# Patient Record
Sex: FEMALE | Race: WHITE | NOT HISPANIC OR LATINO | Employment: FULL TIME | URBAN - METROPOLITAN AREA
[De-identification: names, ages, dates, MRNs, and addresses within clinical notes are randomized per-mention and may not be internally consistent; named-entity substitution may affect disease eponyms.]

---

## 2017-01-02 ENCOUNTER — ALLIED HEALTH/NURSE VISIT (OUTPATIENT)
Dept: FAMILY MEDICINE | Facility: CLINIC | Age: 42
End: 2017-01-02
Payer: COMMERCIAL

## 2017-01-02 DIAGNOSIS — Z48.02 VISIT FOR SUTURE REMOVAL: Primary | ICD-10-CM

## 2017-01-02 PROCEDURE — 99207 ZZC NO CHARGE NURSE ONLY: CPT

## 2017-01-12 ENCOUNTER — OFFICE VISIT (OUTPATIENT)
Dept: DERMATOLOGY | Facility: CLINIC | Age: 42
End: 2017-01-12
Payer: COMMERCIAL

## 2017-01-12 DIAGNOSIS — D48.5 NEOPLASM OF UNCERTAIN BEHAVIOR OF SKIN: Primary | ICD-10-CM

## 2017-01-12 PROCEDURE — 88305 TISSUE EXAM BY PATHOLOGIST: CPT | Mod: 90 | Performed by: DERMATOLOGY

## 2017-01-12 PROCEDURE — 11100 HC BIOPSY SKIN/SUBQ/MUC MEM, SINGLE LESION: CPT | Performed by: DERMATOLOGY

## 2017-01-12 NOTE — MR AVS SNAPSHOT
After Visit Summary   1/12/2017    Elizabeth Goodson    MRN: [id number]           Patient Information     Date Of Birth          1975        Visit Information        Provider Department      1/12/2017 3:15 PM Berta Turner MD [facility]        Today's Diagnoses     Neoplasm of uncertain behavior of skin    -  1       Care Instructions    Wound Care After a Biopsy    What is a skin biopsy?  A skin biopsy allows the doctor to examine a very small piece of tissue under the microscope to determine the diagnosis and the best treatment for the skin condition. A local anesthetic (numbing medicine)  is injected with a very small needle into the skin area to be tested. A small piece of skin is taken from the area. Sometimes a suture (stitch) is used.     What are the risks of a skin biopsy?  I will experience scar, bleeding, swelling, pain, crusting and redness. I may experience incomplete removal or recurrence. Risks of this procedure are excessive bleeding, bruising, infection, nerve damage, numbness, thick (hypertrophic or keloidal) scar and non-diagnostic biopsy.    How should I care for my wound for the first 24 hours?    Keep the wound dry and covered for 24 hours    If it bleeds, hold direct pressure on the area for 15 minutes. If bleeding does not stop then go to the emergency room    Avoid strenuous exercise the first 1-2 days or as your doctor instructs you    How should I care for the wound after 24 hours?    After 24 hours, remove the bandage    You may bathe or shower as normal    If you had a scalp biopsy, you can shampoo as usual and can use shower water to clean the biopsy site daily    Clean the wound twice a day with gentle soap and water    Do not scrub, be gentle    Apply white petroleum/Vaseline after cleaning the wound with a cotton swab or a clean finger, and keep the site covered with a Bandaid /bandage. Bandages are not necessary with a scalp biopsy    If you  are unable to cover the site with a Bandaid /bandage, re-apply ointment 2-3 times a day to keep the site moist. Moisture will help with healing    Avoid strenuous activity for first 1-2 days    Avoid lakes, rivers, pools, and oceans until the stitches are removed or the site is healed    How do I clean my wound?    Wash hands for 15 minutes with soap or use hand  before all wound care    Clean the wound with gentle soap and water    Apply white petroleum/Vaseline  to wound after it is clean    Replace the Bandaid /bandage to keep the wound covered for the first few days or as instructed by your doctor    If you had a scalp biopsy, warm shower water to the area on a daily basis should suffice    What should I use to clean my wound?     Cotton-tipped applicators (Qtips )    White petroleum jelly (Vaseline ). Use a clean new container and use Q-tips to apply.    Bandaids   as needed    Gentle soap     How should I care for my wound long term?    Do not get your wound dirty    Keep up with wound care for one week or until the area is healed.    A small scab will form and fall off by itself when the area is completely healed. The area will be red and will become pink in color as it heals. Sun protection is very important for how your scar will turn out. Sunscreen with an SPF 30 or greater is recommended once the area is healed.    You should have some soreness but it should be mild and slowly go away over several days. Talk to your doctor about using tylenol for pain,    When should I call my doctor?  If you have increased:     Pain or swelling    Pus or drainage (clear or slightly yellow drainage is ok)    Temperature over 100F    Spreading redness or warmth around wound    When will I hear about my results?  The biopsy results can take 2-3 weeks to come back. The clinic will call you with the results, send you a [employer] message, or have you schedule a follow-up clinic or phone time to discuss the results.  Contact our clinics if you do not hear from us in 3 weeks.     Who should I call with questions?    [facility]: [phone]     [facility]: [phone]    For urgent needs outside of business hours call the [facility] at [phone] and ask for the dermatology resident on call            Follow-ups after your visit        Your next 10 appointments already scheduled     Feb 02, 2017  8:30 AM   PROCEDURE with Barbara [facility]agoza MD   [facility]    [address] [address] [place] [address] [phone]            Apr 21, 2017  2:45 PM   Return Visit with Berta Turner MD   [facility])    [address] [place] [address] [phone]              Who to contact     If you have questions or need follow up information about today's clinic visit or your schedule please contact [facility] directly at [phone].  Normal or non-critical lab and imaging results will be communicated to you by [email or web address], letter or phone within 4 business days after the clinic has received the results. If you do not hear from us within 7 days, please contact the clinic through [email or web address] or phone. If you have a critical or abnormal lab result, we will notify you by phone as soon as possible.  Submit refill requests through [employer] or call your pharmacy and they will forward the refill request to us. Please allow 3 business days for your refill to be completed.          Additional Information About Your Visit        [email or web address] Information     [employer] gives you secure access to your electronic health record. If you see a primary care provider, you can also send messages to your care team and make appointments. If you have questions, please call your primary care clinic.  If you do not have a primary care provider, please call [phone] and  they will assist you.      [employer] is an electronic gateway that provides easy, online access to your medical records. With [employer], you can request a clinic appointment, read your test results, renew a prescription or communicate with your care team.     To access your existing account, please contact your [facility] or call [phone] for assistance.        Care EveryWhere ID     This is your Care EveryWhere ID. This could be used by other organizations to access your [facility] medical records  [id number]         Blood Pressure from Last 3 Encounters:   12/06/16 132/90   10/31/16 110/88   10/20/16 134/90    Weight from Last 3 Encounters:   12/06/16 85.049 kg (187 lb 8 oz)   10/31/16 83.008 kg (183 lb)   10/20/16 81.647 kg (180 lb)              We Performed the Following     BIOPSY SKIN/SUBQ/MUC MEM, SINGLE LESION     Surgical pathology exam        Primary Care Provider Office Phone # Fax #    RAÚL Pat [facility] [phone]       [facility] [address] [facility] [address]        Thank you!     Thank you for choosing [facility]  for your care. Our goal is always to provide you with excellent care. Hearing back from our patients is one way we can continue to improve our services. Please take a few minutes to complete the written survey that you may receive in the mail after your visit with us. Thank you!             Your Updated Medication List - Protect others around you: Learn how to safely use, store and throw away your medicines at www.disposemymeds.org.          This list is accurate as of: 1/12/17  3:51 PM.  Always use your most recent med list.                   Brand Name Dispense Instructions for use    albuterol 108 (90 BASE) MCG/ACT Inhaler    PROAIR HFA/PROVENTIL HFA/VENTOLIN HFA    1 Inhaler    Inhale 2 puffs into the lungs every 6 hours as needed for shortness of breath / dyspnea or wheezing       amitriptyline 25 MG  tablet    ELAVIL    135 tablet    Take 2-3 tablets (50-75 mg) by mouth At Bedtime       cyclobenzaprine 10 MG tablet    FLEXERIL    90 tablet    TAKE ONE TABLET BY MOUTH AT BEDTIME       diltiazem 240 MG 24 hr capsule     90 capsule    TAKE ONE CAPSULE BY MOUTH ONCE DAILY       * EPINEPHrine 0.3 MG/0.3ML injection     2 each    Inject 0.3 mLs (0.3 mg) into the muscle once as needed for anaphylaxis       * EPINEPHrine 0.3 MG/0.3ML injection    EPIPEN 2-KIMBERLEE    0.6 mL    Inject 0.3 mLs (0.3 mg) into the muscle once as needed for anaphylaxis       leflunomide 20 MG tablet    ARAVA    30 tablet    Take 1 tablet (20 mg) by mouth daily       levothyroxine 50 MCG tablet    SYNTHROID/LEVOTHROID    90 tablet    Take 1 tablet (50 mcg) by mouth daily       losartan 50 MG tablet    COZAAR    90 tablet    Take 1 tablet (50 mg) by mouth daily       * Notice:  This list has 2 medication(s) that are the same as other medications prescribed for you. Read the directions carefully, and ask your doctor or other care provider to review them with you.

## 2017-01-12 NOTE — PROGRESS NOTES
[facility] Dermatology Note      Dermatology Problem List:  1. BCC, left lower leg  -s/p biopsy 12/20/2016  2. NUB, left knee  -s/p biopsy 1/12/2017    Encounter Date: Jan 12, 2017    CC:  Chief Complaint   Patient presents with     Derm Problem     Lt knee sin spot to be removed         History of Present Illness:  Ms. Elizabeth Goodson is a 41 year old female who presents as a follow up for lesion on the left lower leg. The patient was last seen 12/20/2016 when a lesion on the right knee was identified for recheck and a biopsy performed on the left lower leg. At that time a similar lesion was identified on the left knee. Patient declined biopsy pending results from the left lower leg. Today, the patient reports no other lesions of concern.      Past Medical History:   Patient Active Problem List   Diagnosis     Syncope     Raynaud phenomenon     CARDIOVASCULAR SCREENING; LDL GOAL LESS THAN 160     Strain of neck muscle     Migraine headache     Adnexal mass     Cough with hemoptysis     Incidental lung nodule, less than or equal to 3mm     Need for prophylactic vaccination and inoculation against influenza     Fibromyalgia     HTN, goal below 140/90     H/O: hysterectomy     GERD (gastroesophageal reflux disease)     Edema     High risk medication use     Hypothyroidism     Ventral hernia without obstruction or gangrene     Hemoptysis     Rheumatoid arthritis, involving unspecified site, unspecified rheumatoid factor presence (H)     Penicillin allergy     Peanut allergy     Tree nut allergy     Anaphylaxis, subsequent encounter     Itching     Past Medical History   Diagnosis Date     Tobacco abuse, in remission      quit 2012     Massive hemoptysis 11/12     due to pneumonia     Incidental lung nodule, less than or equal to 3mm 11/12     NICHOLAS     Migraine headache      HTN (hypertension)      Adnexal mass      Hemoptysis 11/12, 11/2015-mild     last episodes, mild, has had massive in HX      Past Surgical History   Procedure Laterality Date     Hysterectomy, vaginal  2002     Cholecystectomy, laporoscopic  2007     Cholecystectomy, Laparoscopic     Excise mass foot  11/22/2013     Procedure: EXCISE MASS FOOT;  Right Foot Soft Tissue Mass Excision;  Surgeon: Jose Cruz Garcia DPM;  [facility]: [facility]     Excise mass foot Right 7/17/2015     Procedure: EXCISE MASS FOOT;  Surgeon: Pierre Adan DPM;  Location: [facility] OR     Laparoscopic herniorrhaphy ventral N/A 2/24/2016     Procedure: LAPAROSCOPIC HERNIORRHAPHY VENTRAL;  Surgeon: Lars Dahl MD;  Location: [facility] OR       Social History:  The patient works as a teacher. The patient denies use of tanning beds. The patient does not drink alcohol, smoke or use tobacco.    Family History:  There is no family history of skin cancer.    Medications:  Current Outpatient Prescriptions   Medication Sig Dispense Refill     albuterol (PROAIR HFA, PROVENTIL HFA, VENTOLIN HFA) 108 (90 BASE) MCG/ACT inhaler Inhale 2 puffs into the lungs every 6 hours as needed for shortness of breath / dyspnea or wheezing 1 Inhaler 1     diltiazem 240 MG 24 hr CD capsule TAKE ONE CAPSULE BY MOUTH ONCE DAILY 90 capsule 1     EPINEPHrine (EPIPEN 2-KIMBERLEE) 0.3 MG/0.3ML injection 2-pack Inject 0.3 mLs (0.3 mg) into the muscle once as needed for anaphylaxis 0.6 mL 1     losartan (COZAAR) 50 MG tablet Take 1 tablet (50 mg) by mouth daily 90 tablet 0     cyclobenzaprine (FLEXERIL) 10 MG tablet TAKE ONE TABLET BY MOUTH AT BEDTIME 90 tablet 1     EPINEPHrine (EPIPEN) 0.3 MG/0.3ML injection Inject 0.3 mLs (0.3 mg) into the muscle once as needed for anaphylaxis 2 each 1     levothyroxine (SYNTHROID, LEVOTHROID) 50 MCG tablet Take 1 tablet (50 mcg) by mouth daily 90 tablet 1     leflunomide (ARAVA) 20 MG tablet Take 1 tablet (20 mg) by mouth daily 30 tablet 2     amitriptyline (ELAVIL) 25 MG tablet Take 2-3 tablets (50-75 mg) by mouth At Bedtime 135 tablet 1       Allergies   Allergen  Reactions     Penicillins Anaphylaxis     Walnuts [Nuts] Anaphylaxis     All tree nuts       Review of Systems:  -Skin: As above in HPI. No additional skin concerns.    Physical exam:  Vitals: There were no vitals taken for this visit.  GEN: This is a well developed, well-nourished female in no acute distress, in a pleasant mood.    SKIN: Focused examination of the left lower leg, left knee and right knee was performed.  -6mm erythematous papule on the left knee.   -right knee normal  -No other lesions of concern on areas examined.     Impression/Plan:  1. BCC, left lower leg, s/p biopsy 12/20/2016    Scheduled with Dr. Zaragoza 2/2/2017  2. Bright red papule with erosion, right knee consistent with inflamed SK-resolved    No further intervention required at this time.   3. Erythematous 6mm papule, left knee. Neoplasm of uncertain behavior. Differential diagnosis includes BCC versus SK versus other  Shave biopsy:  After discussion of benefits and risks including but not limited to bleeding/bruising, pain/swelling, infection, scar, incomplete removal, nerve damage/numbness, recurrence, and non-diagnostic biopsy, written consent, verbal consent and photographs were obtained. Time-out was performed. The area was cleaned with isopropyl alcohol. 0.5 mL of 1% lidocaine with epinephrine was injected to obtain adequate anesthesia of the lesion on the left knee. A shave biopsy was performed. Hemostasis was achieved with aluminium chloride. Vaseline and a sterile dressing were applied. The patient tolerated the procedure and no complications were noted. The patient was provided with verbal and written post care instructions.     Follow up in 6 months for skin check, earlier pending biopsy, earlier for new or changing lesions.     Staff Involved:  Scribe/Staff    Scribe Disclosure:   Portia STARKEY, am serving as a scribe to document services personally performed by Dr. Berta Turner, based on data collection and the provider's  statements to me.     Provider Disclosure:   I agree with above History, Review of Systems, Physical exam and Plan. I have reviewed the content of the documentation and have edited it as needed. I have personally performed the services documented here and the documentation accurately represents those services and the decisions I have made.     Berta Turner MD    Department of Dermatology  [facility]: Phone: [phone], Fax:[phone]  [facility]: Phone: [phone], Fax: [phone]

## 2017-01-12 NOTE — PATIENT INSTRUCTIONS

## 2017-01-12 NOTE — NURSING NOTE
Dermatology Rooming Note    Elizabeth Goodson's goals for this visit include:   Chief Complaint   Patient presents with     Derm Problem     Lt knee sin spot to be removed       Is a scribe okay for this visit:YES,     Are records needed for this visit(If yes, obtain release of information): N/a     Vitals: There were no vitals taken for this visit.    Referring Provider:  No referring provider defined for this encounter.

## 2017-01-12 NOTE — Clinical Note
1/12/2017      RE: Elizabeth Goodson  [address] [address] [place], [place] [address] [address]       [facility] Dermatology Note      Dermatology Problem List:  1. BCC, left lower leg  -s/p biopsy 12/20/2016  2. NUB, left knee  -s/p biopsy 1/12/2017    Encounter Date: Jan 12, 2017    CC:  Chief Complaint   Patient presents with     Derm Problem     Lt knee sin spot to be removed         History of Present Illness:  Ms. Elizabeth Goodson is a 41 year old female who presents as a follow up for lesion on the left lower leg. The patient was last seen 12/20/2016 when a lesion on the right knee was identified for recheck and a biopsy performed on the left lower leg. At that time a similar lesion was identified on the left knee. Patient declined biopsy pending results from the left lower leg. Today, the patient reports no other lesions of concern.      Past Medical History:   Patient Active Problem List   Diagnosis     Syncope     Raynaud phenomenon     CARDIOVASCULAR SCREENING; LDL GOAL LESS THAN 160     Strain of neck muscle     Migraine headache     Adnexal mass     Cough with hemoptysis     Incidental lung nodule, less than or equal to 3mm     Need for prophylactic vaccination and inoculation against influenza     Fibromyalgia     HTN, goal below 140/90     H/O: hysterectomy     GERD (gastroesophageal reflux disease)     Edema     High risk medication use     Hypothyroidism     Ventral hernia without obstruction or gangrene     Hemoptysis     Rheumatoid arthritis, involving unspecified site, unspecified rheumatoid factor presence (H)     Penicillin allergy     Peanut allergy     Tree nut allergy     Anaphylaxis, subsequent encounter     Itching     Past Medical History   Diagnosis Date     Tobacco abuse, in remission      quit 2012     Massive hemoptysis 11/12     due to pneumonia     Incidental lung nodule, less than or equal to 3mm 11/12     NICHOLAS     Migraine headache      HTN (hypertension)      Adnexal  mass      Hemoptysis 11/12, 11/2015-mild     last episodes, mild, has had massive in HX     Past Surgical History   Procedure Laterality Date     Hysterectomy, vaginal  2002     Cholecystectomy, laporoscopic  2007     Cholecystectomy, Laparoscopic     Excise mass foot  11/22/2013     Procedure: EXCISE MASS FOOT;  Right Foot Soft Tissue Mass Excision;  Surgeon: Jose Cruz Garcia DPM;  [facility]     Excise mass foot Right 7/17/2015     Procedure: EXCISE MASS FOOT;  Surgeon: Pierre Adan DPM;  [facility]     Laparoscopic herniorrhaphy ventral N/A 2/24/2016     Procedure: LAPAROSCOPIC HERNIORRHAPHY VENTRAL;  Surgeon: Lars Dahl MD;  Location: [facility] OR       Social History:  The patient works as a teacher. The patient denies use of tanning beds. The patient does not drink alcohol, smoke or use tobacco.    Family History:  There is no family history of skin cancer.    Medications:  Current Outpatient Prescriptions   Medication Sig Dispense Refill     albuterol (PROAIR HFA, PROVENTIL HFA, VENTOLIN HFA) 108 (90 BASE) MCG/ACT inhaler Inhale 2 puffs into the lungs every 6 hours as needed for shortness of breath / dyspnea or wheezing 1 Inhaler 1     diltiazem 240 MG 24 hr CD capsule TAKE ONE CAPSULE BY MOUTH ONCE DAILY 90 capsule 1     EPINEPHrine (EPIPEN 2-KIMBERLEE) 0.3 MG/0.3ML injection 2-pack Inject 0.3 mLs (0.3 mg) into the muscle once as needed for anaphylaxis 0.6 mL 1     losartan (COZAAR) 50 MG tablet Take 1 tablet (50 mg) by mouth daily 90 tablet 0     cyclobenzaprine (FLEXERIL) 10 MG tablet TAKE ONE TABLET BY MOUTH AT BEDTIME 90 tablet 1     EPINEPHrine (EPIPEN) 0.3 MG/0.3ML injection Inject 0.3 mLs (0.3 mg) into the muscle once as needed for anaphylaxis 2 each 1     levothyroxine (SYNTHROID, LEVOTHROID) 50 MCG tablet Take 1 tablet (50 mcg) by mouth daily 90 tablet 1     leflunomide (ARAVA) 20 MG tablet Take 1 tablet (20 mg) by mouth daily 30 tablet 2     amitriptyline (ELAVIL) 25 MG tablet Take  2-3 tablets (50-75 mg) by mouth At Bedtime 135 tablet 1       Allergies   Allergen Reactions     Penicillins Anaphylaxis     Walnuts [Nuts] Anaphylaxis     All tree nuts       Review of Systems:  -Skin: As above in HPI. No additional skin concerns.    Physical exam:  Vitals: There were no vitals taken for this visit.  GEN: This is a well developed, well-nourished female in no acute distress, in a pleasant mood.    SKIN: Focused examination of the left lower leg, left knee and right knee was performed.  -6mm erythematous papule on the left knee.   -right knee normal  -No other lesions of concern on areas examined.     Impression/Plan:  1. BCC, left lower leg, s/p biopsy 12/20/2016    Scheduled with Dr. Zaragoza 2/2/2017  2. Bright red papule with erosion, right knee consistent with inflamed SK-resolved    No further intervention required at this time.   3. Erythematous 6mm papule, left knee. Neoplasm of uncertain behavior. Differential diagnosis includes BCC versus SK versus other  Shave biopsy:  After discussion of benefits and risks including but not limited to bleeding/bruising, pain/swelling, infection, scar, incomplete removal, nerve damage/numbness, recurrence, and non-diagnostic biopsy, written consent, verbal consent and photographs were obtained. Time-out was performed. The area was cleaned with isopropyl alcohol. 0.5 mL of 1% lidocaine with epinephrine was injected to obtain adequate anesthesia of the lesion on the left knee. A shave biopsy was performed. Hemostasis was achieved with aluminium chloride. Vaseline and a sterile dressing were applied. The patient tolerated the procedure and no complications were noted. The patient was provided with verbal and written post care instructions.     Follow up in 6 months for skin check, earlier pending biopsy, earlier for new or changing lesions.     Staff Involved:  Scribe/Staff    Scribe Disclosure:   Portia STARKEY, am serving as a scribe to document services  personally performed by Dr. Berta Turner, based on data collection and the provider's statements to me.     Provider Disclosure:   I agree with above History, Review of Systems, Physical exam and Plan. I have reviewed the content of the documentation and have edited it as needed. I have personally performed the services documented here and the documentation accurately represents those services and the decisions I have made.     Berta Turner MD    Department of Dermatology  [facility]: Phone: [phone], Fax:[phone]  [facility]: Phone: [phone], Fax: [phone]

## 2017-01-16 LAB — COPATH REPORT: NORMAL

## 2017-01-17 ENCOUNTER — TELEPHONE (OUTPATIENT)
Dept: DERMATOLOGY | Facility: CLINIC | Age: 42
End: 2017-01-17

## 2017-01-17 NOTE — TELEPHONE ENCOUNTER
Patient returned call and was scheduled for ED&C with excision appointment on 2-2-2017 with Dr. Zaragoza. Patient verbalized understanding of diagnosis and has chosen to have an ED&C for treatment. Procedure explained to patient and aftercare reviewed. No further questions or concerns per patient at this time.  Columba Mooney LPN

## 2017-01-17 NOTE — TELEPHONE ENCOUNTER
Left message for patient to call [facility] in [place] back at [phone] in regards to results below    Notes Recorded by Maude Bell MD on 1/17/2017 at 11:41 AM  Call patient. Since this is a superficial BCC, It can be treated with Ed and C, excision or aldara. I think Ed and C or aldara would be the last invasive. She is already scheduled for Mohs for another spot.        1/12/17  3:45 PM     Copath Report Patient Name: MATA HARPER   MR#: [id number]   Specimen #:    Collected: 1/12/2017   Received: 1/13/2017   Reported: 1/16/2017 14:29   Ordering Phy(s): MAUDE BELL     For improved result formatting, select 'View Enhanced Report Format'   under Linked Documents section.     SPECIMEN(S):   Skin, left knee     FINAL DIAGNOSIS:   Skin, knee, left:   - Basal cell carcinoma, superficial type, extending to the lateral and   deep margins - (see description)                   Columba Mooney LPN

## 2017-02-02 ENCOUNTER — OFFICE VISIT (OUTPATIENT)
Dept: DERMATOLOGY | Facility: CLINIC | Age: 42
End: 2017-02-02
Payer: COMMERCIAL

## 2017-02-02 VITALS — HEART RATE: 85 BPM | SYSTOLIC BLOOD PRESSURE: 132 MMHG | OXYGEN SATURATION: 97 % | DIASTOLIC BLOOD PRESSURE: 95 MMHG

## 2017-02-02 DIAGNOSIS — C44.719 BASAL CELL CARCINOMA OF LEFT LOWER LEG: Primary | ICD-10-CM

## 2017-02-02 PROCEDURE — 12032 INTMD RPR S/A/T/EXT 2.6-7.5: CPT | Mod: 59 | Performed by: DERMATOLOGY

## 2017-02-02 PROCEDURE — 88305 TISSUE EXAM BY PATHOLOGIST: CPT | Performed by: DERMATOLOGY

## 2017-02-02 PROCEDURE — 17261 DSTRJ MAL LES T/A/L .6-1.0CM: CPT | Performed by: DERMATOLOGY

## 2017-02-02 PROCEDURE — 11602 EXC TR-EXT MAL+MARG 1.1-2 CM: CPT | Mod: 59 | Performed by: DERMATOLOGY

## 2017-02-02 ASSESSMENT — PAIN SCALES - GENERAL: PAINLEVEL: NO PAIN (0)

## 2017-02-02 NOTE — NURSING NOTE
Dermatology Rooming Note    Elizabeth Goodson's goals for this visit include:   Chief Complaint   Patient presents with     Procedure     Excision x 1 - BCC left lower leg and ED&C on knee       Is a scribe okay for this visit:Not applicable,     Are records needed for this visit(If yes, obtain release of information): No,      Vitals: /95 mmHg  Pulse 85  SpO2 97%    Referring Provider:  No referring provider defined for this encounter.      Excision Intake  /95 mmHg  Pulse 85  SpO2 97%    artificial heart valve: No    artificial joint within the last 3 years: No    heart stent in the last 3 months: No    pacemaker: No    defibrillator: No    nerve/brain stimulator: No    bleeding disorder: No    coumadin use: No    heart valve disease: No    site location lower limb or groin: Yes    hepatitis B/C or HIV: No    smoker: No    Iodine allergy: N/A     Filomena Vergara LPN

## 2017-02-02 NOTE — NURSING NOTE
Vaseline and pressure dressing applied to Excision site on left lower leg and ED&C site left knee.  Wound care instructions reviewed with patient and AVS provided.  Patient verbalized understanding.  No further questions or concerns at this time.    Filomena Vergara LPN

## 2017-02-02 NOTE — MR AVS SNAPSHOT
After Visit Summary   2/2/2017    Elizabeth Goodson    MRN: [id number]           Patient Information     Date Of Birth          1975        Visit Information        Provider Department      2/2/2017 8:30 AM Barbara Zaragoza MD [facility]        Today's Diagnoses     Basal cell carcinoma of left lower leg    -  1       Care Instructions    Cryotherapy    What is it?    Use of a very cold liquid, such as liquid nitrogen, to freeze and destroy abnormal skin cells that need to be removed    What should I expect?    Tenderness and redness    A small blister that might grow and fill with dark purple blood. There may be crusting.    More than one treatment may be needed if the lesions do not go away.    How do I care for the treated area?    Gently wash the area with your hands when bathing.    Use a thin layer of Vaseline to help with healing. You may use a Band-Aid.     The area should heal within 7-10 days and may leave behind a pink or lighter color.     Do not use an antibiotic or Neosporin ointment.     You may take acetaminophen (Tylenol) for pain.     Call your Doctor if you have:    Severe pain    Signs of infection (warmth, redness, cloudy yellow drainage, and or a bad smell)    Questions or concerns    Who should I call with questions?       [facility]: [phone]       [facility]: [phone]       For urgent needs outside of business hours call the [facility] at [phone]        and ask for the dermatology resident on call        Wound Care:  Electrodesiccation and Curettage     I will experience scar, altered skin color, bleeding, swelling, pain, crusting and redness. I may experience altered sensation. Risks are excessive bleeding, infection, muscle weakness, thick (hypertrophic or keloidal) scar, and recurrence,. A second procedure may be recommended to obtain the best cosmetic or  functional result.    What is electrodesiccation and curettage ?    Scraping off tissue (curettage)    Destroy tissue using electric current or cautery (electrodessication)    How do I perform wound care?    Keep dressing in place for two days. You may shower with the dressing in place(do not get wet)    After 2 days, wash hands and remove dressing. Clean wound with cotton-swab soaked in hydrogen peroxide to remove drainage and crust    Put on a thick layer of Vaseline on the wound using a cotton-swab     Cover the wound with a Band-AidTM to protect from dust and tight clothing    If wound is draining before two days, change your dressing as described above sooner    During wound care, do not allow the area to dry out or form a scab    What do I need?    Hydrogen peroxide     Cotton-swabs     Vaseline or petroleum jelly     Band-AidsTM or dressing supplies as needed     When should I call the doctor?  [facility]: [phone]  [facility]: [phone]  For urgent needs outside of business hours call the [facility] at [phone] and ask for the dermatology resident on call    Excision Wound Care Instructions  I will experience scar, altered skin color, bleeding, swelling, pain, crusting and redness. I may experience altered sensation. Risks are excessive bleeding, infection, muscle weakness, thick (hypertrophic or keloidal) scar, and recurrence,. A second procedure may be recommended to obtain the best cosmetic or functional result.  Possible complications of any surgical procedure are bleeding, infection, scarring, alteration in skin color and sensation, muscle weakness in the area, wound dehiscence or seperation, or recurrence of the lesion or disease. On occasion, after healing, a secondary procedure or revision may be recommended in order to obtain the best cosmetic or functional result.   After your surgery, a pressure bandage will be  placed over the area that has sutures. This will help prevent bleeding. Please follow these instructions, as they will help you to prevent complications as your wound heals.  The sutures are dissolvable.  For the First 48 hours After Surgery:  1. Leave the pressure bandage on and keep it dry. If it should come loose, you may retape it, but do not take it off.  2. Relax and take it easy. Do not do any vigorous exercise, heavy lifting, or bending forward. This could cause the wound to bleed.  3. Post-operative pain is usually mild. You may take plain or extra strength Tylenol every 4 hours as needed (do not take more than 4,000mg in one day). Do not take any medicine that contains aspirin, ibuprofen or motrin unless you have been recommended these by a doctor.  Avoid alcohol and vitamin E as these may increase your tendency to bleed.  4. You may put an ice pack around the bandaged area for 20 minutes every 2-3 hours. This may help reduce swelling, bruising, and pain. Make sure the ice pack is waterproof so that the pressure bandage does not get wet.   5. You may see a small amount of drainage or blood on your pressure bandage. This is normal. However, if drainage or bleeding continues or saturates the bandage, you will need to apply firm pressure over the bandage with a washcloth for 15 minutes. If bleeding continues after applying pressure for 15 minutes then go to the nearest emergency room.  48 Hours After Surgery  Carefully remove the bandage and start daily wound care and dressing changes. You may also now shower and get the wound wet. Wash wound with a mild soap and water.  Use caution when washing the wound. Be gentle and do not let the forceful shower stream hit the wound directly.  PAT dry.  Daily Wound Care:  1. Wash wound with a mild soap and water.  Use caution when washing the wound, be gentle and do not let the forceful shower stream hit the wound directly.  2. PAT DRY.  3. After the Steri-Strips have  fallen off (5-7days)  Then start to apply Vaseline (from a new container or tube) over the suture line with a Q-tip. It is very important to keep the wound continuously moist, as wounds heal best in a moist environment.  4.  Keep the site covered until healed, you can cover it with a Telfa (non-stick) dressing and tape or a band-aid.    5. If you are unable to keep wound covered, you must apply Vaseline every 2 - 3 hours (while awake) to ensure it is being kept moist for optimal healing. A dressing overnight is recommended to keep the area moist.   Call Us If:  1. You have pain that is not controlled with Tylenol.  2. You have signs or symptoms of an infection, such as: fever over 100 degrees F, redness, warmth, or foul-smelling or yellow/creamy drainage from the wound.  Who should I call with questions?    [facility]: [phone]     [facility]: [phone]    For urgent needs outside of business hours call the [facility] at [phone] and ask for the dermatology resident on call          Follow-ups after your visit        Your next 10 appointments already scheduled     Apr 21, 2017  2:45 PM   Return Visit with Berta Turner MD   [facility]    [address] [place] [id number] [phone]              Who to contact     If you have questions or need follow up information about today's clinic visit or your schedule please contact [facility] directly at [phone].  Normal or non-critical lab and imaging results will be communicated to you by MyChart, letter or phone within 4 business days after the clinic has received the results. If you do not hear from us within 7 days, please contact the clinic through MyChart or phone. If you have a critical or abnormal lab result, we will notify you by phone as soon as possible.  Submit refill requests through [employer] or  call your pharmacy and they will forward the refill request to us. Please allow 3 business days for your refill to be completed.          Additional Information About Your Visit        [employer] Information     [employer] gives you secure access to your electronic health record. If you see a primary care provider, you can also send messages to your care team and make appointments. If you have questions, please call your primary care clinic.  If you do not have a primary care provider, please call [phone] and they will assist you.      [employer] is an electronic gateway that provides easy, online access to your medical records. With [employer], you can request a clinic appointment, read your test results, renew a prescription or communicate with your care team.     To access your existing account, please contact your [facility] or call [phone] for assistance.        Care EveryWhere ID     This is your Care EveryWhere ID. This could be used by other organizations to access your [place] medical records  [id number]        Your Vitals Were     Pulse Pulse Oximetry                85 97%           Blood Pressure from Last 3 Encounters:   02/02/17 132/95   12/06/16 132/90   10/31/16 110/88    Weight from Last 3 Encounters:   12/06/16 85.049 kg (187 lb 8 oz)   10/31/16 83.008 kg (183 lb)   10/20/16 81.647 kg (180 lb)              We Performed the Following     Surgical pathology exam        Primary Care Provider Office Phone # Fax #    [facility] [phone]       [facility] [address], [place] [address]        Thank you!     Thank you for choosing [facility]  for your care. Our goal is always to provide you with excellent care. Hearing back from our patients is one way we can continue to improve our services. Please take a few minutes to complete the written survey that you may receive in the mail after your visit with us.  Thank you!             Your Updated Medication List - Protect others around you: Learn how to safely use, store and throw away your medicines at www.disposemymeds.org.          This list is accurate as of: 2/2/17 10:00 AM.  Always use your most recent med list.                   Brand Name Dispense Instructions for use    albuterol 108 (90 BASE) MCG/ACT Inhaler    PROAIR HFA/PROVENTIL HFA/VENTOLIN HFA    1 Inhaler    Inhale 2 puffs into the lungs every 6 hours as needed for shortness of breath / dyspnea or wheezing       cyclobenzaprine 10 MG tablet    FLEXERIL    90 tablet    TAKE ONE TABLET BY MOUTH AT BEDTIME       diltiazem 240 MG 24 hr capsule     90 capsule    TAKE ONE CAPSULE BY MOUTH ONCE DAILY       * EPINEPHrine 0.3 MG/0.3ML injection     2 each    Inject 0.3 mLs (0.3 mg) into the muscle once as needed for anaphylaxis       * EPINEPHrine 0.3 MG/0.3ML injection    EPIPEN 2-KIMBERLEE    0.6 mL    Inject 0.3 mLs (0.3 mg) into the muscle once as needed for anaphylaxis       leflunomide 20 MG tablet    ARAVA    30 tablet    Take 1 tablet (20 mg) by mouth daily       levothyroxine 50 MCG tablet    SYNTHROID/LEVOTHROID    90 tablet    Take 1 tablet (50 mcg) by mouth daily       losartan 50 MG tablet    COZAAR    90 tablet    Take 1 tablet (50 mg) by mouth daily       * Notice:  This list has 2 medication(s) that are the same as other medications prescribed for you. Read the directions carefully, and ask your doctor or other care provider to review them with you.

## 2017-02-02 NOTE — LETTER
"    Patient:  Mata Goodson  :   1975  MRN:     [id number]        Ms.Jennifer KERVIN Goodson  [address] [address] [place] [id number]        2017    Dear ,      We are writing to inform you of your test results that show your skin cancer was removed completely with clear margins. If you have further questions or concerns, please contact the clinic at [phone].      Sincerely,    Sarahi Zaragoza MD    Dermatology Department of Dermatology  [facility]      Resulted Orders   Surgical pathology exam   Result Value Ref Range    Copath Report       Patient Name: MATA GOODSON  MR#: [id number]  Specimen #:   Collected: 2017  Received: 2/3/2017  Reported: 2017 10:13  Ordering Phy(s): SARAHI ZARAGOZA    For improved result formatting, select 'View Enhanced Report Format'  under Linked Documents section.    SPECIMEN(S):  Skin, left lower leg    FINAL DIAGNOSIS:  Skin, leg, left lower:  - Superficial basal cell carcinoma adjacent to scar from the prior  procedure, completely excised - (see description)    I have personally reviewed all specimens and or slides, including the  listed special stains, and used them with my medical judgement to  determine the final diagnosis.    Electronically signed out by:    Toño Lopez M.D., [facility]    CLINICAL HISTORY:  The patient is a 41-year-old female.    GROSS:  The specimen is received in formalin with proper patient identification,  labeled \"left lower leg\", and consists of a 2.3 x 1.2 cm pale tan skin  ellipse, excised to a depth of 0.3 cm.  No orientation  is provided.  At  the central skin surface is a 0.5 x 0.5 cm ill-defined, variegated  tan-gray to pink, granular lesion which grossly comes to within 0.3 cm  of the nearest margin.  The margins are inked blue, and the specimen is  serially sectioned and entirely submitted as " follows:    Summary of Sections:  1 - skin tips  2-3 - central aspect of skin  (one section in cassette 3 has an incomplete deep margin)    MICROSCOPIC:  The specimen exhibits a central zone of ulcer and dermal fibrosis  consistent with scar from the prior procedure.  Immediately adjacent to  this, residual basal cell carcinoma is present as small buds of atypical  basaloid epithelium along the epidermal undersurface.  This residual  tumor is completely excised.    CPT Codes:  A: 93742-WZ5.T, 45212-GP4.P    TESTING LAB LOCATION:  [facility], [address]   [address]  [phone]    COLLECTION SITE:  Client: [facility]  Location: [facility])

## 2017-02-02 NOTE — PATIENT INSTRUCTIONS
Cryotherapy    What is it?    Use of a very cold liquid, such as liquid nitrogen, to freeze and destroy abnormal skin cells that need to be removed    What should I expect?    Tenderness and redness    A small blister that might grow and fill with dark purple blood. There may be crusting.    More than one treatment may be needed if the lesions do not go away.    How do I care for the treated area?    Gently wash the area with your hands when bathing.    Use a thin layer of Vaseline to help with healing. You may use a Band-Aid.     The area should heal within 7-10 days and may leave behind a pink or lighter color.     Do not use an antibiotic or Neosporin ointment.     You may take acetaminophen (Tylenol) for pain.     Call your Doctor if you have:    Severe pain    Signs of infection (warmth, redness, cloudy yellow drainage, and or a bad smell)    Questions or concerns    Who should I call with questions?       [facility]: [phone]       [facility]: [phone]       For urgent needs outside of business hours call the [facility] at [phone]        and ask for the dermatology resident on call        Wound Care:  Electrodesiccation and Curettage     I will experience scar, altered skin color, bleeding, swelling, pain, crusting and redness. I may experience altered sensation. Risks are excessive bleeding, infection, muscle weakness, thick (hypertrophic or keloidal) scar, and recurrence,. A second procedure may be recommended to obtain the best cosmetic or functional result.    What is electrodesiccation and curettage ?    Scraping off tissue (curettage)    Destroy tissue using electric current or cautery (electrodessication)    How do I perform wound care?    Keep dressing in place for two days. You may shower with the dressing in place(do not get wet)    After 2 days, wash hands and remove dressing. Clean wound with cotton-swab soaked in  hydrogen peroxide to remove drainage and crust    Put on a thick layer of Vaseline on the wound using a cotton-swab     Cover the wound with a Band-AidTM to protect from dust and tight clothing    If wound is draining before two days, change your dressing as described above sooner    During wound care, do not allow the area to dry out or form a scab    What do I need?    Hydrogen peroxide     Cotton-swabs     Vaseline or petroleum jelly     Band-AidsTM or dressing supplies as needed     When should I call the doctor?  [facility]: [phone]  [facility]: [phone]  For urgent needs outside of business hours call the [facility] at [phone] and ask for the dermatology resident on call    Excision Wound Care Instructions  I will experience scar, altered skin color, bleeding, swelling, pain, crusting and redness. I may experience altered sensation. Risks are excessive bleeding, infection, muscle weakness, thick (hypertrophic or keloidal) scar, and recurrence,. A second procedure may be recommended to obtain the best cosmetic or functional result.  Possible complications of any surgical procedure are bleeding, infection, scarring, alteration in skin color and sensation, muscle weakness in the area, wound dehiscence or seperation, or recurrence of the lesion or disease. On occasion, after healing, a secondary procedure or revision may be recommended in order to obtain the best cosmetic or functional result.   After your surgery, a pressure bandage will be placed over the area that has sutures. This will help prevent bleeding. Please follow these instructions, as they will help you to prevent complications as your wound heals.  The sutures are dissolvable.  For the First 48 hours After Surgery:  1. Leave the pressure bandage on and keep it dry. If it should come loose, you may retape it, but do not take it off.  2. Relax and take it easy. Do not  do any vigorous exercise, heavy lifting, or bending forward. This could cause the wound to bleed.  3. Post-operative pain is usually mild. You may take plain or extra strength Tylenol every 4 hours as needed (do not take more than 4,000mg in one day). Do not take any medicine that contains aspirin, ibuprofen or motrin unless you have been recommended these by a doctor.  Avoid alcohol and vitamin E as these may increase your tendency to bleed.  4. You may put an ice pack around the bandaged area for 20 minutes every 2-3 hours. This may help reduce swelling, bruising, and pain. Make sure the ice pack is waterproof so that the pressure bandage does not get wet.   5. You may see a small amount of drainage or blood on your pressure bandage. This is normal. However, if drainage or bleeding continues or saturates the bandage, you will need to apply firm pressure over the bandage with a washcloth for 15 minutes. If bleeding continues after applying pressure for 15 minutes then go to the nearest emergency room.  48 Hours After Surgery  Carefully remove the bandage and start daily wound care and dressing changes. You may also now shower and get the wound wet. Wash wound with a mild soap and water.  Use caution when washing the wound. Be gentle and do not let the forceful shower stream hit the wound directly.  PAT dry.  Daily Wound Care:  1. Wash wound with a mild soap and water.  Use caution when washing the wound, be gentle and do not let the forceful shower stream hit the wound directly.  2. PAT DRY.  3. After the Steri-Strips have fallen off (5-7days)  Then start to apply Vaseline (from a new container or tube) over the suture line with a Q-tip. It is very important to keep the wound continuously moist, as wounds heal best in a moist environment.  4.  Keep the site covered until healed, you can cover it with a Telfa (non-stick) dressing and tape or a band-aid.    5. If you are unable to keep wound covered, you must apply  Vaseline every 2 - 3 hours (while awake) to ensure it is being kept moist for optimal healing. A dressing overnight is recommended to keep the area moist.   Call Us If:  1. You have pain that is not controlled with Tylenol.  2. You have signs or symptoms of an infection, such as: fever over 100 degrees F, redness, warmth, or foul-smelling or yellow/creamy drainage from the wound.  Who should I call with questions?    [facility]: [phone]     [facility]: [phone]    For urgent needs outside of business hours call the [facility] at [phone] and ask for the dermatology resident on call

## 2017-02-02 NOTE — LETTER
2/2/2017       RE: Elizabeth Goodson  [address] [address] [place] [address]  [place] [address]     Dear Colleague,    Thank you for referring your patient, Elizabteh Goodson, to the [email or web address] at [facility]. Please see a copy of my visit note below.    DERMATOLOGY EXCISION PROCEDURE NOTE (1 of 2)    Dermatology Problem List:  1.NMSC:  -  BCC, left lower leg, s/p exc 2/2/17  - BCC, L  Knee, s/p ED&C 2/2/17    NAME OF PROCEDURE: Excision intermediate layered linear closure  Staff surgeon: Barbara Zaragoza MD    PRE-OPERATIVE DIAGNOSIS:  Basal Cell Carcinoma  POST-OPERATIVE DIAGNOSIS: Same   FINAL EXCISION SIZE(DEFECT SIZE): 1.6 cm, with 4 mm margin   FINAL REPAIR LENGTH: 3.2 cm     INDICATIONS: This patient presented with a 0.8cm Basal Cell Carcinoma of the L lower leg. Excision was indicated. We discussed the principles of treatment and most likely complications including scarring, bleeding, infection, incomplete excision, wound dehiscence, pain, nerve damage, and recurrence. Informed consent was obtained and the patient underwent the procedure as follows:    PROCEDURE: The patient was taken to the operative suite. Time-out was performed.  The treatment area was anesthetized with 1% lidocaine with epinephrine. The area was prepped with Chlorhexidine and rinsed with sterile saline and draped with sterile towels. The lesion was delineated and excised down to subcutaneous fat in a fusiform manner. Hemostasis was obtained by electrocoagulation.     REPAIR: An intermediate layered linear closure was selected as the procedure which would maximally preserve both function and cosmesis.    After the excision of the tumor, the area was carefully undermined. Hemostasis was obtained with spot electrocoagulation.  Closure was oriented so that the wound was in the patient's natural skin tension lines. The subcutaneous and dermal layers were then closed with 4-0 vicryl sutures. The  epidermis was then carefully approximated along the length of the wound using 4-0 monocryl running subcuticular sutures.     The final wound length was 3.2 cm. A total of 8 ml of anesthesia was administered for all surgical sites. Estimated blood loss was less than 10 ml for all surgical sites. A sterile pressure dressing was applied and wound care instructions, with a written handout, were given. The [facility] and ambulatory.    Dr. Zaragoza was immediately available for the entire surgery and was physicially present for the key portions of the procedure.    Anatomic Pathology Results: pending    Clinical Follow-Up: 6 mo    Staff Involved:  Staff Only    Dermatology Procedure Note (2 of 2): Electrodesiccation and Curettage    PREOPERATIVE DIAGNOSIS: BCC    POSTOPERATIVE DIAGNOSIS: same    LOCATION: L knee    SIZE: 0.8 cm     Treatment options including electrodessiccation and curettage (ED and C), excision and topicals were reviewed.  The expected cure rates, healing times and anticipated scars of each option were discussed and the patient elects to proceed with ED and C.     The risks and benefits of the procedure were described to the patient.  These include but are not limited to bleeding, infection, scar, incomplete removal, and recurrence. Written informed consent was obtained. Time-out was performed. The above site was cleansed with and injected with 6mL1% lidocaine with epinephrine. Once anesthesia was obtained, the site was prepped with Chlorhexidine and rinsed with sterile saline. The lesion was curetted with  in 3 directions with a 3mm margin and this was followed by electrodessication.  This process was repeated three times. The defect measured 1.4cm. Vaseline and a bandage were applied to the wound. The patient tolerated the procedure well and was given post care instructions.    Clinical Follow-up: 6 mo, sooner prn    Dr. Zaragoza staffed the patient.      Staff Involved:  Staff Only    Barbara Zaragoza MD    Department of Dermatology  [facility]

## 2017-02-07 LAB — COPATH REPORT: NORMAL

## 2017-02-13 ENCOUNTER — TELEPHONE (OUTPATIENT)
Dept: FAMILY MEDICINE | Facility: CLINIC | Age: 42
End: 2017-02-13

## 2017-02-13 DIAGNOSIS — I10 ESSENTIAL HYPERTENSION WITH GOAL BLOOD PRESSURE LESS THAN 140/90: Primary | ICD-10-CM

## 2017-02-13 NOTE — TELEPHONE ENCOUNTER
Losartan       Last Written Prescription Date: 10/7/16  Last Fill Quantity: 90, # refills: 0  Last Office Visit with G, P or [facility] prescribing provider: 10/31/16  Next 5 appointments (look out 90 days)     Apr 21, 2017  2:45 PM CDT   Return Visit with Berta Turner MD   [facility]    [address] [place] [id number] [phone]                   Potassium   Date Value Ref Range Status   02/22/2016 4.0 3.4 - 5.3 mmol/L Final     Creatinine   Date Value Ref Range Status   02/22/2016 0.59 0.52 - 1.04 mg/dL Final     BP Readings from Last 3 Encounters:   02/02/17 (!) 132/95   12/06/16 132/90   10/31/16 110/88

## 2017-02-15 RX ORDER — LOSARTAN POTASSIUM 50 MG/1
TABLET ORAL
Qty: 30 TABLET | Refills: 0 | Status: SHIPPED | OUTPATIENT
Start: 2017-02-15 | End: 2017-03-20

## 2017-02-15 NOTE — TELEPHONE ENCOUNTER
Medication is being filled for 1 time refill only due to:  Future labs ordered BMP. Also needs bp check with MA in clinic. Please call to schedule.    Keri White, RN, BSN

## 2017-02-16 NOTE — PROGRESS NOTES
DERMATOLOGY EXCISION PROCEDURE NOTE (1 of 2)    Dermatology Problem List:  1.NMSC:  -  BCC, left lower leg, s/p exc 2/2/17  - BCC, L  Knee, s/p ED&C 2/2/17    NAME OF PROCEDURE: Excision intermediate layered linear closure  Staff surgeon: Barbara Zaragoza MD    PRE-OPERATIVE DIAGNOSIS:  Basal Cell Carcinoma  POST-OPERATIVE DIAGNOSIS: Same   FINAL EXCISION SIZE(DEFECT SIZE): 1.6 cm, with 4 mm margin   FINAL REPAIR LENGTH: 3.2 cm     INDICATIONS: This patient presented with a 0.8cm Basal Cell Carcinoma of the L lower leg. Excision was indicated. We discussed the principles of treatment and most likely complications including scarring, bleeding, infection, incomplete excision, wound dehiscence, pain, nerve damage, and recurrence. Informed consent was obtained and the patient underwent the procedure as follows:    PROCEDURE: The patient was taken to the operative suite. Time-out was performed.  The treatment area was anesthetized with 1% lidocaine with epinephrine. The area was prepped with Chlorhexidine and rinsed with sterile saline and draped with sterile towels. The lesion was delineated and excised down to subcutaneous fat in a fusiform manner. Hemostasis was obtained by electrocoagulation.     REPAIR: An intermediate layered linear closure was selected as the procedure which would maximally preserve both function and cosmesis.    After the excision of the tumor, the area was carefully undermined. Hemostasis was obtained with spot electrocoagulation.  Closure was oriented so that the wound was in the patient's natural skin tension lines. The subcutaneous and dermal layers were then closed with 4-0 vicryl sutures. The epidermis was then carefully approximated along the length of the wound using 4-0 monocryl running subcuticular sutures.     The final wound length was 3.2 cm. A total of 8 ml of anesthesia was administered for all surgical sites. Estimated blood loss was less than 10 ml for all surgical sites. A  sterile pressure dressing was applied and wound care instructions, with a written handout, were given. The patient was discharged from the [facility] alert and ambulatory.    Dr. Zaragoza was immediately available for the entire surgery and was physicially present for the key portions of the procedure.    Anatomic Pathology Results: pending    Clinical Follow-Up: 6 mo    Staff Involved:  Staff Only    Dermatology Procedure Note (2 of 2): Electrodesiccation and Curettage    PREOPERATIVE DIAGNOSIS: BCC    POSTOPERATIVE DIAGNOSIS: same    LOCATION: L knee    SIZE: 0.8 cm     Treatment options including electrodessiccation and curettage (ED and C), excision and topicals were reviewed.  The expected cure rates, healing times and anticipated scars of each option were discussed and the patient elects to proceed with ED and C.     The risks and benefits of the procedure were described to the patient.  These include but are not limited to bleeding, infection, scar, incomplete removal, and recurrence. Written informed consent was obtained. Time-out was performed. The above site was cleansed with and injected with 6mL1% lidocaine with epinephrine. Once anesthesia was obtained, the site was prepped with Chlorhexidine and rinsed with sterile saline. The lesion was curetted with  in 3 directions with a 3mm margin and this was followed by electrodessication.  This process was repeated three times. The defect measured 1.4cm. Vaseline and a bandage were applied to the wound. The patient tolerated the procedure well and was given post care instructions.    Clinical Follow-up: 6 mo, sooner prn    Dr. Zaragoza staffed the patient.     Staff Involved:  Staff Only    Barbara Zaragoza MD    Department of Dermatology  [facility]

## 2017-03-20 ENCOUNTER — OFFICE VISIT (OUTPATIENT)
Dept: FAMILY MEDICINE | Facility: CLINIC | Age: 42
End: 2017-03-20
Payer: COMMERCIAL

## 2017-03-20 ENCOUNTER — HOSPITAL ENCOUNTER (OUTPATIENT)
Dept: CT IMAGING | Facility: CLINIC | Age: 42
Discharge: HOME | End: 2017-03-20
Attending: NURSE PRACTITIONER | Admitting: NURSE PRACTITIONER
Payer: COMMERCIAL

## 2017-03-20 ENCOUNTER — RADIANT APPOINTMENT (OUTPATIENT)
Dept: GENERAL RADIOLOGY | Facility: CLINIC | Age: 42
End: 2017-03-20
Attending: NURSE PRACTITIONER
Payer: COMMERCIAL

## 2017-03-20 ENCOUNTER — MYC MEDICAL ADVICE (OUTPATIENT)
Dept: FAMILY MEDICINE | Facility: CLINIC | Age: 42
End: 2017-03-20

## 2017-03-20 VITALS — TEMPERATURE: 98.2 F | HEART RATE: 88 BPM | DIASTOLIC BLOOD PRESSURE: 102 MMHG | SYSTOLIC BLOOD PRESSURE: 122 MMHG

## 2017-03-20 DIAGNOSIS — I10 HTN, GOAL BELOW 140/90: ICD-10-CM

## 2017-03-20 DIAGNOSIS — R07.89 ATYPICAL CHEST PAIN: Primary | ICD-10-CM

## 2017-03-20 DIAGNOSIS — E27.8 ADRENAL MASS, LEFT (H): ICD-10-CM

## 2017-03-20 DIAGNOSIS — R07.89 ATYPICAL CHEST PAIN: ICD-10-CM

## 2017-03-20 DIAGNOSIS — E03.9 HYPOTHYROIDISM, UNSPECIFIED TYPE: ICD-10-CM

## 2017-03-20 DIAGNOSIS — K21.9 GASTROESOPHAGEAL REFLUX DISEASE, ESOPHAGITIS PRESENCE NOT SPECIFIED: ICD-10-CM

## 2017-03-20 DIAGNOSIS — I10 ESSENTIAL HYPERTENSION WITH GOAL BLOOD PRESSURE LESS THAN 140/90: ICD-10-CM

## 2017-03-20 DIAGNOSIS — I10 ESSENTIAL HYPERTENSION: ICD-10-CM

## 2017-03-20 DIAGNOSIS — Z20.828 EXPOSURE TO INFLUENZA: ICD-10-CM

## 2017-03-20 DIAGNOSIS — M79.7 FIBROMYALGIA: ICD-10-CM

## 2017-03-20 DIAGNOSIS — Z87.898 HISTORY OF HEMOPTYSIS: ICD-10-CM

## 2017-03-20 LAB
ANION GAP SERPL CALCULATED.3IONS-SCNC: 3 MMOL/L (ref 3–14)
BASOPHILS # BLD AUTO: 0 10E9/L (ref 0–0.2)
BASOPHILS NFR BLD AUTO: 0.3 %
BUN SERPL-MCNC: 9 MG/DL (ref 7–30)
CALCIUM SERPL-MCNC: 9.1 MG/DL (ref 8.5–10.1)
CHLORIDE SERPL-SCNC: 105 MMOL/L (ref 94–109)
CO2 SERPL-SCNC: 32 MMOL/L (ref 20–32)
CREAT SERPL-MCNC: 0.67 MG/DL (ref 0.52–1.04)
DIFFERENTIAL METHOD BLD: NORMAL
EOSINOPHIL # BLD AUTO: 0.2 10E9/L (ref 0–0.7)
EOSINOPHIL NFR BLD AUTO: 2.2 %
ERYTHROCYTE [DISTWIDTH] IN BLOOD BY AUTOMATED COUNT: 13.7 % (ref 10–15)
FLUAV+FLUBV AG SPEC QL: NEGATIVE
FLUAV+FLUBV AG SPEC QL: NEGATIVE
GFR SERPL CREATININE-BSD FRML MDRD: NORMAL ML/MIN/1.7M2
GLUCOSE SERPL-MCNC: 99 MG/DL (ref 70–99)
HCT VFR BLD AUTO: 41.5 % (ref 35–47)
HGB BLD-MCNC: 13.8 G/DL (ref 11.7–15.7)
LYMPHOCYTES # BLD AUTO: 1.5 10E9/L (ref 0.8–5.3)
LYMPHOCYTES NFR BLD AUTO: 17.2 %
MCH RBC QN AUTO: 30.5 PG (ref 26.5–33)
MCHC RBC AUTO-ENTMCNC: 33.3 G/DL (ref 31.5–36.5)
MCV RBC AUTO: 92 FL (ref 78–100)
MONOCYTES # BLD AUTO: 0.6 10E9/L (ref 0–1.3)
MONOCYTES NFR BLD AUTO: 6.4 %
NEUTROPHILS # BLD AUTO: 6.6 10E9/L (ref 1.6–8.3)
NEUTROPHILS NFR BLD AUTO: 73.9 %
PLATELET # BLD AUTO: 389 10E9/L (ref 150–450)
POTASSIUM SERPL-SCNC: 4.1 MMOL/L (ref 3.4–5.3)
RBC # BLD AUTO: 4.53 10E12/L (ref 3.8–5.2)
SODIUM SERPL-SCNC: 140 MMOL/L (ref 133–144)
SPECIMEN SOURCE: NORMAL
TROPONIN I SERPL-MCNC: NORMAL UG/L (ref 0–0.04)
WBC # BLD AUTO: 8.9 10E9/L (ref 4–11)

## 2017-03-20 PROCEDURE — 84484 ASSAY OF TROPONIN QUANT: CPT | Performed by: NURSE PRACTITIONER

## 2017-03-20 PROCEDURE — 87804 INFLUENZA ASSAY W/OPTIC: CPT | Performed by: NURSE PRACTITIONER

## 2017-03-20 PROCEDURE — 25500064 ZZH RX 255 OP 636: Performed by: NURSE PRACTITIONER

## 2017-03-20 PROCEDURE — 93000 ELECTROCARDIOGRAM COMPLETE: CPT | Performed by: NURSE PRACTITIONER

## 2017-03-20 PROCEDURE — 25000125 ZZHC RX 250: Performed by: NURSE PRACTITIONER

## 2017-03-20 PROCEDURE — 71260 CT THORAX DX C+: CPT

## 2017-03-20 PROCEDURE — 99214 OFFICE O/P EST MOD 30 MIN: CPT | Performed by: NURSE PRACTITIONER

## 2017-03-20 PROCEDURE — 71020 XR CHEST 2 VW: CPT

## 2017-03-20 PROCEDURE — 36415 COLL VENOUS BLD VENIPUNCTURE: CPT | Performed by: NURSE PRACTITIONER

## 2017-03-20 PROCEDURE — 80048 BASIC METABOLIC PNL TOTAL CA: CPT | Performed by: NURSE PRACTITIONER

## 2017-03-20 PROCEDURE — 85025 COMPLETE CBC W/AUTO DIFF WBC: CPT | Performed by: NURSE PRACTITIONER

## 2017-03-20 RX ORDER — DILTIAZEM HYDROCHLORIDE 240 MG/1
CAPSULE, COATED, EXTENDED RELEASE ORAL
Qty: 90 CAPSULE | Refills: 1 | Status: SHIPPED | OUTPATIENT
Start: 2017-03-20 | End: 2017-09-25

## 2017-03-20 RX ORDER — LEVOTHYROXINE SODIUM 50 UG/1
50 TABLET ORAL DAILY
Qty: 90 TABLET | Refills: 1 | Status: SHIPPED | OUTPATIENT
Start: 2017-03-20 | End: 2017-04-18 | Stop reason: DRUGHIGH

## 2017-03-20 RX ORDER — IOPAMIDOL 755 MG/ML
500 INJECTION, SOLUTION INTRAVASCULAR ONCE
Status: COMPLETED | OUTPATIENT
Start: 2017-03-20 | End: 2017-03-20

## 2017-03-20 RX ORDER — CYCLOBENZAPRINE HCL 10 MG
TABLET ORAL
Qty: 90 TABLET | Refills: 1 | Status: SHIPPED | OUTPATIENT
Start: 2017-03-20 | End: 2017-09-26

## 2017-03-20 RX ORDER — METOPROLOL SUCCINATE 25 MG/1
25 TABLET, EXTENDED RELEASE ORAL DAILY
Qty: 30 TABLET | Refills: 1 | Status: SHIPPED | OUTPATIENT
Start: 2017-03-20 | End: 2017-06-28

## 2017-03-20 RX ORDER — LOSARTAN POTASSIUM 50 MG/1
50 TABLET ORAL DAILY
Qty: 90 TABLET | Refills: 2 | Status: SHIPPED | OUTPATIENT
Start: 2017-03-20 | End: 2017-04-18 | Stop reason: DRUGHIGH

## 2017-03-20 RX ADMIN — IOPAMIDOL 75 ML: 755 INJECTION, SOLUTION INTRAVENOUS at 13:51

## 2017-03-20 RX ADMIN — SODIUM CHLORIDE 75 ML: 9 INJECTION, SOLUTION INTRAVENOUS at 13:51

## 2017-03-20 ASSESSMENT — PAIN SCALES - GENERAL: PAINLEVEL: MILD PAIN (2)

## 2017-03-20 NOTE — MR AVS SNAPSHOT
After Visit Summary   3/20/2017    Elizabeth Goodson    MRN: [id number]           Patient Information     Date Of Birth          1975        Visit Information        Provider Department      3/20/2017 11:20 AM Keri Yu APRN CNP [facility]        Today's Diagnoses     Atypical chest pain    -  1    HTN, goal below 140/90        Exposure to influenza        Gastroesophageal reflux disease, esophagitis presence not specified        History of hemoptysis        Fibromyalgia        Essential hypertension with goal blood pressure less than 140/90        Essential hypertension        Hypothyroidism, unspecified type        Adrenal mass, left (H)           Follow-ups after your visit        Your next 10 appointments already scheduled     Apr 21, 2017  2:45 PM CDT   Return Visit with Berta Turner MD   [facility]    [address] [place] [id number] [phone]              Future tests that were ordered for you today     Open Future Orders        Priority Expected Expires Ordered    CT Abdomen w Contrast Routine  3/20/2018 3/20/2017    CT Chest w Contrast Routine  3/20/2018 3/20/2017            Who to contact     If you have questions or need follow up information about today's clinic visit or your schedule please contact [facility] directly at [phone].  Normal or non-critical lab and imaging results will be communicated to you by MyChart, letter or phone within 4 business days after the clinic has received the results. If you do not hear from us within 7 days, please contact the clinic through MyChart or phone. If you have a critical or abnormal lab result, we will notify you by phone as soon as possible.  Submit refill requests through [employer] or call your pharmacy and they will forward the refill request to us. Please allow 3 business days for your refill to be completed.          Additional  Information About Your Visit        [facility] Information     [employer] gives you secure access to your electronic health record. If you see a primary care provider, you can also send messages to your care team and make appointments. If you have questions, please call your primary care clinic.  If you do not have a primary care provider, please call [phone] and they will assist you.        Care EveryWhere ID     This is your Care EveryWhere ID. This could be used by other organizations to access your [facility] [place] medical records  [id number]        Your Vitals Were     Pulse Temperature                88 98.2  F (36.8  C) (Oral)           Blood Pressure from Last 3 Encounters:   03/20/17 (!) 122/102   02/02/17 (!) 132/95   12/06/16 132/90    Weight from Last 3 Encounters:   12/06/16 187 lb 8 oz (85 kg)   10/31/16 183 lb (83 kg)   10/20/16 180 lb (81.6 kg)              We Performed the Following     Basic metabolic panel     CBC with platelets and differential     EKG 12-lead complete w/read - Clinics     Influenza A/B antigen     Troponin I          Today's Medication Changes          These changes are accurate as of: 3/20/17  6:13 PM.  If you have any questions, ask your nurse or doctor.               Start taking these medicines.        Dose/Directions    metoprolol 25 MG 24 hr tablet   Commonly known as:  TOPROL-XL   Used for:  HTN, goal below 140/90   Started by:  Keri Yu APRN CNP        Dose:  25 mg   Take 1 tablet (25 mg) by mouth daily   Quantity:  30 tablet   Refills:  1         These medicines have changed or have updated prescriptions.        Dose/Directions    cyclobenzaprine 10 MG tablet   Commonly known as:  FLEXERIL   This may have changed:  See the new instructions.   Used for:  Fibromyalgia   Changed by:  Keri Yu APRN CNP        TAKE ONE TABLET BY MOUTH AT BEDTIME   Quantity:  90 tablet   Refills:  1       diltiazem 240 MG 24 hr capsule   This may have changed:  See the new instructions.    Used for:  Essential hypertension   Changed by:  Keri Yu APRN CNP        TAKE ONE CAPSULE BY MOUTH ONCE DAILY   Quantity:  90 capsule   Refills:  1       losartan 50 MG tablet   Commonly known as:  COZAAR   This may have changed:  See the new instructions.   Used for:  Essential hypertension with goal blood pressure less than 140/90   Changed by:  Keri Yu APRN CNP        Dose:  50 mg   Take 1 tablet (50 mg) by mouth daily   Quantity:  90 tablet   Refills:  2            Where to get your medicines      These medications were sent to [id number] - [place], [place] - [address]  [address] [facility] [address]    Hours:  test Rx sent successfully 12/26/02  KR Phone:  [phone]     cyclobenzaprine 10 MG tablet    diltiazem 240 MG 24 hr capsule    levothyroxine 50 MCG tablet    losartan 50 MG tablet    metoprolol 25 MG 24 hr tablet                Primary Care Provider Office Phone # Fax #    RAÚL Pat [phone]       [facility] [address] [address] [address]        Thank you!     Thank you for choosing [facility]  for your care. Our goal is always to provide you with excellent care. Hearing back from our patients is one way we can continue to improve our services. Please take a few minutes to complete the written survey that you may receive in the mail after your visit with us. Thank you!             Your Updated Medication List - Protect others around you: Learn how to safely use, store and throw away your medicines at www.disposemymeds.org.          This list is accurate as of: 3/20/17  6:13 PM.  Always use your most recent med list.                   Brand Name Dispense Instructions for use    albuterol 108 (90 BASE) MCG/ACT Inhaler    PROAIR HFA/PROVENTIL HFA/VENTOLIN HFA    1 Inhaler    Inhale 2 puffs into the lungs every 6 hours as needed for shortness of breath / dyspnea or wheezing       cyclobenzaprine 10 MG  tablet    FLEXERIL    90 tablet    TAKE ONE TABLET BY MOUTH AT BEDTIME       diltiazem 240 MG 24 hr capsule     90 capsule    TAKE ONE CAPSULE BY MOUTH ONCE DAILY       * EPINEPHrine 0.3 MG/0.3ML injection     2 each    Inject 0.3 mLs (0.3 mg) into the muscle once as needed for anaphylaxis       * EPINEPHrine 0.3 MG/0.3ML injection    EPIPEN 2-KIMBERLEE    0.6 mL    Inject 0.3 mLs (0.3 mg) into the muscle once as needed for anaphylaxis       leflunomide 20 MG tablet    ARAVA    30 tablet    Take 1 tablet (20 mg) by mouth daily       levothyroxine 50 MCG tablet    SYNTHROID/LEVOTHROID    90 tablet    Take 1 tablet (50 mcg) by mouth daily       losartan 50 MG tablet    COZAAR    90 tablet    Take 1 tablet (50 mg) by mouth daily       metoprolol 25 MG 24 hr tablet    TOPROL-XL    30 tablet    Take 1 tablet (25 mg) by mouth daily       * Notice:  This list has 2 medication(s) that are the same as other medications prescribed for you. Read the directions carefully, and ask your doctor or other care provider to review them with you.

## 2017-03-20 NOTE — PROGRESS NOTES
SUBJECTIVE:                                                    Elizabeth Goodson is a 42 year old female who presents to clinic today for the following health issues:    Patient reports experiencing intermittent chest pain sicne yesterday. She describes that the chest pain start on left anterior chest and wraps around to her back. She notes it does not feel like heartburn. She denies shortness of breath, cough and fever. Patient relates when she inhales, she feels a burning in her chest. She was dizzy yesterday, but not today. Patient did not sleep well as she set her alarm for every two hours to check if she was still okay. During sleep, she was uncomfortable, tossing and turning. She denies issues with anxiety. Patient is unsure why her BP is elevated today (165/120). Patient reports she had an episode of right sided chest pain last year and had an x-ray done. She denies any injury to the chest, and notes pain is not exacerbated with movement. Patient relates her daughter recently had influenza, she denies any influenza like symptoms including sore throat. She was administered influenza vaccine in the fall. She is very worried about the chest pain due to family history of CAD.     She reports she has been experiencing more frequent headaches.     Patient has been taking 1 tablet OTC nexium daily, and 2 tablets TUMS for heartburn. The heartburn is worst at night.     Patient is willing to switch antihypertensive medications. PMHX: She is allergic to Norvasc.     Problem list and histories reviewed & adjusted, as indicated.  Additional history: as documented    Patient Active Problem List   Diagnosis     Syncope     Raynaud phenomenon     CARDIOVASCULAR SCREENING; LDL GOAL LESS THAN 160     Strain of neck muscle     Migraine headache     Cough with hemoptysis     Incidental lung nodule, less than or equal to 3mm     Need for prophylactic vaccination and inoculation against influenza     Fibromyalgia     HTN,  goal below 140/90     H/O: hysterectomy     GERD (gastroesophageal reflux disease)     Edema     High risk medication use     Hypothyroidism     Ventral hernia without obstruction or gangrene     Hemoptysis     Rheumatoid arthritis, involving unspecified site, unspecified rheumatoid factor presence (H)     Penicillin allergy     Peanut allergy     Tree nut allergy     Anaphylaxis, subsequent encounter     Itching     History of hemoptysis     Atypical chest pain     Past Surgical History   Procedure Laterality Date     Hysterectomy, vaginal  2002     Cholecystectomy, laporoscopic  2007     Cholecystectomy, Laparoscopic     Excise mass foot  11/22/2013     Procedure: EXCISE MASS FOOT;  Right Foot Soft Tissue Mass Excision;  Surgeon: Jose Cruz Garcia DPM;  Location: [facility] OR     Excise mass foot Right 7/17/2015     Procedure: EXCISE MASS FOOT;  Surgeon: Pierre Adan DPM;  Location: [facility] OR     Laparoscopic herniorrhaphy ventral N/A 2/24/2016     Procedure: LAPAROSCOPIC HERNIORRHAPHY VENTRAL;  Surgeon: Lars Dahl MD;  Location: [facility] OR       Social History   Substance Use Topics     Smoking status: Former Smoker     Types: Cigarettes     Quit date: 10/1/2012     Smokeless tobacco: Never Used     Alcohol use 0.0 oz/week     0 Standard drinks or equivalent per week      Comment: rarely, 1-2 per month     Family History   Problem Relation Age of Onset     Asthma Father      C.A.D. Father      DIABETES No family hx of      Hypertension No family hx of      CEREBROVASCULAR DISEASE No family hx of      Breast Cancer No family hx of      Cancer - colorectal No family hx of      Prostate Cancer No family hx of      Alcohol/Drug No family hx of      Allergies No family hx of      Alzheimer Disease No family hx of      Anesthesia Reaction No family hx of      Arthritis No family hx of      Blood Disease No family hx of      CANCER No family hx of      Cardiovascular No family hx of      Circulatory No family hx of       Congenital Anomalies No family hx of      Connective Tissue Disorder No family hx of      Neurologic Disorder No family hx of      Depression No family hx of      Endocrine Disease No family hx of      Eye Disorder No family hx of      Genetic Disorder No family hx of      GASTROINTESTINAL DISEASE No family hx of      Genitourinary Problems No family hx of      Gynecology No family hx of      HEART DISEASE No family hx of      Lipids No family hx of      Musculoskeletal Disorder No family hx of      Obesity No family hx of      OSTEOPOROSIS No family hx of      Psychotic Disorder No family hx of      Respiratory No family hx of      Hearing Loss No family hx of      Family History Negative No family hx of      Thyroid Disease No family hx of      Colon Cancer No family hx of      Hyperlipidemia No family hx of      Coronary Artery Disease No family hx of      Other Cancer No family hx of          Current Outpatient Prescriptions   Medication Sig Dispense Refill     metoprolol (TOPROL-XL) 25 MG 24 hr tablet Take 1 tablet (25 mg) by mouth daily 30 tablet 1     cyclobenzaprine (FLEXERIL) 10 MG tablet TAKE ONE TABLET BY MOUTH AT BEDTIME 90 tablet 1     losartan (COZAAR) 50 MG tablet Take 1 tablet (50 mg) by mouth daily 90 tablet 2     diltiazem 240 MG 24 hr capsule TAKE ONE CAPSULE BY MOUTH ONCE DAILY 90 capsule 1     levothyroxine (SYNTHROID/LEVOTHROID) 50 MCG tablet Take 1 tablet (50 mcg) by mouth daily 90 tablet 1     [DISCONTINUED] losartan (COZAAR) 50 MG tablet TAKE ONE TABLET BY MOUTH ONCE DAILY 30 tablet 0     albuterol (PROAIR HFA, PROVENTIL HFA, VENTOLIN HFA) 108 (90 BASE) MCG/ACT inhaler Inhale 2 puffs into the lungs every 6 hours as needed for shortness of breath / dyspnea or wheezing 1 Inhaler 1     [DISCONTINUED] diltiazem 240 MG 24 hr CD capsule TAKE ONE CAPSULE BY MOUTH ONCE DAILY 90 capsule 1     EPINEPHrine (EPIPEN 2-KIMBERLEE) 0.3 MG/0.3ML injection 2-pack Inject 0.3 mLs (0.3 mg) into the muscle once as  needed for anaphylaxis 0.6 mL 1     EPINEPHrine (EPIPEN) 0.3 MG/0.3ML injection Inject 0.3 mLs (0.3 mg) into the muscle once as needed for anaphylaxis 2 each 1     [DISCONTINUED] levothyroxine (SYNTHROID, LEVOTHROID) 50 MCG tablet Take 1 tablet (50 mcg) by mouth daily 90 tablet 1     leflunomide (ARAVA) 20 MG tablet Take 1 tablet (20 mg) by mouth daily 30 tablet 2       Reviewed and updated as needed this visit by clinical staff  Allergies  Meds  Problems       Reviewed and updated as needed this visit by Provider  Allergies  Meds  Problems         ROS:  Constitutional, neuro, ENT, endocrine, pulmonary, cardiac, gastrointestinal, genitourinary, musculoskeletal, integument and psychiatric systems are negative, except as otherwise noted.    This document serves as a record of the services and decisions personally performed and made by Keri Yu DNP. It was created on her behalf by Kathie Beard, a trained medical scribe. The creation of this document is based on the provider's statements to the medical scribe.  Kathie Beard 11:36 AM March 20, 2017    OBJECTIVE:                                                    BP (!) 122/102  Pulse 88  Temp 98.2  F (36.8  C) (Oral)  There is no height or weight on file to calculate BMI.  GENERAL APPEARANCE: healthy, alert and no distress- anxious at beginning of visit.   HENT: ear canals and TM's normal and nose and mouth without ulcers or lesions  NECK: no adenopathy, no asymmetry, masses, or scars and thyroid normal to palpation  RESP: lungs clear to auscultation - no rales, rhonchi or wheezes  CV: regular rates and rhythm, normal S1 S2, no S3 or S4 and no murmur, click or rub  ABDOMEN: soft, nontender, without hepatosplenomegaly or masses and bowel sounds normal  MS: extremities normal- no gross deformities noted, some tenderness in ant. Upper left chest with compression  NEURO: Normal strength and tone, mentation intact and speech normal  PSYCH: mentation  appears normal and affect normal/bright    Diagnostic test results:  Results for orders placed or performed in visit on 03/20/17 (from the past 24 hour(s))   Influenza A/B antigen   Result Value Ref Range    Influenza A/B Agn Specimen Nasal     Influenza A Negative NEG    Influenza B Negative NEG        ASSESSMENT/PLAN:                                                        ICD-10-CM    1. Atypical chest pain R07.89 CBC with platelets and differential     Basic metabolic panel     Troponin I     XR Chest 2 Views     CT Chest w Contrast   2. HTN, goal below 140/90 I10 Basic metabolic panel     metoprolol (TOPROL-XL) 25 MG 24 hr tablet   3. Exposure to influenza Z20.828 Influenza A/B antigen   4. Gastroesophageal reflux disease, esophagitis presence not specified K21.9    5. History of hemoptysis Z87.09    6. Fibromyalgia M79.7 cyclobenzaprine (FLEXERIL) 10 MG tablet   7. Essential hypertension with goal blood pressure less than 140/90 I10 losartan (COZAAR) 50 MG tablet   8. Essential hypertension I10 diltiazem 240 MG 24 hr capsule   9. Hypothyroidism, unspecified type E03.9 levothyroxine (SYNTHROID/LEVOTHROID) 50 MCG tablet       BP elevated in clinic today - 165/120. Recheck - 122/102. Order placed for metoprolol, losartan and diltiazem. Medication direction, dosage, and side effects discussed with patient.  Starting Toprol for high BP    Reviewed medications. Refills provided.    Order placed for chest XR, influenza A/B antigen, and other labs that the patient will complete today. Patient is fasting. Reviewed XR with patient - normal. Order placed for CT scan.    Follow up with Provider - imaging and BP check in 2 weeks     The information in this document, created by the medical scribe for me, accurately reflects the services I personally performed and the decisions made by me. I have reviewed and approved this document for accuracy prior to leaving the patient care area.  March 20, 2017 12:22 PM    Keri Yu,  RAÚL [facility]UST[facility]

## 2017-03-20 NOTE — TELEPHONE ENCOUNTER
Spoke with patient. She got all prescriptions except the levothyroxine and losartan. These 2 scripts were called to pharmacy as patient states their prescription system has been down and they are only getting occasional scripts sent through. Patient will wait for return call regarding CT.    Keri White RN, BSN

## 2017-03-21 ENCOUNTER — RADIANT APPOINTMENT (OUTPATIENT)
Dept: CT IMAGING | Facility: CLINIC | Age: 42
End: 2017-03-21
Attending: NURSE PRACTITIONER
Payer: COMMERCIAL

## 2017-03-21 ENCOUNTER — TELEPHONE (OUTPATIENT)
Dept: FAMILY MEDICINE | Facility: CLINIC | Age: 42
End: 2017-03-21

## 2017-03-21 DIAGNOSIS — E27.8 ADRENAL MASS, LEFT (H): ICD-10-CM

## 2017-03-21 PROCEDURE — 74150 CT ABDOMEN W/O CONTRAST: CPT | Performed by: RADIOLOGY

## 2017-03-21 NOTE — TELEPHONE ENCOUNTER
Spoke with radiology - plan in place, adnexal finding previously had follow-up, this is for adrenal nodule. Keri Yu

## 2017-03-21 NOTE — TELEPHONE ENCOUNTER
Reason for Call: Request for an order or referral:    Order or referral being requested: Order    Date needed: as soon as possible    Has the patient been seen by the PCP for this problem? NO    Additional comments: Incorrect order due to it is not adrenal it is adnoxel and should be an ultrasound    Phone number Patient can be reached at:  [phone]  Best Time:  Anytime before noon     Can we leave a detailed message on this number?  NO    Call taken on 3/21/2017 at 8:00 AM by Chelly Miguel

## 2017-03-22 ENCOUNTER — MYC MEDICAL ADVICE (OUTPATIENT)
Dept: FAMILY MEDICINE | Facility: CLINIC | Age: 42
End: 2017-03-22

## 2017-04-06 ENCOUNTER — TELEPHONE (OUTPATIENT)
Dept: FAMILY MEDICINE | Facility: CLINIC | Age: 42
End: 2017-04-06

## 2017-04-06 NOTE — LETTER
[facility]  [address], [address]  [place] [address]  Phone: [phone]  Fax: [phone]  April 13, 2017      Elizabeth Goodson  [address] [address] [place] [address]      Dear Elizabeth,    We care about your health and have reviewed your health plan including your medical conditions, medications, and lab results.  Based on this review, it is recommended that you follow up regarding the following health topic(s):  -High Blood Pressure    We recommend you take the following action(s):  -schedule a FREE FLOAT MA-ONLY BLOOD PRESSURE APPOINTMENT within the next 1-4 weeks.  -schedule a LAB ONLY APPOINTMENT to recheck your: TSH (thyroid test) within the next 1-4 weeks.  If' you have had labs completed eslewhere, please let us know so we can update your records.       Please call us at the [facility] - [phone] (or use [employer]) to address the above recommendations.     Thank you for trusting [facility] and we appreciate the opportunity to serve you.  We look forward to supporting your healthcare needs in the future.    Healthy Regards,    Your Health Care Team  [facility]

## 2017-04-06 NOTE — TELEPHONE ENCOUNTER
Summary:    Patient is due/failing the following:   BP CHECK    Action needed:   Patient needs nurse only appointment.    Type of outreach:    Phone, left message for patient to call back.     Questions for provider review:    None                                                                                                                                    Alysha Nava       Chart routed to Care Team .          Panel Management Review      Patient has the following on her problem list:     Hypertension   Last three blood pressure readings:  BP Readings from Last 3 Encounters:   03/20/17 (!) 122/102   02/02/17 (!) 132/95   12/06/16 132/90     Blood pressure: FAILED    HTN Guidelines:  Age 18-59 BP range:  Less than 140/90  Age 60-85 with Diabetes:  Less than 140/90  Age 60-85 without Diabetes:  less than 150/90      Composite cancer screening  Chart review shows that this patient is due/due soon for the following None

## 2017-04-13 NOTE — TELEPHONE ENCOUNTER
2nd attempt to contact patient. Left message and reminder letter sent.   Patient due for a BP check and lab only appointment to recheck TSH.    Alysha Nava

## 2017-04-17 ENCOUNTER — ALLIED HEALTH/NURSE VISIT (OUTPATIENT)
Dept: FAMILY MEDICINE | Facility: CLINIC | Age: 42
End: 2017-04-17
Payer: COMMERCIAL

## 2017-04-17 VITALS — DIASTOLIC BLOOD PRESSURE: 96 MMHG | SYSTOLIC BLOOD PRESSURE: 146 MMHG | HEART RATE: 88 BPM

## 2017-04-17 DIAGNOSIS — I10 ESSENTIAL HYPERTENSION WITH GOAL BLOOD PRESSURE LESS THAN 140/90: ICD-10-CM

## 2017-04-17 DIAGNOSIS — I10 HTN, GOAL BELOW 140/90: Primary | ICD-10-CM

## 2017-04-17 DIAGNOSIS — E03.9 HYPOTHYROIDISM, UNSPECIFIED TYPE: ICD-10-CM

## 2017-04-17 PROCEDURE — 36415 COLL VENOUS BLD VENIPUNCTURE: CPT | Performed by: NURSE PRACTITIONER

## 2017-04-17 PROCEDURE — 99207 ZZC NO CHARGE NURSE ONLY: CPT

## 2017-04-17 PROCEDURE — 80048 BASIC METABOLIC PNL TOTAL CA: CPT | Performed by: NURSE PRACTITIONER

## 2017-04-17 PROCEDURE — 84439 ASSAY OF FREE THYROXINE: CPT | Performed by: NURSE PRACTITIONER

## 2017-04-17 PROCEDURE — 84443 ASSAY THYROID STIM HORMONE: CPT | Performed by: NURSE PRACTITIONER

## 2017-04-17 NOTE — MR AVS SNAPSHOT
After Visit Summary   4/17/2017    Elizabeth Goodson    MRN: [id number]           Patient Information     Date Of Birth          1975        Visit Information        Provider Department      4/17/2017 4:00 PM RG FLOAT 1 [facility]        Today's Diagnoses     HTN, goal below 140/90    -  1       Follow-ups after your visit        Your next 10 appointments already scheduled     Apr 17, 2017  4:30 PM CDT   LAB with RG LAB   [facility] ([facility]ers)    [address], [address]  [place] [address] [phone]           Patient must bring picture ID.  Patient should be prepared to give a urine specimen  Please do not eat 10-12 hours before your appointment if you are coming in fasting for labs on lipids, cholesterol, or glucose (sugar).  Pregnant women should follow their Care Team instructions. Water with medications is okay. Do not drink coffee or other fluids.   If you have concerns about taking  your medications, please ask at office or if scheduling via [employer], send a message by clicking on Secure Messaging, Message Your Care Team.            Apr 21, 2017  2:45 PM CDT   Return Visit with Berta Turner MD   [facility]    [address] [place] [address] [phone]              Who to contact     If you have questions or need follow up information about today's clinic visit or your schedule please contact [facility] directly at [phone].  Normal or non-critical lab and imaging results will be communicated to you by MyChart, letter or phone within 4 business days after the clinic has received the results. If you do not hear from us within 7 days, please contact the clinic through [employer] or phone. If you have a critical or abnormal lab result, we will notify you by phone as soon as possible.  Submit refill requests through [employer] or call your pharmacy and  they will forward the refill request to us. Please allow 3 business days for your refill to be completed.          Additional Information About Your Visit        [employer] Information     [employer] gives you secure access to your electronic health record. If you see a primary care provider, you can also send messages to your care team and make appointments. If you have questions, please call your primary care clinic.  If you do not have a primary care provider, please call [phone] and they will assist you.        Care EveryWhere ID     This is your Care EveryWhere ID. This could be used by other organizations to access your [facility] medical records  [id number]        Your Vitals Were     Pulse                   88            Blood Pressure from Last 3 Encounters:   04/17/17 (!) 146/96   03/20/17 (!) 122/102   02/02/17 (!) 132/95    Weight from Last 3 Encounters:   12/06/16 187 lb 8 oz (85 kg)   10/31/16 183 lb (83 kg)   10/20/16 180 lb (81.6 kg)              Today, you had the following     No orders found for display       Primary Care Provider Office Phone # Fax #    Keri RAÚL [facility] [phone]       [facility] [address] [address] [address]        Thank you!     Thank you for choosing [facility]  for your care. Our goal is always to provide you with excellent care. Hearing back from our patients is one way we can continue to improve our services. Please take a few minutes to complete the written survey that you may receive in the mail after your visit with us. Thank you!             Your Updated Medication List - Protect others around you: Learn how to safely use, store and throw away your medicines at www.disposemymeds.org.          This list is accurate as of: 4/17/17  4:07 PM.  Always use your most recent med list.                   Brand Name Dispense Instructions for use    albuterol 108 (90 BASE) MCG/ACT Inhaler    PROAIR HFA/PROVENTIL HFA/VENTOLIN HFA     1 Inhaler    Inhale 2 puffs into the lungs every 6 hours as needed for shortness of breath / dyspnea or wheezing       cyclobenzaprine 10 MG tablet    FLEXERIL    90 tablet    TAKE ONE TABLET BY MOUTH AT BEDTIME       diltiazem 240 MG 24 hr capsule     90 capsule    TAKE ONE CAPSULE BY MOUTH ONCE DAILY       * EPINEPHrine 0.3 MG/0.3ML injection     2 each    Inject 0.3 mLs (0.3 mg) into the muscle once as needed for anaphylaxis       * EPINEPHrine 0.3 MG/0.3ML injection    EPIPEN 2-KIMBERLEE    0.6 mL    Inject 0.3 mLs (0.3 mg) into the muscle once as needed for anaphylaxis       leflunomide 20 MG tablet    ARAVA    30 tablet    Take 1 tablet (20 mg) by mouth daily       levothyroxine 50 MCG tablet    SYNTHROID/LEVOTHROID    90 tablet    Take 1 tablet (50 mcg) by mouth daily       losartan 50 MG tablet    COZAAR    90 tablet    Take 1 tablet (50 mg) by mouth daily       metoprolol 25 MG 24 hr tablet    TOPROL-XL    30 tablet    Take 1 tablet (25 mg) by mouth daily       * Notice:  This list has 2 medication(s) that are the same as other medications prescribed for you. Read the directions carefully, and ask your doctor or other care provider to review them with you.

## 2017-04-17 NOTE — NURSING NOTE
Chief Complaint   Patient presents with     Hypertension     Elizabeth Goodson is a 42 year old female who comes in today for a Blood Pressure check because of new medication and medication change.    *Document pulse and BP  *Use new set of vitals button for multiple readings.  *Use extended vitals for orthostatic    Vitals as recorded, a large cuff was used.    Patient is taking medication as prescribed  Patient is tolerating medications well.  Patient is monitoring Blood Pressure at home.  Average readings if yes are 122/98    Current complaints: none    Disposition: results routed to MD/AP.       Matt Whitlock MA

## 2017-04-18 ENCOUNTER — MYC MEDICAL ADVICE (OUTPATIENT)
Dept: FAMILY MEDICINE | Facility: CLINIC | Age: 42
End: 2017-04-18

## 2017-04-18 DIAGNOSIS — I10 ESSENTIAL HYPERTENSION: Primary | ICD-10-CM

## 2017-04-18 DIAGNOSIS — E03.4 HYPOTHYROIDISM DUE TO ACQUIRED ATROPHY OF THYROID: ICD-10-CM

## 2017-04-18 DIAGNOSIS — I10 ESSENTIAL HYPERTENSION WITH GOAL BLOOD PRESSURE LESS THAN 140/90: ICD-10-CM

## 2017-04-18 DIAGNOSIS — M06.9 RHEUMATOID ARTHRITIS, INVOLVING UNSPECIFIED SITE, UNSPECIFIED RHEUMATOID FACTOR PRESENCE: Primary | ICD-10-CM

## 2017-04-18 LAB
ANION GAP SERPL CALCULATED.3IONS-SCNC: 9 MMOL/L (ref 3–14)
BUN SERPL-MCNC: 13 MG/DL (ref 7–30)
CALCIUM SERPL-MCNC: 9.3 MG/DL (ref 8.5–10.1)
CHLORIDE SERPL-SCNC: 105 MMOL/L (ref 94–109)
CO2 SERPL-SCNC: 25 MMOL/L (ref 20–32)
CREAT SERPL-MCNC: 0.66 MG/DL (ref 0.52–1.04)
GFR SERPL CREATININE-BSD FRML MDRD: NORMAL ML/MIN/1.7M2
GLUCOSE SERPL-MCNC: 88 MG/DL (ref 70–99)
POTASSIUM SERPL-SCNC: 3.8 MMOL/L (ref 3.4–5.3)
SODIUM SERPL-SCNC: 139 MMOL/L (ref 133–144)
T4 FREE SERPL-MCNC: 0.94 NG/DL (ref 0.76–1.46)
TSH SERPL DL<=0.005 MIU/L-ACNC: 4.04 MU/L (ref 0.4–4)

## 2017-04-18 RX ORDER — LOSARTAN POTASSIUM 100 MG/1
100 TABLET ORAL DAILY
Qty: 30 TABLET | Refills: 1
Start: 2017-04-18 | End: 2017-04-18

## 2017-04-18 RX ORDER — LEVOTHYROXINE SODIUM 75 UG/1
75 TABLET ORAL DAILY
Qty: 90 TABLET | Refills: 0 | Status: SHIPPED | OUTPATIENT
Start: 2017-04-18 | End: 2017-07-24

## 2017-04-18 RX ORDER — HYDROCHLOROTHIAZIDE 25 MG/1
25 TABLET ORAL DAILY
Qty: 30 TABLET | Refills: 1 | Status: SHIPPED | OUTPATIENT
Start: 2017-04-18 | End: 2017-06-21

## 2017-04-18 RX ORDER — LOSARTAN POTASSIUM 50 MG/1
50 TABLET ORAL DAILY
Qty: 90 TABLET | Refills: 2
Start: 2017-04-18 | End: 2018-03-16

## 2017-04-18 NOTE — TELEPHONE ENCOUNTER
Pt. retainig fluid, ordering Echo. HCTZ added. BP and OV in 2 weeks. Increased thyroid med due to high TSH. Re-check in 2 months. Keri Yu

## 2017-04-21 ENCOUNTER — OFFICE VISIT (OUTPATIENT)
Dept: DERMATOLOGY | Facility: CLINIC | Age: 42
End: 2017-04-21
Payer: COMMERCIAL

## 2017-04-21 DIAGNOSIS — Z85.828 HISTORY OF NONMELANOMA SKIN CANCER: Primary | ICD-10-CM

## 2017-04-21 DIAGNOSIS — D22.9 MULTIPLE BENIGN NEVI: ICD-10-CM

## 2017-04-21 PROCEDURE — 99213 OFFICE O/P EST LOW 20 MIN: CPT | Performed by: DERMATOLOGY

## 2017-04-21 NOTE — PROGRESS NOTES
[facility] Dermatology Note      Dermatology Problem List:  1. NMSC  -BCC, left knee, s/p ED&C 2/2/2017  -BCC, left lower leg, s/p excision 2/2/2017  2. Actinic keratosis  -s/p cryotherapy    Encounter Date: Apr 21, 2017    CC:  Chief Complaint   Patient presents with     RECHECK     6 month skin check - Hx of NMSC         History of Present Illness:  Ms. Elizabeth Goodson is a 42 year old female who presents as a follow up for history of NMSC. The patien was last seen 2/2/2017 when a BCC on the left knee was treated with ED&C with excision on the left lower leg and 1 AK treated with cryotherapy. Today, the patient reports a lesion on the left arm that she would like evaluated. Denies change in the lesion. The patient reports no other lesions of concern.      Past Medical History:   Patient Active Problem List   Diagnosis     Syncope     Raynaud phenomenon     CARDIOVASCULAR SCREENING; LDL GOAL LESS THAN 160     Strain of neck muscle     Migraine headache     Cough with hemoptysis     Incidental lung nodule, less than or equal to 3mm     Need for prophylactic vaccination and inoculation against influenza     Fibromyalgia     HTN, goal below 140/90     H/O: hysterectomy     GERD (gastroesophageal reflux disease)     Edema     High risk medication use     Hypothyroidism     Ventral hernia without obstruction or gangrene     Hemoptysis     Rheumatoid arthritis, involving unspecified site, unspecified rheumatoid factor presence (H)     Penicillin allergy     Peanut allergy     Tree nut allergy     Anaphylaxis, subsequent encounter     Itching     History of hemoptysis     Atypical chest pain     Past Medical History:   Diagnosis Date     Adnexal mass      Hemoptysis 11/12, 11/2015-mild    last episodes, mild, has had massive in HX     HTN (hypertension)      Incidental lung nodule, less than or equal to 3mm 11/12    NICHOLAS     Massive hemoptysis 11/12    due to pneumonia     Migraine headache       Tobacco abuse, in remission     quit 2012     Past Surgical History:   Procedure Laterality Date     CHOLECYSTECTOMY, LAPOROSCOPIC  2007    Cholecystectomy, Laparoscopic     EXCISE MASS FOOT  11/22/2013    Procedure: EXCISE MASS FOOT;  Right Foot Soft Tissue Mass Excision;  Surgeon: Jose Cruz Garcia DPM;  Location: [facility] OR     EXCISE MASS FOOT Right 7/17/2015    Procedure: EXCISE MASS FOOT;  Surgeon: Pierre Adan DPM;  Location: [facility] OR     HYSTERECTOMY, VAGINAL  2002     LAPAROSCOPIC HERNIORRHAPHY VENTRAL N/A 2/24/2016    Procedure: LAPAROSCOPIC HERNIORRHAPHY VENTRAL;  Surgeon: Lars Dahl MD;  Location: [facility] OR     Social History:  The patient works as a teacher. The patient denies use of tanning beds. The patient does not drink alcohol, smoke or use tobacco.    Family History:  There is no family history of skin cancer.    Medications:  Current Outpatient Prescriptions   Medication Sig Dispense Refill     hydrochlorothiazide (HYDRODIURIL) 25 MG tablet Take 1 tablet (25 mg) by mouth daily 30 tablet 1     losartan (COZAAR) 50 MG tablet Take 1 tablet (50 mg) by mouth daily 90 tablet 2     levothyroxine (SYNTHROID/LEVOTHROID) 75 MCG tablet Take 1 tablet (75 mcg) by mouth daily 90 tablet 0     metoprolol (TOPROL-XL) 25 MG 24 hr tablet Take 1 tablet (25 mg) by mouth daily 30 tablet 1     cyclobenzaprine (FLEXERIL) 10 MG tablet TAKE ONE TABLET BY MOUTH AT BEDTIME 90 tablet 1     diltiazem 240 MG 24 hr capsule TAKE ONE CAPSULE BY MOUTH ONCE DAILY 90 capsule 1     albuterol (PROAIR HFA, PROVENTIL HFA, VENTOLIN HFA) 108 (90 BASE) MCG/ACT inhaler Inhale 2 puffs into the lungs every 6 hours as needed for shortness of breath / dyspnea or wheezing 1 Inhaler 1     EPINEPHrine (EPIPEN 2-KIMBERLEE) 0.3 MG/0.3ML injection 2-pack Inject 0.3 mLs (0.3 mg) into the muscle once as needed for anaphylaxis 0.6 mL 1     EPINEPHrine (EPIPEN) 0.3 MG/0.3ML injection Inject 0.3 mLs (0.3 mg) into the muscle once as needed for anaphylaxis  2 each 1     leflunomide (ARAVA) 20 MG tablet Take 1 tablet (20 mg) by mouth daily 30 tablet 2     Allergies   Allergen Reactions     Penicillins Anaphylaxis     Walnuts [Nuts] Anaphylaxis     All tree nuts     Norvasc [Amlodipine] Rash     Norvasc- rash at 10 mg dosing     Review of Systems:  -Skin: As above in HPI. No additional skin concerns.  Const: The patient is generally feeling well today.           Physical exam:  Vitals: There were no vitals taken for this visit.  GEN: This is a well developed, well-nourished female in no acute distress, in a pleasant mood.    SKIN: Total skin excluding the undergarment areas was performed. The exam included the head/face, neck, both arms, chest, back, abdomen, both legs, digits and/or nails. Declines exam of genitals and buttocks  -There are well healed surgical scars without erythema, nodularity or telangiectasias on the left knee and left lower leg.   -6mm irregularly shaped pigmented macule on the right lower back.   -Pigmented macule with erythema on the left inframammary.  - There are well circumscribed, symmetric tan to brown pigmented macules and papules on the trunk and ext.   -There is a  tan to brown waxy, stuck on papule located on the left upper arm, axilla  -No other lesions of concern on areas examined.     Impression/Plan:  1. History of nonmelanoma skin cancer, no clincial evidence of recurrence:  2. History of actinic keratosis, no clinical evidence of recurrence  3. Pigmented macule with erythema, left inframammary, consistent with inflamed nevus    Plan to recheck at follow up visit. See biopsy versus clinical monitoring discussion below  4. 6mm irregularly shaped pigmented macule, right lower back. Consistent with congential nevus    Photograph was obtained for clinical monitoring and inclusion in medical record.    Discussed biopsy versus clinical monitoring. I reviewed histopathological diagnosis is needed for definitive diagnosis. I Reviewed this  could be a skin cancer and clinical monitoring can result in delay. Pt not interested in biopsy today.     Plan to recheck at follow up visit.   5. Multiple benign nevi, trunk and ext  6. SK/ left upper arm/axilla-reviewed nature    Follow up in 3 months, earlier for new or changing lesions.     Staff Involved:  Scribe/Staff    Scribe Disclosure:   I, Portia Roche, am serving as a scribe to document services personally performed by Dr. Berta Turner, based on data collection and the provider's statements to me.      Provider Disclosure:   I agree with above History, Review of Systems, Physical exam and Plan. I have reviewed the content of the documentation and have edited it as needed. I have personally performed the services documented here and the documentation accurately represents those services and the decisions I have made.     Berta Turner MD    Department of Dermatology  [facility]: Phone: [phone], Fax:[phone]  [facility]: Phone: [phone], Fax: [phone]

## 2017-04-21 NOTE — MR AVS SNAPSHOT
After Visit Summary   4/21/2017    Elizabeth Goodson    MRN: [id number]           Patient Information     Date Of Birth          1975        Visit Information        Provider Department      4/21/2017 2:45 PM Berta Turner MD [facility]         Follow-ups after your visit        Your next 10 appointments already scheduled     Jul 25, 2017  3:15 PM CDT   Return Visit with Berta Turner MD   [facility] ([facility])    [address] [place] [address] [phone]              Who to contact     If you have questions or need follow up information about today's clinic visit or your schedule please contact [facility] directly at [phone].  Normal or non-critical lab and imaging results will be communicated to you by MyChart, letter or phone within 4 business days after the clinic has received the results. If you do not hear from us within 7 days, please contact the clinic through MyChart or phone. If you have a critical or abnormal lab result, we will notify you by phone as soon as possible.  Submit refill requests through Medminder or call your pharmacy and they will forward the refill request to us. Please allow 3 business days for your refill to be completed.          Additional Information About Your Visit        MediaBrixhart Information     Medminder gives you secure access to your electronic health record. If you see a primary care provider, you can also send messages to your care team and make appointments. If you have questions, please call your primary care clinic.  If you do not have a primary care provider, please call [phone] and they will assist you.      Medminder is an electronic gateway that provides easy, online access to your medical records. With Medminder, you can request a clinic appointment, read your test results, renew a prescription or communicate with your care team.     To access your  existing account, please contact your [facility] or call [phone] for assistance.        Care EveryWhere ID     This is your Care EveryWhere ID. This could be used by other organizations to access your [place] medical records  [id number]         Blood Pressure from Last 3 Encounters:   04/17/17 (!) 146/96   03/20/17 (!) 122/102   02/02/17 (!) 132/95    Weight from Last 3 Encounters:   12/06/16 85 kg (187 lb 8 oz)   10/31/16 83 kg (183 lb)   10/20/16 81.6 kg (180 lb)              Today, you had the following     No orders found for display       Primary Care Provider Office Phone # Fax #    RAÚL Pat [facility] [phone]       [facility] [address] [facility] [address]        Thank you!     Thank you for choosing [facility]  for your care. Our goal is always to provide you with excellent care. Hearing back from our patients is one way we can continue to improve our services. Please take a few minutes to complete the written survey that you may receive in the mail after your visit with us. Thank you!             Your Updated Medication List - Protect others around you: Learn how to safely use, store and throw away your medicines at www.disposemymeds.org.          This list is accurate as of: 4/21/17  3:12 PM.  Always use your most recent med list.                   Brand Name Dispense Instructions for use    albuterol 108 (90 BASE) MCG/ACT Inhaler    PROAIR HFA/PROVENTIL HFA/VENTOLIN HFA    1 Inhaler    Inhale 2 puffs into the lungs every 6 hours as needed for shortness of breath / dyspnea or wheezing       cyclobenzaprine 10 MG tablet    FLEXERIL    90 tablet    TAKE ONE TABLET BY MOUTH AT BEDTIME       diltiazem 240 MG 24 hr capsule     90 capsule    TAKE ONE CAPSULE BY MOUTH ONCE DAILY       * EPINEPHrine 0.3 MG/0.3ML injection     2 each    Inject 0.3 mLs (0.3 mg) into the muscle once as needed for anaphylaxis       *  EPINEPHrine 0.3 MG/0.3ML injection    EPIPEN 2-KIMBERLEE    0.6 mL    Inject 0.3 mLs (0.3 mg) into the muscle once as needed for anaphylaxis       hydrochlorothiazide 25 MG tablet    HYDRODIURIL    30 tablet    Take 1 tablet (25 mg) by mouth daily       leflunomide 20 MG tablet    ARAVA    30 tablet    Take 1 tablet (20 mg) by mouth daily       levothyroxine 75 MCG tablet    SYNTHROID/LEVOTHROID    90 tablet    Take 1 tablet (75 mcg) by mouth daily       losartan 50 MG tablet    COZAAR    90 tablet    Take 1 tablet (50 mg) by mouth daily       metoprolol 25 MG 24 hr tablet    TOPROL-XL    30 tablet    Take 1 tablet (25 mg) by mouth daily       * Notice:  This list has 2 medication(s) that are the same as other medications prescribed for you. Read the directions carefully, and ask your doctor or other care provider to review them with you.

## 2017-04-21 NOTE — NURSING NOTE
Dermatology Rooming Note    Elizabeth Goodson's goals for this visit include:   Chief Complaint   Patient presents with     RECHECK     6 month skin check - Hx of NMSC       Is a scribe okay for this visit: YES    Are records needed for this visit(If yes, obtain release of information): NO     Vitals: There were no vitals taken for this visit.    Referring Provider:  No referring provider defined for this encounter.    Irish Thurman, CMA

## 2017-04-28 ENCOUNTER — RADIANT APPOINTMENT (OUTPATIENT)
Dept: CARDIOLOGY | Facility: CLINIC | Age: 42
End: 2017-04-28
Attending: NURSE PRACTITIONER
Payer: COMMERCIAL

## 2017-04-28 DIAGNOSIS — I10 ESSENTIAL HYPERTENSION: ICD-10-CM

## 2017-04-28 PROCEDURE — 93306 TTE W/DOPPLER COMPLETE: CPT

## 2017-05-09 ENCOUNTER — TELEPHONE (OUTPATIENT)
Dept: FAMILY MEDICINE | Facility: CLINIC | Age: 42
End: 2017-05-09

## 2017-05-09 NOTE — LETTER
[facility]  [address]  [place] [address]  [phone]      May 9, 2017      Elizabeth Goodson  [address] [address] [place] [address]        Dear Elizabeth,      We received a notice that you are to be scheduled with a specialty clinic. The referral has been placed by your provider and you can call to schedule an appointment directly.     Enclosed, you will find the referral with the phone number to call to schedule an appointment.  If you have already scheduled this, you may disregard this letter.    Please call us if you have any questions or concerns.      Sincerely,    [facility] Support Staff / david

## 2017-05-09 NOTE — TELEPHONE ENCOUNTER
You placed a referral to rheumatology to multiple locations on 4/18/17.    It is unclear if the patient has scheduled yet, not finding a report showing they were seen.     Please forward to your team if further follow up is needed to see if they have made this appointment.      Thank you!   Neetu/Referral Representative for [facility]

## 2017-06-21 DIAGNOSIS — I10 ESSENTIAL HYPERTENSION: ICD-10-CM

## 2017-06-21 NOTE — TELEPHONE ENCOUNTER
hydrochlorizide      Last Written Prescription Date: 04/18/17  Last Fill Quantity: 30, # refills: 1  Last Office Visit with FMTAI, REGINA or [facility] prescribing provider: 03/20/17  Next 5 appointments (look out 90 days)     Jul 25, 2017  3:15 PM CDT   Return Visit with Berta Turner MD   [facility] ([facility])    [address] [place] [address] [phone]                   Potassium   Date Value Ref Range Status   04/17/2017 3.8 3.4 - 5.3 mmol/L Final     Creatinine   Date Value Ref Range Status   04/17/2017 0.66 0.52 - 1.04 mg/dL Final     BP Readings from Last 3 Encounters:   04/17/17 (!) 146/96   03/20/17 (!) 122/102   02/02/17 (!) 132/95

## 2017-06-23 RX ORDER — HYDROCHLOROTHIAZIDE 25 MG/1
TABLET ORAL
Qty: 30 TABLET | Refills: 0 | Status: SHIPPED | OUTPATIENT
Start: 2017-06-23 | End: 2017-07-22

## 2017-06-23 NOTE — TELEPHONE ENCOUNTER
HYDRODIURIL  Routing refill request to provider for review/approval because:  Medication was newly prescribed on 04/18/2017 (hadn't been prescribed since 2014).  Patient needs to be seen.   BP elevated.  Note from Keri Yu on 04/18/2017: Pt. retainig fluid, ordering Echo. HCTZ added. BP and OV in 2 weeks. Increased thyroid med due to high TSH. Re-check in 2 months. Keri Yu

## 2017-06-28 DIAGNOSIS — I10 HTN, GOAL BELOW 140/90: ICD-10-CM

## 2017-06-28 RX ORDER — METOPROLOL SUCCINATE 25 MG/1
TABLET, EXTENDED RELEASE ORAL
Qty: 30 TABLET | Refills: 0 | Status: SHIPPED | OUTPATIENT
Start: 2017-06-28 | End: 2017-07-26

## 2017-06-28 NOTE — TELEPHONE ENCOUNTER
metoprolol (TOPROL-XL) 25 MG 24 hr tablet      Last Written Prescription Date: 3/20/17  Last Fill Quantity: 30, # refills: 1    Last Office Visit with TRISTON, REGINA or [facility] prescribing provider:  3/20/17   Future Office Visit:    Next 5 appointments (look out 90 days)     Jul 10, 2017  1:20 PM CDT   Office Visit with RAÚL Pat [facility] ([facility])    [address] [place] [address], [address]  [facility] [address] [phone]            Jul 25, 2017  3:15 PM CDT   Return Visit with Berta Turner MD   [facility] ([facility])    [address] [place] [address] [phone]                    BP Readings from Last 3 Encounters:   04/17/17 (!) 146/96   03/20/17 (!) 122/102   02/02/17 (!) 132/95

## 2017-06-28 NOTE — TELEPHONE ENCOUNTER
Pt calling to ck status of request.  She states she is leaving for vacation tomorrow at 11am and needs to pick this up before.  Please call to advise.  thanks

## 2017-06-30 NOTE — PROGRESS NOTES
"  SUBJECTIVE:                                                    Elizabeth Goodson is a 42 year old female who presents to clinic today for the following health issues:  {Provider please address medication reconciliation discrepancies--rooming staff please delete if no med/rec issues}    Hypertension Follow-up      Outpatient blood pressures {ISCHECKIN}    Low Salt Diet: { :902524::\"no added salt\"}      Amount of exercise or physical activity: {Exercise frequency days per week:248023}    Problems taking medications regularly: {Med Problems:295718::\"No\"}    Medication side effects: {CHRONIC MED SIDE EFFECTS:448844::\"none\"}    Diet: { :896614}      {additional problems for provider to add:413294}    Problem list and histories reviewed & adjusted, as indicated.  Additional history: {NONE - AS DOCUMENTED:926410::\"as documented\"}    {HIST REVIEW/ LINKS 2:440428}    Reviewed and updated as needed this visit by clinical staff       Reviewed and updated as needed this visit by Provider         {PROVIDER CHARTING PREFERENCE:824402}  "

## 2017-07-10 ENCOUNTER — OFFICE VISIT (OUTPATIENT)
Dept: FAMILY MEDICINE | Facility: CLINIC | Age: 42
End: 2017-07-10
Payer: COMMERCIAL

## 2017-07-10 ENCOUNTER — MYC MEDICAL ADVICE (OUTPATIENT)
Dept: FAMILY MEDICINE | Facility: CLINIC | Age: 42
End: 2017-07-10

## 2017-07-10 VITALS
TEMPERATURE: 99.3 F | RESPIRATION RATE: 16 BRPM | HEART RATE: 88 BPM | WEIGHT: 194.7 LBS | BODY MASS INDEX: 34.5 KG/M2 | SYSTOLIC BLOOD PRESSURE: 132 MMHG | HEIGHT: 63 IN | DIASTOLIC BLOOD PRESSURE: 86 MMHG

## 2017-07-10 DIAGNOSIS — E87.6 HYPOKALEMIA: ICD-10-CM

## 2017-07-10 DIAGNOSIS — Z41.1 ENCOUNTER FOR COSMETIC SURGERY: ICD-10-CM

## 2017-07-10 DIAGNOSIS — I10 HTN, GOAL BELOW 140/90: ICD-10-CM

## 2017-07-10 DIAGNOSIS — M06.9 RHEUMATOID ARTHRITIS, INVOLVING UNSPECIFIED SITE, UNSPECIFIED RHEUMATOID FACTOR PRESENCE: ICD-10-CM

## 2017-07-10 DIAGNOSIS — Z01.818 PREOP GENERAL PHYSICAL EXAM: Primary | ICD-10-CM

## 2017-07-10 LAB
ANION GAP SERPL CALCULATED.3IONS-SCNC: 9 MMOL/L (ref 3–14)
BUN SERPL-MCNC: 11 MG/DL (ref 7–30)
CALCIUM SERPL-MCNC: 9.3 MG/DL (ref 8.5–10.1)
CHLORIDE SERPL-SCNC: 97 MMOL/L (ref 94–109)
CO2 SERPL-SCNC: 31 MMOL/L (ref 20–32)
CREAT SERPL-MCNC: 0.7 MG/DL (ref 0.52–1.04)
GFR SERPL CREATININE-BSD FRML MDRD: ABNORMAL ML/MIN/1.7M2
GLUCOSE SERPL-MCNC: 126 MG/DL (ref 70–99)
HGB BLD-MCNC: 13.3 G/DL (ref 11.7–15.7)
POTASSIUM SERPL-SCNC: 2.8 MMOL/L (ref 3.4–5.3)
SODIUM SERPL-SCNC: 137 MMOL/L (ref 133–144)

## 2017-07-10 PROCEDURE — 36415 COLL VENOUS BLD VENIPUNCTURE: CPT | Performed by: NURSE PRACTITIONER

## 2017-07-10 PROCEDURE — 93000 ELECTROCARDIOGRAM COMPLETE: CPT | Performed by: NURSE PRACTITIONER

## 2017-07-10 PROCEDURE — 80048 BASIC METABOLIC PNL TOTAL CA: CPT | Performed by: NURSE PRACTITIONER

## 2017-07-10 PROCEDURE — 99215 OFFICE O/P EST HI 40 MIN: CPT | Performed by: NURSE PRACTITIONER

## 2017-07-10 PROCEDURE — 85018 HEMOGLOBIN: CPT | Performed by: NURSE PRACTITIONER

## 2017-07-10 ASSESSMENT — PAIN SCALES - GENERAL: PAINLEVEL: NO PAIN (0)

## 2017-07-10 NOTE — NURSING NOTE
"Chief Complaint   Patient presents with     Pre-Op Exam     DOS: 7/28/2017      Panel Management     Urine Drug Screen       Initial /86  Pulse 88  Temp 99.3  F (37.4  C) (Temporal)  Resp 16  Ht 5' 3\" (1.6 m)  Wt 194 lb 11.2 oz (88.3 kg)  BMI 34.49 kg/m2 Estimated body mass index is 34.49 kg/(m^2) as calculated from the following:    Height as of this encounter: 5' 3\" (1.6 m).    Weight as of this encounter: 194 lb 11.2 oz (88.3 kg).  Medication Reconciliation: complete  Latonya Talavera CMA (AAMA)    "

## 2017-07-10 NOTE — PROGRESS NOTES
[facility] [place]  [address], [address]  [place] [address]  [phone]  Dept: [phone]    PRE-OP EVALUATION:  Today's date: 7/10/2017    Elizabeth Goodson (: 1975) presents for pre-operative evaluation assessment as requested by Dr. Heath.  She requires evaluation and anesthesia risk assessment prior to undergoing surgery/procedure for treatment of cosmetic change .  Proposed procedure: Panniculectomy and liposuction to thighs    Date of Surgery/ Procedure: 2017  Time of Surgery/ Procedure: 7:30am  Hospital/Surgical Facility: [facility]  Fax number for surgical facility: [phone]  Primary Physician: Keri Yu  Type of Anesthesia Anticipated: General    Patient has a Health Care Directive or Living Will:  NO    1. NO - Do you have a history of heart attack, stroke, stent, bypass or surgery on an artery in the head, neck, heart or legs?  2. NO - Do you ever have any pain or discomfort in your chest?  3. NO - Do you have a history of  Heart Failure?  4. NO - Are you troubled by shortness of breath when: walking on the level, up a slight hill or at night?  5. NO - Do you currently have a cold, bronchitis or other respiratory infection?  6. YES - DO YOU HAVE A COUGH, SHORTNESS OF BREATH OR WHEEZING? Mild URI symptoms  7. NO - Do you sometimes get pains in the calves of your legs when you walk?  8. NO - Do you or anyone in your family have previous history of blood clots?  9. NO - Do you or does anyone in your family have a serious bleeding problem such as prolonged bleeding following surgeries or cuts?  10. NO - Have you ever had problems with anemia or been told to take iron pills?  11. NO - Have you had any abnormal blood loss such as black, tarry or bloody stools, or abnormal vaginal bleeding?  12. YES - HAVE YOU EVER HAD A BLOOD TRANSFUSION? With vaginal hemorrhage post-partum hysterectomy  13. NO - Have you or any of your relatives ever had  problems with anesthesia?  14. NO - Do you have sleep apnea, excessive snoring or daytime drowsiness?  15. NO - Do you have any prosthetic heart valves?  16. NO - Do you have prosthetic joints?  17. NO - Is there any chance that you may be pregnant?      HPI:                                                      Brief HPI related to upcoming procedure: desiring cosmetic change.      HYPERTENSION - Patient has longstanding history of mod-severe HTN , currently denies any symptoms referable to elevated blood pressure. Specifically denies chest pain, palpitations, dyspnea, orthopnea, PND or peripheral edema. Blood pressure readings have been in normal range. Current medication regimen is as listed below. Patient denies any side effects of medication.                                                                                                                                                                                          .    MEDICAL HISTORY:                                                      Patient Active Problem List    Diagnosis Date Noted     High risk medication use 08/13/2013     Priority: High     Fibromyalgia 04/05/2013     Priority: High     Raynaud phenomenon 05/19/2010     Priority: High     History of hemoptysis 03/20/2017     Priority: Medium     Atypical chest pain 03/20/2017     Priority: Medium     Anaphylaxis, subsequent encounter 12/06/2016     Priority: Medium     Itching 12/06/2016     Priority: Medium     Penicillin allergy 10/18/2016     Priority: Medium     Peanut allergy 10/18/2016     Priority: Medium     Tree nut allergy 10/18/2016     Priority: Medium     Rheumatoid arthritis, involving unspecified site, unspecified rheumatoid factor presence (H) 08/23/2016     Priority: Medium     Ventral hernia without obstruction or gangrene 02/22/2016     Priority: Medium     Hypothyroidism 10/15/2014     Priority: Medium     H/O: hysterectomy 07/16/2013     Priority: Medium     GERD  (gastroesophageal reflux disease) 07/16/2013     Priority: Medium     Edema 07/16/2013     Priority: Medium     HTN, goal below 140/90 05/03/2013     Priority: Medium     Need for prophylactic vaccination and inoculation against influenza 01/31/2013     Priority: Medium     Cough with hemoptysis 11/05/2012     Priority: Medium     Incidental lung nodule, less than or equal to 3mm 11/05/2012     Priority: Medium     Hemoptysis 11/01/2012     Priority: Medium     last episodes- mild 11/2015, has had massive in HX 11/2012       Strain of neck muscle 05/31/2011     Priority: Medium     Migraine headache 05/31/2011     Priority: Medium     (Problem list name updated by automated process. Provider to review and confirm.)       CARDIOVASCULAR SCREENING; LDL GOAL LESS THAN 160 10/31/2010     Priority: Medium     Syncope 05/19/2010     Priority: Medium      Past Medical History:   Diagnosis Date     Adnexal mass      H/O transfusion of whole blood 2001    with child labor/hysterectomy     Hemoptysis 11/12, 11/2015-mild    last episodes, mild, has had massive in HX     HTN (hypertension)      Incidental lung nodule, less than or equal to 3mm 11/12    NICHOLAS     Massive hemoptysis 11/12    due to pneumonia     Migraine headache      Tobacco abuse, in remission     quit 2012     Past Surgical History:   Procedure Laterality Date     CHOLECYSTECTOMY, LAPOROSCOPIC  2007    Cholecystectomy, Laparoscopic     EXCISE MASS FOOT  11/22/2013    Procedure: EXCISE MASS FOOT;  Right Foot Soft Tissue Mass Excision;  Surgeon: Jose Cruz Garcia DPM;  Location: [facility] OR     EXCISE MASS FOOT Right 7/17/2015    Procedure: EXCISE MASS FOOT;  Surgeon: Pierre Adan DPM;  Location:  OR     HYSTERECTOMY, VAGINAL  2002     LAPAROSCOPIC HERNIORRHAPHY VENTRAL N/A 2/24/2016    Procedure: LAPAROSCOPIC HERNIORRHAPHY VENTRAL;  Surgeon: Lars Dahl MD;  Location: [facility] OR     Current Outpatient Prescriptions   Medication Sig Dispense Refill      potassium chloride SA (POTASSIUM CHLORIDE) 20 MEQ CR tablet 1 tab po QID today, then TID tomorrow, then 1 po QD 95 tablet 1     metoprolol (TOPROL-XL) 25 MG 24 hr tablet TAKE ONE TABLET BY MOUTH ONCE DAILY 30 tablet 0     hydrochlorothiazide (HYDRODIURIL) 25 MG tablet TAKE ONE TABLET BY MOUTH ONCE DAILY 30 tablet 0     losartan (COZAAR) 50 MG tablet Take 1 tablet (50 mg) by mouth daily 90 tablet 2     levothyroxine (SYNTHROID/LEVOTHROID) 75 MCG tablet Take 1 tablet (75 mcg) by mouth daily 90 tablet 0     cyclobenzaprine (FLEXERIL) 10 MG tablet TAKE ONE TABLET BY MOUTH AT BEDTIME 90 tablet 1     diltiazem 240 MG 24 hr capsule TAKE ONE CAPSULE BY MOUTH ONCE DAILY 90 capsule 1     leflunomide (ARAVA) 20 MG tablet Take 1 tablet (20 mg) by mouth daily 30 tablet 2     albuterol (PROAIR HFA, PROVENTIL HFA, VENTOLIN HFA) 108 (90 BASE) MCG/ACT inhaler Inhale 2 puffs into the lungs every 6 hours as needed for shortness of breath / dyspnea or wheezing (Patient not taking: Reported on 7/10/2017) 1 Inhaler 1     EPINEPHrine (EPIPEN 2-KIMBERLEE) 0.3 MG/0.3ML injection 2-pack Inject 0.3 mLs (0.3 mg) into the muscle once as needed for anaphylaxis (Patient not taking: Reported on 7/10/2017) 0.6 mL 1     EPINEPHrine (EPIPEN) 0.3 MG/0.3ML injection Inject 0.3 mLs (0.3 mg) into the muscle once as needed for anaphylaxis (Patient not taking: Reported on 7/10/2017) 2 each 1     OTC products: None, except as noted above    Allergies   Allergen Reactions     Penicillins Anaphylaxis     Walnuts [Nuts] Anaphylaxis     All tree nuts     Norvasc [Amlodipine] Rash     Norvasc- rash at 10 mg dosing      Latex Allergy: NO    Social History   Substance Use Topics     Smoking status: Former Smoker     Types: Cigarettes     Quit date: 10/1/2012     Smokeless tobacco: Never Used     Alcohol use 0.0 oz/week     0 Standard drinks or equivalent per week      Comment: rarely, 1-2 per month     History   Drug Use No       REVIEW OF SYSTEMS:                        "                             Constitutional, neuro, ENT, endocrine, pulmonary, cardiac, gastrointestinal, genitourinary, musculoskeletal, integument and psychiatric systems are negative, except as otherwise noted.    EXAM:                                                    /86  Pulse 88  Temp 99.3  F (37.4  C) (Temporal)  Resp 16  Ht 5' 3\" (1.6 m)  Wt 194 lb 11.2 oz (88.3 kg)  BMI 34.49 kg/m2    GENERAL APPEARANCE: healthy, alert and no distress     EYES: EOMI, PERRL     HENT: ear canals and TM's normal and nose and mouth without ulcers or lesions     NECK: no adenopathy, no asymmetry, masses, or scars and thyroid normal to palpation     RESP: lungs clear to auscultation - no rales, rhonchi or wheezes     CV: regular rates and rhythm, normal S1 S2, no S3 or S4 and no murmur, click or rub     ABDOMEN:  soft, nontender, no HSM or masses and bowel sounds normal     MS: extremities normal- no gross deformities noted, no evidence of inflammation in joints, FROM in all extremities.     SKIN: no suspicious lesions or rashes     NEURO: Normal strength and tone, sensory exam grossly normal, mentation intact and speech normal     PSYCH: mentation appears normal. and affect normal/bright     LYMPHATICS: No axillary, cervical, or supraclavicular nodes    DIAGNOSTICS:                                                    EKG: appears normal, NSR, normal axis, normal intervals, no acute ST/T changes c/w ischemia, no LVH by voltage criteria, unchanged from previous tracings  Hemoglobin (indicated for history of anemia or procedure with significant blood loss such as tonsillectomy, major intraperitoneal surgery, vascular surgery, major spine surgery, total joint replacement)  Serum Potassium  Serum Creatinine    Recent Labs   Lab Test  04/17/17   1555  03/20/17   1200  08/23/16   1401  11/19/15   12/02/13   1631   HGB   --   13.8  13.4   < >   --    < >  13.7   PLT   --   389  380   < >   --    < >  327   INR   --    --    " --    --   1   --   1.00   NA  139  140   --    < >   --    < >   --    POTASSIUM  3.8  4.1   --    < >   --    < >   --    CR  0.66  0.67   --    < >   --    < >   --     < > = values in this interval not displayed.        IMPRESSION:                                                    Reason for surgery/procedure: cosmetic procedure  Diagnosis/reason for consult: HTN, RA    The proposed surgical procedure is considered INTERMEDIATE risk.    REVISED CARDIAC RISK INDEX  The patient has the following serious cardiovascular risks for perioperative complications such as (MI, PE, VFib and 3  AV Block):  No serious cardiac risks  INTERPRETATION: 0 risks: Class I (very low risk - 0.4% complication rate)    The patient has the following additional risks for perioperative complications:  No identified additional risks      ICD-10-CM    1. Preop general physical exam Z01.818 Hemoglobin     Basic metabolic panel     EKG 12-lead complete w/read - Clinics   2. Encounter for cosmetic surgery Z41.1 Hemoglobin     Basic metabolic panel     EKG 12-lead complete w/read - Clinics   3. HTN, goal below 140/90 I10 **Basic metabolic panel FUTURE 2mo   4. Rheumatoid arthritis, involving unspecified site, unspecified rheumatoid factor presence (H) M06.9    5. Hypokalemia E87.6 **Basic metabolic panel FUTURE 14d     potassium chloride SA (POTASSIUM CHLORIDE) 20 MEQ CR tablet     **Basic metabolic panel FUTURE 2mo       RECOMMENDATIONS:                                                        Cardiovascular Risk  Performs 4 METs exercise without symptoms .   Patient is already on a Beta Blocker. Continue Betablocker therapy after surgery, using Beta blocker order set as necessary for NPO status.      --Patient is to take all scheduled medications on the day of surgery EXCEPT for modifications listed below.    Anticoagulant or Antiplatelet Medication Use  NSAIDS: Ibuprofen (Motrin):         Stop one day prior to surgery        ACE Inhibitor or  Angiotensin Receptor Blocker (ARB) Use  Ace inhibitor or Angiotensin Receptor Blocker (ARB) and should HOLD this medication for the 24 hours prior to surgery.      Hypokalemia noted on labs. Oral potassium prescribed to get K= to therapeutic range. Once in normal range, can proceed with surgery without further diagnostic evaluation.   Low Potassium resolved.     Last Basic Metabolic Panel:  Lab Results   Component Value Date     07/14/2017      Lab Results   Component Value Date    POTASSIUM 4.1 07/14/2017     Lab Results   Component Value Date    CHLORIDE 104 07/14/2017     Lab Results   Component Value Date    ELLIE 9.8 07/14/2017     Lab Results   Component Value Date    CO2 26 07/14/2017     Lab Results   Component Value Date    BUN 11 07/14/2017     Lab Results   Component Value Date    CR 0.76 07/14/2017     Lab Results   Component Value Date    GLC 99 07/14/2017            Signed Electronically by: RAÚL Flor CNP    Copy of this evaluation report is provided to requesting physician.    [facility] Preop Guidelines.

## 2017-07-10 NOTE — PATIENT INSTRUCTIONS
Before Your Surgery      Call your surgeon if there is any change in your health. This includes signs of a cold or flu (such as a sore throat, runny nose, cough, rash or fever).    Do not smoke, drink alcohol or take over the counter medicine (unless your surgeon or primary care doctor tells you to) for the 24 hours before and after surgery.    If you take prescribed drugs: Follow your doctor s orders about which medicines to take and which to stop until after surgery.    Eating and drinking prior to surgery: follow the instructions from your surgeon    Take a shower or bath the night before surgery. Use the soap your surgeon gave you to gently clean your skin. If you do not have soap from your surgeon, use your regular soap. Do not shave or scrub the surgery site.  Wear clean pajamas and have clean sheets on your bed.     Hold Cozaar July 27th & 28th.

## 2017-07-10 NOTE — MR AVS SNAPSHOT
After Visit Summary   7/10/2017    Elizabeth Goodson    MRN: [id number]           Patient Information     Date Of Birth          1975        Visit Information        Provider Department      7/10/2017 1:20 PM Keri Yu APRN CNP [place] [facility]        Today's Diagnoses     Preop general physical exam    -  1    Encounter for cosmetic surgery        HTN, goal below 140/90        Rheumatoid arthritis, involving unspecified site, unspecified rheumatoid factor presence (H)          Care Instructions      Before Your Surgery      Call your surgeon if there is any change in your health. This includes signs of a cold or flu (such as a sore throat, runny nose, cough, rash or fever).    Do not smoke, drink alcohol or take over the counter medicine (unless your surgeon or primary care doctor tells you to) for the 24 hours before and after surgery.    If you take prescribed drugs: Follow your doctor s orders about which medicines to take and which to stop until after surgery.    Eating and drinking prior to surgery: follow the instructions from your surgeon    Take a shower or bath the night before surgery. Use the soap your surgeon gave you to gently clean your skin. If you do not have soap from your surgeon, use your regular soap. Do not shave or scrub the surgery site.  Wear clean pajamas and have clean sheets on your bed.     Hold Cozaar July 27th & 28th.          Follow-ups after your visit        Your next 10 appointments already scheduled     Jul 25, 2017  3:15 PM CDT   Return Visit with Berta Turner MD   [facility]    [address] [place] [address] [phone]              Who to contact     If you have questions or need follow up information about today's clinic visit or your schedule please contact [facility] directly at [phone].  Normal or non-critical lab and imaging results will be communicated to  "you by MyChart, letter or phone within 4 business days after the clinic has received the results. If you do not hear from us within 7 days, please contact the clinic through [employer] or phone. If you have a critical or abnormal lab result, we will notify you by phone as soon as possible.  Submit refill requests through [employer] or call your pharmacy and they will forward the refill request to us. Please allow 3 business days for your refill to be completed.          Additional Information About Your Visit        [employer] Information     [employer] gives you secure access to your electronic health record. If you see a primary care provider, you can also send messages to your care team and make appointments. If you have questions, please call your primary care clinic.  If you do not have a primary care provider, please call [phone] and they will assist you.        Care EveryWhere ID     This is your Care EveryWhere ID. This could be used by other organizations to access your [facility] medical records  [id number]        Your Vitals Were     Pulse Temperature Respirations Height BMI (Body Mass Index)       88 99.3  F (37.4  C) (Temporal) 16 5' 3\" (1.6 m) 34.49 kg/m2        Blood Pressure from Last 3 Encounters:   07/10/17 132/86   04/17/17 (!) 146/96   03/20/17 (!) 122/102    Weight from Last 3 Encounters:   07/10/17 194 lb 11.2 oz (88.3 kg)   12/06/16 187 lb 8 oz (85 kg)   10/31/16 183 lb (83 kg)              We Performed the Following     Basic metabolic panel     EKG 12-lead complete w/read - Clinics     Hemoglobin        Primary Care Provider Office Phone # Fax #    [facility] [phone]       [facility] [address] [address] [address]        Equal Access to Services     DENIA HARRIS : Hadii kiki mcnally Sojuan, waaxda luqadaha, qaybta kaalmada adetristayageovany, jose gomez. So [facility] [phone].    ATENCIÓN: Si lon adames " disposición servicios gratuitos de asistencia lingüística. Jone mckeon [phone].    We comply with applicable federal civil rights laws and [place] laws. We do not discriminate on the basis of race, color, national origin, age, disability sex, sexual orientation or gender identity.            Thank you!     Thank you for choosing [facility]  for your care. Our goal is always to provide you with excellent care. Hearing back from our patients is one way we can continue to improve our services. Please take a few minutes to complete the written survey that you may receive in the mail after your visit with us. Thank you!             Your Updated Medication List - Protect others around you: Learn how to safely use, store and throw away your medicines at www.disposemymeds.org.          This list is accurate as of: 7/10/17  2:46 PM.  Always use your most recent med list.                   Brand Name Dispense Instructions for use Diagnosis    albuterol 108 (90 BASE) MCG/ACT Inhaler    PROAIR HFA/PROVENTIL HFA/VENTOLIN HFA    1 Inhaler    Inhale 2 puffs into the lungs every 6 hours as needed for shortness of breath / dyspnea or wheezing    Cough       cyclobenzaprine 10 MG tablet    FLEXERIL    90 tablet    TAKE ONE TABLET BY MOUTH AT BEDTIME    Fibromyalgia       diltiazem 240 MG 24 hr capsule     90 capsule    TAKE ONE CAPSULE BY MOUTH ONCE DAILY    Essential hypertension       * EPINEPHrine 0.3 MG/0.3ML injection     2 each    Inject 0.3 mLs (0.3 mg) into the muscle once as needed for anaphylaxis    Allergy to tree nuts       * EPINEPHrine 0.3 MG/0.3ML injection    EPIPEN 2-KIMBERLEE    0.6 mL    Inject 0.3 mLs (0.3 mg) into the muscle once as needed for anaphylaxis    Peanut allergy, Tree nut allergy       hydrochlorothiazide 25 MG tablet    HYDRODIURIL    30 tablet    TAKE ONE TABLET BY MOUTH ONCE DAILY    Essential hypertension       leflunomide 20 MG tablet    ARAVA    30 tablet    Take 1 tablet (20 mg) by  mouth daily    Rheumatoid arthritis, involving unspecified site, unspecified rheumatoid factor presence (H)       levothyroxine 75 MCG tablet    SYNTHROID/LEVOTHROID    90 tablet    Take 1 tablet (75 mcg) by mouth daily    Hypothyroidism due to acquired atrophy of thyroid       losartan 50 MG tablet    COZAAR    90 tablet    Take 1 tablet (50 mg) by mouth daily    Essential hypertension with goal blood pressure less than 140/90       metoprolol 25 MG 24 hr tablet    TOPROL-XL    30 tablet    TAKE ONE TABLET BY MOUTH ONCE DAILY    HTN, goal below 140/90       * Notice:  This list has 2 medication(s) that are the same as other medications prescribed for you. Read the directions carefully, and ask your doctor or other care provider to review them with you.

## 2017-07-11 ENCOUNTER — MYC MEDICAL ADVICE (OUTPATIENT)
Dept: FAMILY MEDICINE | Facility: CLINIC | Age: 42
End: 2017-07-11

## 2017-07-11 RX ORDER — POTASSIUM CHLORIDE 1500 MG/1
TABLET, EXTENDED RELEASE ORAL
Qty: 95 TABLET | Refills: 1 | Status: SHIPPED | OUTPATIENT
Start: 2017-07-11 | End: 2018-04-16

## 2017-07-14 DIAGNOSIS — E87.6 HYPOKALEMIA: ICD-10-CM

## 2017-07-14 LAB
ANION GAP SERPL CALCULATED.3IONS-SCNC: 9 MMOL/L (ref 3–14)
BUN SERPL-MCNC: 11 MG/DL (ref 7–30)
CALCIUM SERPL-MCNC: 9.8 MG/DL (ref 8.5–10.1)
CHLORIDE SERPL-SCNC: 104 MMOL/L (ref 94–109)
CO2 SERPL-SCNC: 26 MMOL/L (ref 20–32)
CREAT SERPL-MCNC: 0.76 MG/DL (ref 0.52–1.04)
GFR SERPL CREATININE-BSD FRML MDRD: 83 ML/MIN/1.7M2
GLUCOSE SERPL-MCNC: 99 MG/DL (ref 70–99)
POTASSIUM SERPL-SCNC: 4.1 MMOL/L (ref 3.4–5.3)
SODIUM SERPL-SCNC: 139 MMOL/L (ref 133–144)

## 2017-07-14 PROCEDURE — 80048 BASIC METABOLIC PNL TOTAL CA: CPT | Performed by: NURSE PRACTITIONER

## 2017-07-14 PROCEDURE — 36415 COLL VENOUS BLD VENIPUNCTURE: CPT | Performed by: NURSE PRACTITIONER

## 2017-07-18 ENCOUNTER — MYC MEDICAL ADVICE (OUTPATIENT)
Dept: FAMILY MEDICINE | Facility: CLINIC | Age: 42
End: 2017-07-18

## 2017-07-19 NOTE — TELEPHONE ENCOUNTER
Pt wanted to clarify dosing instructions on her Potassium.  Clarified that it is one tab QD.     potassium chloride SA (POTASSIUM CHLORIDE) 20 MEQ CR tablet 95 tablet 1 2017  --     Si tab po QID today, then TID tomorrow, then 1 po QD      Kimberlee Purcell RN

## 2017-07-20 ENCOUNTER — TELEPHONE (OUTPATIENT)
Dept: DERMATOLOGY | Facility: CLINIC | Age: 42
End: 2017-07-20

## 2017-07-20 NOTE — TELEPHONE ENCOUNTER
[facility] Call Center    Phone Message    Name of Caller: Elizabeth    Phone Number: Cell number on file:    Telephone Information:   Mobile [phone]       Best time to return call: any     May a detailed message be left on voicemail: yes    Relation to patient: Self    Reason for Call: Other: patient is calling to reschedule her appointment since dr. Velasco is on maternity leave , she would like to be called back once the new docs schedule is available     Action Taken: Message routed to:  Adult Clinics: Dermatology p [phone]

## 2017-07-22 DIAGNOSIS — I10 ESSENTIAL HYPERTENSION: ICD-10-CM

## 2017-07-24 DIAGNOSIS — E03.4 HYPOTHYROIDISM DUE TO ACQUIRED ATROPHY OF THYROID: ICD-10-CM

## 2017-07-24 NOTE — TELEPHONE ENCOUNTER
hydrochlorothiazide (HYDRODIURIL) 25 MG tablet      Last Written Prescription Date: 6/23/17  Last Fill Quantity: 30, # refills: 0  Last Office Visit with [facility], [facility] or [facility] prescribing provider: 7/10/17       Potassium   Date Value Ref Range Status   07/14/2017 4.1 3.4 - 5.3 mmol/L Final     Creatinine   Date Value Ref Range Status   07/14/2017 0.76 0.52 - 1.04 mg/dL Final     BP Readings from Last 3 Encounters:   07/10/17 132/86   04/17/17 (!) 146/96   03/20/17 (!) 122/102

## 2017-07-25 RX ORDER — HYDROCHLOROTHIAZIDE 25 MG/1
TABLET ORAL
Qty: 90 TABLET | Refills: 3 | Status: SHIPPED | OUTPATIENT
Start: 2017-07-25 | End: 2018-08-27

## 2017-07-25 NOTE — TELEPHONE ENCOUNTER
levothyroxine (SYNTHROID/LEVOTHROID) 75 MCG tablet     Last Written Prescription Date: 4/18/17  Last Quantity: 90, # refills: 0  Last Office Visit with [facility], [facility] or [facility] prescribing provider: 7/10/17        TSH   Date Value Ref Range Status   04/17/2017 4.04 (H) 0.40 - 4.00 mU/L Final

## 2017-07-25 NOTE — TELEPHONE ENCOUNTER
Prescription approved per [facility] [place] Refill Protocol.  Pretty Dumont, RN, BSN

## 2017-07-26 DIAGNOSIS — I10 HTN, GOAL BELOW 140/90: ICD-10-CM

## 2017-07-26 NOTE — TELEPHONE ENCOUNTER
metoprolol (TOPROL-XL) 25 MG 24 hr tablet      Last Written Prescription Date: 06/28/17  Last Fill Quantity: 30, # refills: 0    Last Office Visit with [facility], [facility] or [facility] prescribing provider:  07/10/17   Future Office Visit:        BP Readings from Last 3 Encounters:   07/10/17 132/86   04/17/17 (!) 146/96   03/20/17 (!) 122/102

## 2017-07-27 RX ORDER — LEVOTHYROXINE SODIUM 75 UG/1
TABLET ORAL
Qty: 90 TABLET | Refills: 0 | Status: SHIPPED | OUTPATIENT
Start: 2017-07-27 | End: 2017-09-26

## 2017-07-27 NOTE — TELEPHONE ENCOUNTER
Routing refill request to provider for review/approval because:  Labs out of range:  TSH    Pretty Dumont, RN, BSN

## 2017-07-28 RX ORDER — METOPROLOL SUCCINATE 25 MG/1
TABLET, EXTENDED RELEASE ORAL
Qty: 90 TABLET | Refills: 3 | Status: SHIPPED | OUTPATIENT
Start: 2017-07-28 | End: 2018-08-15

## 2017-07-28 NOTE — TELEPHONE ENCOUNTER
Prescription approved per [facility] Refill Protocol.  Pretty Dumont, RN, BSN

## 2017-09-25 ENCOUNTER — OFFICE VISIT (OUTPATIENT)
Dept: FAMILY MEDICINE | Facility: CLINIC | Age: 42
End: 2017-09-25
Payer: COMMERCIAL

## 2017-09-25 VITALS
HEART RATE: 88 BPM | OXYGEN SATURATION: 100 % | HEIGHT: 62 IN | BODY MASS INDEX: 33.55 KG/M2 | TEMPERATURE: 99.7 F | SYSTOLIC BLOOD PRESSURE: 128 MMHG | DIASTOLIC BLOOD PRESSURE: 82 MMHG | WEIGHT: 182.3 LBS | RESPIRATION RATE: 18 BRPM

## 2017-09-25 DIAGNOSIS — I10 ESSENTIAL HYPERTENSION: ICD-10-CM

## 2017-09-25 DIAGNOSIS — Z91.010 PEANUT ALLERGY: ICD-10-CM

## 2017-09-25 DIAGNOSIS — E03.4 HYPOTHYROIDISM DUE TO ACQUIRED ATROPHY OF THYROID: ICD-10-CM

## 2017-09-25 DIAGNOSIS — R07.0 THROAT PAIN: Primary | ICD-10-CM

## 2017-09-25 DIAGNOSIS — Z91.018 TREE NUT ALLERGY: ICD-10-CM

## 2017-09-25 LAB
ANION GAP SERPL CALCULATED.3IONS-SCNC: 10 MMOL/L (ref 3–14)
BUN SERPL-MCNC: 13 MG/DL (ref 7–30)
CALCIUM SERPL-MCNC: 9.2 MG/DL (ref 8.5–10.1)
CHLORIDE SERPL-SCNC: 100 MMOL/L (ref 94–109)
CO2 SERPL-SCNC: 28 MMOL/L (ref 20–32)
CREAT SERPL-MCNC: 0.65 MG/DL (ref 0.52–1.04)
DEPRECATED S PYO AG THROAT QL EIA: NORMAL
GFR SERPL CREATININE-BSD FRML MDRD: >90 ML/MIN/1.7M2
GLUCOSE SERPL-MCNC: 95 MG/DL (ref 70–99)
POTASSIUM SERPL-SCNC: 3.5 MMOL/L (ref 3.4–5.3)
SODIUM SERPL-SCNC: 138 MMOL/L (ref 133–144)
SPECIMEN SOURCE: NORMAL
T4 FREE SERPL-MCNC: 0.95 NG/DL (ref 0.76–1.46)
TSH SERPL DL<=0.005 MIU/L-ACNC: 10.01 MU/L (ref 0.4–4)

## 2017-09-25 PROCEDURE — 99214 OFFICE O/P EST MOD 30 MIN: CPT | Performed by: NURSE PRACTITIONER

## 2017-09-25 PROCEDURE — 87081 CULTURE SCREEN ONLY: CPT | Performed by: NURSE PRACTITIONER

## 2017-09-25 PROCEDURE — 84439 ASSAY OF FREE THYROXINE: CPT | Performed by: NURSE PRACTITIONER

## 2017-09-25 PROCEDURE — 80048 BASIC METABOLIC PNL TOTAL CA: CPT | Performed by: NURSE PRACTITIONER

## 2017-09-25 PROCEDURE — 87880 STREP A ASSAY W/OPTIC: CPT | Performed by: NURSE PRACTITIONER

## 2017-09-25 PROCEDURE — 36415 COLL VENOUS BLD VENIPUNCTURE: CPT | Performed by: NURSE PRACTITIONER

## 2017-09-25 PROCEDURE — 84443 ASSAY THYROID STIM HORMONE: CPT | Performed by: NURSE PRACTITIONER

## 2017-09-25 RX ORDER — DILTIAZEM HYDROCHLORIDE 240 MG/1
CAPSULE, COATED, EXTENDED RELEASE ORAL
Qty: 90 CAPSULE | Refills: 1 | Status: SHIPPED | OUTPATIENT
Start: 2017-09-25 | End: 2018-05-21

## 2017-09-25 RX ORDER — EPINEPHRINE 0.3 MG/.3ML
0.3 INJECTION SUBCUTANEOUS
Qty: 0.6 ML | Refills: 1 | Status: SHIPPED | OUTPATIENT
Start: 2017-09-25 | End: 2018-08-27

## 2017-09-25 ASSESSMENT — PAIN SCALES - GENERAL: PAINLEVEL: MILD PAIN (3)

## 2017-09-25 NOTE — PROGRESS NOTES
SUBJECTIVE:                                                    Elizabeth Goodson is a 42 year old female who presents to clinic today for the following health issues:        Acute Illness   Acute illness concerns: sore throat, ear aches  Onset: 4 days    Fever: YES    Chills/Sweats: YES- both    Headache (location?): YES    Sinus Pressure:YES    Conjunctivitis:  no    Ear Pain: YES: both    Rhinorrhea: no     Congestion: no     Sore Throat: YES     Cough: no    Wheeze: no     Decreased Appetite: YES    Nausea: YES    Vomiting: no     Diarrhea:  YES    Dysuria/Freq.: no     Fatigue/Achiness: YES- achiness    Sick/Strep Exposure: YES     Therapies Tried and outcome: tylenol, does not help    Patient reports having a sore throat and ear aches for 4 days. She has felt sick, and has had a fever. Yesterday she experienced body aches, and diarrhea. She works very closely with someone that has had strept throat recently. Patient has tried Tylenol, but it has not helped. Today she expresses having a headache.     She does not want to get her Flu shot today as she already feels sick.     GI  She had hernia surgery and tummy tuck with liposuction  on 07/28/2017, and had severe abdominal pain after. Patient is still recovering right now. She has to get the area drained every 2 weeks. She relates that she did not like the extra skin she had from her pregnancies, and wanted to do this for herself. Patient did experience pain as a result from her surgery. She felt that the skin was tight at first.     Mood  She is concerned about her friend that recently had a stroke.     Medications  Patient would like refills for her Epipen and Diltiazem 240mg.       Problem list and histories reviewed & adjusted, as indicated.  Additional history: as documented    Patient Active Problem List   Diagnosis     Syncope     Raynaud phenomenon     CARDIOVASCULAR SCREENING; LDL GOAL LESS THAN 160     Strain of neck muscle     Migraine headache      Cough with hemoptysis     Incidental lung nodule, less than or equal to 3mm     Need for prophylactic vaccination and inoculation against influenza     Fibromyalgia     HTN, goal below 140/90     H/O: hysterectomy     GERD (gastroesophageal reflux disease)     Edema     High risk medication use     Hypothyroidism     Ventral hernia without obstruction or gangrene     Hemoptysis     Rheumatoid arthritis, involving unspecified site, unspecified rheumatoid factor presence (H)     Penicillin allergy     Peanut allergy     Tree nut allergy     Anaphylaxis, subsequent encounter     Itching     History of hemoptysis     Atypical chest pain     Past Surgical History:   Procedure Laterality Date     CHOLECYSTECTOMY, LAPOROSCOPIC  2007    Cholecystectomy, Laparoscopic     EXCISE MASS FOOT  11/22/2013    Procedure: EXCISE MASS FOOT;  Right Foot Soft Tissue Mass Excision;  Surgeon: Jose Cruz Garcia DPM;  Location: [facility]     EXCISE MASS FOOT Right 7/17/2015    Procedure: EXCISE MASS FOOT;  Surgeon: Pierre Adan DPM;  Location: [facility]     HYSTERECTOMY, VAGINAL  2002     LAPAROSCOPIC HERNIORRHAPHY VENTRAL N/A 2/24/2016    Procedure: LAPAROSCOPIC HERNIORRHAPHY VENTRAL;  Surgeon: Lars Dahl MD;  Location: [facility]     PANNICULECTOMY  07/28/2017    with liposuction, and hernia revision Dr. Heath       Social History   Substance Use Topics     Smoking status: Former Smoker     Types: Cigarettes     Quit date: 10/1/2012     Smokeless tobacco: Never Used     Alcohol use 0.0 oz/week     0 Standard drinks or equivalent per week      Comment: rarely, 1-2 per month     Family History   Problem Relation Age of Onset     Asthma Father      C.A.D. Father      DIABETES No family hx of      Hypertension No family hx of      CEREBROVASCULAR DISEASE No family hx of      Breast Cancer No family hx of      Cancer - colorectal No family hx of      Prostate Cancer No family hx of      Alcohol/Drug No family hx of      Allergies  No family hx of      Alzheimer Disease No family hx of      Anesthesia Reaction No family hx of      Arthritis No family hx of      Blood Disease No family hx of      CANCER No family hx of      Cardiovascular No family hx of      Circulatory No family hx of      Congenital Anomalies No family hx of      Connective Tissue Disorder No family hx of      Neurologic Disorder No family hx of      Depression No family hx of      Endocrine Disease No family hx of      Eye Disorder No family hx of      Genetic Disorder No family hx of      GASTROINTESTINAL DISEASE No family hx of      Genitourinary Problems No family hx of      Gynecology No family hx of      HEART DISEASE No family hx of      Lipids No family hx of      Musculoskeletal Disorder No family hx of      Obesity No family hx of      OSTEOPOROSIS No family hx of      Psychotic Disorder No family hx of      Respiratory No family hx of      Hearing Loss No family hx of      Family History Negative No family hx of      Thyroid Disease No family hx of      Colon Cancer No family hx of      Hyperlipidemia No family hx of      Coronary Artery Disease No family hx of      Other Cancer No family hx of      Anxiety Disorder No family hx of      MENTAL ILLNESS No family hx of      Substance Abuse No family hx of          Current Outpatient Prescriptions   Medication Sig Dispense Refill     diltiazem 240 MG 24 hr capsule TAKE ONE CAPSULE BY MOUTH ONCE DAILY 90 capsule 1     EPINEPHrine (EPIPEN 2-KIMBERLEE) 0.3 MG/0.3ML injection 2-pack Inject 0.3 mLs (0.3 mg) into the muscle once as needed for anaphylaxis 0.6 mL 1     metoprolol (TOPROL-XL) 25 MG 24 hr tablet TAKE ONE TABLET BY MOUTH ONCE DAILY 90 tablet 3     levothyroxine (SYNTHROID/LEVOTHROID) 75 MCG tablet TAKE ONE TABLET BY MOUTH ONCE DAILY 90 tablet 0     hydrochlorothiazide (HYDRODIURIL) 25 MG tablet TAKE ONE TABLET BY MOUTH ONCE DAILY 90 tablet 3     potassium chloride SA (POTASSIUM CHLORIDE) 20 MEQ CR tablet 1 tab po QID  "today, then TID tomorrow, then 1 po QD 95 tablet 1     losartan (COZAAR) 50 MG tablet Take 1 tablet (50 mg) by mouth daily 90 tablet 2     leflunomide (ARAVA) 20 MG tablet Take 1 tablet (20 mg) by mouth daily 30 tablet 2     cyclobenzaprine (FLEXERIL) 10 MG tablet TAKE ONE TABLET BY MOUTH AT BEDTIME (Patient not taking: Reported on 9/25/2017) 90 tablet 1     albuterol (PROAIR HFA, PROVENTIL HFA, VENTOLIN HFA) 108 (90 BASE) MCG/ACT inhaler Inhale 2 puffs into the lungs every 6 hours as needed for shortness of breath / dyspnea or wheezing (Patient not taking: Reported on 7/10/2017) 1 Inhaler 1     EPINEPHrine (EPIPEN) 0.3 MG/0.3ML injection Inject 0.3 mLs (0.3 mg) into the muscle once as needed for anaphylaxis (Patient not taking: Reported on 7/10/2017) 2 each 1         ROS:  Constitutional, neuro, ENT, endocrine, pulmonary, cardiac, gastrointestinal, genitourinary, musculoskeletal, integument and psychiatric systems are negative, except as otherwise noted.    This document serves as a record of the services and decisions personally performed and made by Keri Yu DNP. It was created on her behalf by Galilea Hagen, a trained medical scribe. The creation of this document is based on the provider's statements to the medical scribe.  Galilea Hagen 3:57 PM September 25, 2017    OBJECTIVE:                                                    /82  Pulse 88  Temp 99.7  F (37.6  C) (Temporal)  Resp 18  Ht 5' 2.4\" (1.585 m)  Wt 182 lb 4.8 oz (82.7 kg)  SpO2 100%  BMI 32.91 kg/m2  Body mass index is 32.91 kg/(m^2).    GENERAL APPEARANCE: healthy, alert and no distress  EYES: Eyes grossly normal to inspection and conjunctivae and sclerae normal  HENT: ear canals and TM's normal, nose and mouth without ulcers or lesions and nasal mucosa edematous with mild rhinorrhea  NECK: no adenopathy, no asymmetry, masses, or scars and thyroid normal to palpation  RESP: lungs clear to auscultation - no rales, rhonchi or " wheezes  CV: regular rates and rhythm, normal S1 S2, no S3 or S4 and no murmur, click or rub  ABDOMEN: soft, nontender, without hepatosplenomegaly or masses and bowel sounds normal  SKIN: no suspicious lesions or rashes  NEURO: Normal strength and tone, mentation intact and speech normal  PSYCH: mentation appears normal and affect normal/bright    Diagnostic test results:  Diagnostic Test Results:  Results for orders placed or performed in visit on 09/25/17 (from the past 24 hour(s))   Strep, Rapid Screen   Result Value Ref Range    Specimen Description Throat     Rapid Strep A Screen       NEGATIVE: No Group A streptococcal antigen detected by immunoassay, await culture report.   Basic metabolic panel   Result Value Ref Range    Sodium 138 133 - 144 mmol/L    Potassium 3.5 3.4 - 5.3 mmol/L    Chloride 100 94 - 109 mmol/L    Carbon Dioxide 28 20 - 32 mmol/L    Anion Gap 10 3 - 14 mmol/L    Glucose 95 70 - 99 mg/dL    Urea Nitrogen 13 7 - 30 mg/dL    Creatinine 0.65 0.52 - 1.04 mg/dL    GFR Estimate >90 >60 mL/min/1.7m2    GFR Estimate If Black >90 >60 mL/min/1.7m2    Calcium 9.2 8.5 - 10.1 mg/dL   TSH with free T4 reflex   Result Value Ref Range    TSH 10.01 (H) 0.40 - 4.00 mU/L   T4 free   Result Value Ref Range    T4 Free 0.95 0.76 - 1.46 ng/dL          ASSESSMENT/PLAN:                                                        ICD-10-CM    1. Throat pain R07.0 Strep, Rapid Screen     Beta strep group A culture     T4 free   2. Essential hypertension I10 Basic metabolic panel     diltiazem 240 MG 24 hr capsule   3. Hypothyroidism due to acquired atrophy of thyroid E03.4 TSH with free T4 reflex   4. Peanut allergy Z91.010 EPINEPHrine (EPIPEN 2-KIMBERLEE) 0.3 MG/0.3ML injection 2-pack   5. Tree nut allergy Z91.018 EPINEPHrine (EPIPEN 2-KIMBERLEE) 0.3 MG/0.3ML injection 2-pack   l  Evaluated throat pain symptoms. Strep test results were negative. Will grow out culture, and notify patient with results.  Watchful monitoring as is on  Arava.     No change to hypertension care. Refilled Diltiazem 240mg. Order placed for labs that the patient will complete today. Will notify with results.     Checking TSH levels, will notify with results.    Allergies are controlled with an Epipen. Refilled Epipen.     Advised patient to continue home care for recent tummy tuck and hernia surgery.     Offered patient Flu shot, deferred by patient- will get in a couple weeks for better timing.     Follow up with Provider - Return to clinic in 6 months for medication check    All questions invited, asked and answered to the patient's apparent satisfaction.  Patient agrees to plan.     The information in this document, created by the medical scribe for me, accurately reflects the services I personally performed and the decisions made by me. I have reviewed and approved this document for accuracy prior to leaving the patient care area.  September 25, 2017 3:57 PM    RAÚL Flor [facility]

## 2017-09-25 NOTE — NURSING NOTE
"Chief Complaint   Patient presents with     Panel Management     fly, yearly drug screen     Pharyngitis       Initial /82  Pulse 88  Temp 99.7  F (37.6  C) (Temporal)  Resp 18  Ht 5' 2.4\" (1.585 m)  Wt 182 lb 4.8 oz (82.7 kg)  SpO2 100%  BMI 32.91 kg/m2 Estimated body mass index is 32.91 kg/(m^2) as calculated from the following:    Height as of this encounter: 5' 2.4\" (1.585 m).    Weight as of this encounter: 182 lb 4.8 oz (82.7 kg).  Medication Reconciliation: complete   April MARLENI Granado      "

## 2017-09-25 NOTE — MR AVS SNAPSHOT
"              After Visit Summary   9/25/2017    Elizabeth Goodson    MRN: [id number]           Patient Information     Date Of Birth          1975        Visit Information        Provider Department      9/25/2017 3:20 PM Keri Yu APRN CNP [facility]        Today's Diagnoses     Throat pain    -  1    Essential hypertension        Hypothyroidism due to acquired atrophy of thyroid        Peanut allergy        Tree nut allergy           Follow-ups after your visit        Who to contact     If you have questions or need follow up information about today's clinic visit or your schedule please contact [facility] directly at [phone].  Normal or non-critical lab and imaging results will be communicated to you by Statehart, letter or phone within 4 business days after the clinic has received the results. If you do not hear from us within 7 days, please contact the clinic through Speedmentt or phone. If you have a critical or abnormal lab result, we will notify you by phone as soon as possible.  Submit refill requests through bttn or call your pharmacy and they will forward the refill request to us. Please allow 3 business days for your refill to be completed.          Additional Information About Your Visit        MyChart Information     bttn gives you secure access to your electronic health record. If you see a primary care provider, you can also send messages to your care team and make appointments. If you have questions, please call your primary care clinic.  If you do not have a primary care provider, please call [phone] and they will assist you.        Care EveryWhere ID     This is your Care EveryWhere ID. This could be used by other organizations to access your [place] medical records  [id number]        Your Vitals Were     Pulse Temperature Respirations Height Pulse Oximetry BMI (Body Mass Index)    88 99.7  F (37.6  C) (Temporal) 18 5' 2.4\" (1.585 m) 100% 32.91 " kg/m2       Blood Pressure from Last 3 Encounters:   09/25/17 128/82   07/10/17 132/86   04/17/17 (!) 146/96    Weight from Last 3 Encounters:   09/25/17 182 lb 4.8 oz (82.7 kg)   07/10/17 194 lb 11.2 oz (88.3 kg)   12/06/16 187 lb 8 oz (85 kg)              We Performed the Following     Basic metabolic panel     Beta strep group A culture     Strep, Rapid Screen     T4 free     TSH with free T4 reflex          Where to get your medicines      These medications were sent to [id number] - [place], [place] - [address]  [address], [facility] [address]    Hours:  test Rx sent successfully 12/26/02  KR Phone:  [phone]     diltiazem 240 MG 24 hr capsule    EPINEPHrine 0.3 MG/0.3ML injection 2-pack          Primary Care Provider Office Phone # Fax #    Keri RAÚL Hector [facility] [phone] [address] [facility] [address]        Equal Access to Services     [facility]: Hadii aad ku hadasho Soomaali, waaxda luqadaha, qaybta kaalmada adeegyada, waxkatia figueredo . So [facility] [phone].    ATENCIÓN: Si habla español, tiene a alston disposición servicios gratuitos de asistencia lingüística. Llame al [phone].    We comply with applicable federal civil rights laws and [place] laws. We do not discriminate on the basis of race, color, national origin, age, disability sex, sexual orientation or gender identity.            Thank you!     Thank you for choosing [facility]  for your care. Our goal is always to provide you with excellent care. Hearing back from our patients is one way we can continue to improve our services. Please take a few minutes to complete the written survey that you may receive in the mail after your visit with us. Thank you!             Your Updated Medication List - Protect others around you: Learn how to safely use, store and throw away your medicines at www.disposemymeds.org.          This list is accurate as of:  9/25/17 11:59 PM.  Always use your most recent med list.                   Brand Name Dispense Instructions for use Diagnosis    albuterol 108 (90 BASE) MCG/ACT Inhaler    PROAIR HFA/PROVENTIL HFA/VENTOLIN HFA    1 Inhaler    Inhale 2 puffs into the lungs every 6 hours as needed for shortness of breath / dyspnea or wheezing    Cough       cyclobenzaprine 10 MG tablet    FLEXERIL    90 tablet    TAKE ONE TABLET BY MOUTH AT BEDTIME    Fibromyalgia       diltiazem 240 MG 24 hr capsule     90 capsule    TAKE ONE CAPSULE BY MOUTH ONCE DAILY    Essential hypertension       * EPINEPHrine 0.3 MG/0.3ML injection 2-pack    EPIPEN/ADRENACLICK/or ANY BX GENERIC EQUIV    2 each    Inject 0.3 mLs (0.3 mg) into the muscle once as needed for anaphylaxis    Allergy to tree nuts       * EPINEPHrine 0.3 MG/0.3ML injection 2-pack    EPIPEN 2-KIMBERLEE    0.6 mL    Inject 0.3 mLs (0.3 mg) into the muscle once as needed for anaphylaxis    Peanut allergy, Tree nut allergy       hydrochlorothiazide 25 MG tablet    HYDRODIURIL    90 tablet    TAKE ONE TABLET BY MOUTH ONCE DAILY    Essential hypertension       leflunomide 20 MG tablet    ARAVA    30 tablet    Take 1 tablet (20 mg) by mouth daily    Rheumatoid arthritis, involving unspecified site, unspecified rheumatoid factor presence (H)       levothyroxine 75 MCG tablet    SYNTHROID/LEVOTHROID    90 tablet    TAKE ONE TABLET BY MOUTH ONCE DAILY    Hypothyroidism due to acquired atrophy of thyroid       losartan 50 MG tablet    COZAAR    90 tablet    Take 1 tablet (50 mg) by mouth daily    Essential hypertension with goal blood pressure less than 140/90       metoprolol 25 MG 24 hr tablet    TOPROL-XL    90 tablet    TAKE ONE TABLET BY MOUTH ONCE DAILY    HTN, goal below 140/90       potassium chloride SA 20 MEQ CR tablet    KLOR-CON    95 tablet    1 tab po QID today, then TID tomorrow, then 1 po QD    Hypokalemia       * Notice:  This list has 2 medication(s) that are the same as other  medications prescribed for you. Read the directions carefully, and ask your doctor or other care provider to review them with you.

## 2017-09-26 ENCOUNTER — MYC MEDICAL ADVICE (OUTPATIENT)
Dept: FAMILY MEDICINE | Facility: CLINIC | Age: 42
End: 2017-09-26

## 2017-09-26 DIAGNOSIS — M79.7 FIBROMYALGIA: ICD-10-CM

## 2017-09-26 LAB
BACTERIA SPEC CULT: NORMAL
SPECIMEN SOURCE: NORMAL

## 2017-09-26 RX ORDER — LEVOTHYROXINE SODIUM 88 UG/1
88 TABLET ORAL DAILY
Qty: 90 TABLET | Refills: 0 | Status: SHIPPED | OUTPATIENT
Start: 2017-09-26 | End: 2017-12-28

## 2017-09-26 RX ORDER — CYCLOBENZAPRINE HCL 10 MG
TABLET ORAL
Qty: 90 TABLET | Refills: 1 | Status: SHIPPED | OUTPATIENT
Start: 2017-09-26 | End: 2018-03-30

## 2017-09-26 NOTE — TELEPHONE ENCOUNTER
cyclobenzaprine (FLEXERIL) 10 MG tablet      Last Written Prescription Date:  03/20/17  Last Fill Quantity: 90,   # refills: 1  Last Office Visit with [facility], [facility] or [facility] prescribing provider: 09/25/17  Future Office visit:       Routing refill request to provider for review/approval because:  Drug not on the [facility], [facility] or [facility] refill protocol or controlled substance

## 2017-09-28 NOTE — TELEPHONE ENCOUNTER
CourseNetworking message read with no response.  Will close encounter at this time.    Gustavo Arndt, RN, BSN

## 2017-10-17 DIAGNOSIS — I10 HTN, GOAL BELOW 140/90: ICD-10-CM

## 2017-10-18 RX ORDER — METOPROLOL SUCCINATE 25 MG/1
TABLET, EXTENDED RELEASE ORAL
Qty: 30 TABLET | Refills: 0 | OUTPATIENT
Start: 2017-10-18

## 2017-10-18 NOTE — TELEPHONE ENCOUNTER
Pending Prescriptions:                       Disp   Refills    metoprolol (TOPROL-XL) 25 MG 24 hr tablet *30 tab*0        Sig: TAKE ONE TABLET BY MOUTH ONCE DAILY    Was filled in july 2017 for annual supply.    Gustavo Arndt, RN, BSN

## 2017-11-29 ENCOUNTER — TELEPHONE (OUTPATIENT)
Dept: FAMILY MEDICINE | Facility: CLINIC | Age: 42
End: 2017-11-29

## 2017-11-29 NOTE — TELEPHONE ENCOUNTER
Reason for Call: Request for an order or referral:    Order or referral being requested: potassium    Date needed: as soon as possible    Has the patient been seen by the PCP for this problem? YES    Additional comments: pt declined appt per PFA, requesting lab order.  Pt states she has had some lightheadedness recently and would like her potassium rechecked.    Phone number Patient can be reached at:  Cell number on file:    Telephone Information:   Mobile [phone]       Best Time:  any    Can we leave a detailed message on this number?  YES    Call taken on 11/29/2017 at 3:48 PM by Kimberlee Ceja

## 2017-11-29 NOTE — TELEPHONE ENCOUNTER
Elizabeth Goodson is a 42 year old female who calls with would like potassium levels.    NURSING ASSESSMENT:  Description:  Pt states she believes her potassium level might be low.  She states her Potassium was low at her last appt and feels like those symptoms are coming back.  She just wanted to do a lab only appt. Keri Yu CNP, suggests pt make an office visit.  Onset/duration:  Last week  Associated symptoms:  Leg cramping, occasional light headedness.  Improves/worsens symptoms:  nothing  Pain scale (0-10)   0/10    Allergies:   Allergies   Allergen Reactions     Penicillins Anaphylaxis     Walnuts [Nuts] Anaphylaxis     All tree nuts     Norvasc [Amlodipine] Rash     Norvasc- rash at 10 mg dosing       RECOMMENDED DISPOSITION:  Keri Yu CNP, advises pt be seen in clinic -  does not have any available appt's until Tuesday.  Scheduled pt with KV on Friday.  Will comply with recommendation: Yes  If further questions/concerns or if symptoms do not improve, worsen or new symptoms develop, call your PCP or [facility] as soon as possible.      MARIZOL Jacobo RN

## 2017-11-30 ENCOUNTER — TELEPHONE (OUTPATIENT)
Dept: FAMILY MEDICINE | Facility: CLINIC | Age: 42
End: 2017-11-30

## 2017-11-30 NOTE — TELEPHONE ENCOUNTER
Patient returned call; relayed panel management message from below. She has appt tomorrow with KV, will have TSH labs done then .

## 2017-11-30 NOTE — TELEPHONE ENCOUNTER
Panel Management Review      Patient has the following on her problem list:     Hypertension   Last three blood pressure readings:  BP Readings from Last 3 Encounters:   09/25/17 128/82   07/10/17 132/86   04/17/17 (!) 146/96     Blood pressure: Passed    HTN Guidelines:  Age 18-59 BP range:  Less than 140/90  Age 60-85 with Diabetes:  Less than 140/90  Age 60-85 without Diabetes:  less than 150/90        Composite cancer screening  Chart review shows that this patient is due/due soon for the following None  Summary:    Patient is due/failing the following:   TSH    Action needed:   Patient needs non-fasting lab only appointment    Type of outreach:    Phone, left message for patient to call back.     Questions for provider review:    None                                                                                                                                    April MARLENI Granado       Chart routed to Care Team .

## 2017-12-28 DIAGNOSIS — E03.4 HYPOTHYROIDISM DUE TO ACQUIRED ATROPHY OF THYROID: ICD-10-CM

## 2017-12-29 RX ORDER — LEVOTHYROXINE SODIUM 88 UG/1
TABLET ORAL
Qty: 90 TABLET | Refills: 0 | Status: SHIPPED | OUTPATIENT
Start: 2017-12-29 | End: 2018-01-18 | Stop reason: DRUGHIGH

## 2017-12-29 NOTE — TELEPHONE ENCOUNTER
Synthroid:  Routing refill request to provider for review/approval because:  Labs out of range:  TSH    Pretty Dumont, RN, BSN

## 2017-12-29 NOTE — TELEPHONE ENCOUNTER
Patient can be informed that the Levothyroxine refill was completed.  She was to have returned by this time for repeat lab tests for her thyroid since it was not balance in September.  Please schedule a lab visit as recommended by Keri Yu.    Marcelo Rojas MD  Please close encounter when call/work completed.

## 2018-01-02 DIAGNOSIS — E03.4 HYPOTHYROIDISM DUE TO ACQUIRED ATROPHY OF THYROID: ICD-10-CM

## 2018-01-02 PROCEDURE — 84443 ASSAY THYROID STIM HORMONE: CPT | Performed by: NURSE PRACTITIONER

## 2018-01-02 PROCEDURE — 84439 ASSAY OF FREE THYROXINE: CPT | Performed by: NURSE PRACTITIONER

## 2018-01-02 PROCEDURE — 36415 COLL VENOUS BLD VENIPUNCTURE: CPT | Performed by: NURSE PRACTITIONER

## 2018-01-03 LAB
T4 FREE SERPL-MCNC: 0.93 NG/DL (ref 0.76–1.46)
TSH SERPL DL<=0.005 MIU/L-ACNC: 8.66 MU/L (ref 0.4–4)

## 2018-01-03 RX ORDER — LEVOTHYROXINE SODIUM 100 UG/1
100 TABLET ORAL DAILY
Qty: 90 TABLET | Refills: 0 | Status: SHIPPED | OUTPATIENT
Start: 2018-01-03 | End: 2018-04-19 | Stop reason: DRUGHIGH

## 2018-01-16 ENCOUNTER — TELEPHONE (OUTPATIENT)
Dept: FAMILY MEDICINE | Facility: CLINIC | Age: 43
End: 2018-01-16

## 2018-01-16 NOTE — TELEPHONE ENCOUNTER
Warning signs discussed, could eval. With CT is poss.   Would need further eval.   Pt. Coming in for appt. - please schedule on SD at 3:20 for Thursday.   Keri Yu

## 2018-01-16 NOTE — TELEPHONE ENCOUNTER
Reason for Call:  Other call back    Detailed comments: Elizabeth was seen at [facility] and was told she might have Mastoid in her ear.  She thought she had a ear infection.   She is concerned about this and is wondering if Keri thinks this is right.  Please call    Phone Number Patient can be reached at: Home number on file [phone] (home)    Best Time: any    Can we leave a detailed message on this number? YES    Call taken on 1/16/2018 at 3:35 PM by Kala Rahman

## 2018-01-17 NOTE — PROGRESS NOTES
SUBJECTIVE:                                                    Elizabeth Goodson is a 42 year old female who presents to clinic today for the following health issues:    History of Present Illness     Diet:  Regular (no restrictions)  Frequency of exercise:  2-3 days/week  Duration of exercise:  15-30 minutes  Taking medications regularly:  Yes  Additional concerns today:  No    Problem list and histories reviewed & adjusted, as indicated.  Additional history: as documented    Ear ache - left ear       Duration: 7 days, [facility]     Description (location/character/radiation): first it was shooting pain, uncomfortable pain, and now is just sore and achy on left ear. Noticed some greenish fluid come out from the left ear 2 days ago. Pain when chewing on left side, too. States she has sinus pressure and is concerned about a sinus infection.Pt states it is bothersome at night. She was not treated when seen in [facility].    Intensity:  mild    Accompanying signs and symptoms: sinus and some congestion    History (similar episodes/previous evaluation): None     Precipitating or alleviating factors: None    Therapies tried and outcome: Alternating ibuprofen and tylenol       Patient Active Problem List   Diagnosis     Syncope     Raynaud phenomenon     CARDIOVASCULAR SCREENING; LDL GOAL LESS THAN 160     Strain of neck muscle     Migraine headache     Cough with hemoptysis     Incidental lung nodule, less than or equal to 3mm     Need for prophylactic vaccination and inoculation against influenza     Fibromyalgia     HTN, goal below 140/90     H/O: hysterectomy     GERD (gastroesophageal reflux disease)     Edema     High risk medication use     Hypothyroidism     Ventral hernia without obstruction or gangrene     Hemoptysis     Rheumatoid arthritis, involving unspecified site, unspecified rheumatoid factor presence (H)     Penicillin allergy     Peanut allergy     Tree nut allergy     Anaphylaxis,  subsequent encounter     Itching     History of hemoptysis     Atypical chest pain     Past Surgical History:   Procedure Laterality Date     CHOLECYSTECTOMY, LAPOROSCOPIC  2007    Cholecystectomy, Laparoscopic     EXCISE MASS FOOT  11/22/2013    Procedure: EXCISE MASS FOOT;  Right Foot Soft Tissue Mass Excision;  Surgeon: Jose Cruz Garcia DPM;  Location: [facility]     EXCISE MASS FOOT Right 7/17/2015    Procedure: EXCISE MASS FOOT;  Surgeon: Pierre Adan DPM;  Location: [facility]     HYSTERECTOMY, VAGINAL  2002     LAPAROSCOPIC HERNIORRHAPHY VENTRAL N/A 2/24/2016    Procedure: LAPAROSCOPIC HERNIORRHAPHY VENTRAL;  Surgeon: Lars Dahl MD;  Location: [facility]     PANNICULECTOMY  07/28/2017    with liposuction, and hernia revision Dr. Heath       Social History   Substance Use Topics     Smoking status: Former Smoker     Types: Cigarettes     Quit date: 10/1/2012     Smokeless tobacco: Never Used     Alcohol use 0.0 oz/week     0 Standard drinks or equivalent per week      Comment: rarely, 1-2 per month     Family History   Problem Relation Age of Onset     Asthma Father      C.A.D. Father      DIABETES No family hx of      Hypertension No family hx of      CEREBROVASCULAR DISEASE No family hx of      Breast Cancer No family hx of      Cancer - colorectal No family hx of      Prostate Cancer No family hx of      Alcohol/Drug No family hx of      Allergies No family hx of      Alzheimer Disease No family hx of      Anesthesia Reaction No family hx of      Arthritis No family hx of      Blood Disease No family hx of      CANCER No family hx of      Cardiovascular No family hx of      Circulatory No family hx of      Congenital Anomalies No family hx of      Connective Tissue Disorder No family hx of      Neurologic Disorder No family hx of      Depression No family hx of      Endocrine Disease No family hx of      Eye Disorder No family hx of      Genetic Disorder No family hx of      GASTROINTESTINAL DISEASE  No family hx of      Genitourinary Problems No family hx of      Gynecology No family hx of      HEART DISEASE No family hx of      Lipids No family hx of      Musculoskeletal Disorder No family hx of      Obesity No family hx of      OSTEOPOROSIS No family hx of      Psychotic Disorder No family hx of      Respiratory No family hx of      Hearing Loss No family hx of      Family History Negative No family hx of      Thyroid Disease No family hx of      Colon Cancer No family hx of      Hyperlipidemia No family hx of      Coronary Artery Disease No family hx of      Other Cancer No family hx of      Anxiety Disorder No family hx of      MENTAL ILLNESS No family hx of      Substance Abuse No family hx of          Current Outpatient Prescriptions   Medication Sig Dispense Refill     azithromycin (ZITHROMAX) 250 MG tablet 2 tablets daily on day one, then one tablet po daily until gone. 6 tablet 0     levothyroxine (SYNTHROID/LEVOTHROID) 100 MCG tablet Take 1 tablet (100 mcg) by mouth daily 90 tablet 0     cyclobenzaprine (FLEXERIL) 10 MG tablet TAKE ONE TABLET BY MOUTH AT BEDTIME. 90 tablet 1     diltiazem 240 MG 24 hr capsule TAKE ONE CAPSULE BY MOUTH ONCE DAILY 90 capsule 1     metoprolol (TOPROL-XL) 25 MG 24 hr tablet TAKE ONE TABLET BY MOUTH ONCE DAILY 90 tablet 3     hydrochlorothiazide (HYDRODIURIL) 25 MG tablet TAKE ONE TABLET BY MOUTH ONCE DAILY 90 tablet 3     potassium chloride SA (POTASSIUM CHLORIDE) 20 MEQ CR tablet 1 tab po QID today, then TID tomorrow, then 1 po QD 95 tablet 1     losartan (COZAAR) 50 MG tablet Take 1 tablet (50 mg) by mouth daily 90 tablet 2     leflunomide (ARAVA) 20 MG tablet Take 1 tablet (20 mg) by mouth daily 30 tablet 2     [DISCONTINUED] levothyroxine (SYNTHROID/LEVOTHROID) 88 MCG tablet TAKE ONE TABLET BY MOUTH ONCE DAILY (Patient not taking: Reported on 1/18/2018) 90 tablet 0     EPINEPHrine (EPIPEN 2-KIMBERLEE) 0.3 MG/0.3ML injection 2-pack Inject 0.3 mLs (0.3 mg) into the muscle  once as needed for anaphylaxis (Patient not taking: Reported on 1/18/2018) 0.6 mL 1     albuterol (PROAIR HFA, PROVENTIL HFA, VENTOLIN HFA) 108 (90 BASE) MCG/ACT inhaler Inhale 2 puffs into the lungs every 6 hours as needed for shortness of breath / dyspnea or wheezing (Patient not taking: Reported on 7/10/2017) 1 Inhaler 1     EPINEPHrine (EPIPEN) 0.3 MG/0.3ML injection Inject 0.3 mLs (0.3 mg) into the muscle once as needed for anaphylaxis (Patient not taking: Reported on 7/10/2017) 2 each 1     Allergies   Allergen Reactions     Penicillins Anaphylaxis     Walnuts [Nuts] Anaphylaxis     All tree nuts     Norvasc [Amlodipine] Rash     Norvasc- rash at 10 mg dosing     Recent Labs   Lab Test  01/02/18   1554  09/25/17   1550  07/14/17   1128   08/23/16   1401   11/30/15   1449  10/28/15   1550   10/08/14   1114   06/30/11   1222   LDL   --    --    --    --    --    --    --    --    --   85   --   75   HDL   --    --    --    --    --    --    --    --    --   39*   --   40*   TRIG   --    --    --    --    --    --    --    --    --   124   --   118   ALT   --    --    --    --   24   --   21  22   < >  20   < >   --    CR   --   0.65  0.76   < >   --    < >   --    --    < >  0.85   < >   --    GFRESTIMATED   --   >90  83   < >   --    < >   --    --    < >  75   < >   --    GFRESTBLACK   --   >90  >90  African American GFR Calc     < >   --    < >   --    --    < >  >90   GFR Calc     < >   --    POTASSIUM   --   3.5  4.1   < >   --    < >   --    --    < >  3.8   < >   --    TSH  8.66*  10.01*   --    < >  5.27*   --    --    --    < >  6.94*   --   1.00    < > = values in this interval not displayed.      BP Readings from Last 3 Encounters:   01/18/18 112/86   09/25/17 128/82   07/10/17 132/86    Wt Readings from Last 3 Encounters:   01/18/18 186 lb (84.4 kg)   09/25/17 182 lb 4.8 oz (82.7 kg)   07/10/17 194 lb 11.2 oz (88.3 kg)                  Labs reviewed in  "EPIC          ROS:  Constitutional, HEENT, cardiovascular, pulmonary, GI, , musculoskeletal, neuro, skin, endocrine and psych systems are negative, except as otherwise noted.    This document serves as a record of the services and decisions personally performed and made by Keri Yu CNP. It was created on her behalf by Eunice Perdomo, a trained medical scribe. The creation of this document is based the provider's statements to the medical scribe.    Eunice Perdomo January 18, 2018 3:50 PM    OBJECTIVE:   /86  Pulse 87  Temp 98  F (36.7  C) (Temporal)  Resp (!) 98  Ht 5' 2.6\" (1.59 m)  Wt 186 lb (84.4 kg)  BMI 33.37 kg/m2  Body mass index is 33.37 kg/(m^2).  GENERAL APPEARANCE: healthy, alert and no distress  EYES: Eyes grossly normal to inspection and conjunctivae and sclerae normal  HENT: ear canals and TM's normal, nose and mouth without ulcers or lesions and nasal mucosa edematous, no rhinorrhea  NECK: no adenopathy, no asymmetry, masses, or scars and thyroid normal to palpation  RESP: lungs clear to auscultation - no rales, rhonchi or wheezes  CV: regular rates and rhythm, normal S1 S2, no S3 or S4 and no murmur, click or rub      Diagnostic Test Results:  none     ASSESSMENT/PLAN:       ICD-10-CM    1. Acute maxillary sinusitis, recurrence not specified J01.00 azithromycin (ZITHROMAX) 250 MG tablet     Antiobiotic, see orders. Return to clinic if symptoms persist or worsen. OTC's antypyretics/analgesics prn, fluids, rest, supportive cares. OTC decongestants, prn. Patient understands and agrees to plan.no concern for mastoiditis with current exam.     Offered flu shot, but Pt declined today due to family history of possible allergies to influenza vaccine.    Follow up - RTC if symptoms persist or worsen. OTC's antipyretics/analgesics prn, fluids, rest, supportive cares advised. Start antibiotic.      The information in this document, created by the medical scribe for me, accurately reflects the " services I personally performed and the decisions made by me. I have reviewed and approved this document for accuracy prior to leaving the patient care area.    RAÚL Flor [facility] RUSTAM

## 2018-01-18 ENCOUNTER — OFFICE VISIT (OUTPATIENT)
Dept: FAMILY MEDICINE | Facility: CLINIC | Age: 43
End: 2018-01-18
Payer: COMMERCIAL

## 2018-01-18 VITALS
RESPIRATION RATE: 98 BRPM | TEMPERATURE: 98 F | HEIGHT: 63 IN | DIASTOLIC BLOOD PRESSURE: 86 MMHG | BODY MASS INDEX: 32.96 KG/M2 | WEIGHT: 186 LBS | SYSTOLIC BLOOD PRESSURE: 112 MMHG | HEART RATE: 87 BPM

## 2018-01-18 DIAGNOSIS — J01.00 ACUTE MAXILLARY SINUSITIS, RECURRENCE NOT SPECIFIED: Primary | ICD-10-CM

## 2018-01-18 PROCEDURE — 99213 OFFICE O/P EST LOW 20 MIN: CPT | Performed by: NURSE PRACTITIONER

## 2018-01-18 RX ORDER — AZITHROMYCIN 250 MG/1
TABLET, FILM COATED ORAL
Qty: 6 TABLET | Refills: 0 | Status: SHIPPED | OUTPATIENT
Start: 2018-01-18 | End: 2018-04-16

## 2018-01-18 ASSESSMENT — PAIN SCALES - GENERAL: PAINLEVEL: MILD PAIN (3)

## 2018-01-18 NOTE — MR AVS SNAPSHOT
"              After Visit Summary   1/18/2018    Elizabeth Goodson    MRN: [id number]           Patient Information     Date Of Birth          1975        Visit Information        Provider Department      1/18/2018 3:20 PM Keri Yu APRN CNP [facility]        Today's Diagnoses     Acute maxillary sinusitis, recurrence not specified    -  1       Follow-ups after your visit        Who to contact     If you have questions or need follow up information about today's clinic visit or your schedule please contact [facility] directly at [phone].  Normal or non-critical lab and imaging results will be communicated to you by AdChinahart, letter or phone within 4 business days after the clinic has received the results. If you do not hear from us within 7 days, please contact the clinic through AdChinahart or phone. If you have a critical or abnormal lab result, we will notify you by phone as soon as possible.  Submit refill requests through Dyn or call your pharmacy and they will forward the refill request to us. Please allow 3 business days for your refill to be completed.          Additional Information About Your Visit        MyChart Information     Dyn gives you secure access to your electronic health record. If you see a primary care provider, you can also send messages to your care team and make appointments. If you have questions, please call your primary care clinic.  If you do not have a primary care provider, please call [phone] and they will assist you.        Care EveryWhere ID     This is your Care EveryWhere ID. This could be used by other organizations to access your [facility] medical records  [id number]        Your Vitals Were     Pulse Temperature Respirations Height BMI (Body Mass Index)       87 98  F (36.7  C) (Temporal) 98 5' 2.6\" (1.59 m) 33.37 kg/m2        Blood Pressure from Last 3 Encounters:   01/18/18 112/86   09/25/17 128/82   07/10/17 132/86    " Weight from Last 3 Encounters:   01/18/18 186 lb (84.4 kg)   09/25/17 182 lb 4.8 oz (82.7 kg)   07/10/17 194 lb 11.2 oz (88.3 kg)              Today, you had the following     No orders found for display         Today's Medication Changes          These changes are accurate as of: 1/18/18 11:59 PM.  If you have any questions, ask your nurse or doctor.               Start taking these medicines.        Dose/Directions    azithromycin 250 MG tablet   Commonly known as:  ZITHROMAX   Used for:  Acute maxillary sinusitis, recurrence not specified   Started by:  Keri Yu APRN CNP        2 tablets daily on day one, then one tablet po daily until gone.   Quantity:  6 tablet   Refills:  0         These medicines have changed or have updated prescriptions.        Dose/Directions    levothyroxine 100 MCG tablet   Commonly known as:  SYNTHROID/LEVOTHROID   This may have changed:  Another medication with the same name was removed. Continue taking this medication, and follow the directions you see here.   Used for:  Hypothyroidism due to acquired atrophy of thyroid        Dose:  100 mcg   Take 1 tablet (100 mcg) by mouth daily   Quantity:  90 tablet   Refills:  0            Where to get your medicines      These medications were sent to [id number] - [place], [place] - [address]  [address] [address]    Hours:  test Rx sent successfully 12/26/02   Phone:  [phone]     azithromycin 250 MG tablet                Primary Care Provider Office Phone # Fax #    RAÚL Pat [facility] [phone] [address] [address]        Equal Access to Services     [facility] KIMBERLY : Hadii kiki cunninghamo Sojuan, waaxda luqadaha, qaybta kaalmada adeegyada, jose gomez. So [facility] [phone].    ATENCIÓN: Si habla español, tiene a alston disposición servicios gratuitos de asistencia lingüística. Llame al [phone].    We comply with applicable  federal civil rights laws and [place] laws. We do not discriminate on the basis of race, color, national origin, age, disability, sex, sexual orientation, or gender identity.            Thank you!     Thank you for choosing [facility]  for your care. Our goal is always to provide you with excellent care. Hearing back from our patients is one way we can continue to improve our services. Please take a few minutes to complete the written survey that you may receive in the mail after your visit with us. Thank you!             Your Updated Medication List - Protect others around you: Learn how to safely use, store and throw away your medicines at www.disposemymeds.org.          This list is accurate as of: 1/18/18 11:59 PM.  Always use your most recent med list.                   Brand Name Dispense Instructions for use Diagnosis    albuterol 108 (90 BASE) MCG/ACT Inhaler    PROAIR HFA/PROVENTIL HFA/VENTOLIN HFA    1 Inhaler    Inhale 2 puffs into the lungs every 6 hours as needed for shortness of breath / dyspnea or wheezing    Cough       azithromycin 250 MG tablet    ZITHROMAX    6 tablet    2 tablets daily on day one, then one tablet po daily until gone.    Acute maxillary sinusitis, recurrence not specified       cyclobenzaprine 10 MG tablet    FLEXERIL    90 tablet    TAKE ONE TABLET BY MOUTH AT BEDTIME.    Fibromyalgia       diltiazem 240 MG 24 hr capsule     90 capsule    TAKE ONE CAPSULE BY MOUTH ONCE DAILY    Essential hypertension       * EPINEPHrine 0.3 MG/0.3ML injection 2-pack    EPIPEN/ADRENACLICK/or ANY BX GENERIC EQUIV    2 each    Inject 0.3 mLs (0.3 mg) into the muscle once as needed for anaphylaxis    Allergy to tree nuts       * EPINEPHrine 0.3 MG/0.3ML injection 2-pack    EPIPEN 2-KIMBERLEE    0.6 mL    Inject 0.3 mLs (0.3 mg) into the muscle once as needed for anaphylaxis    Peanut allergy, Tree nut allergy       hydrochlorothiazide 25 MG tablet    HYDRODIURIL    90 tablet    TAKE ONE  TABLET BY MOUTH ONCE DAILY    Essential hypertension       leflunomide 20 MG tablet    ARAVA    30 tablet    Take 1 tablet (20 mg) by mouth daily    Rheumatoid arthritis, involving unspecified site, unspecified rheumatoid factor presence (H)       levothyroxine 100 MCG tablet    SYNTHROID/LEVOTHROID    90 tablet    Take 1 tablet (100 mcg) by mouth daily    Hypothyroidism due to acquired atrophy of thyroid       losartan 50 MG tablet    COZAAR    90 tablet    Take 1 tablet (50 mg) by mouth daily    Essential hypertension with goal blood pressure less than 140/90       metoprolol succinate 25 MG 24 hr tablet    TOPROL-XL    90 tablet    TAKE ONE TABLET BY MOUTH ONCE DAILY    HTN, goal below 140/90       potassium chloride SA 20 MEQ CR tablet    KLOR-CON    95 tablet    1 tab po QID today, then TID tomorrow, then 1 po QD    Hypokalemia       * Notice:  This list has 2 medication(s) that are the same as other medications prescribed for you. Read the directions carefully, and ask your doctor or other care provider to review them with you.

## 2018-01-26 ENCOUNTER — MYC MEDICAL ADVICE (OUTPATIENT)
Dept: FAMILY MEDICINE | Facility: CLINIC | Age: 43
End: 2018-01-26

## 2018-03-16 DIAGNOSIS — I10 ESSENTIAL HYPERTENSION WITH GOAL BLOOD PRESSURE LESS THAN 140/90: ICD-10-CM

## 2018-03-19 RX ORDER — LOSARTAN POTASSIUM 50 MG/1
TABLET ORAL
Qty: 90 TABLET | Refills: 1 | Status: SHIPPED | OUTPATIENT
Start: 2018-03-19 | End: 2018-10-31

## 2018-03-19 NOTE — TELEPHONE ENCOUNTER
"Requested Prescriptions   Pending Prescriptions Disp Refills     losartan (COZAAR) 50 MG tablet [Pharmacy Med Name: LOSARTAN POTASSIUM 50MG TABS] 90 tablet 2     Sig: TAKE ONE TABLET BY MOUTH ONCE DAILY    Angiotensin-II Receptors Passed    3/16/2018  9:16 PM       Passed - Blood pressure under 140/90 in past 12 months    BP Readings from Last 3 Encounters:   01/18/18 112/86   09/25/17 128/82   07/10/17 132/86                Passed - Recent (12 mo) or future (30 days) visit within the authorizing provider's specialty    Patient had office visit in the last 12 months or has a visit in the next 30 days with authorizing provider or within the authorizing provider's specialty.  See \"Patient Info\" tab in inbasket, or \"Choose Columns\" in Meds & Orders section of the refill encounter.           Passed - Patient is age 18 or older       Passed - No active pregnancy on record       Passed - Normal serum creatinine on file in past 12 months    Recent Labs   Lab Test  09/25/17   1550   CR  0.65            Passed - Normal serum potassium on file in past 12 months    Recent Labs   Lab Test  09/25/17   1550   POTASSIUM  3.5                   Passed - No positive pregnancy test in past 12 months        Prescription approved per [facility] [place] [facility].    Kimberlee Purcell RN    "

## 2018-03-21 ENCOUNTER — TELEPHONE (OUTPATIENT)
Dept: FAMILY MEDICINE | Facility: CLINIC | Age: 43
End: 2018-03-21

## 2018-03-21 NOTE — TELEPHONE ENCOUNTER
Panel Management Review      Patient has the following on her problem list:     Hypertension   Last three blood pressure readings:  BP Readings from Last 3 Encounters:   01/18/18 112/86   09/25/17 128/82   07/10/17 132/86     Blood pressure: Passed    HTN Guidelines:  Age 18-59 BP range:  Less than 140/90  Age 60-85 with Diabetes:  Less than 140/90  Age 60-85 without Diabetes:  less than 150/90      Composite cancer screening  Chart review shows that this patient is due/due soon for the following None  Summary:    Patient is due/failing the following:   TSH with T4    Action needed:   Patient needs non-fasting lab only appointment    Type of outreach:    Phone, left message for patient to call back.     Questions for provider review:    None                                                                                                                                    Lara Bloom CMA

## 2018-03-30 DIAGNOSIS — M79.7 FIBROMYALGIA: ICD-10-CM

## 2018-03-30 RX ORDER — CYCLOBENZAPRINE HCL 10 MG
TABLET ORAL
Qty: 90 TABLET | Refills: 1 | Status: SHIPPED | OUTPATIENT
Start: 2018-03-30 | End: 2018-09-30

## 2018-03-30 NOTE — TELEPHONE ENCOUNTER
Pt calling. She has 2 pills left but is leaving tomorrow morning for [place] for a week. She is hoping this can be done ASAP.  Thank you,  Elizabeth Galdamez- Pt Rep.

## 2018-04-11 NOTE — PROGRESS NOTES
SUBJECTIVE:   Elizabeth Goodson is a 43 year old female who presents to clinic today for the following health issues:      History of Present Illness     Diet:  Regular (no restrictions)  Frequency of exercise:  6-7 days/week  Duration of exercise:  30-45 minutes  Taking medications regularly:  Yes  Medication side effects:  None  Additional concerns today:  No    Concern - Lump on hand  Onset: 6-9 months    Description:   Painful lump on left hand.    Intensity: mild, 3/10    Progression of Symptoms:  Worsening for the last 4 weeks.    Accompanying Signs & Symptoms:  Tingling and numbness in hand    Previous history of similar problem:   No    Precipitating factors:   Worsened by: Pain is worse if the bump gets hit.    Alleviating factors:  Improved by: NA    Therapies Tried and outcome: nothing tried    Patient reports of a lump under her left ring finger. This has been present for the past 6-9 months, but it has been worsening over the last 4 weeks. She reports of increased shooting pains, numbness, and painful to open hand. She has a history of RA but admits she has not been taking her RA medication and have not seem a rheumatologist in the past 2-3 years.     Answers for HPI/ROS submitted by the patient on 4/16/2018   PHQ-2 Score: 0    Problem list and histories reviewed & adjusted, as indicated.  Additional history: as documented      Patient Active Problem List   Diagnosis     Syncope     Raynaud phenomenon     CARDIOVASCULAR SCREENING; LDL GOAL LESS THAN 160     Strain of neck muscle     Migraine headache     Cough with hemoptysis     Incidental lung nodule, less than or equal to 3mm     Need for prophylactic vaccination and inoculation against influenza     Fibromyalgia     HTN, goal below 140/90     H/O: hysterectomy     GERD (gastroesophageal reflux disease)     Edema     High risk medication use     Hypothyroidism     Ventral hernia without obstruction or gangrene     Hemoptysis     Rheumatoid  arthritis, involving unspecified site, unspecified rheumatoid factor presence (H)     Penicillin allergy     Peanut allergy     Tree nut allergy     Anaphylaxis, subsequent encounter     Itching     History of hemoptysis     Atypical chest pain     Past Surgical History:   Procedure Laterality Date     CHOLECYSTECTOMY, LAPOROSCOPIC  2007    Cholecystectomy, Laparoscopic     EXCISE MASS FOOT  11/22/2013    Procedure: EXCISE MASS FOOT;  Right Foot Soft Tissue Mass Excision;  Surgeon: Jose Cruz Garcia DPM;  Location: [facility] OR     EXCISE MASS FOOT Right 7/17/2015    Procedure: EXCISE MASS FOOT;  Surgeon: Pierre Adan DPM;  Location: [facility] OR     HYSTERECTOMY, VAGINAL  2002     LAPAROSCOPIC HERNIORRHAPHY VENTRAL N/A 2/24/2016    Procedure: LAPAROSCOPIC HERNIORRHAPHY VENTRAL;  Surgeon: Lars Dahl MD;  Location: [facility] OR     PANNICULECTOMY  07/28/2017    with liposuction, and hernia revision Dr. Heath       Social History   Substance Use Topics     Smoking status: Former Smoker     Types: Cigarettes     Quit date: 10/1/2012     Smokeless tobacco: Never Used     Alcohol use 0.0 oz/week     0 Standard drinks or equivalent per week      Comment: rarely, 1-2 per month     Family History   Problem Relation Age of Onset     Asthma Father      C.A.D. Father      DIABETES No family hx of      Hypertension No family hx of      CEREBROVASCULAR DISEASE No family hx of      Breast Cancer No family hx of      Cancer - colorectal No family hx of      Prostate Cancer No family hx of      Alcohol/Drug No family hx of      Allergies No family hx of      Alzheimer Disease No family hx of      Anesthesia Reaction No family hx of      Arthritis No family hx of      Blood Disease No family hx of      CANCER No family hx of      Cardiovascular No family hx of      Circulatory No family hx of      Congenital Anomalies No family hx of      Connective Tissue Disorder No family hx of      Neurologic Disorder No family hx of       Depression No family hx of      Endocrine Disease No family hx of      Eye Disorder No family hx of      Genetic Disorder No family hx of      GASTROINTESTINAL DISEASE No family hx of      Genitourinary Problems No family hx of      Gynecology No family hx of      HEART DISEASE No family hx of      Lipids No family hx of      Musculoskeletal Disorder No family hx of      Obesity No family hx of      OSTEOPOROSIS No family hx of      Psychotic Disorder No family hx of      Respiratory No family hx of      Hearing Loss No family hx of      Family History Negative No family hx of      Thyroid Disease No family hx of      Colon Cancer No family hx of      Hyperlipidemia No family hx of      Coronary Artery Disease No family hx of      Other Cancer No family hx of      Anxiety Disorder No family hx of      MENTAL ILLNESS No family hx of      Substance Abuse No family hx of          Current Outpatient Prescriptions   Medication Sig Dispense Refill     levothyroxine (SYNTHROID/LEVOTHROID) 112 MCG tablet Take 1 tablet (112 mcg) by mouth daily 90 tablet 0     leflunomide (ARAVA) 20 MG tablet Take 1 tablet (20 mg) by mouth daily 30 tablet 2     potassium chloride SA (KLOR-CON) 20 MEQ CR tablet 1 tab po QID today, then TID tomorrow, then 1 po QD 95 tablet 1     cyclobenzaprine (FLEXERIL) 10 MG tablet TAKE ONE TABLET BY MOUTH EVERY NIGHT AT BEDTIME 90 tablet 1     losartan (COZAAR) 50 MG tablet TAKE ONE TABLET BY MOUTH ONCE DAILY 90 tablet 1     levothyroxine (SYNTHROID/LEVOTHROID) 100 MCG tablet Take 1 tablet (100 mcg) by mouth daily 90 tablet 0     diltiazem 240 MG 24 hr capsule TAKE ONE CAPSULE BY MOUTH ONCE DAILY 90 capsule 1     EPINEPHrine (EPIPEN 2-KIMBERLEE) 0.3 MG/0.3ML injection 2-pack Inject 0.3 mLs (0.3 mg) into the muscle once as needed for anaphylaxis 0.6 mL 1     metoprolol (TOPROL-XL) 25 MG 24 hr tablet TAKE ONE TABLET BY MOUTH ONCE DAILY 90 tablet 3     hydrochlorothiazide (HYDRODIURIL) 25 MG tablet TAKE ONE TABLET  BY MOUTH ONCE DAILY 90 tablet 3     EPINEPHrine (EPIPEN) 0.3 MG/0.3ML injection Inject 0.3 mLs (0.3 mg) into the muscle once as needed for anaphylaxis 2 each 1     albuterol (PROAIR HFA, PROVENTIL HFA, VENTOLIN HFA) 108 (90 BASE) MCG/ACT inhaler Inhale 2 puffs into the lungs every 6 hours as needed for shortness of breath / dyspnea or wheezing (Patient not taking: Reported on 4/16/2018) 1 Inhaler 1     Allergies   Allergen Reactions     Penicillins Anaphylaxis     Walnuts [Nuts] Anaphylaxis     All tree nuts     Norvasc [Amlodipine] Rash     Norvasc- rash at 10 mg dosing     Recent Labs   Lab Test  04/16/18   1530  01/02/18   1554  09/25/17   1550   08/23/16   1401   11/30/15   1449   10/08/14   1114   06/30/11   1222   LDL   --    --    --    --    --    --    --    --   85   --   75   HDL   --    --    --    --    --    --    --    --   39*   --   40*   TRIG   --    --    --    --    --    --    --    --   124   --   118   ALT  21   --    --    --   24   --   21   < >  20   < >   --    CR  0.81   --   0.65   < >   --    < >   --    < >  0.85   < >   --    GFRESTIMATED  77   --   >90   < >   --    < >   --    < >  75   < >   --    GFRESTBLACK  >90   --   >90   < >   --    < >   --    < >  >90   GFR Calc     < >   --    POTASSIUM  3.4   --   3.5   < >   --    < >   --    < >  3.8   < >   --    TSH  4.86*  8.66*  10.01*   < >  5.27*   --    --    < >  6.94*   --   1.00    < > = values in this interval not displayed.      BP Readings from Last 3 Encounters:   04/16/18 110/72   01/18/18 112/86   09/25/17 128/82    Wt Readings from Last 3 Encounters:   04/16/18 185 lb (83.9 kg)   01/18/18 186 lb (84.4 kg)   09/25/17 182 lb 4.8 oz (82.7 kg)           ROS:  Constitutional, HEENT, cardiovascular, pulmonary, GI, , musculoskeletal, neuro, skin, endocrine and psych systems are negative, except as otherwise noted.    This document serves as a record of the services and decisions personally performed and made  "by Keri Yu CNP. It was created on her behalf by Eunice Perdomo, a trained medical scribe. The creation of this document is based the provider's statements to the medical scribe.    Eunice Perdomo April 16, 2018 3:23 PM    OBJECTIVE:   /72  Pulse 85  Temp 99  F (37.2  C) (Temporal)  Ht 5' 2.5\" (1.588 m)  Wt 185 lb (83.9 kg)  SpO2 97%  Breastfeeding? No  BMI 33.3 kg/m2  Body mass index is 33.3 kg/(m^2).  GENERAL: healthy, alert and no distress  NECK: no adenopathy, no asymmetry, masses, or scars and thyroid normal to palpation  RESP: lungs clear to auscultation - no rales, rhonchi or wheezes  CV: regular rate and rhythm, normal S1 S2, no S3 or S4, no murmur, click or rub, no peripheral edema and peripheral pulses strong  MS: no gross musculoskeletal defects noted, no edema. Palm off fourth left metatarsal she has 3-4 mm ganglion cyst that is mobil with finger flexion and extesion, mild tenderness.   SKIN: no suspicious lesions or rashes  NEURO: Normal strength and tone, mentation intact and speech normal  PSYCH: mentation appears normal, affect normal/bright    Diagnostic Test Results:  none     ASSESSMENT/PLAN:       ICD-10-CM    1. Ganglion of hand, left M67.442 ORTHO  REFERRAL     T4 free   2. Rheumatoid arthritis, involving unspecified site, unspecified rheumatoid factor presence (H) M06.9 leflunomide (ARAVA) 20 MG tablet     RHEUMATOLOGY REFERRAL     Comprehensive metabolic panel     CBC with platelets and differential   3. Hypokalemia E87.6 potassium chloride SA (KLOR-CON) 20 MEQ CR tablet     Comprehensive metabolic panel   4. Hypothyroidism due to acquired atrophy of thyroid E03.4 TSH with free T4 reflex     levothyroxine (SYNTHROID/LEVOTHROID) 112 MCG tablet     TSH with free T4 reflex   5. Encounter for screening mammogram for breast cancer Z12.31 MA Screen Bilateral w/[facility]   Ortho referral for further evaluation of cyst located on left hand. See exam above. Possibly may need to be " surgically removed and discussed this possibility with patient. Referral placed today.    For her RA, she states she has not been taking her RA medications. I encouraged her to restart this and to follow-up with rheumatology as it has been 2-3 years since last follow-up. Medication refilled today.     Hypokalemia: labs today and KLOR-CON refilled today.     Will have patient complete TSH, CMP, and CBC labs today and will notify with results.    Follow-up with ortho for further evaluation for cyst  With PCP as needed.    The information in this document, created by the medical scribe for me, accurately reflects the services I personally performed and the decisions made by me. I have reviewed and approved this document for accuracy prior to leaving the patient care area.    RA[facility]L [facility]

## 2018-04-16 ENCOUNTER — OFFICE VISIT (OUTPATIENT)
Dept: FAMILY MEDICINE | Facility: CLINIC | Age: 43
End: 2018-04-16
Payer: COMMERCIAL

## 2018-04-16 VITALS
DIASTOLIC BLOOD PRESSURE: 72 MMHG | TEMPERATURE: 99 F | HEART RATE: 85 BPM | SYSTOLIC BLOOD PRESSURE: 110 MMHG | WEIGHT: 185 LBS | BODY MASS INDEX: 32.78 KG/M2 | HEIGHT: 63 IN | OXYGEN SATURATION: 97 %

## 2018-04-16 DIAGNOSIS — M67.442 GANGLION OF HAND, LEFT: Primary | ICD-10-CM

## 2018-04-16 DIAGNOSIS — Z12.31 ENCOUNTER FOR SCREENING MAMMOGRAM FOR BREAST CANCER: ICD-10-CM

## 2018-04-16 DIAGNOSIS — M06.9 RHEUMATOID ARTHRITIS, INVOLVING UNSPECIFIED SITE, UNSPECIFIED RHEUMATOID FACTOR PRESENCE: ICD-10-CM

## 2018-04-16 DIAGNOSIS — E87.6 HYPOKALEMIA: ICD-10-CM

## 2018-04-16 DIAGNOSIS — E03.4 HYPOTHYROIDISM DUE TO ACQUIRED ATROPHY OF THYROID: ICD-10-CM

## 2018-04-16 LAB
ALBUMIN SERPL-MCNC: 3.8 G/DL (ref 3.4–5)
ALP SERPL-CCNC: 64 U/L (ref 40–150)
ALT SERPL W P-5'-P-CCNC: 21 U/L (ref 0–50)
ANION GAP SERPL CALCULATED.3IONS-SCNC: 8 MMOL/L (ref 3–14)
AST SERPL W P-5'-P-CCNC: 17 U/L (ref 0–45)
BASOPHILS # BLD AUTO: 0 10E9/L (ref 0–0.2)
BASOPHILS NFR BLD AUTO: 0.4 %
BILIRUB SERPL-MCNC: 0.2 MG/DL (ref 0.2–1.3)
BUN SERPL-MCNC: 14 MG/DL (ref 7–30)
CALCIUM SERPL-MCNC: 9.3 MG/DL (ref 8.5–10.1)
CHLORIDE SERPL-SCNC: 99 MMOL/L (ref 94–109)
CO2 SERPL-SCNC: 29 MMOL/L (ref 20–32)
CREAT SERPL-MCNC: 0.81 MG/DL (ref 0.52–1.04)
DIFFERENTIAL METHOD BLD: ABNORMAL
EOSINOPHIL # BLD AUTO: 0.1 10E9/L (ref 0–0.7)
EOSINOPHIL NFR BLD AUTO: 1.3 %
ERYTHROCYTE [DISTWIDTH] IN BLOOD BY AUTOMATED COUNT: 15.2 % (ref 10–15)
GFR SERPL CREATININE-BSD FRML MDRD: 77 ML/MIN/1.7M2
GLUCOSE SERPL-MCNC: 103 MG/DL (ref 70–99)
HCT VFR BLD AUTO: 40.6 % (ref 35–47)
HGB BLD-MCNC: 13.3 G/DL (ref 11.7–15.7)
LYMPHOCYTES # BLD AUTO: 2.5 10E9/L (ref 0.8–5.3)
LYMPHOCYTES NFR BLD AUTO: 26.6 %
MCH RBC QN AUTO: 29.2 PG (ref 26.5–33)
MCHC RBC AUTO-ENTMCNC: 32.8 G/DL (ref 31.5–36.5)
MCV RBC AUTO: 89 FL (ref 78–100)
MONOCYTES # BLD AUTO: 0.7 10E9/L (ref 0–1.3)
MONOCYTES NFR BLD AUTO: 7.7 %
NEUTROPHILS # BLD AUTO: 6 10E9/L (ref 1.6–8.3)
NEUTROPHILS NFR BLD AUTO: 64 %
PLATELET # BLD AUTO: 435 10E9/L (ref 150–450)
POTASSIUM SERPL-SCNC: 3.4 MMOL/L (ref 3.4–5.3)
PROT SERPL-MCNC: 7.6 G/DL (ref 6.8–8.8)
RBC # BLD AUTO: 4.55 10E12/L (ref 3.8–5.2)
SODIUM SERPL-SCNC: 136 MMOL/L (ref 133–144)
T4 FREE SERPL-MCNC: 1.08 NG/DL (ref 0.76–1.46)
TSH SERPL DL<=0.005 MIU/L-ACNC: 4.86 MU/L (ref 0.4–4)
WBC # BLD AUTO: 9.3 10E9/L (ref 4–11)

## 2018-04-16 PROCEDURE — 84443 ASSAY THYROID STIM HORMONE: CPT | Performed by: NURSE PRACTITIONER

## 2018-04-16 PROCEDURE — 85025 COMPLETE CBC W/AUTO DIFF WBC: CPT | Performed by: NURSE PRACTITIONER

## 2018-04-16 PROCEDURE — 99214 OFFICE O/P EST MOD 30 MIN: CPT | Performed by: NURSE PRACTITIONER

## 2018-04-16 PROCEDURE — 84439 ASSAY OF FREE THYROXINE: CPT | Performed by: NURSE PRACTITIONER

## 2018-04-16 PROCEDURE — 36415 COLL VENOUS BLD VENIPUNCTURE: CPT | Performed by: NURSE PRACTITIONER

## 2018-04-16 PROCEDURE — 80053 COMPREHEN METABOLIC PANEL: CPT | Performed by: NURSE PRACTITIONER

## 2018-04-16 RX ORDER — LEFLUNOMIDE 20 MG/1
20 TABLET ORAL DAILY
Qty: 30 TABLET | Refills: 2 | Status: SHIPPED | OUTPATIENT
Start: 2018-04-16 | End: 2020-12-07

## 2018-04-16 RX ORDER — POTASSIUM CHLORIDE 1500 MG/1
TABLET, EXTENDED RELEASE ORAL
Qty: 95 TABLET | Refills: 1 | Status: SHIPPED | OUTPATIENT
Start: 2018-04-16 | End: 2019-09-26

## 2018-04-16 NOTE — MR AVS SNAPSHOT
After Visit Summary   4/16/2018    Elizabeth Goodson    MRN: [id number]           Patient Information     Date Of Birth          1975        Visit Information        Provider Department      4/16/2018 3:00 PM Keri Yu APRN [facility]        Today's Diagnoses     Ganglion of hand, left    -  1    Rheumatoid arthritis, involving unspecified site, unspecified rheumatoid factor presence (H)        Hypokalemia        Hypothyroidism due to acquired atrophy of thyroid           Follow-ups after your visit        Additional Services     ORTHO  REFERRAL       [facility] is referring you to the Orthopedic  Services at [facility].       The  Representative will assist you in the coordination of your Orthopedic and Musculoskeletal Care as prescribed by your physician.    The  Representative will call you within 1 business day to help schedule your appointment, or you may contact the  Representative at:    All areas ~ [phone]     Type of Referral : Surgical / Specialist       Timeframe requested: 1 - 5 days    Coverage of these services is subject to the terms and limitations of your health insurance plan.  Please call member services at your health plan with any benefit or coverage questions.      If X-rays, CT or MRI's have been performed, please contact the facility where they were done to arrange for , prior to your scheduled appointment.  Please bring this referral request to your appointment and present it to your specialist.            RHEUMATOLOGY REFERRAL       Your provider has referred you to: [facility]: [facility] [phone]   [email or web address]  [facility]: [facility] [phone]   [email or web address]    Please be aware that coverage of these services is subject to the terms and  "limitations of your health insurance plan.  Call member services at your health plan with any benefit or coverage questions.      Please bring the following with you to your appointment:    (1) Any X-Rays, CTs or MRIs which have been performed.  Contact the facility where they were done to arrange for  prior to your scheduled appointment.    (2) List of current medications   (3) This referral request   (4) Any documents/labs given to you for this referral                  Who to contact     If you have questions or need follow up information about today's clinic visit or your schedule please contact [facility] directly at [phone].  Normal or non-critical lab and imaging results will be communicated to you by [employer], letter or phone within 4 business days after the clinic has received the results. If you do not hear from us within 7 days, please contact the clinic through [employer] or phone. If you have a critical or abnormal lab result, we will notify you by phone as soon as possible.  Submit refill requests through [employer] or call your pharmacy and they will forward the refill request to us. Please allow 3 business days for your refill to be completed.          Additional Information About Your Visit        [employer] Information     [employer] gives you secure access to your electronic health record. If you see a primary care provider, you can also send messages to your care team and make appointments. If you have questions, please call your primary care clinic.  If you do not have a primary care provider, please call [phone] and they will assist you.        Care EveryWhere ID     This is your Care EveryWhere ID. This could be used by other organizations to access your [facility] medical records  [id number]        Your Vitals Were     Pulse Temperature Height Pulse Oximetry Breastfeeding? BMI (Body Mass Index)    85 99  F (37.2  C) (Temporal) 5' 2.5\" (1.588 m) 97% No 33.3 kg/m2       Blood " Pressure from Last 3 Encounters:   04/16/18 110/72   01/18/18 112/86   09/25/17 128/82    Weight from Last 3 Encounters:   04/16/18 185 lb (83.9 kg)   01/18/18 186 lb (84.4 kg)   09/25/17 182 lb 4.8 oz (82.7 kg)              We Performed the Following     CBC with platelets and differential     Comprehensive metabolic panel     ORTHO  REFERRAL     RHEUMATOLOGY REFERRAL     TSH with free T4 reflex          Where to get your medicines      These medications [id number] - [place], [place] - [address]  [address], [facility] [address]    Hours:  test Rx sent successfully 12/26/02  KR Phone:  [phone]     leflunomide 20 MG tablet    potassium chloride SA 20 MEQ CR tablet          Primary Care Provider Office Phone # Fax #    Keri RAÚL [facility] [phone] [address] [facility] [address]        Equal Access to Services     [facility] AH: Hadii aad ku hadasho Soomaali, waaxda luqadaha, qaybta kaalmada adeegyada, waxay idiin hayaan adeeg kharajus figueredo . So [facility] [phone].    ATENCIÓN: Si habla español, tiene a alston disposición servicios gratuitos de asistencia lingüística. [facility] [phone].    We comply with applicable federal civil rights laws and [place] laws. We do not discriminate on the basis of race, color, national origin, age, disability, sex, sexual orientation, or gender identity.            Thank you!     Thank you for choosing [facility]  for your care. Our goal is always to provide you with excellent care. Hearing back from our patients is one way we can continue to improve our services. Please take a few minutes to complete the written survey that you may receive in the mail after your visit with us. Thank you!             Your Updated Medication List - Protect others around you: Learn how to safely use, store and throw away your medicines at www.disposemymeds.org.          This list is accurate as of 4/16/18   3:30 PM.  Always use your most recent med list.                   Brand Name Dispense Instructions for use Diagnosis    albuterol 108 (90 Base) MCG/ACT Inhaler    PROAIR HFA/PROVENTIL HFA/VENTOLIN HFA    1 Inhaler    Inhale 2 puffs into the lungs every 6 hours as needed for shortness of breath / dyspnea or wheezing    Cough       cyclobenzaprine 10 MG tablet    FLEXERIL    90 tablet    TAKE ONE TABLET BY MOUTH EVERY NIGHT AT BEDTIME    Fibromyalgia       diltiazem 240 MG 24 hr capsule     90 capsule    TAKE ONE CAPSULE BY MOUTH ONCE DAILY    Essential hypertension       * EPINEPHrine 0.3 MG/0.3ML injection 2-pack    EPIPEN/ADRENACLICK/or ANY BX GENERIC EQUIV    2 each    Inject 0.3 mLs (0.3 mg) into the muscle once as needed for anaphylaxis    Allergy to tree nuts       * EPINEPHrine 0.3 MG/0.3ML injection 2-pack    EPIPEN 2-KIMBERLEE    0.6 mL    Inject 0.3 mLs (0.3 mg) into the muscle once as needed for anaphylaxis    Peanut allergy, Tree nut allergy       hydrochlorothiazide 25 MG tablet    HYDRODIURIL    90 tablet    TAKE ONE TABLET BY MOUTH ONCE DAILY    Essential hypertension       leflunomide 20 MG tablet    ARAVA    30 tablet    Take 1 tablet (20 mg) by mouth daily    Rheumatoid arthritis, involving unspecified site, unspecified rheumatoid factor presence (H)       levothyroxine 100 MCG tablet    SYNTHROID/LEVOTHROID    90 tablet    Take 1 tablet (100 mcg) by mouth daily    Hypothyroidism due to acquired atrophy of thyroid       losartan 50 MG tablet    COZAAR    90 tablet    TAKE ONE TABLET BY MOUTH ONCE DAILY    Essential hypertension with goal blood pressure less than 140/90       metoprolol succinate 25 MG 24 hr tablet    TOPROL-XL    90 tablet    TAKE ONE TABLET BY MOUTH ONCE DAILY    HTN, goal below 140/90       potassium chloride SA 20 MEQ CR tablet    KLOR-CON    95 tablet    1 tab po QID today, then TID tomorrow, then 1 po QD    Hypokalemia       * Notice:  This list has 2 medication(s) that are the same  as other medications prescribed for you. Read the directions carefully, and ask your doctor or other care provider to review them with you.

## 2018-04-17 RX ORDER — LEVOTHYROXINE SODIUM 112 UG/1
112 TABLET ORAL DAILY
Qty: 90 TABLET | Refills: 0 | Status: SHIPPED | OUTPATIENT
Start: 2018-04-17 | End: 2018-07-13

## 2018-04-19 ENCOUNTER — OFFICE VISIT (OUTPATIENT)
Dept: ORTHOPEDICS | Facility: OTHER | Age: 43
End: 2018-04-19
Payer: COMMERCIAL

## 2018-04-19 ENCOUNTER — RADIANT APPOINTMENT (OUTPATIENT)
Dept: GENERAL RADIOLOGY | Facility: OTHER | Age: 43
End: 2018-04-19
Attending: ORTHOPAEDIC SURGERY
Payer: COMMERCIAL

## 2018-04-19 VITALS
TEMPERATURE: 99.6 F | SYSTOLIC BLOOD PRESSURE: 120 MMHG | HEIGHT: 63 IN | WEIGHT: 185 LBS | DIASTOLIC BLOOD PRESSURE: 82 MMHG | BODY MASS INDEX: 32.78 KG/M2

## 2018-04-19 DIAGNOSIS — M79.642 PAIN OF LEFT HAND: ICD-10-CM

## 2018-04-19 DIAGNOSIS — R22.32 MASS OF FINGER, LEFT: Primary | ICD-10-CM

## 2018-04-19 PROCEDURE — 73130 X-RAY EXAM OF HAND: CPT | Mod: LT

## 2018-04-19 PROCEDURE — 99243 OFF/OP CNSLTJ NEW/EST LOW 30: CPT | Performed by: ORTHOPAEDIC SURGERY

## 2018-04-19 ASSESSMENT — PAIN SCALES - GENERAL: PAINLEVEL: MILD PAIN (2)

## 2018-04-19 NOTE — NURSING NOTE
"Chief Complaint   Patient presents with     Musculoskeletal Problem     lt hand cyst     Consult     ref: Keri Yu       Initial /82 (BP Location: Left arm, Patient Position: Chair, Cuff Size: Adult Large)  Temp 99.6  F (37.6  C) (Temporal)  Ht 1.588 m (5' 2.5\")  Wt 83.9 kg (185 lb)  BMI 33.3 kg/m2 Estimated body mass index is 33.3 kg/(m^2) as calculated from the following:    Height as of this encounter: 1.588 m (5' 2.5\").    Weight as of this encounter: 83.9 kg (185 lb).  Medication Reconciliation: complete   Geno/CLAUDIA     "

## 2018-04-19 NOTE — PROGRESS NOTES
ORTHOPEDIC CONSULT      Chief Complaint: Elizabeth Goodson is a 43 year old female who is being seen for Chief Complaint   Patient presents with     Musculoskeletal Problem     lt hand cyst     Consult     ref: Keri Yu       History of Present Illness:  Elizabeth oGodson is a 43 year old female who is seen in consultation at the request of Keri Yu CNP for evaluation of left hand cyst.   Mechanism of Injury: no specific injury.  Started about 9 months as a small lump on left hand palmar side proximal to MCP joint of ring finger. States painful when bumped or when gripping something.  Feels like last 1.5 months has been growing and becoming more painful. Tender to touch. At times has pain at rest, with a mild to ache to the finger but severe pain when hit.  Also describes the pain travels up the finger on the palmar side, occasional numbness/tingling to the finger.  Has previous hx of cysts to the feet removed by surgery.  No redness/drainage, streaking or other signs of infection from this.  No previous cyst at this location.  Has tried NSAIDs, ice, and heat with no benefit.  Would like it removed.  No issues with past anesthesia, not on blood thinners.     Patient's past medical, surgical, social and family histories reviewed.     Past Medical History:   Diagnosis Date     Adnexal mass      H/O transfusion of whole blood 2001    with child labor/hysterectomy     Hemoptysis 11/12, 11/2015-mild    last episodes, mild, has had massive in HX     HTN (hypertension)      Incidental lung nodule, less than or equal to 3mm 11/12    [facility]ICH[facility]AS     Massive hemoptysis 11/12    due to pneumonia     Migraine headache      Tobacco abuse, in remission     quit 2012       Past Surgical History:   Procedure Laterality Date     CHOLECYSTECTOMY, LAPOROSCOPIC  2007    Cholecystectomy, Laparoscopic     EXCISE MASS FOOT  11/22/2013    Procedure: EXCISE MASS FOOT;  Right Foot Soft Tissue Mass Excision;  Surgeon: Jose Cruz Garcia,  HUDSON;  Location: [facility]     EXCISE MASS FOOT Right 7/17/2015    Procedure: EXCISE MASS FOOT;  Surgeon: Pierre Adan DPM;  Location: [facility] OR     HYSTERECTOMY, VAGINAL  2002     LAPAROSCOPIC HERNIORRHAPHY VENTRAL N/A 2/24/2016    Procedure: LAPAROSCOPIC HERNIORRHAPHY VENTRAL;  Surgeon: Lars Dahl MD;  Location: [facility] OR     PANNICULECTOMY  07/28/2017    with liposuction, and hernia revision Dr. Heath       Medications:    Current Outpatient Prescriptions on File Prior to Visit:  albuterol (PROAIR HFA, PROVENTIL HFA, VENTOLIN HFA) 108 (90 BASE) MCG/ACT inhaler Inhale 2 puffs into the lungs every 6 hours as needed for shortness of breath / dyspnea or wheezing   cyclobenzaprine (FLEXERIL) 10 MG tablet TAKE ONE TABLET BY MOUTH EVERY NIGHT AT BEDTIME   diltiazem 240 MG 24 hr capsule TAKE ONE CAPSULE BY MOUTH ONCE DAILY   hydrochlorothiazide (HYDRODIURIL) 25 MG tablet TAKE ONE TABLET BY MOUTH ONCE DAILY   levothyroxine (SYNTHROID/LEVOTHROID) 112 MCG tablet Take 1 tablet (112 mcg) by mouth daily   losartan (COZAAR) 50 MG tablet TAKE ONE TABLET BY MOUTH ONCE DAILY   metoprolol (TOPROL-XL) 25 MG 24 hr tablet TAKE ONE TABLET BY MOUTH ONCE DAILY   EPINEPHrine (EPIPEN 2-KIMBERLEE) 0.3 MG/0.3ML injection 2-pack Inject 0.3 mLs (0.3 mg) into the muscle once as needed for anaphylaxis   leflunomide (ARAVA) 20 MG tablet Take 1 tablet (20 mg) by mouth daily   potassium chloride SA (KLOR-CON) 20 MEQ CR tablet 1 tab po QID today, then TID tomorrow, then 1 po QD   [DISCONTINUED] levothyroxine (SYNTHROID/LEVOTHROID) 100 MCG tablet Take 1 tablet (100 mcg) by mouth daily     No current facility-administered medications on file prior to visit.     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 Father      DIABETES No family hx of      Hypertension No family hx of      CEREBROVASCULAR DISEASE No family hx of      Breast Cancer No family hx of      Cancer - colorectal No family hx of      Prostate Cancer No family hx of      Alcohol/Drug No family hx of      Allergies No family hx of      Alzheimer Disease No family hx of      Anesthesia Reaction No family hx of      Arthritis No family hx of      Blood Disease No family hx of      CANCER No family hx of      Cardiovascular No family hx of      Circulatory No family hx of      Congenital Anomalies No family hx of      Connective Tissue Disorder No family hx of      Neurologic Disorder No family hx of      Depression No family hx of      Endocrine Disease No family hx of      Eye Disorder No family hx of      Genetic Disorder No family hx of      GASTROINTESTINAL DISEASE No family hx of      Genitourinary Problems No family hx of      Gynecology No family hx of      HEART DISEASE No family hx of      Lipids No family hx of      Musculoskeletal Disorder No family hx of      Obesity No family hx of      OSTEOPOROSIS No family hx of      Psychotic Disorder No family hx of      Respiratory No family hx of      Hearing Loss No family hx of      Family History Negative No family hx of      Thyroid Disease No family hx of      Colon Cancer No family hx of      Hyperlipidemia No family hx of      Coronary Artery Disease No family hx of      Other Cancer No family hx of      Anxiety Disorder No family hx of      MENTAL  "ILLNESS No family hx of      Substance Abuse No family hx of        REVIEW OF SYSTEMS  10 point review systems performed otherwise negative as noted as per history of present illness.    Physical Exam:  Vitals: /82 (BP Location: Left arm, Patient Position: Chair, Cuff Size: Adult Large)  Temp 99.6  F (37.6  C) (Temporal)  Ht 1.588 m (5' 2.5\")  Wt 83.9 kg (185 lb)  BMI 33.3 kg/m2  BMI= Body mass index is 33.3 kg/(m^2).  Constitutional: healthy, alert and no acute distress   Psychiatric: mentation appears normal and affect normal/bright  NEURO: no focal deficits  RESP: Normal with easy respirations and no use of accessory muscles to breathe, no audible wheezing or retractions  CV: LUE:   no edema         Regular rate and rhythm by palpation  SKIN No erythema, rashes, excoriation, or breakdown. No evidence of infection. Mass that is not visualized but can be palpated. Small mass (about bb sized), hard, non-mobile, tender to palmar side of left ring finger located 1-2 cm proximal of MCP joint.  Does move with flexion of the ring finger.  No evidence of infection.   JOINT/EXTREMITIES: left hand: mass as above. No deformities, bruising. Tender to mass otherwise non-tender throughout hand.  Full ROM. No locking, clicking, catching with flexion/extension of digits.  No tenderness at MCP joint of ring finger.    Mildly decreased sensation to ring finger.    Cap refill <2.  Skin pink warm dry.  Radial pulse 2+  GAIT: not tested     Diagnostic Modalities:  left hand X-ray: Normal alignment. No fractures, dislocation or lesions. No soft tissue abnormalities.  Independent visualization of the images was performed.      Impression: left ring finger flexor sheath mass     Plan:  All of the above pertinent physical exam and imaging modalities findings was reviewed with Elizabeth.      The patient has attempted conservative treatments that include NSAIDs, ice, rest, and 9 months yet still continues to have issues. These " issues include pain with gripping, at times pain at rest, and increasing size and pain of mass.. Secondary to these reasons I have offered surgery in the form of flexor sheath cyst excision with possible Al pulley release under local anesthesia    The risks, benefits, alternatives, complication, limitations and expectations were reviewed at length. Complications such as infection, bleeding, nerve damage, recurrence of mass, repeat surgery, skin problems, scar sensitivity.  Also reviewed were expectations and the goal of surgery. With surgery the goal would be removal of mass. Discussed there is a possibility of recurrence.  Postoperatively there will be limitation in use for approximately 1 to 2 weeks or as clinically indicated. All questions were answered. The patient agrees to proceed with surgery.     No pre-op H&P required, surgery will planned under local anesthesia    Return to clinic 10 days post-operatively. , or sooner as needed for changes.  Re-x-ray on return: No    Scribed by:  RAÚL Gao, CNP, DNP  4:33 PM  4/19/2018    I attest I have seen and evaluated the patient.  I agree with above impression and plan.  Ashok Orta D.O.

## 2018-04-19 NOTE — MR AVS SNAPSHOT
After Visit Summary   4/19/2018    Elizabeth Goodson    MRN: [id number]           Patient Information     Date Of Birth          1975        Visit Information        Provider Department      4/19/2018 3:40 PM Aaron Orta DO [facility]        Today's Diagnoses     Mass of finger, left    -  1       Follow-ups after your visit        Your next 10 appointments already scheduled     May 14, 2018 11:50 AM CDT   Return Visit with Aaron Orta DO   [facility] ([facility]    [address]  [address]  [place] [address] [phone]              Who to contact     If you have questions or need follow up information about today's clinic visit or your schedule please contact [facility] directly at [phone].  Normal or non-critical lab and imaging results will be communicated to you by [employer], letter or phone within 4 business days after the clinic has received the results. If you do not hear from us within 7 days, please contact the clinic through [employer] or phone. If you have a critical or abnormal lab result, we will notify you by phone as soon as possible.  Submit refill requests through [employer] or call your pharmacy and they will forward the refill request to us. Please allow 3 business days for your refill to be completed.          Additional Information About Your Visit        MyChart Information     [employer] gives you secure access to your electronic health record. If you see a primary care provider, you can also send messages to your care team and make appointments. If you have questions, please call your primary care clinic.  If you do not have a primary care provider, please call [phone] and they will assist you.        Care EveryWhere ID     This is your Care EveryWhere ID. This could be used by other organizations to access your [facility] medical records  [id number]        Your  "Vitals Were     Temperature Height BMI (Body Mass Index)             99.6  F (37.6  C) (Temporal) 5' 2.5\" (1.588 m) 33.3 kg/m2          Blood Pressure from Last 3 Encounters:   04/19/18 120/82   04/16/18 110/72   01/18/18 112/86    Weight from Last 3 Encounters:   04/19/18 185 lb (83.9 kg)   04/16/18 185 lb (83.9 kg)   01/18/18 186 lb (84.4 kg)              We Performed the Following     Jinny-Operative Worksheet          Today's Medication Changes          These changes are accurate as of 4/19/18  4:38 PM.  If you have any questions, ask your nurse or doctor.               These medicines have changed or have updated prescriptions.        Dose/Directions    levothyroxine 112 MCG tablet   Commonly known as:  SYNTHROID/LEVOTHROID   This may have changed:  Another medication with the same name was removed. Continue taking this medication, and follow the directions you see here.   Used for:  Hypothyroidism due to acquired atrophy of thyroid   Changed by:  Aaron Orta,         Dose:  112 mcg   Take 1 tablet (112 mcg) by mouth daily   Quantity:  90 tablet   Refills:  0                Primary Care Provider Office Phone # Fax #    RAÚL Pat [facility] [phone] [address] [place] [address]        Equal Access to Services     RICK HARRIS AH: Hadii kiki bhatti hadasho Soomaali, waaxda luqadaha, qaybta kaalmada adeegyada, jose gomez. So [facility] [phone].    ATENCIÓN: Si habla español, tiene a alston disposición servicios gratuitos de asistencia lingüística. Llame al [phone].    We comply with applicable federal civil rights laws and [place] laws. We do not discriminate on the basis of race, color, national origin, age, disability, sex, sexual orientation, or gender identity.            Thank you!     Thank you for choosing [facility]  for your care. Our goal is always to provide you with excellent care. Hearing back from our patients " is one way we can continue to improve our services. Please take a few minutes to complete the written survey that you may receive in the mail after your visit with us. Thank you!             Your Updated Medication List - Protect others around you: Learn how to safely use, store and throw away your medicines at www.disposemymeds.org.          This list is accurate as of 4/19/18  4:38 PM.  Always use your most recent med list.                   Brand Name Dispense Instructions for use Diagnosis    albuterol 108 (90 Base) MCG/ACT Inhaler    PROAIR HFA/PROVENTIL HFA/VENTOLIN HFA    1 Inhaler    Inhale 2 puffs into the lungs every 6 hours as needed for shortness of breath / dyspnea or wheezing    Cough       cyclobenzaprine 10 MG tablet    FLEXERIL    90 tablet    TAKE ONE TABLET BY MOUTH EVERY NIGHT AT BEDTIME    Fibromyalgia       diltiazem 240 MG 24 hr capsule     90 capsule    TAKE ONE CAPSULE BY MOUTH ONCE DAILY    Essential hypertension       EPINEPHrine 0.3 MG/0.3ML injection 2-pack    EPIPEN 2-KIMBERLEE    0.6 mL    Inject 0.3 mLs (0.3 mg) into the muscle once as needed for anaphylaxis    Peanut allergy, Tree nut allergy       hydrochlorothiazide 25 MG tablet    HYDRODIURIL    90 tablet    TAKE ONE TABLET BY MOUTH ONCE DAILY    Essential hypertension       leflunomide 20 MG tablet    ARAVA    30 tablet    Take 1 tablet (20 mg) by mouth daily    Rheumatoid arthritis, involving unspecified site, unspecified rheumatoid factor presence (H)       levothyroxine 112 MCG tablet    SYNTHROID/LEVOTHROID    90 tablet    Take 1 tablet (112 mcg) by mouth daily    Hypothyroidism due to acquired atrophy of thyroid       losartan 50 MG tablet    COZAAR    90 tablet    TAKE ONE TABLET BY MOUTH ONCE DAILY    Essential hypertension with goal blood pressure less than 140/90       metoprolol succinate 25 MG 24 hr tablet    TOPROL-XL    90 tablet    TAKE ONE TABLET BY MOUTH ONCE DAILY    HTN, goal below 140/90       potassium chloride SA  20 MEQ CR tablet    KLOR-CON    95 tablet    1 tab po QID today, then TID tomorrow, then 1 po QD    Hypokalemia

## 2018-04-19 NOTE — LETTER
4/19/2018         RE: Elizabeth Goodson  [address] [facility] [address]        Dear Colleague,    Thank you for referring your patient, Elizabeth Goodson, to the [facility]. Please see a copy of my visit note below.    ORTHOPEDIC CONSULT      Chief Complaint: Elizabeth Goodson is a 43 year old female who is being seen for Chief Complaint   Patient presents with     Musculoskeletal Problem     lt hand cyst     Consult     ref: Keri Yu       History of Present Illness:  Elizabeth Goodson is a 43 year old female who is seen in consultation at the request of Keri Yu CNP for evaluation of left hand cyst.   Mechanism of Injury: no specific injury.  Started about 9 months as a small lump on left hand palmar side proximal to MCP joint of ring finger. States painful when bumped or when gripping something.  Feels like last 1.5 months has been growing and becoming more painful. Tender to touch. At times has pain at rest, with a mild to ache to the finger but severe pain when hit.  Also describes the pain travels up the finger on the palmar side, occasional numbness/tingling to the finger.  Has previous hx of cysts to the feet removed by surgery.  No redness/drainage, streaking or other signs of infection from this.  No previous cyst at this location.  Has tried NSAIDs, ice, and heat with no benefit.  Would like it removed.  No issues with past anesthesia, not on blood thinners.     Patient's past medical, surgical, social and family histories reviewed.     Past Medical History:   Diagnosis Date     Adnexal mass      H/O transfusion of whole blood 2001    with child labor/hysterectomy     Hemoptysis 11/12, 11/2015-mild    last episodes, mild, has had massive in HX     HTN (hypertension)      Incidental lung nodule, less than or equal to 3mm 11/12    NICHOLAS     Massive hemoptysis 11/12    due to pneumonia     Migraine headache      Tobacco abuse, in remission     quit 2012       Past  Surgical History:   Procedure Laterality Date     CHOLECYSTECTOMY, LAPOROSCOPIC  2007    Cholecystectomy, Laparoscopic     EXCISE MASS FOOT  11/22/2013    Procedure: EXCISE MASS FOOT;  Right Foot Soft Tissue Mass Excision;  Surgeon: Jose Cruz Garcia DPM;  Location: [facility] OR     EXCISE MASS FOOT Right 7/17/2015    Procedure: EXCISE MASS FOOT;  Surgeon: Pierre Adan DPM;  Location: [facility] OR     HYSTERECTOMY, VAGINAL  2002     LAPAROSCOPIC HERNIORRHAPHY VENTRAL N/A 2/24/2016    Procedure: LAPAROSCOPIC HERNIORRHAPHY VENTRAL;  Surgeon: Lars Dahl MD;  Location: [facility] OR     PANNICULECTOMY  07/28/2017    with liposuction, and hernia revision Dr. Heath       Medications:    Current Outpatient Prescriptions on File Prior to Visit:  albuterol (PROAIR HFA, PROVENTIL HFA, VENTOLIN HFA) 108 (90 BASE) MCG/ACT inhaler Inhale 2 puffs into the lungs every 6 hours as needed for shortness of breath / dyspnea or wheezing   cyclobenzaprine (FLEXERIL) 10 MG tablet TAKE ONE TABLET BY MOUTH EVERY NIGHT AT BEDTIME   diltiazem 240 MG 24 hr capsule TAKE ONE CAPSULE BY MOUTH ONCE DAILY   hydrochlorothiazide (HYDRODIURIL) 25 MG tablet TAKE ONE TABLET BY MOUTH ONCE DAILY   levothyroxine (SYNTHROID/LEVOTHROID) 112 MCG tablet Take 1 tablet (112 mcg) by mouth daily   losartan (COZAAR) 50 MG tablet TAKE ONE TABLET BY MOUTH ONCE DAILY   metoprolol (TOPROL-XL) 25 MG 24 hr tablet TAKE ONE TABLET BY MOUTH ONCE DAILY   EPINEPHrine (EPIPEN 2-KIMBERLEE) 0.3 MG/0.3ML injection 2-pack Inject 0.3 mLs (0.3 mg) into the muscle once as needed for anaphylaxis   leflunomide (ARAVA) 20 MG tablet Take 1 tablet (20 mg) by mouth daily   potassium chloride SA (KLOR-CON) 20 MEQ CR tablet 1 tab po QID today, then TID tomorrow, then 1 po QD   [DISCONTINUED] levothyroxine (SYNTHROID/LEVOTHROID) 100 MCG tablet Take 1 tablet (100 mcg) by mouth daily     No current facility-administered medications on file prior to visit.     Allergies   Allergen Reactions      Penicillins Anaphylaxis     Walnuts [Nuts] Anaphylaxis     All tree nuts     Norvasc [Amlodipine] Rash     Norvasc- rash at 10 mg dosing       Social History     Occupational History           [employer] school- lunch program     Social History Main Topics     Smoking status: Former Smoker     Types: Cigarettes     Quit date: 10/1/2012     Smokeless tobacco: Never Used     Alcohol use 0.0 oz/week     0 Standard drinks or equivalent per week      Comment: rarely, 1-2 per month     Drug use: No     Sexual activity: Yes     Partners: Male     Birth control/ protection: Surgical, Female Surgical      Comment: hysterectomy       Family History   Problem Relation Age of Onset     Asthma Father      C.A.D. Father      DIABETES No family hx of      Hypertension No family hx of      CEREBROVASCULAR DISEASE No family hx of      Breast Cancer No family hx of      Cancer - colorectal No family hx of      Prostate Cancer No family hx of      Alcohol/Drug No family hx of      Allergies No family hx of      Alzheimer Disease No family hx of      Anesthesia Reaction No family hx of      Arthritis No family hx of      Blood Disease No family hx of      CANCER No family hx of      Cardiovascular No family hx of      Circulatory No family hx of      Congenital Anomalies No family hx of      Connective Tissue Disorder No family hx of      Neurologic Disorder No family hx of      Depression No family hx of      Endocrine Disease No family hx of      Eye Disorder No family hx of      Genetic Disorder No family hx of      GASTROINTESTINAL DISEASE No family hx of      Genitourinary Problems No family hx of      Gynecology No family hx of      HEART DISEASE No family hx of      Lipids No family hx of      Musculoskeletal Disorder No family hx of      Obesity No family hx of      OSTEOPOROSIS No family hx of      Psychotic Disorder No family hx of      Respiratory No family hx of      Hearing Loss No family hx of      Family History  "Negative No family hx of      Thyroid Disease No family hx of      Colon Cancer No family hx of      Hyperlipidemia No family hx of      Coronary Artery Disease No family hx of      Other Cancer No family hx of      Anxiety Disorder No family hx of      MENTAL ILLNESS No family hx of      Substance Abuse No family hx of        REVIEW OF SYSTEMS  10 point review systems performed otherwise negative as noted as per history of present illness.    Physical Exam:  Vitals: /82 (BP Location: Left arm, Patient Position: Chair, Cuff Size: Adult Large)  Temp 99.6  F (37.6  C) (Temporal)  Ht 1.588 m (5' 2.5\")  Wt 83.9 kg (185 lb)  BMI 33.3 kg/m2  BMI= Body mass index is 33.3 kg/(m^2).  Constitutional: healthy, alert and no acute distress   Psychiatric: mentation appears normal and affect normal/bright  NEURO: no focal deficits  RESP: Normal with easy respirations and no use of accessory muscles to breathe, no audible wheezing or retractions  CV: LUE:   no edema         Regular rate and rhythm by palpation  SKIN No erythema, rashes, excoriation, or breakdown. No evidence of infection. Mass that is not visualized but can be palpated. Small mass (about bb sized), hard, non-mobile, tender to palmar side of left ring finger located 1-2 cm proximal of MCP joint.  Does move with flexion of the ring finger.  No evidence of infection.   JOINT/EXTREMITIES: left hand: mass as above. No deformities, bruising. Tender to mass otherwise non-tender throughout hand.  Full ROM. No locking, clicking, catching with flexion/extension of digits.  No tenderness at MCP joint of ring finger.    Mildly decreased sensation to ring finger.    Cap refill <2.  Skin pink warm dry.  Radial pulse 2+  GAIT: not tested     Diagnostic Modalities:  left hand X-ray: Normal alignment. No fractures, dislocation or lesions. No soft tissue abnormalities.  Independent visualization of the images was performed.      Impression: left ring finger flexor sheath " mass     Plan:  All of the above pertinent physical exam and imaging modalities findings was reviewed with Elizabeth.      The patient has attempted conservative treatments that include NSAIDs, ice, rest, and 9 months yet still continues to have issues. These issues include pain with gripping, at times pain at rest, and increasing size and pain of mass.. Secondary to these reasons I have offered surgery in the form of flexor sheath cyst excision with possible Al pulley release under local anesthesia    The risks, benefits, alternatives, complication, limitations and expectations were reviewed at length. Complications such as infection, bleeding, nerve damage, recurrence of mass, repeat surgery, skin problems, scar sensitivity.  Also reviewed were expectations and the goal of surgery. With surgery the goal would be removal of mass. Discussed there is a possibility of recurrence.  Postoperatively there will be limitation in use for approximately 1 to 2 weeks or as clinically indicated. All questions were answered. The patient agrees to proceed with surgery.     No pre-op H&P required, surgery will planned under local anesthesia    Return to clinic 10 days post-operatively. , or sooner as needed for changes.  Re-x-ray on return: No    Scribed by:  Yogi Butt, APRN, CNP, DNP  4:33 PM  4/19/2018    I attest I have seen and evaluated the patient.  I agree with above impression and plan.  Ashok Orta D.O.    Again, thank you for allowing me to participate in the care of your patient.        Sincerely,        Aaron Orta, DO

## 2018-04-20 ENCOUNTER — TELEPHONE (OUTPATIENT)
Dept: ORTHOPEDICS | Facility: OTHER | Age: 43
End: 2018-04-20

## 2018-04-20 NOTE — TELEPHONE ENCOUNTER
Type of surgery: Left Ring Finger Cyst Excision  Location of surgery: [facility]  Date and time of surgery: 05/04/18  Surgeon: Dr Orta  Pre-Op Appt Date: not needed  Post-Op Appt Date: 05/14/18 w/Dr Orta  Packet sent out: given to patient in the office  Pre-cert/Authorization completed:  to be  Date: 04/20/18

## 2018-04-27 ENCOUNTER — TELEPHONE (OUTPATIENT)
Dept: ORTHOPEDICS | Facility: CLINIC | Age: 43
End: 2018-04-27

## 2018-04-30 ENCOUNTER — TELEPHONE (OUTPATIENT)
Dept: FAMILY MEDICINE | Facility: CLINIC | Age: 43
End: 2018-04-30

## 2018-04-30 NOTE — LETTER
[facility]  [address]  [place] [address]  [phone]      April 30, 2018    Elizabeth Goodson                                                                                                                     [address] [address] [place] [id number]        Dear Elizabeth,    We received a notice that you are to be scheduled with a specialty clinic. The referral has been placed by your provider and you can call to schedule an appointment directly.     Enclosed, you will find the referral with the phone number to call to schedule an appointment.  If you have already scheduled this, you may disregard this letter.    Please call us if you have any questions or concerns.      Sincerely,     [facility] Support Staff / david

## 2018-04-30 NOTE — TELEPHONE ENCOUNTER
Called and spoke with patient.  Lump still present.  Would still like it removed.  Will plan on continuing with surgery as planned.    Misael Butt, APRN, CNP, DNP  Orthopedic Surgery

## 2018-04-30 NOTE — TELEPHONE ENCOUNTER
You placed a referral for patient to rheumatology on 4/16/18.  Patient has not scheduled as of yet.      Please review and forward to team if follow up with the patient is needed.     Thank you!  Neetu/Clinic Referrals Dyad II

## 2018-05-04 ENCOUNTER — HOSPITAL ENCOUNTER (OUTPATIENT)
Facility: CLINIC | Age: 43
Discharge: HOME | End: 2018-05-04
Attending: ORTHOPAEDIC SURGERY | Admitting: ORTHOPAEDIC SURGERY
Payer: COMMERCIAL

## 2018-05-04 ENCOUNTER — SURGERY (OUTPATIENT)
Age: 43
End: 2018-05-04

## 2018-05-04 VITALS
SYSTOLIC BLOOD PRESSURE: 137 MMHG | OXYGEN SATURATION: 97 % | TEMPERATURE: 98.3 F | RESPIRATION RATE: 16 BRPM | DIASTOLIC BLOOD PRESSURE: 99 MMHG

## 2018-05-04 DIAGNOSIS — R22.32 MASS OF FINGER, LEFT: Primary | ICD-10-CM

## 2018-05-04 PROCEDURE — 40000306 ZZH STATISTIC PRE PROC ASSESS II: Performed by: ORTHOPAEDIC SURGERY

## 2018-05-04 PROCEDURE — 36000052 ZZH SURGERY LEVEL 2 EA 15 ADDTL MIN: Performed by: ORTHOPAEDIC SURGERY

## 2018-05-04 PROCEDURE — 25000125 ZZHC RX 250: Performed by: ORTHOPAEDIC SURGERY

## 2018-05-04 PROCEDURE — 36000050 ZZH SURGERY LEVEL 2 1ST 30 MIN: Performed by: ORTHOPAEDIC SURGERY

## 2018-05-04 PROCEDURE — 26055 INCISE FINGER TENDON SHEATH: CPT | Mod: F3 | Performed by: ORTHOPAEDIC SURGERY

## 2018-05-04 PROCEDURE — 27210794 ZZH OR GENERAL SUPPLY STERILE: Performed by: ORTHOPAEDIC SURGERY

## 2018-05-04 PROCEDURE — 71000027 ZZH RECOVERY PHASE 2 EACH 15 MINS: Performed by: ORTHOPAEDIC SURGERY

## 2018-05-04 RX ORDER — BUPIVACAINE HYDROCHLORIDE 2.5 MG/ML
INJECTION, SOLUTION INFILTRATION; PERINEURAL PRN
Status: DISCONTINUED | OUTPATIENT
Start: 2018-05-04 | End: 2018-05-04 | Stop reason: HOSPADM

## 2018-05-04 RX ORDER — HYDROCODONE BITARTRATE AND ACETAMINOPHEN 5; 325 MG/1; MG/1
1 TABLET ORAL
Status: DISCONTINUED | OUTPATIENT
Start: 2018-05-04 | End: 2018-05-04 | Stop reason: HOSPADM

## 2018-05-04 RX ORDER — HYDROCODONE BITARTRATE AND ACETAMINOPHEN 5; 325 MG/1; MG/1
1-2 TABLET ORAL EVERY 4 HOURS PRN
Qty: 20 TABLET | Refills: 0 | Status: SHIPPED | OUTPATIENT
Start: 2018-05-04 | End: 2018-05-14

## 2018-05-04 RX ADMIN — BUPIVACAINE HYDROCHLORIDE 7 ML: 2.5 INJECTION, SOLUTION EPIDURAL; INFILTRATION; INTRACAUDAL; PERINEURAL at 10:46

## 2018-05-04 NOTE — IP AVS SNAPSHOT
[facility]    [address]     [place] [address]    Phone:  [phone]                                       After Visit Summary   5/4/2018    Elizabeth Goodson    MRN: [id number]           After Visit Summary Signature Page     I have received my discharge instructions, and my questions have been answered. I have discussed any challenges I see with this plan with the nurse or doctor.    ..........................................................................................................................................  Patient/Patient Representative Signature      ..........................................................................................................................................  Patient Representative Print Name and Relationship to Patient    ..................................................               ................................................  Date                                            Time    ..........................................................................................................................................  Reviewed by Signature/Title    ...................................................              ..............................................  Date                                                            Time

## 2018-05-04 NOTE — PROGRESS NOTES
Mildly hypertensive with elevated diasystolic pressures before and during procedure. B/p at pre-op was normotensive.  B/p ranged during procedure 134/98 to 144/102.  Asymptomatic.  Pt states had previous hx of HTN but now very well controlled.  Patient states will check b/p at home in a couple of days and if not back to normal will speak with PCP.  Patient recommended to be seen sooner if symptomatic.    Misael Butt APRN, CNP, DNP  Orthopedic Surgery

## 2018-05-04 NOTE — OP NOTE
Procedure Date: 05/04/2018      PREOPERATIVE DIAGNOSIS:  Left ring finger mass.      POSTOPERATIVE DIAGNOSIS:  Left ring finger trigger finger.      PROCEDURE:  Release left ring finger trigger finger A1 pulley.      SURGEON:  Aaron Orta DO      ASSISTANT:  None.      ANESTHESIA:  Local.      COMPLICATIONS:  None.      ESTIMATED BLOOD LOSS:  Minimal.      COUNTS:  Correct.      DISPOSITION:  PACU in stable condition.      GROSS FINDINGS:  Preoperatively, I can palpate a small mass over the A1 pulley metacarpal head area.  Upon dissection to the subcutaneous tissues, there was no discernible masses.  I released the A1 pulley.  There was an area of some thickening with some tenosynovitis along the tendon.    I carefully evaluated the area, including the bed of the tendons as well as into the subcutaneous tissue.      INDICATIONS:  Ms. Goodson is a 43-year-old female with complaints of pain at the base of her left ring finger.  She has complained of a palpable mass.  Complained of tenderness,  particularly with gripping.  We discussed her options, given her anti-inflammatories, rest and 9 months worth of symptoms, we discussed surgical excision.  I discussed that location is consistent generally with a flexor sheath cyst, possible A1 pulley release as well.  Once reviewed, she opted to proceed.  Leading up to surgery, she reported she accidentally struck her hand causing significant pain.  Associated with this, her ring her ring finger locked down that she had to manually open.  She thought the mass was slightly smaller afterwards, but then returned.  Preoperatively, we discussed the treatment once again, the appropriate extremity was signed and she was wheeled to operative suite #4.        PROCEDURE IN DETAIL:  When deemed appropriate, after timeout was performed in aseptic conditions, the area was anesthetized with some Marcaine plain.  Once adequate anesthesia was obtained, a timeout was performed again, and  again, the appropriate patient, surgery and extremity were verified.  Esmarch was utilized to exsanguinate the extremity and tourniquet was inflated to 250 mmHg on her upper arm for a total of 15 minutes.  An oblique incision was made over the base of the A1 pulley at the base of the ring finger.  Blunt dissection was carried down to the flexor sheath.  I spent some considerable time evaluating the area and no mass could be found. At this time, I used a separate #15 blade and released the A1 pulley in its entirety, again, carefully evaluating the area.  So at this point, the tourniquet was deflated, the wound was irrigated.  Hemostasis had been achieved on the approach.  The skin edges were approximated with nylon and a sterile bandage applied.  She subsequently transferred to the PACU in stable condition.  She will be discharged with hydrocodone for pain, followup appointment has been scheduled, discharge instructions provided.         LUCIANO RILEY DO             D: 2018   T: 2018   MT: BRYAN      Name:     MATA HARPER   MRN:      [id number]        Account:        [id number]   :      1975           Procedure Date: 2018      Document: [id number]

## 2018-05-04 NOTE — BRIEF OP NOTE
[facility] Brief Operative Note    Pre-operative diagnosis: Left ring finger mass   Post-operative diagnosis: left ring finger trigger finger   Procedure: Procedure(s):  RELEASE TRIGGER FINGER   Surgeon: Aaron Orta DO   Assistant(s): None   Estimated blood loss:  Fluids: Minimal  0 Crystalloids   Specimens: None   Findings: See dictated operative report for full details [id number]     Ashok Orta D.O.

## 2018-05-04 NOTE — IP AVS SNAPSHOT
MRN:[id number]                      After Visit Summary   5/4/2018    Elizabeth Goodson    MRN: [id number]           Thank you!     Thank you for choosing [facility] for your care. Our goal is always to provide you with excellent care. Hearing back from our patients is one way we can continue to improve our services. Please take a few minutes to complete the written survey that you may receive in the mail after you visit with us. Thank you!        Patient Information     Date Of Birth          1975        About your hospital stay     You were admitted on:  May 4, 2018 You last received care in the:  [facility] Phase II    You were discharged on:  May 4, 2018       Who to Call     For medical emergencies, please call 911.  For non-urgent questions about your medical care, please call your primary care provider or clinic, [phone]  For questions related to your surgery, please call your surgery clinic        Attending Provider     Provider Aaron Walls,  Orthopedics       Primary Care Provider Office Phone # Fax #    RAÚL Pat [facility] [phone]      After Care Instructions      Diet as Tolerated       Return to diet before surgery, unless instructed otherwise.            Discharge Instructions       Review outpatient procedure discharge instructions with patient as directed by Provider            Discharge Instructions - Lifting Limit (specify)       Lifting limit: cup of coffee until seen at Post-op follow up appointment.            Dressing Change       Change dressing on third day after surgery.            Ice to affected area       Ice pack to surgical site every 15 minutes per hour for 24 hours            No Alcohol       For 24 hours post procedure            Notify Provider       For signs and symptoms of infection: Fever greater than 101, redness, swelling, heat at site, drainage, pus.            Return to clinic       Return to  clinic in 10 days            Wound care       Do not immerse wound in water until sutures removed                  Your next 10 appointments already scheduled     May 14, 2018 11:50 AM CDT   [facility] with Aaron Orta DO   [facility]    [address]  [address]  [place] [address]   [phone]              Pending Results     No orders found from 5/2/2018 to 5/5/2018.            Admission Information     Date & Time Provider Department Dept. Phone    5/4/2018 Aaron Orta DO [facility] Phase [phone]      Your Vitals Were     Blood Pressure Temperature Respirations Pulse Oximetry          134/97 (Cuff Size: Adult Large) 98  F (36.7  C) (Oral) 16 98%        MyChart Information     [employer] gives you secure access to your electronic health record. If you see a primary care provider, you can also send messages to your care team and make appointments. If you have questions, please call your primary care clinic.  If you do not have a primary care provider, please call [phone] and they will assist you.        Care EveryWhere ID     This is your Care EveryWhere ID. This could be used by other organizations to access your [facility] medical records  [id number]        Equal Access to Services     DENIA HARRIS : Hadii kiki cunninghamo Sojuan, waaxda luqadaha, qaybta kaalmada adeegyada, jose gomez. So [facility] [phone].    ATENCIÓN: Si habla español, tiene a alston disposición servicios gratuitos de asistencia lingüística. Llame al [phone].    We comply with applicable federal civil rights laws and [place] laws. We do not discriminate on the basis of race, color, national origin, age, disability, sex, sexual orientation, or gender identity.               Review of your medicines      START taking        Dose / Directions    HYDROcodone-acetaminophen 5-325 MG per tablet   Commonly known as:   NORCO   Used for:  Mass of finger, left        Dose:  1-2 tablet   Take 1-2 tablets by mouth every 4 hours as needed for other (Moderate to Severe Pain)   Quantity:  20 tablet   Refills:  0         CONTINUE these medicines which have NOT CHANGED        Dose / Directions    albuterol 108 (90 Base) MCG/ACT Inhaler   Commonly known as:  PROAIR HFA/PROVENTIL HFA/VENTOLIN HFA   Used for:  Cough        Dose:  2 puff   Inhale 2 puffs into the lungs every 6 hours as needed for shortness of breath / dyspnea or wheezing   Quantity:  1 Inhaler   Refills:  1       cyclobenzaprine 10 MG tablet   Commonly known as:  FLEXERIL   Used for:  Fibromyalgia        TAKE ONE TABLET BY MOUTH EVERY NIGHT AT BEDTIME   Quantity:  90 tablet   Refills:  1       diltiazem 240 MG 24 hr capsule   Used for:  Essential hypertension        TAKE ONE CAPSULE BY MOUTH ONCE DAILY   Quantity:  90 capsule   Refills:  1       EPINEPHrine 0.3 MG/0.3ML injection 2-pack   Commonly known as:  EPIPEN 2-KIMBERLEE   Used for:  Peanut allergy, Tree nut allergy        Dose:  0.3 mg   Inject 0.3 mLs (0.3 mg) into the muscle once as needed for anaphylaxis   Quantity:  0.6 mL   Refills:  1       hydrochlorothiazide 25 MG tablet   Commonly known as:  HYDRODIURIL   Used for:  Essential hypertension        TAKE ONE TABLET BY MOUTH ONCE DAILY   Quantity:  90 tablet   Refills:  3       leflunomide 20 MG tablet   Commonly known as:  ARAVA   Used for:  Rheumatoid arthritis, involving unspecified site, unspecified rheumatoid factor presence (H)        Dose:  20 mg   Take 1 tablet (20 mg) by mouth daily   Quantity:  30 tablet   Refills:  2       levothyroxine 112 MCG tablet   Commonly known as:  SYNTHROID/LEVOTHROID   Used for:  Hypothyroidism due to acquired atrophy of thyroid        Dose:  112 mcg   Take 1 tablet (112 mcg) by mouth daily   Quantity:  90 tablet   Refills:  0       losartan 50 MG tablet   Commonly known as:  COZAAR   Used for:  Essential hypertension with goal blood  pressure less than 140/90        TAKE ONE TABLET BY MOUTH ONCE DAILY   Quantity:  90 tablet   Refills:  1       metoprolol succinate 25 MG 24 hr tablet   Commonly known as:  TOPROL-XL   Used for:  HTN, goal below 140/90        TAKE ONE TABLET BY MOUTH ONCE DAILY   Quantity:  90 tablet   Refills:  3       potassium chloride SA 20 MEQ CR tablet   Commonly known as:  KLOR-CON   Used for:  Hypokalemia        1 tab po QID today, then TID tomorrow, then 1 po QD   Quantity:  95 tablet   Refills:  1            Where to get your medicines      Some of these will need a paper prescription and others can be bought over the counter. Ask your nurse if you have questions.     Bring a paper prescription for each of these medications     HYDROcodone-acetaminophen 5-325 MG per tablet                Protect others around you: Learn how to safely use, store and throw away your medicines at www.disposemymeds.org.        Information about OPIOIDS     PRESCRIPTION OPIOIDS: WHAT YOU NEED TO KNOW   You have a prescription for an opioid (narcotic) pain medicine. Opioids can cause addiction. If you have a history of chemical dependency of any type, you are at a higher risk of becoming addicted to opioids. Only take this medicine after all other options have been tried. Take it for as short a time and as few doses as possible.     Do not:    Drive. If you drive while taking these medicines, you could be arrested for driving under the influence (DUI).    Operate heavy machinery    Do any other dangerous activities while taking these medicines.     Drink any alcohol while taking these medicines.      Take with any other medicines that contain acetaminophen. Read all labels carefully. Look for the word  acetaminophen  or  Tylenol.  Ask your pharmacist if you have questions or are unsure.    Store your pills in a secure place, locked if possible. We will not replace any lost or stolen medicine. If you don t finish your medicine, please throw away  (dispose) as directed by your pharmacist. The [place] Pollution Control Agency has more information about safe disposal: https://www.pca.[facility].[place]/living-green/managing-unwanted-medications    All opioids tend to cause constipation. Drink plenty of water and eat foods that have a lot of fiber, such as fruits, vegetables, prune juice, apple juice and high-fiber cereal. Take a laxative (Miralax, milk of magnesia, Colace, Senna) if you don t move your bowels at least every other day.              Medication List: This is a list of all your medications and when to take them. Check marks below indicate your daily home schedule. Keep this list as a reference.      Medications           Morning Afternoon Evening Bedtime As Needed    albuterol 108 (90 Base) MCG/ACT Inhaler   Commonly known as:  PROAIR HFA/PROVENTIL HFA/VENTOLIN HFA   Inhale 2 puffs into the lungs every 6 hours as needed for shortness of breath / dyspnea or wheezing                                cyclobenzaprine 10 MG tablet   Commonly known as:  FLEXERIL   TAKE ONE TABLET BY MOUTH EVERY NIGHT AT BEDTIME                                diltiazem 240 MG 24 hr capsule   TAKE ONE CAPSULE BY MOUTH ONCE DAILY                                EPINEPHrine 0.3 MG/0.3ML injection 2-pack   Commonly known as:  EPIPEN 2-KIMBERLEE   Inject 0.3 mLs (0.3 mg) into the muscle once as needed for anaphylaxis                                hydrochlorothiazide 25 MG tablet   Commonly known as:  HYDRODIURIL   TAKE ONE TABLET BY MOUTH ONCE DAILY                                HYDROcodone-acetaminophen 5-325 MG per tablet   Commonly known as:  NORCO   Take 1-2 tablets by mouth every 4 hours as needed for other (Moderate to Severe Pain)                                leflunomide 20 MG tablet   Commonly known as:  ARAVA   Take 1 tablet (20 mg) by mouth daily                                levothyroxine 112 MCG tablet   Commonly known as:  SYNTHROID/LEVOTHROID   Take 1 tablet (112 mcg) by  mouth daily                                losartan 50 MG tablet   Commonly known as:  COZAAR   TAKE ONE TABLET BY MOUTH ONCE DAILY                                metoprolol succinate 25 MG 24 hr tablet   Commonly known as:  TOPROL-XL   TAKE ONE TABLET BY MOUTH ONCE DAILY                                potassium chloride SA 20 MEQ CR tablet   Commonly known as:  KLOR-CON   1 tab po QID today, then TID tomorrow, then 1 po QD

## 2018-05-14 ENCOUNTER — OFFICE VISIT (OUTPATIENT)
Dept: ORTHOPEDICS | Facility: OTHER | Age: 43
End: 2018-05-14
Payer: COMMERCIAL

## 2018-05-14 VITALS
TEMPERATURE: 98 F | DIASTOLIC BLOOD PRESSURE: 80 MMHG | HEIGHT: 63 IN | WEIGHT: 185 LBS | BODY MASS INDEX: 32.78 KG/M2 | SYSTOLIC BLOOD PRESSURE: 120 MMHG

## 2018-05-14 DIAGNOSIS — R22.32 MASS OF FINGER, LEFT: Primary | ICD-10-CM

## 2018-05-14 PROCEDURE — 99024 POSTOP FOLLOW-UP VISIT: CPT | Performed by: ORTHOPAEDIC SURGERY

## 2018-05-14 ASSESSMENT — PAIN SCALES - GENERAL: PAINLEVEL: NO PAIN (0)

## 2018-05-14 NOTE — LETTER
5/14/2018         RE: Elizabeth Goodson  [address] [facility] [facility] [address]        Dear Colleague,    Thank you for referring your patient, Elizabeth Goodson, to the [facility]. Please see a copy of my visit note below.    Orthopedic Clinic Post-Operative Note    CHIEF COMPLAINT:   Chief Complaint   Patient presents with     Surgical Followup     DOS:5/4/2018~Release left ring finger trigger finger A1 pulley~10 days postop       HISTORY OF PRESENT ILLNESS  Doing well.  No incision issues.  Some discomfort along the incision.  She took one hydrocodone and it caused her to throw up.    Patient's past medical, surgical, social and family histories reviewed.     Past Medical History:   Diagnosis Date     Adnexal mass      H/O transfusion of whole blood 2001    with child labor/hysterectomy     Hemoptysis 11/12, 11/2015-mild    last episodes, mild, has had massive in HX     HTN (hypertension)      Incidental lung nodule, less than or equal to 3mm 11/12    NICHOLAS     Massive hemoptysis 11/12    due to pneumonia     Migraine headache      Tobacco abuse, in remission     quit 2012       Past Surgical History:   Procedure Laterality Date     CHOLECYSTECTOMY, LAPOROSCOPIC  2007    Cholecystectomy, Laparoscopic     EXCISE MASS FOOT  11/22/2013    Procedure: EXCISE MASS FOOT;  Right Foot Soft Tissue Mass Excision;  Surgeon: Jose Cruz Garcia DPM;  Location: [facility] OR     EXCISE MASS FOOT Right 7/17/2015    Procedure: EXCISE MASS FOOT;  Surgeon: Pierre Adan DPM;  Location: [facility] OR     HYSTERECTOMY, VAGINAL  2002     LAPAROSCOPIC HERNIORRHAPHY VENTRAL N/A 2/24/2016    Procedure: LAPAROSCOPIC HERNIORRHAPHY VENTRAL;  Surgeon: Lars Dahl MD;  Location: [facility] OR     PANNICULECTOMY  07/28/2017    with liposuction, and hernia revision Dr. Heath     RELEASE TRIGGER FINGER Left 5/4/2018    Procedure: RELEASE TRIGGER FINGER;  Trigger Finger Release Left ring finger ;  Surgeon: Aaron Orta,  DO;  Location: [facility] OR       Medications:    Current Outpatient Prescriptions on File Prior to Visit:  cyclobenzaprine (FLEXERIL) 10 MG tablet TAKE ONE TABLET BY MOUTH EVERY NIGHT AT BEDTIME   diltiazem 240 MG 24 hr capsule TAKE ONE CAPSULE BY MOUTH ONCE DAILY   hydrochlorothiazide (HYDRODIURIL) 25 MG tablet TAKE ONE TABLET BY MOUTH ONCE DAILY   leflunomide (ARAVA) 20 MG tablet Take 1 tablet (20 mg) by mouth daily   levothyroxine (SYNTHROID/LEVOTHROID) 112 MCG tablet Take 1 tablet (112 mcg) by mouth daily   losartan (COZAAR) 50 MG tablet TAKE ONE TABLET BY MOUTH ONCE DAILY   metoprolol (TOPROL-XL) 25 MG 24 hr tablet TAKE ONE TABLET BY MOUTH ONCE DAILY   potassium chloride SA (KLOR-CON) 20 MEQ CR tablet 1 tab po QID today, then TID tomorrow, then 1 po QD   albuterol (PROAIR HFA, PROVENTIL HFA, VENTOLIN HFA) 108 (90 BASE) MCG/ACT inhaler Inhale 2 puffs into the lungs every 6 hours as needed for shortness of breath / dyspnea or wheezing   EPINEPHrine (EPIPEN 2-KIMBERLEE) 0.3 MG/0.3ML injection 2-pack Inject 0.3 mLs (0.3 mg) into the muscle once as needed for anaphylaxis     No current facility-administered medications on file prior to visit.     Allergies   Allergen Reactions     Penicillins Anaphylaxis     Walnuts [Nuts] Anaphylaxis     All tree nuts     Norvasc [Amlodipine] Rash     Norvasc- rash at 10 mg dosing       Social History     Occupational History           [employer] school- lunch program     Social History Main Topics     Smoking status: Former Smoker     Types: Cigarettes     Quit date: 10/1/2012     Smokeless tobacco: Never Used     Alcohol use 0.0 oz/week     0 Standard drinks or equivalent per week      Comment: rarely, 1-2 per month     Drug use: No     Sexual activity: Yes     Partners: Male     Birth control/ protection: Surgical, Female Surgical      Comment: hysterectomy       Family History   Problem Relation Age of Onset     Asthma Father      C.A.D. Father      DIABETES No family hx of       Hypertension No family hx of      CEREBROVASCULAR DISEASE No family hx of      Breast Cancer No family hx of      Cancer - colorectal No family hx of      Prostate Cancer No family hx of      Alcohol/Drug No family hx of      Allergies No family hx of      Alzheimer Disease No family hx of      Anesthesia Reaction No family hx of      Arthritis No family hx of      Blood Disease No family hx of      CANCER No family hx of      Cardiovascular No family hx of      Circulatory No family hx of      Congenital Anomalies No family hx of      Connective Tissue Disorder No family hx of      Neurologic Disorder No family hx of      Depression No family hx of      Endocrine Disease No family hx of      Eye Disorder No family hx of      Genetic Disorder No family hx of      GASTROINTESTINAL DISEASE No family hx of      Genitourinary Problems No family hx of      Gynecology No family hx of      HEART DISEASE No family hx of      Lipids No family hx of      Musculoskeletal Disorder No family hx of      Obesity No family hx of      OSTEOPOROSIS No family hx of      Psychotic Disorder No family hx of      Respiratory No family hx of      Hearing Loss No family hx of      Family History Negative No family hx of      Thyroid Disease No family hx of      Colon Cancer No family hx of      Hyperlipidemia No family hx of      Coronary Artery Disease No family hx of      Other Cancer No family hx of      Anxiety Disorder No family hx of      MENTAL ILLNESS No family hx of      Substance Abuse No family hx of        REVIEW OF SYSTEMS  General: negative for, night sweats, dizziness, fatigue  Resp: No shortness of breath and no cough  CV: negative for chest pain, syncope or near-syncope  GI: negative for nausea, vomiting and diarrhea  : negative for dysuria and hematuria  Musculoskeletal: as above  Neurologic: negative for syncope   Hematologic: negative for bleeding disorder    Physical Exam:  Vitals: /80 (BP Location: Right arm,  "Patient Position: Chair, Cuff Size: Adult Regular)  Temp 98  F (36.7  C) (Temporal)  Ht 5' 2.5\" (1.588 m)  Wt 185 lb (83.9 kg)  BMI 33.3 kg/m2  BMI= Body mass index is 33.3 kg/(m^2).  Constitutional: healthy, alert and no acute distress   Psychiatric: mentation appears normal and affect normal/bright  NEURO: no focal deficits  SKIN: .healing well, well approximated skin edges, without signs of infection including no erythema, incision breakdown or purlent drainage  JOINT/EXTREMITIES: Some stiffness towards terminal flexion.  Fingers well perfused good capillary refill.  Slight amount edema extending into her middle phalanx.  GAIT: not tested     Diagnostic Modalities:  None today.  Independent visualization of the images was performed.      Impression:   Chief Complaint   Patient presents with     Surgical Followup     DOS:5/4/2018~Release left ring finger trigger finger A1 pulley~10 days postop   Overall doing well.    Plan:   Sutures removed.  Gradual return back to activity over the next few weeks.  Follow-up as needed for any questions or concerns  Return to clinic PRN, or sooner as needed for changes.    Re-x-ray on return: No    Ashok Orta D.O.    Again, thank you for allowing me to participate in the care of your patient.        Sincerely,        Aaron Orta, DO    "

## 2018-05-14 NOTE — PROGRESS NOTES
Orthopedic Clinic Post-Operative Note    CHIEF COMPLAINT:   Chief Complaint   Patient presents with     Surgical Followup     DOS:5/4/2018~Release left ring finger trigger finger A1 pulley~10 days postop       HISTORY OF PRESENT ILLNESS  Doing well.  No incision issues.  Some discomfort along the incision.  She took one hydrocodone and it caused her to throw up.    Patient's past medical, surgical, social and family histories reviewed.     Past Medical History:   Diagnosis Date     Adnexal mass      H/O transfusion of whole blood 2001    with child labor/hysterectomy     Hemoptysis 11/12, 11/2015-mild    last episodes, mild, has had massive in HX     HTN (hypertension)      Incidental lung nodule, less than or equal to 3mm 11/12    NICHOLAS     Massive hemoptysis 11/12    due to pneumonia     Migraine headache      Tobacco abuse, in remission     quit 2012       Past Surgical History:   Procedure Laterality Date     CHOLECYSTECTOMY, LAPOROSCOPIC  2007    Cholecystectomy, Laparoscopic     EXCISE MASS FOOT  11/22/2013    Procedure: EXCISE MASS FOOT;  Right Foot Soft Tissue Mass Excision;  Surgeon: Jose Cruz Garcia DPM;  Location: [facility]     EXCISE MASS FOOT Right 7/17/2015    Procedure: EXCISE MASS FOOT;  Surgeon: Pierre Adan DPM;  Location: [facility]     HYSTERECTOMY, VAGINAL  2002     LAPAROSCOPIC HERNIORRHAPHY VENTRAL N/A 2/24/2016    Procedure: LAPAROSCOPIC HERNIORRHAPHY VENTRAL;  Surgeon: Lars Dahl MD;  Location: [facility]     PANNICULECTOMY  07/28/2017    with liposuction, and hernia revision Dr. Heath     RELEASE TRIGGER FINGER Left 5/4/2018    Procedure: RELEASE TRIGGER FINGER;  Trigger Finger Release Left ring finger ;  Surgeon: Aaron Orta DO;  Location: [facility] OR       Medications:    Current Outpatient Prescriptions on File Prior to Visit:  cyclobenzaprine (FLEXERIL) 10 MG tablet TAKE ONE TABLET BY MOUTH EVERY NIGHT AT BEDTIME   diltiazem 240 MG 24 hr capsule TAKE ONE CAPSULE BY MOUTH  ONCE DAILY   hydrochlorothiazide (HYDRODIURIL) 25 MG tablet TAKE ONE TABLET BY MOUTH ONCE DAILY   leflunomide (ARAVA) 20 MG tablet Take 1 tablet (20 mg) by mouth daily   levothyroxine (SYNTHROID/LEVOTHROID) 112 MCG tablet Take 1 tablet (112 mcg) by mouth daily   losartan (COZAAR) 50 MG tablet TAKE ONE TABLET BY MOUTH ONCE DAILY   metoprolol (TOPROL-XL) 25 MG 24 hr tablet TAKE ONE TABLET BY MOUTH ONCE DAILY   potassium chloride SA (KLOR-CON) 20 MEQ CR tablet 1 tab po QID today, then TID tomorrow, then 1 po QD   albuterol (PROAIR HFA, PROVENTIL HFA, VENTOLIN HFA) 108 (90 BASE) MCG/ACT inhaler Inhale 2 puffs into the lungs every 6 hours as needed for shortness of breath / dyspnea or wheezing   EPINEPHrine (EPIPEN 2-KIMBERLEE) 0.3 MG/0.3ML injection 2-pack Inject 0.3 mLs (0.3 mg) into the muscle once as needed for anaphylaxis     No current facility-administered medications on file prior to visit.     Allergies   Allergen Reactions     Penicillins Anaphylaxis     Walnuts [Nuts] Anaphylaxis     All tree nuts     Norvasc [Amlodipine] Rash     Norvasc- rash at 10 mg dosing       Social History     Occupational History           [employer] school- lunch program     Social History Main Topics     Smoking status: Former Smoker     Types: Cigarettes     Quit date: 10/1/2012     Smokeless tobacco: Never Used     Alcohol use 0.0 oz/week     0 Standard drinks or equivalent per week      Comment: rarely, 1-2 per month     Drug use: No     Sexual activity: Yes     Partners: Male     Birth control/ protection: Surgical, Female Surgical      Comment: hysterectomy       Family History   Problem Relation Age of Onset     Asthma Father      C.A.D. Father      DIABETES No family hx of      Hypertension No family hx of      CEREBROVASCULAR DISEASE No family hx of      Breast Cancer No family hx of      Cancer - colorectal No family hx of      Prostate Cancer No family hx of      Alcohol/Drug No family hx of      Allergies No family hx of       "Alzheimer Disease No family hx of      Anesthesia Reaction No family hx of      Arthritis No family hx of      Blood Disease No family hx of      CANCER No family hx of      Cardiovascular No family hx of      Circulatory No family hx of      Congenital Anomalies No family hx of      Connective Tissue Disorder No family hx of      Neurologic Disorder No family hx of      Depression No family hx of      Endocrine Disease No family hx of      Eye Disorder No family hx of      Genetic Disorder No family hx of      GASTROINTESTINAL DISEASE No family hx of      Genitourinary Problems No family hx of      Gynecology No family hx of      HEART DISEASE No family hx of      Lipids No family hx of      Musculoskeletal Disorder No family hx of      Obesity No family hx of      OSTEOPOROSIS No family hx of      Psychotic Disorder No family hx of      Respiratory No family hx of      Hearing Loss No family hx of      Family History Negative No family hx of      Thyroid Disease No family hx of      Colon Cancer No family hx of      Hyperlipidemia No family hx of      Coronary Artery Disease No family hx of      Other Cancer No family hx of      Anxiety Disorder No family hx of      MENTAL ILLNESS No family hx of      Substance Abuse No family hx of        REVIEW OF SYSTEMS  General: negative for, night sweats, dizziness, fatigue  Resp: No shortness of breath and no cough  CV: negative for chest pain, syncope or near-syncope  GI: negative for nausea, vomiting and diarrhea  : negative for dysuria and hematuria  Musculoskeletal: as above  Neurologic: negative for syncope   Hematologic: negative for bleeding disorder    Physical Exam:  Vitals: /80 (BP Location: Right arm, Patient Position: Chair, Cuff Size: Adult Regular)  Temp 98  F (36.7  C) (Temporal)  Ht 5' 2.5\" (1.588 m)  Wt 185 lb (83.9 kg)  BMI 33.3 kg/m2  BMI= Body mass index is 33.3 kg/(m^2).  Constitutional: healthy, alert and no acute distress   Psychiatric: " mentation appears normal and affect normal/bright  NEURO: no focal deficits  SKIN: .healing well, well approximated skin edges, without signs of infection including no erythema, incision breakdown or purlent drainage  JOINT/EXTREMITIES: Some stiffness towards terminal flexion.  Fingers well perfused good capillary refill.  Slight amount edema extending into her middle phalanx.  GAIT: not tested     Diagnostic Modalities:  None today.  Independent visualization of the images was performed.      Impression:   Chief Complaint   Patient presents with     Surgical Followup     DOS:5/4/2018~Release left ring finger trigger finger A1 pulley~10 days postop   Overall doing well.    Plan:   Sutures removed.  Gradual return back to activity over the next few weeks.  Follow-up as needed for any questions or concerns  Return to clinic PRN, or sooner as needed for changes.    Re-x-ray on return: No    Ashok Orta D.O.

## 2018-05-14 NOTE — MR AVS SNAPSHOT
"              After Visit Summary   5/14/2018    Elizabeth Goodson    MRN: [id number]           Patient Information     Date Of Birth          1975        Visit Information        Provider Department      5/14/2018 11:50 AM Aaron Orta, DO [facility]        Today's Diagnoses     Mass of finger, left    -  1       Follow-ups after your visit        Who to contact     If you have questions or need follow up information about today's clinic visit or your schedule please contact [facility] directly at [phone].  Normal or non-critical lab and imaging results will be communicated to you by [employer], letter or phone within 4 business days after the clinic has received the results. If you do not hear from us within 7 days, please contact the clinic through [employer] or phone. If you have a critical or abnormal lab result, we will notify you by phone as soon as possible.  Submit refill requests through [employer] or call your pharmacy and they will forward the refill request to us. Please allow 3 business days for your refill to be completed.          Additional Information About Your Visit        MyChart Information     [employer] gives you secure access to your electronic health record. If you see a primary care provider, you can also send messages to your care team and make appointments. If you have questions, please call your primary care clinic.  If you do not have a primary care provider, please call [phone] and they will assist you.        Care EveryWhere ID     This is your Care EveryWhere ID. This could be used by other organizations to access your [place] medical records  [id number]        Your Vitals Were     Temperature Height BMI (Body Mass Index)             98  F (36.7  C) (Temporal) 5' 2.5\" (1.588 m) 33.3 kg/m2          Blood Pressure from Last 3 Encounters:   05/14/18 120/80   05/04/18 (!) 137/99   04/19/18 120/82    Weight from Last 3 Encounters: "   05/14/18 185 lb (83.9 kg)   04/19/18 185 lb (83.9 kg)   04/16/18 185 lb (83.9 kg)              Today, you had the following     No orders found for display       Primary Care Provider Office Phone # Fax #    RAÚL Pat [facility] [phone] [address] [facility] [address]        Equal Access to Services     RICK HARRIS : Hadii aad ku hadasho Soomaali, waaxda luqadaha, qaybta kaalmada adeegyada, waxay idiin hayaan adeeg khsuesh lacain . So [facility] [phone].    ATENCIÓN: Si habla español, tiene a alston disposición servicios gratuitos de asistencia lingüística. Llame al [phone].    We comply with applicable federal civil rights laws and [place] laws. We do not discriminate on the basis of race, color, national origin, age, disability, sex, sexual orientation, or gender identity.            Thank you!     Thank you for choosing [facility]  for your care. Our goal is always to provide you with excellent care. Hearing back from our patients is one way we can continue to improve our services. Please take a few minutes to complete the written survey that you may receive in the mail after your visit with us. Thank you!             Your Updated Medication List - Protect others around you: Learn how to safely use, store and throw away your medicines at www.disposemymeds.org.          This list is accurate as of 5/14/18 12:07 PM.  Always use your most recent med list.                   Brand Name Dispense Instructions for use Diagnosis    albuterol 108 (90 Base) MCG/ACT Inhaler    PROAIR HFA/PROVENTIL HFA/VENTOLIN HFA    1 Inhaler    Inhale 2 puffs into the lungs every 6 hours as needed for shortness of breath / dyspnea or wheezing    Cough       cyclobenzaprine 10 MG tablet    FLEXERIL    90 tablet    TAKE ONE TABLET BY MOUTH EVERY NIGHT AT BEDTIME    Fibromyalgia       diltiazem 240 MG 24 hr capsule     90 capsule    TAKE ONE CAPSULE BY MOUTH ONCE DAILY    Essential  hypertension       EPINEPHrine 0.3 MG/0.3ML injection 2-pack    EPIPEN 2-KIMBERLEE    0.6 mL    Inject 0.3 mLs (0.3 mg) into the muscle once as needed for anaphylaxis    Peanut allergy, Tree nut allergy       hydrochlorothiazide 25 MG tablet    HYDRODIURIL    90 tablet    TAKE ONE TABLET BY MOUTH ONCE DAILY    Essential hypertension       leflunomide 20 MG tablet    ARAVA    30 tablet    Take 1 tablet (20 mg) by mouth daily    Rheumatoid arthritis, involving unspecified site, unspecified rheumatoid factor presence (H)       levothyroxine 112 MCG tablet    SYNTHROID/LEVOTHROID    90 tablet    Take 1 tablet (112 mcg) by mouth daily    Hypothyroidism due to acquired atrophy of thyroid       losartan 50 MG tablet    COZAAR    90 tablet    TAKE ONE TABLET BY MOUTH ONCE DAILY    Essential hypertension with goal blood pressure less than 140/90       metoprolol succinate 25 MG 24 hr tablet    TOPROL-XL    90 tablet    TAKE ONE TABLET BY MOUTH ONCE DAILY    HTN, goal below 140/90       potassium chloride SA 20 MEQ CR tablet    KLOR-CON    95 tablet    1 tab po QID today, then TID tomorrow, then 1 po QD    Hypokalemia

## 2018-05-21 DIAGNOSIS — I10 ESSENTIAL HYPERTENSION: ICD-10-CM

## 2018-05-22 RX ORDER — DILTIAZEM HYDROCHLORIDE 240 MG/1
CAPSULE, COATED, EXTENDED RELEASE ORAL
Qty: 90 CAPSULE | Refills: 1 | Status: SHIPPED | OUTPATIENT
Start: 2018-05-22 | End: 2019-02-26

## 2018-05-22 NOTE — TELEPHONE ENCOUNTER
Diltiazem    Routing refill request to provider for review/approval because:  A break in medication    Negar Beckham RN, BSN

## 2018-07-03 ENCOUNTER — MYC MEDICAL ADVICE (OUTPATIENT)
Dept: FAMILY MEDICINE | Facility: CLINIC | Age: 43
End: 2018-07-03

## 2018-07-03 DIAGNOSIS — K43.9 VENTRAL HERNIA WITHOUT OBSTRUCTION OR GANGRENE: Primary | ICD-10-CM

## 2018-07-09 ENCOUNTER — OFFICE VISIT (OUTPATIENT)
Dept: SURGERY | Facility: OTHER | Age: 43
End: 2018-07-09
Payer: COMMERCIAL

## 2018-07-09 VITALS
WEIGHT: 182.6 LBS | TEMPERATURE: 97.2 F | BODY MASS INDEX: 32.87 KG/M2 | SYSTOLIC BLOOD PRESSURE: 122 MMHG | DIASTOLIC BLOOD PRESSURE: 70 MMHG

## 2018-07-09 DIAGNOSIS — Z98.890 S/P REPAIR OF VENTRAL HERNIA: ICD-10-CM

## 2018-07-09 DIAGNOSIS — R10.33 PERIUMBILICAL PAIN: Primary | ICD-10-CM

## 2018-07-09 DIAGNOSIS — Z87.19 S/P REPAIR OF VENTRAL HERNIA: ICD-10-CM

## 2018-07-09 PROCEDURE — 99213 OFFICE O/P EST LOW 20 MIN: CPT | Performed by: SPECIALIST

## 2018-07-09 NOTE — MR AVS SNAPSHOT
After Visit Summary   7/9/2018    Elizabeth Goodson    MRN: [id number]           Patient Information     Date Of Birth          1975        Visit Information        Provider Department      7/9/2018 10:00 AM Lars Dahl MD [facility]        Today's Diagnoses     Periumbilical pain    -  1    S/P repair of ventral hernia           Follow-ups after your visit        Your next 10 appointments already scheduled     Jul 09, 2018 10:00 AM CDT   New Visit with Lars Dahl MD   [facility] ([facility])    [address] [facility] [address] [phone]              Who to contact     If you have questions or need follow up information about today's clinic visit or your schedule please contact [facility] directly at [phone].  Normal or non-critical lab and imaging results will be communicated to you by MyChart, letter or phone within 4 business days after the clinic has received the results. If you do not hear from us within 7 days, please contact the clinic through [employer] or phone. If you have a critical or abnormal lab result, we will notify you by phone as soon as possible.  Submit refill requests through [employer] or call your pharmacy and they will forward the refill request to us. Please allow 3 business days for your refill to be completed.          Additional Information About Your Visit        MyChart Information     [employer] gives you secure access to your electronic health record. If you see a primary care provider, you can also send messages to your care team and make appointments. If you have questions, please call your primary care clinic.  If you do not have a primary care provider, please call [phone] and they will assist you.        Care EveryWhere ID     This is your Care EveryWhere ID. This could be used by other organizations to access your [place] medical records  [id number]        Your  Vitals Were     Temperature BMI (Body Mass Index)                97.2  F (36.2  C) (Temporal) 32.87 kg/m2           Blood Pressure from Last 3 Encounters:   07/09/18 122/70   05/14/18 120/80   05/04/18 (!) 137/99    Weight from Last 3 Encounters:   07/09/18 82.8 kg (182 lb 9.6 oz)   05/14/18 83.9 kg (185 lb)   04/19/18 83.9 kg (185 lb)              Today, you had the following     No orders found for display       Primary Care Provider Office Phone # Fax #    Keri GALEANA RAÚL [facility] [phone] [address] [facility] [address]        Equal Access to Services     DENIA HARRIS : Hadii aad ku hadasho Soariadnaali, waaxda luqadaha, qaybta kaalmada adeegyada, jose figueredo . So [facility] [phone].    ATENCIÓN: Si habla español, tiene a alston disposición servicios gratuitos de asistencia lingüística. [facility] [phone].    We comply with applicable federal civil rights laws and [place] laws. We do not discriminate on the basis of race, color, national origin, age, disability, sex, sexual orientation, or gender identity.            Thank you!     Thank you for choosing [facility]  for your care. Our goal is always to provide you with excellent care. Hearing back from our patients is one way we can continue to improve our services. Please take a few minutes to complete the written survey that you may receive in the mail after your visit with us. Thank you!             Your Updated Medication List - Protect others around you: Learn how to safely use, store and throw away your medicines at www.disposemymeds.org.          This list is accurate as of 7/9/18  9:59 AM.  Always use your most recent med list.                   Brand Name Dispense Instructions for use Diagnosis    albuterol 108 (90 Base) MCG/ACT Inhaler    PROAIR HFA/PROVENTIL HFA/VENTOLIN HFA    1 Inhaler    Inhale 2 puffs into the lungs every 6 hours as needed for shortness of breath / dyspnea or  wheezing    Cough       cyclobenzaprine 10 MG tablet    FLEXERIL    90 tablet    TAKE ONE TABLET BY MOUTH EVERY NIGHT AT BEDTIME    Fibromyalgia       diltiazem 240 MG 24 hr capsule     90 capsule    TAKE ONE CAPSULE BY MOUTH ONCE DAILY    Essential hypertension       EPINEPHrine 0.3 MG/0.3ML injection 2-pack    EPIPEN 2-KIMBERLEE    0.6 mL    Inject 0.3 mLs (0.3 mg) into the muscle once as needed for anaphylaxis    Peanut allergy, Tree nut allergy       hydrochlorothiazide 25 MG tablet    HYDRODIURIL    90 tablet    TAKE ONE TABLET BY MOUTH ONCE DAILY    Essential hypertension       leflunomide 20 MG tablet    ARAVA    30 tablet    Take 1 tablet (20 mg) by mouth daily    Rheumatoid arthritis, involving unspecified site, unspecified rheumatoid factor presence (H)       levothyroxine 112 MCG tablet    SYNTHROID/LEVOTHROID    90 tablet    Take 1 tablet (112 mcg) by mouth daily    Hypothyroidism due to acquired atrophy of thyroid       losartan 50 MG tablet    COZAAR    90 tablet    TAKE ONE TABLET BY MOUTH ONCE DAILY    Essential hypertension with goal blood pressure less than 140/90       metoprolol succinate 25 MG 24 hr tablet    TOPROL-XL    90 tablet    TAKE ONE TABLET BY MOUTH ONCE DAILY    HTN, goal below 140/90       potassium chloride SA 20 MEQ CR tablet    KLOR-CON    95 tablet    1 tab po QID today, then TID tomorrow, then 1 po QD    Hypokalemia

## 2018-07-09 NOTE — PROGRESS NOTES
F/U for ventral hernia repair      Subjective:  Pt is 2 years s/p a lap ventral hernia repair.  Was doing well up until 3 months ago when she lifted a cooler.  She then developed pain at the old hernia site.  Worse with lifting.  No masses or obvious hernias      Objective:  B/P: 122/70, T: 97.2, P: Data Unavailable, R: Data Unavailable  Abd: soft, non distended, minimal tenderness at old hernia site.  No obvious hernias      Assesment/Plan:  Pt s/p lap ventral hernia repair 2 years ago.  Was lifting a heavy cooler and developed pain at site.  No hernias on exam.  Will get CT to evaluate.  Pt will call when CT completed.      Lars Dahl MD, FACS

## 2018-07-09 NOTE — LETTER
7/9/2018         RE: Elizabeth Goodson  [address] [place] [place] [address]        Dear Colleague,    Thank you for referring your patient, Elizabeth Goodson, to the [facility]. Please see a copy of my visit note below.    F/U for ventral hernia repair      Subjective:  Pt is 2 years s/p a lap ventral hernia repair.  Was doing well up until 3 months ago when she lifted a cooler.  She then developed pain at the old hernia site.  Worse with lifting.  No masses or obvious hernias      Objective:  B/P: 122/70, T: 97.2, P: Data Unavailable, R: Data Unavailable  Abd: soft, non distended, minimal tenderness at old hernia site.  No obvious hernias      Assesment/Plan:  Pt s/p lap ventral hernia repair 2 years ago.  Was lifting a heavy cooler and developed pain at site.  No hernias on exam.  Will get CT to evaluate.  Pt will call when CT completed.      Lars Dahl MD, FACS        Again, thank you for allowing me to participate in the care of your patient.        Sincerely,        Lars Dahl MD

## 2018-07-09 NOTE — NURSING NOTE
"Elizabeth Goodson is a 43 year old female who presents for:  Chief Complaint   Patient presents with     Pain     abd. pain, hx of hernia repair about 2 years ago        Initial Vitals:  /70 (BP Location: Right arm, Patient Position: Sitting, Cuff Size: Adult Large)  Temp 97.2  F (36.2  C) (Temporal)  Wt 82.8 kg (182 lb 9.6 oz)  BMI 32.87 kg/m2 Estimated body mass index is 32.87 kg/(m^2) as calculated from the following:    Height as of 5/14/18: 1.588 m (5' 2.5\").    Weight as of this encounter: 82.8 kg (182 lb 9.6 oz).. Body surface area is 1.91 meters squared. BP completed using cuff size: large  Data Unavailable    Do you feel safe in your environment?  Yes  Do you need any refills today? No    Nursing Comments:         Kelley Wise  "

## 2018-07-09 NOTE — NURSING NOTE
Patient given written and verball instructions with number to radiology, she will call and schedule per schedule.................................................Kelley iWse CMA  ([facility])

## 2018-07-10 ENCOUNTER — HOSPITAL ENCOUNTER (OUTPATIENT)
Dept: CT IMAGING | Facility: CLINIC | Age: 43
Discharge: HOME | End: 2018-07-10
Attending: SPECIALIST | Admitting: SPECIALIST
Payer: COMMERCIAL

## 2018-07-10 DIAGNOSIS — Z98.890 S/P REPAIR OF VENTRAL HERNIA: ICD-10-CM

## 2018-07-10 DIAGNOSIS — Z87.19 S/P REPAIR OF VENTRAL HERNIA: ICD-10-CM

## 2018-07-10 DIAGNOSIS — R10.33 PERIUMBILICAL PAIN: ICD-10-CM

## 2018-07-10 PROCEDURE — 25000128 H RX IP 250 OP 636: Performed by: SPECIALIST

## 2018-07-10 PROCEDURE — 25000125 ZZHC RX 250: Performed by: SPECIALIST

## 2018-07-10 PROCEDURE — 74177 CT ABD & PELVIS W/CONTRAST: CPT

## 2018-07-10 RX ORDER — IOPAMIDOL 755 MG/ML
500 INJECTION, SOLUTION INTRAVASCULAR ONCE
Status: COMPLETED | OUTPATIENT
Start: 2018-07-10 | End: 2018-07-10

## 2018-07-10 RX ADMIN — IOPAMIDOL 90 ML: 755 INJECTION, SOLUTION INTRAVENOUS at 08:04

## 2018-07-10 RX ADMIN — SODIUM CHLORIDE 60 ML: 9 INJECTION, SOLUTION INTRAVENOUS at 08:04

## 2018-07-11 ENCOUNTER — TELEPHONE (OUTPATIENT)
Dept: FAMILY MEDICINE | Facility: OTHER | Age: 43
End: 2018-07-11

## 2018-07-11 NOTE — TELEPHONE ENCOUNTER
Pt was told to call Dr. Dahl back when she had her CT done.  She did have this done 7/10/18 at the [facility] location. Please call back with results.

## 2018-07-13 DIAGNOSIS — E03.4 HYPOTHYROIDISM DUE TO ACQUIRED ATROPHY OF THYROID: ICD-10-CM

## 2018-07-13 NOTE — TELEPHONE ENCOUNTER
"Levothyroxine-  Routing refill request to provider for review/approval because:  Labs out of range:  TSH 4.86  LOV 04/16/2018      Requested Prescriptions   Pending Prescriptions Disp Refills     levothyroxine (SYNTHROID/LEVOTHROID) 112 MCG tablet [Pharmacy Med Name: LEVOTHYROXINE SODIUM 112MCG TABS] 90 tablet 0     Sig: TAKE ONE TABLET BY MOUTH ONCE DAILY    Thyroid Protocol Failed    7/13/2018 11:03 AM       Failed - Normal TSH on file in past 12 months    Recent Labs   Lab Test  04/16/18   1530   TSH  4.86*             Passed - Patient is 12 years or older       Passed - Recent (12 mo) or future (30 days) visit within the authorizing provider's specialty    Patient had office visit in the last 12 months or has a visit in the next 30 days with authorizing provider or within the authorizing provider's specialty.  See \"Patient Info\" tab in inbasket, or \"Choose Columns\" in Meds & Orders section of the refill encounter.           Passed - No active pregnancy on record    If patient is pregnant or has had a positive pregnancy test, please check TSH.         Passed - No positive pregnancy test in past 12 months    If patient is pregnant or has had a positive pregnancy test, please check TSH.          Apoorva De Oliveira, RN, BSN    "

## 2018-07-16 RX ORDER — LEVOTHYROXINE SODIUM 112 UG/1
TABLET ORAL
Qty: 90 TABLET | Refills: 0 | Status: SHIPPED | OUTPATIENT
Start: 2018-07-16 | End: 2018-07-19

## 2018-07-18 DIAGNOSIS — E03.4 HYPOTHYROIDISM DUE TO ACQUIRED ATROPHY OF THYROID: ICD-10-CM

## 2018-07-18 LAB — TSH SERPL DL<=0.005 MIU/L-ACNC: 2.39 MU/L (ref 0.4–4)

## 2018-07-18 PROCEDURE — 36415 COLL VENOUS BLD VENIPUNCTURE: CPT | Performed by: NURSE PRACTITIONER

## 2018-07-18 PROCEDURE — 84443 ASSAY THYROID STIM HORMONE: CPT | Performed by: NURSE PRACTITIONER

## 2018-07-19 RX ORDER — LEVOTHYROXINE SODIUM 112 UG/1
112 TABLET ORAL DAILY
Qty: 90 TABLET | Refills: 3 | Status: SHIPPED | OUTPATIENT
Start: 2018-07-19 | End: 2019-08-05

## 2018-07-26 ENCOUNTER — TRANSFERRED RECORDS (OUTPATIENT)
Dept: HEALTH INFORMATION MANAGEMENT | Facility: CLINIC | Age: 43
End: 2018-07-26

## 2018-07-30 NOTE — PROGRESS NOTES
"  SUBJECTIVE:   Elizabeth Goodson is a 43 year old female who presents to clinic today for the following health issues:    History of Present Illness     Diet:  Regular (no restrictions)  Frequency of exercise:  2-3 days/week  Duration of exercise:  15-30 minutes  Taking medications regularly:  Yes  Medication side effects:  None  Additional concerns today:  No    Hospital Follow-up Visit:    Hospital/Nursing Home/IP Rehab Facility: [facility]   Date of Admission: 7/26/2018  Date of Discharge: 7/27/2018  Reason(s) for Admission: Hemoptysis             Problems taking medications regularly:  None       Medication changes since discharge:  5 day Zpack and Pantoprazole 40 MG        Problems adhering to non-medication therapy:  None    Summary of hospitalization:  [facility] discharge summary reviewed  Diagnostic Tests/Treatments reviewed.  Follow up needed: none  Other Healthcare Providers Involved in Patient s Care:         None  Update since discharge: stable. Experiencing loose stools that started just one day ago.      Post Discharge Medication Reconciliation: discharge medications reconciled, continue medications without change.  Plan of care communicated with patient     Coding guidelines for this visit:  Type of Medical   Decision Making Face-to-Face Visit       within 7 Days of discharge Face-to-Face Visit        within 14 days of discharge   Moderate Complexity [id number]   High Complexity [id number]          Per hospital discharge note --  \"MDM:   Elizabeth Goodson is a 43 y.o. female who presents with coughing up blood. The patient has a history of this in the past and was seen through Pulmonology. They are not really sure of the cause and think it may be associated with rheumatoid arthritis nodules. The patient has had cauterization through bronchoscopy int he past. She states she woke up at 1 am and coughed up a bunch of blood. She has not had any since. She " "is not really short of breath. She has no pain. During her stay, I reviewed her previous medical records including Pulmonology consults and evaluations in the past. She had IV access placed. She had labs drawn, including CBC and INR, which were otherwise within normal limits. She had a CT pulmonary angiogram consistent with suspected hemorrhage in the right upper lobe. This is the same spot that it has happened before in the past. At this time, she has been vitally stable throughout her stay. I discussed management. I think she would be appropriate for transfer to [facility] where Pulmonology is, for continued monitoring and evaluation. She was in agreement. I spoke with Dr. Vizcarra, on-call for [facility] beds at the [facility]. We discussed the case and she has agreed to accept the patient for transfer.\"      Loose stools, not formed.  Hypertension Follow-up      Outpatient blood pressures are being checked at home.  Results are  117 -120 / 80s.    Low Salt Diet: no added salt      Problem list and histories reviewed & adjusted, as indicated.  Additional history: as documented      Patient Active Problem List   Diagnosis     Syncope     Raynaud phenomenon     CARDIOVASCULAR SCREENING; LDL GOAL LESS THAN 160     Strain of neck muscle     Migraine headache     Cough with hemoptysis     Incidental lung nodule, less than or equal to 3mm     Need for prophylactic vaccination and inoculation against influenza     Fibromyalgia     HTN, goal below 140/90     H/O: hysterectomy     GERD (gastroesophageal reflux disease)     Edema     High risk medication use     Hypothyroidism     Ventral hernia without obstruction or gangrene     Hemoptysis     Rheumatoid arthritis, involving unspecified site, unspecified rheumatoid factor presence (H)     Penicillin allergy     Peanut allergy     Tree nut allergy     Anaphylaxis, subsequent encounter     Itching     History of hemoptysis     Atypical chest pain     Mass of finger, left "     Past Surgical History:   Procedure Laterality Date     CHOLECYSTECTOMY, LAPOROSCOPIC  2007    Cholecystectomy, Laparoscopic     EXCISE MASS FOOT  11/22/2013    Procedure: EXCISE MASS FOOT;  Right Foot Soft Tissue Mass Excision;  Surgeon: Jose Cruz Garcia DPM;  Location: [facility] OR     EXCISE MASS FOOT Right 7/17/2015    Procedure: EXCISE MASS FOOT;  Surgeon: Pierre Adan DPM;  Location: [facility] OR     HYSTERECTOMY, VAGINAL  2002     LAPAROSCOPIC HERNIORRHAPHY VENTRAL N/A 2/24/2016    Procedure: LAPAROSCOPIC HERNIORRHAPHY VENTRAL;  Surgeon: Lars Dahl MD;  Location: [facility] OR     PANNICULECTOMY  07/28/2017    with liposuction, and hernia revision Dr. Heath     RELEASE TRIGGER FINGER Left 5/4/2018    Procedure: RELEASE TRIGGER FINGER;  Trigger Finger Release Left ring finger ;  Surgeon: Aaron Orta DO;  Location: [facility] OR       Social History   Substance Use Topics     Smoking status: Former Smoker     Years: 13.00     Types: Cigarettes     Quit date: 10/1/2012     Smokeless tobacco: Never Used     Alcohol use 0.0 oz/week     0 Standard drinks or equivalent per week      Comment: occ/ 1-2 per month     Family History   Problem Relation Age of Onset     Asthma Father      C.A.D. Father      Diabetes No family hx of      Hypertension No family hx of      Cerebrovascular Disease No family hx of      Breast Cancer No family hx of      Cancer - colorectal No family hx of      Prostate Cancer No family hx of      Alcohol/Drug No family hx of      Allergies No family hx of      Alzheimer Disease No family hx of      Anesthesia Reaction No family hx of      Arthritis No family hx of      Blood Disease No family hx of      Cancer No family hx of      Cardiovascular No family hx of      Circulatory No family hx of      Congenital Anomalies No family hx of      Connective Tissue Disorder No family hx of      Neurologic Disorder No family hx of      Depression No family hx of      Endocrine Disease No  family hx of      Eye Disorder No family hx of      Genetic Disorder No family hx of      GASTROINTESTINAL DISEASE No family hx of      Genitourinary Problems No family hx of      Gynecology No family hx of      HEART DISEASE No family hx of      Lipids No family hx of      Musculoskeletal Disorder No family hx of      Obesity No family hx of      Osteoperosis No family hx of      Psychotic Disorder No family hx of      Respiratory No family hx of      Hearing Loss No family hx of      Family History Negative No family hx of      Thyroid Disease No family hx of      Colon Cancer No family hx of      Hyperlipidemia No family hx of      Coronary Artery Disease No family hx of      Other Cancer No family hx of      Anxiety Disorder No family hx of      Mental Illness No family hx of      Substance Abuse No family hx of          Current Outpatient Prescriptions   Medication Sig Dispense Refill     cyclobenzaprine (FLEXERIL) 10 MG tablet TAKE ONE TABLET BY MOUTH EVERY NIGHT AT BEDTIME 90 tablet 1     diltiazem 240 MG 24 hr capsule TAKE ONE CAPSULE BY MOUTH ONCE DAILY 90 capsule 1     EPINEPHrine (EPIPEN 2-KIMBERLEE) 0.3 MG/0.3ML injection 2-pack Inject 0.3 mLs (0.3 mg) into the muscle once as needed for anaphylaxis 0.6 mL 1     hydrochlorothiazide (HYDRODIURIL) 25 MG tablet TAKE ONE TABLET BY MOUTH ONCE DAILY 90 tablet 3     leflunomide (ARAVA) 20 MG tablet Take 1 tablet (20 mg) by mouth daily 30 tablet 2     levothyroxine (SYNTHROID/LEVOTHROID) 112 MCG tablet Take 1 tablet (112 mcg) by mouth daily 90 tablet 3     losartan (COZAAR) 50 MG tablet TAKE ONE TABLET BY MOUTH ONCE DAILY 90 tablet 1     metoprolol (TOPROL-XL) 25 MG 24 hr tablet TAKE ONE TABLET BY MOUTH ONCE DAILY 90 tablet 3     omeprazole (PRILOSEC) 40 MG capsule Take 1 capsule (40 mg) by mouth daily Take 30-60 minutes before a meal. 90 capsule 1     pantoprazole (PROTONIX) 40 MG EC tablet Take 40 mg by mouth       potassium chloride SA (KLOR-CON) 20 MEQ CR tablet 1  tab po QID today, then TID tomorrow, then 1 po QD 95 tablet 1     albuterol (PROAIR HFA, PROVENTIL HFA, VENTOLIN HFA) 108 (90 BASE) MCG/ACT inhaler Inhale 2 puffs into the lungs every 6 hours as needed for shortness of breath / dyspnea or wheezing (Patient not taking: Reported on 7/31/2018) 1 Inhaler 1     Allergies   Allergen Reactions     Penicillins Anaphylaxis     Walnuts [Nuts] Anaphylaxis     All tree nuts     Norvasc [Amlodipine] Rash     Norvasc- rash at 10 mg dosing     Recent Labs   Lab Test  07/31/18   1355  07/18/18   0925  04/16/18   1530   09/25/17   1550   08/23/16   1401   11/30/15   1449   10/08/14   1114   06/30/11   1222   LDL   --    --    --    --    --    --    --    --    --    --   85   --   75   HDL   --    --    --    --    --    --    --    --    --    --   39*   --   40*   TRIG   --    --    --    --    --    --    --    --    --    --   124   --   118   ALT   --    --   21   --    --    --   24   --   21   < >  20   < >   --    CR   --    --   0.81   --   0.65   < >   --    < >   --    < >  0.85   < >   --    GFRESTIMATED   --    --   77   --   >90   < >   --    < >   --    < >  75   < >   --    GFRESTBLACK   --    --   >90   --   >90   < >   --    < >   --    < >  >90   GFR Calc     < >   --    POTASSIUM  3.1*   --   3.4   --   3.5   < >   --    < >   --    < >  3.8   < >   --    TSH   --   2.39  4.86*   < >  10.01*   < >  5.27*   --    --    < >  6.94*   --   1.00    < > = values in this interval not displayed.      BP Readings from Last 3 Encounters:   07/31/18 118/82   07/09/18 122/70   05/14/18 120/80    Wt Readings from Last 3 Encounters:   07/31/18 180 lb 4.8 oz (81.8 kg)   07/09/18 182 lb 9.6 oz (82.8 kg)   05/14/18 185 lb (83.9 kg)           ROS:  Constitutional, HEENT, cardiovascular, pulmonary, GI, , musculoskeletal, neuro, skin, endocrine and psych systems are negative, except as otherwise noted.    This document serves as a record of the services and  "decisions personally performed and made by Keri Yu CNP. It was created on her behalf by Eunice Perdomo, a trained medical scribe. The creation of this document is based the provider's statements to the medical scribe.    Eunice Perdomo July 31, 2018 1:38 PM    OBJECTIVE:   /82  Pulse 92  Temp 98.6  F (37  C) (Temporal)  Resp 14  Ht 5' 2.5\" (1.588 m)  Wt 180 lb 4.8 oz (81.8 kg)  SpO2 98%  BMI 32.45 kg/m2  Body mass index is 32.45 kg/(m^2).  GENERAL: healthy, alert and no distress  HENT: ear canals and TM's normal, nose and mouth without ulcers or lesions  NECK: no adenopathy, no asymmetry, masses, or scars and thyroid normal to palpation  RESP: lungs clear to auscultation - no rales, rhonchi or wheezes  CV: regular rate and rhythm, normal S1 S2, no S3 or S4, no murmur, click or rub, no peripheral edema and peripheral pulses strong  ABDOMEN: soft, mild epigastric tenderness, no hepatosplenomegaly, no masses and bowel sounds normal  NEURO: Normal strength and tone, mentation intact and speech normal  PSYCH: mentation appears normal, affect normal/bright    Diagnostic Test Results:  none     ASSESSMENT/PLAN:       ICD-10-CM    1. Cough with hemoptysis R04.2 omeprazole (PRILOSEC) 40 MG capsule   2. Hiatal hernia K44.9 omeprazole (PRILOSEC) 40 MG capsule   3. Screening for HIV (human immunodeficiency virus) Z11.4 HIV Screening   4. Hypokalemia E87.6 Potassium   5. HTN, goal below 140/90 I10    6. Gastroesophageal reflux disease without esophagitis K21.9 omeprazole (PRILOSEC) 40 MG capsule   ER discharge summary and lab results reviewed.  Continue current medication without change. Prilosec rx provided for when Pt. Completes current course of medications.  Checking labs today  Discussed follow-up with GI/surgery to evaluate for hiatal hernia.  1x HIV screening completed today.    Follow-up with GI/Surgery for further evaluation of hernia.    The information in this document, created by the medical scribe for me, " accurately reflects the services I personally performed and the decisions made by me. I have reviewed and approved this document for accuracy prior to leaving the patient care area.    RAÚL Flor CNP  [facility]

## 2018-07-31 ENCOUNTER — OFFICE VISIT (OUTPATIENT)
Dept: FAMILY MEDICINE | Facility: CLINIC | Age: 43
End: 2018-07-31
Payer: COMMERCIAL

## 2018-07-31 VITALS
HEIGHT: 63 IN | OXYGEN SATURATION: 98 % | RESPIRATION RATE: 14 BRPM | SYSTOLIC BLOOD PRESSURE: 118 MMHG | BODY MASS INDEX: 31.95 KG/M2 | HEART RATE: 92 BPM | DIASTOLIC BLOOD PRESSURE: 82 MMHG | WEIGHT: 180.3 LBS | TEMPERATURE: 98.6 F

## 2018-07-31 DIAGNOSIS — K21.9 GASTROESOPHAGEAL REFLUX DISEASE WITHOUT ESOPHAGITIS: ICD-10-CM

## 2018-07-31 DIAGNOSIS — I10 HTN, GOAL BELOW 140/90: ICD-10-CM

## 2018-07-31 DIAGNOSIS — K44.9 HIATAL HERNIA: ICD-10-CM

## 2018-07-31 DIAGNOSIS — R04.2 COUGH WITH HEMOPTYSIS: Primary | ICD-10-CM

## 2018-07-31 DIAGNOSIS — Z11.4 SCREENING FOR HIV (HUMAN IMMUNODEFICIENCY VIRUS): ICD-10-CM

## 2018-07-31 DIAGNOSIS — E87.6 HYPOKALEMIA: ICD-10-CM

## 2018-07-31 LAB — POTASSIUM SERPL-SCNC: 3.1 MMOL/L (ref 3.4–5.3)

## 2018-07-31 PROCEDURE — 36415 COLL VENOUS BLD VENIPUNCTURE: CPT | Performed by: NURSE PRACTITIONER

## 2018-07-31 PROCEDURE — 84132 ASSAY OF SERUM POTASSIUM: CPT | Performed by: NURSE PRACTITIONER

## 2018-07-31 PROCEDURE — 99214 OFFICE O/P EST MOD 30 MIN: CPT | Performed by: NURSE PRACTITIONER

## 2018-07-31 PROCEDURE — 87389 HIV-1 AG W/HIV-1&-2 AB AG IA: CPT | Performed by: NURSE PRACTITIONER

## 2018-07-31 RX ORDER — PANTOPRAZOLE SODIUM 40 MG/1
40 TABLET, DELAYED RELEASE ORAL
COMMUNITY
Start: 2018-07-28 | End: 2018-11-16

## 2018-07-31 RX ORDER — OMEPRAZOLE 40 MG/1
40 CAPSULE, DELAYED RELEASE ORAL DAILY
Qty: 90 CAPSULE | Refills: 1 | Status: SHIPPED | OUTPATIENT
Start: 2018-07-31 | End: 2018-11-16

## 2018-07-31 ASSESSMENT — PAIN SCALES - GENERAL: PAINLEVEL: NO PAIN (0)

## 2018-07-31 NOTE — MR AVS SNAPSHOT
After Visit Summary   7/31/2018    Elizabeth Goodson    MRN: [id number]           Patient Information     Date Of Birth          1975        Visit Information        Provider Department      7/31/2018 1:20 PM Keri Yu APRN CNP [facility]        Today's Diagnoses     Cough with hemoptysis    -  1    Hiatal hernia        Screening for HIV (human immunodeficiency virus)        Hypokalemia        HTN, goal below 140/90        Gastroesophageal reflux disease without esophagitis           Follow-ups after your visit        Who to contact     If you have questions or need follow up information about today's clinic visit or your schedule please contact [facility] directly at [phone].  Normal or non-critical lab and imaging results will be communicated to you by Node[employer], letter or phone within 4 business days after the clinic has received the results. If you do not hear from us within 7 days, please contact the clinic through Node[employer] or phone. If you have a critical or abnormal lab result, we will notify you by phone as soon as possible.  Submit refill requests through [employer] or call your pharmacy and they will forward the refill request to us. Please allow 3 business days for your refill to be completed.          Additional Information About Your Visit        MyChart Information     [employer] gives you secure access to your electronic health record. If you see a primary care provider, you can also send messages to your care team and make appointments. If you have questions, please call your primary care clinic.  If you do not have a primary care provider, please call [phone] and they will assist you.        Care EveryWhere ID     This is your Care EveryWhere ID. This could be used by other organizations to access your [place] medical records  [id number]        Your Vitals Were     Pulse Temperature Respirations Height Pulse Oximetry BMI (Body Mass Index)    92  "98.6  F (37  C) (Temporal) 14 5' 2.5\" (1.588 m) 98% 32.45 kg/m2       Blood Pressure from Last 3 Encounters:   07/31/18 118/82   07/09/18 122/70   05/14/18 120/80    Weight from Last 3 Encounters:   07/31/18 180 lb 4.8 oz (81.8 kg)   07/09/18 182 lb 9.6 oz (82.8 kg)   05/14/18 185 lb (83.9 kg)              We Performed the Following     HIV Screening     Potassium          Today's Medication Changes          These changes are accurate as of 7/31/18 11:59 PM.  If you have any questions, ask your nurse or doctor.               Start taking these medicines.        Dose/Directions    omeprazole 40 MG capsule   Commonly known as:  priLOSEC   Used for:  Cough with hemoptysis, Hiatal hernia, Gastroesophageal reflux disease without esophagitis   Started by:  Keri Yu APRN CNP        Dose:  40 mg   Take 1 capsule (40 mg) by mouth daily Take 30-60 minutes before a meal.   Quantity:  90 capsule   Refills:  1            Where to get your medicines      These medications were sent to [id number] - [place], [place] - [address]  [address] [address]    Hours:  test Rx sent successfully 12/26/02  KR Phone:  [phone]     omeprazole 40 MG capsule                Primary Care Provider Office Phone # Fax #    RAÚL Pat [phone] [address] [address]        Equal Access to Services     [facility] AH: Hadii aad ku hadasho Soomaali, waaxda luqadaha, qaybta kaalmada adeegyada, waxay mikael figueredo . So [facility] [phone].    ATENCIÓN: Si habla español, tiene a alston disposición servicios gratuitos de asistencia lingüística. Llame al [phone].    We comply with applicable federal civil rights laws and [place] laws. We do not discriminate on the basis of race, color, national origin, age, disability, sex, sexual orientation, or gender identity.            Thank you!     Thank you for choosing [facility]  for your " care. Our goal is always to provide you with excellent care. Hearing back from our patients is one way we can continue to improve our services. Please take a few minutes to complete the written survey that you may receive in the mail after your visit with us. Thank you!             Your Updated Medication List - Protect others around you: Learn how to safely use, store and throw away your medicines at www.disposemymeds.org.          This list is accurate as of 7/31/18 11:59 PM.  Always use your most recent med list.                   Brand Name Dispense Instructions for use Diagnosis    albuterol 108 (90 Base) MCG/ACT Inhaler    PROAIR HFA/PROVENTIL HFA/VENTOLIN HFA    1 Inhaler    Inhale 2 puffs into the lungs every 6 hours as needed for shortness of breath / dyspnea or wheezing    Cough       cyclobenzaprine 10 MG tablet    FLEXERIL    90 tablet    TAKE ONE TABLET BY MOUTH EVERY NIGHT AT BEDTIME    Fibromyalgia       diltiazem 240 MG 24 hr capsule     90 capsule    TAKE ONE CAPSULE BY MOUTH ONCE DAILY    Essential hypertension       EPINEPHrine 0.3 MG/0.3ML injection 2-pack    EPIPEN 2-KIMBERLEE    0.6 mL    Inject 0.3 mLs (0.3 mg) into the muscle once as needed for anaphylaxis    Peanut allergy, Tree nut allergy       hydrochlorothiazide 25 MG tablet    HYDRODIURIL    90 tablet    TAKE ONE TABLET BY MOUTH ONCE DAILY    Essential hypertension       leflunomide 20 MG tablet    ARAVA    30 tablet    Take 1 tablet (20 mg) by mouth daily    Rheumatoid arthritis, involving unspecified site, unspecified rheumatoid factor presence (H)       levothyroxine 112 MCG tablet    SYNTHROID/LEVOTHROID    90 tablet    Take 1 tablet (112 mcg) by mouth daily    Hypothyroidism due to acquired atrophy of thyroid       losartan 50 MG tablet    COZAAR    90 tablet    TAKE ONE TABLET BY MOUTH ONCE DAILY    Essential hypertension with goal blood pressure less than 140/90       metoprolol succinate 25 MG 24 hr tablet    TOPROL-XL    90 tablet     TAKE ONE TABLET BY MOUTH ONCE DAILY    HTN, goal below 140/90       omeprazole 40 MG capsule    priLOSEC    90 capsule    Take 1 capsule (40 mg) by mouth daily Take 30-60 minutes before a meal.    Cough with hemoptysis, Hiatal hernia, Gastroesophageal reflux disease without esophagitis       pantoprazole 40 MG EC tablet    PROTONIX     Take 40 mg by mouth        potassium chloride SA 20 MEQ CR tablet    KLOR-CON    95 tablet    1 tab po QID today, then TID tomorrow, then 1 po QD    Hypokalemia

## 2018-08-01 ENCOUNTER — CARE COORDINATION (OUTPATIENT)
Dept: GASTROENTEROLOGY | Facility: CLINIC | Age: 43
End: 2018-08-01

## 2018-08-01 DIAGNOSIS — K21.9 GASTROESOPHAGEAL REFLUX DISEASE, ESOPHAGITIS PRESENCE NOT SPECIFIED: Primary | ICD-10-CM

## 2018-08-01 LAB — HIV 1+2 AB+HIV1 P24 AG SERPL QL IA: NONREACTIVE

## 2018-08-02 ENCOUNTER — TELEPHONE (OUTPATIENT)
Dept: GASTROENTEROLOGY | Facility: CLINIC | Age: 43
End: 2018-08-02

## 2018-08-15 DIAGNOSIS — I10 HTN, GOAL BELOW 140/90: ICD-10-CM

## 2018-08-15 RX ORDER — METOPROLOL SUCCINATE 25 MG/1
TABLET, EXTENDED RELEASE ORAL
Qty: 90 TABLET | Refills: 1 | Status: SHIPPED | OUTPATIENT
Start: 2018-08-15 | End: 2019-03-20

## 2018-08-15 NOTE — TELEPHONE ENCOUNTER
Metoprolol    Prescription approved per [facility] Refill Protocol.    Negar Beckham, RN, BSN

## 2018-08-24 NOTE — PROGRESS NOTES
"  SUBJECTIVE:   Elizabeth Goodson is a 43 year old female who presents to clinic today for the following health issues:      HPI     Acute Illness   Acute illness concerns: Cough   Onset: x3 weeks     Fever: no    Chills/Sweats: no    Headache (location?): YES    Sinus Pressure:YES    Conjunctivitis:  no    Ear Pain: YES: both    Rhinorrhea: no    Congestion: YES    Sore Throat: YES- little     Cough: YES    Wheeze: no    Decreased Appetite: no    Nausea: no    Vomiting: no    Diarrhea:  no    Dysuria/Freq.: no    Fatigue/Achiness: no    Sick/Strep Exposure: no     Therapies Tried and outcome: Dayquil     Patient has had symptoms for the past 3 weeks. Feels she has a sinus infection on top of her symptoms. Also has a cough and is coughing up phlegm. Ear pain as well. Sore throat that she attributes to coughing so much. No fevers. Has recently traveled in an airplane as well and states \"everyone\" has a cold after returning.     Problem list and histories reviewed & adjusted, as indicated.  Additional history: as documented  Patient Active Problem List   Diagnosis     Syncope     Raynaud phenomenon     CARDIOVASCULAR SCREENING; LDL GOAL LESS THAN 160     Strain of neck muscle     Migraine headache     Cough with hemoptysis     Incidental lung nodule, less than or equal to 3mm     Need for prophylactic vaccination and inoculation against influenza     Fibromyalgia     HTN, goal below 140/90     H/O: hysterectomy     GERD (gastroesophageal reflux disease)     Edema     High risk medication use     Hypothyroidism     Ventral hernia without obstruction or gangrene     Hemoptysis     Rheumatoid arthritis, involving unspecified site, unspecified rheumatoid factor presence (H)     Penicillin allergy     Peanut allergy     Tree nut allergy     Anaphylaxis, subsequent encounter     Itching     History of hemoptysis     Atypical chest pain     Mass of finger, left     Past Surgical History:   Procedure Laterality Date     " CHOLECYSTECTOMY, LAPOROSCOPIC  2007    Cholecystectomy, Laparoscopic     EXCISE MASS FOOT  11/22/2013    Procedure: EXCISE MASS FOOT;  Right Foot Soft Tissue Mass Excision;  Surgeon: Jose Cruz Garcia DPM;  Location: [facility]     EXCISE MASS FOOT Right 7/17/2015    Procedure: EXCISE MASS FOOT;  Surgeon: Pierre Adan DPM;  Location: [facility]     HYSTERECTOMY, VAGINAL  2002     LAPAROSCOPIC HERNIORRHAPHY VENTRAL N/A 2/24/2016    Procedure: LAPAROSCOPIC HERNIORRHAPHY VENTRAL;  Surgeon: Lars Dahl MD;  Location: [facility]     PANNICULECTOMY  07/28/2017    with liposuction, and hernia revision Dr. Heath     RELEASE TRIGGER FINGER Left 5/4/2018    Procedure: RELEASE TRIGGER FINGER;  Trigger Finger Release Left ring finger ;  Surgeon: Aaron Orta DO;  Location: [facility]       Social History   Substance Use Topics     Smoking status: Former Smoker     Years: 13.00     Types: Cigarettes     Quit date: 10/1/2012     Smokeless tobacco: Never Used     Alcohol use 0.0 oz/week     0 Standard drinks or equivalent per week      Comment: occ/ 1-2 per month     Family History   Problem Relation Age of Onset     Asthma Father      C.A.D. Father      Diabetes No family hx of      Hypertension No family hx of      Cerebrovascular Disease No family hx of      Breast Cancer No family hx of      Cancer - colorectal No family hx of      Prostate Cancer No family hx of      Alcohol/Drug No family hx of      Allergies No family hx of      Alzheimer Disease No family hx of      Anesthesia Reaction No family hx of      Arthritis No family hx of      Blood Disease No family hx of      Cancer No family hx of      Cardiovascular No family hx of      Circulatory No family hx of      Congenital Anomalies No family hx of      Connective Tissue Disorder No family hx of      Neurologic Disorder No family hx of      Depression No family hx of      Endocrine Disease No family hx of      Eye Disorder No family hx of      Genetic  Disorder No family hx of      GASTROINTESTINAL DISEASE No family hx of      Genitourinary Problems No family hx of      Gynecology No family hx of      HEART DISEASE No family hx of      Lipids No family hx of      Musculoskeletal Disorder No family hx of      Obesity No family hx of      Osteoperosis No family hx of      Psychotic Disorder No family hx of      Respiratory No family hx of      Hearing Loss No family hx of      Family History Negative No family hx of      Thyroid Disease No family hx of      Colon Cancer No family hx of      Hyperlipidemia No family hx of      Coronary Artery Disease No family hx of      Other Cancer No family hx of      Anxiety Disorder No family hx of      Mental Illness No family hx of      Substance Abuse No family hx of          Current Outpatient Prescriptions   Medication Sig Dispense Refill     cyclobenzaprine (FLEXERIL) 10 MG tablet TAKE ONE TABLET BY MOUTH EVERY NIGHT AT BEDTIME 90 tablet 1     diltiazem 240 MG 24 hr capsule TAKE ONE CAPSULE BY MOUTH ONCE DAILY 90 capsule 1     EPINEPHrine (EPIPEN 2-KIMBERLEE) 0.3 MG/0.3ML injection 2-pack Inject 0.3 mLs (0.3 mg) into the muscle once as needed for anaphylaxis 0.6 mL 1     hydrochlorothiazide (HYDRODIURIL) 25 MG tablet TAKE ONE TABLET BY MOUTH ONCE DAILY 90 tablet 3     leflunomide (ARAVA) 20 MG tablet Take 1 tablet (20 mg) by mouth daily 30 tablet 2     losartan (COZAAR) 50 MG tablet TAKE ONE TABLET BY MOUTH ONCE DAILY 90 tablet 1     metoprolol succinate (TOPROL-XL) 25 MG 24 hr tablet TAKE ONE TABLET BY MOUTH ONCE DAILY 90 tablet 1     omeprazole (PRILOSEC) 40 MG capsule Take 1 capsule (40 mg) by mouth daily Take 30-60 minutes before a meal. 90 capsule 1     pantoprazole (PROTONIX) 40 MG EC tablet Take 40 mg by mouth       potassium chloride SA (KLOR-CON) 20 MEQ CR tablet 1 tab po QID today, then TID tomorrow, then 1 po QD 95 tablet 1     albuterol (PROAIR HFA, PROVENTIL HFA, VENTOLIN HFA) 108 (90 BASE) MCG/ACT inhaler Inhale 2  puffs into the lungs every 6 hours as needed for shortness of breath / dyspnea or wheezing (Patient not taking: Reported on 7/31/2018) 1 Inhaler 1     levothyroxine (SYNTHROID/LEVOTHROID) 112 MCG tablet Take 1 tablet (112 mcg) by mouth daily 90 tablet 3     Allergies   Allergen Reactions     Penicillins Anaphylaxis     Walnuts [Nuts] Anaphylaxis     All tree nuts     Norvasc [Amlodipine] Rash     Norvasc- rash at 10 mg dosing     Recent Labs   Lab Test  07/31/18   1355  07/18/18   0925  04/16/18   1530   09/25/17   1550   08/23/16   1401   11/30/15   1449   10/08/14   1114   06/30/11   1222   LDL   --    --    --    --    --    --    --    --    --    --   85   --   75   HDL   --    --    --    --    --    --    --    --    --    --   39*   --   40*   TRIG   --    --    --    --    --    --    --    --    --    --   124   --   118   ALT   --    --   21   --    --    --   24   --   21   < >  20   < >   --    CR   --    --   0.81   --   0.65   < >   --    < >   --    < >  0.85   < >   --    GFRESTIMATED   --    --   77   --   >90   < >   --    < >   --    < >  75   < >   --    GFRESTBLACK   --    --   >90   --   >90   < >   --    < >   --    < >  >90   GFR Calc     < >   --    POTASSIUM  3.1*   --   3.4   --   3.5   < >   --    < >   --    < >  3.8   < >   --    TSH   --   2.39  4.86*   < >  10.01*   < >  5.27*   --    --    < >  6.94*   --   1.00    < > = values in this interval not displayed.      BP Readings from Last 3 Encounters:   08/27/18 122/74   07/31/18 118/82   07/09/18 122/70    Wt Readings from Last 3 Encounters:   08/27/18 178 lb (80.7 kg)   07/31/18 180 lb 4.8 oz (81.8 kg)   07/09/18 182 lb 9.6 oz (82.8 kg)          ROS:  Constitutional, HEENT, cardiovascular, pulmonary, GI, , musculoskeletal, neuro, skin, endocrine and psych systems are negative, except as otherwise noted.    This document serves as a record of the services and decisions personally performed and made by Keri Yu,  "CNP. It was created on her behalf by Eunice Perdomo, a trained medical scribe. The creation of this document is based the provider's statements to the medical scribe.    Eunice Pedromo August 27, 2018 10:31 AM    OBJECTIVE:   /74  Pulse 90  Temp 97.9  F (36.6  C) (Temporal)  Resp 16  Ht 5' 2.4\" (1.585 m)  Wt 178 lb (80.7 kg)  SpO2 99%  BMI 32.14 kg/m2  Body mass index is 32.14 kg/(m^2).  GENERAL APPEARANCE: healthy, alert and no distress  EYES: Eyes grossly normal to inspection and conjunctivae and sclerae normal  HENT: ear canals and TM's normal, nose and mouth without ulcers or lesions and nasal mucosa edematous with mild rhinorrhea. Oropharynx with mild erythema   NECK: no adenopathy, no asymmetry, masses, or scars and thyroid normal to palpation  RESP: lungs clear to auscultation - no rales, rhonchi or wheezes  CV: regular rates and rhythm, normal S1 S2, no S3 or S4 and no murmur, click or rub    Diagnostic Test Results:  none     ASSESSMENT/PLAN:       ICD-10-CM    1. Acute recurrent maxillary sinusitis J01.01 azithromycin (ZITHROMAX) 250 MG tablet   2. Peanut allergy Z91.010 EPINEPHrine (EPIPEN 2-KIMBERLEE) 0.3 MG/0.3ML injection 2-pack   3. Tree nut allergy Z91.018 EPINEPHrine (EPIPEN 2-KIMBERLEE) 0.3 MG/0.3ML injection 2-pack   4. Essential hypertension I10 hydrochlorothiazide (HYDRODIURIL) 25 MG tablet     Potassium   Antiobiotic see orders. Return to clinic if symptoms persist or worsen. OTC's antypyretics/analgesics prn, fluids, rest, supportive cares. Avoid OTC decongestants, prn.     Reviewed allergies. Epi pen refilled today.  HTN-- Chronic, stable. Continue current medication, refill done if needed  Checking potassium labs today per patient's request and will notify with results.    Follow-up - Return to clinic with any new or worsening symptoms, and as needed.    The information in this document, created by the medical scribe for me, accurately reflects the services I personally performed and the " decisions made by me. I have reviewed and approved this document for accuracy prior to leaving the patient care area.    RAÚL Flor [facility]

## 2018-08-27 ENCOUNTER — OFFICE VISIT (OUTPATIENT)
Dept: FAMILY MEDICINE | Facility: CLINIC | Age: 43
End: 2018-08-27
Payer: COMMERCIAL

## 2018-08-27 VITALS
DIASTOLIC BLOOD PRESSURE: 74 MMHG | OXYGEN SATURATION: 99 % | TEMPERATURE: 97.9 F | RESPIRATION RATE: 16 BRPM | WEIGHT: 178 LBS | HEIGHT: 62 IN | BODY MASS INDEX: 32.76 KG/M2 | HEART RATE: 90 BPM | SYSTOLIC BLOOD PRESSURE: 122 MMHG

## 2018-08-27 DIAGNOSIS — I10 ESSENTIAL HYPERTENSION: ICD-10-CM

## 2018-08-27 DIAGNOSIS — J01.01 ACUTE RECURRENT MAXILLARY SINUSITIS: Primary | ICD-10-CM

## 2018-08-27 DIAGNOSIS — Z91.010 PEANUT ALLERGY: ICD-10-CM

## 2018-08-27 DIAGNOSIS — Z91.018 TREE NUT ALLERGY: ICD-10-CM

## 2018-08-27 LAB — POTASSIUM SERPL-SCNC: 3.4 MMOL/L (ref 3.4–5.3)

## 2018-08-27 PROCEDURE — 84132 ASSAY OF SERUM POTASSIUM: CPT | Performed by: NURSE PRACTITIONER

## 2018-08-27 PROCEDURE — 99214 OFFICE O/P EST MOD 30 MIN: CPT | Performed by: NURSE PRACTITIONER

## 2018-08-27 PROCEDURE — 36415 COLL VENOUS BLD VENIPUNCTURE: CPT | Performed by: NURSE PRACTITIONER

## 2018-08-27 RX ORDER — AZITHROMYCIN 250 MG/1
TABLET, FILM COATED ORAL
Qty: 6 TABLET | Refills: 0 | Status: SHIPPED | OUTPATIENT
Start: 2018-08-27 | End: 2018-10-16

## 2018-08-27 RX ORDER — EPINEPHRINE 0.3 MG/.3ML
0.3 INJECTION SUBCUTANEOUS
Qty: 0.6 ML | Refills: 1 | Status: SHIPPED | OUTPATIENT
Start: 2018-08-27 | End: 2020-01-27

## 2018-08-27 RX ORDER — HYDROCHLOROTHIAZIDE 25 MG/1
25 TABLET ORAL DAILY
Qty: 90 TABLET | Refills: 3 | Status: SHIPPED | OUTPATIENT
Start: 2018-08-27 | End: 2019-09-15

## 2018-08-27 ASSESSMENT — PAIN SCALES - GENERAL: PAINLEVEL: NO PAIN (0)

## 2018-08-27 NOTE — MR AVS SNAPSHOT
"              After Visit Summary   8/27/2018    Elizabeth Goodson    MRN: [id number]           Patient Information     Date Of Birth          1975        Visit Information        Provider Department      8/27/2018 10:20 AM Keri Yu APRN CNP [facility]        Today's Diagnoses     Acute recurrent maxillary sinusitis    -  1    Peanut allergy        Tree nut allergy        Essential hypertension           Follow-ups after your visit        Who to contact     If you have questions or need follow up information about today's clinic visit or your schedule please contact [facility] directly at [phone].  Normal or non-critical lab and imaging results will be communicated to you by [employer], letter or phone within 4 business days after the clinic has received the results. If you do not hear from us within 7 days, please contact the clinic through [employer] or phone. If you have a critical or abnormal lab result, we will notify you by phone as soon as possible.  Submit refill requests through [employer] or call your pharmacy and they will forward the refill request to us. Please allow 3 business days for your refill to be completed.          Additional Information About Your Visit        MyChart Information     [employer] gives you secure access to your electronic health record. If you see a primary care provider, you can also send messages to your care team and make appointments. If you have questions, please call your primary care clinic.  If you do not have a primary care provider, please call [phone] and they will assist you.        Care EveryWhere ID     This is your Care EveryWhere ID. This could be used by other organizations to access your [facility] medical records  [id number]        Your Vitals Were     Pulse Temperature Respirations Height Pulse Oximetry BMI (Body Mass Index)    90 97.9  F (36.6  C) (Temporal) 16 5' 2.4\" (1.585 m) 99% 32.14 kg/m2       Blood Pressure from " Last 3 Encounters:   08/27/18 122/74   07/31/18 118/82   07/09/18 122/70    Weight from Last 3 Encounters:   08/27/18 178 lb (80.7 kg)   07/31/18 180 lb 4.8 oz (81.8 kg)   07/09/18 182 lb 9.6 oz (82.8 kg)              We Performed the Following     Potassium          Today's Medication Changes          These changes are accurate as of 8/27/18 11:59 PM.  If you have any questions, ask your nurse or doctor.               Start taking these medicines.        Dose/Directions    azithromycin 250 MG tablet   Commonly known as:  ZITHROMAX   Used for:  Acute recurrent maxillary sinusitis   Started by:  Keri Yu APRN CNP        2 tablets daily on day one, then one tablet po daily until gone.   Quantity:  6 tablet   Refills:  0         These medicines have changed or have updated prescriptions.        Dose/Directions    hydrochlorothiazide 25 MG tablet   Commonly known as:  HYDRODIURIL   This may have changed:  See the new instructions.   Used for:  Essential hypertension   Changed by:  Keri Yu APRN CNP        Dose:  25 mg   Take 1 tablet (25 mg) by mouth daily   Quantity:  90 tablet   Refills:  3            Where to get your medicines      These medications were sent to [id number] - [place], [place] - [address]  [address] [address]    Hours:  test Rx sent successfully 12/26/02  KR Phone:  [phone]     azithromycin 250 MG tablet    EPINEPHrine 0.3 MG/0.3ML injection 2-pack    hydrochlorothiazide 25 MG tablet                Primary Care Provider Office Phone # Fax #    RAÚL Pat [phone] [address] [address]        Equal Access to Services     RICK HARRIS AH: Hadii kiki mcnally Sojuan, waaxda luqadaha, qaybta kaalmada cornelioyageovany, jose gomez. So [facility] [phone].    ATENCIÓN: Si habla español, tiene a alsotn disposición servicios gratuitos de asistencia lingüística. Llame al [phone].    We  comply with applicable federal civil rights laws and [place] laws. We do not discriminate on the basis of race, color, national origin, age, disability, sex, sexual orientation, or gender identity.            Thank you!     Thank you for choosing [facility]  for your care. Our goal is always to provide you with excellent care. Hearing back from our patients is one way we can continue to improve our services. Please take a few minutes to complete the written survey that you may receive in the mail after your visit with us. Thank you!             Your Updated Medication List - Protect others around you: Learn how to safely use, store and throw away your medicines at www.disposemymeds.org.          This list is accurate as of 8/27/18 11:59 PM.  Always use your most recent med list.                   Brand Name Dispense Instructions for use Diagnosis    albuterol 108 (90 Base) MCG/ACT inhaler    PROAIR HFA/PROVENTIL HFA/VENTOLIN HFA    1 Inhaler    Inhale 2 puffs into the lungs every 6 hours as needed for shortness of breath / dyspnea or wheezing    Cough       azithromycin 250 MG tablet    ZITHROMAX    6 tablet    2 tablets daily on day one, then one tablet po daily until gone.    Acute recurrent maxillary sinusitis       cyclobenzaprine 10 MG tablet    FLEXERIL    90 tablet    TAKE ONE TABLET BY MOUTH EVERY NIGHT AT BEDTIME    Fibromyalgia       diltiazem 240 MG 24 hr capsule     90 capsule    TAKE ONE CAPSULE BY MOUTH ONCE DAILY    Essential hypertension       EPINEPHrine 0.3 MG/0.3ML injection 2-pack    EPIPEN 2-KIMBERLEE    0.6 mL    Inject 0.3 mLs (0.3 mg) into the muscle once as needed for anaphylaxis    Peanut allergy, Tree nut allergy       hydrochlorothiazide 25 MG tablet    HYDRODIURIL    90 tablet    Take 1 tablet (25 mg) by mouth daily    Essential hypertension       leflunomide 20 MG tablet    ARAVA    30 tablet    Take 1 tablet (20 mg) by mouth daily    Rheumatoid arthritis, involving unspecified  site, unspecified rheumatoid factor presence (H)       levothyroxine 112 MCG tablet    SYNTHROID/LEVOTHROID    90 tablet    Take 1 tablet (112 mcg) by mouth daily    Hypothyroidism due to acquired atrophy of thyroid       losartan 50 MG tablet    COZAAR    90 tablet    TAKE ONE TABLET BY MOUTH ONCE DAILY    Essential hypertension with goal blood pressure less than 140/90       metoprolol succinate 25 MG 24 hr tablet    TOPROL-XL    90 tablet    TAKE ONE TABLET BY MOUTH ONCE DAILY    HTN, goal below 140/90       omeprazole 40 MG capsule    priLOSEC    90 capsule    Take 1 capsule (40 mg) by mouth daily Take 30-60 minutes before a meal.    Cough with hemoptysis, Hiatal hernia, Gastroesophageal reflux disease without esophagitis       pantoprazole 40 MG EC tablet    PROTONIX     Take 40 mg by mouth        potassium chloride SA 20 MEQ CR tablet    KLOR-CON    95 tablet    1 tab po QID today, then TID tomorrow, then 1 po QD    Hypokalemia

## 2018-08-28 RX ORDER — HYDROCHLOROTHIAZIDE 25 MG/1
TABLET ORAL
Qty: 90 TABLET | Refills: 3 | OUTPATIENT
Start: 2018-08-28

## 2018-08-28 NOTE — TELEPHONE ENCOUNTER
Hydrochlorothiazide    Sent 8/27/2018 with 1 year supply. Refill not appropriate at this time.     Negar Beckham, RN, BSN

## 2018-09-25 ENCOUNTER — MYC MEDICAL ADVICE (OUTPATIENT)
Dept: FAMILY MEDICINE | Facility: CLINIC | Age: 43
End: 2018-09-25

## 2018-09-26 ENCOUNTER — E-VISIT (OUTPATIENT)
Dept: FAMILY MEDICINE | Facility: CLINIC | Age: 43
End: 2018-09-26
Payer: COMMERCIAL

## 2018-09-26 DIAGNOSIS — J20.9 ACUTE BRONCHITIS, UNSPECIFIED ORGANISM: Primary | ICD-10-CM

## 2018-09-26 PROCEDURE — 99444 ZZC PHYSICIAN ONLINE EVALUATION & MANAGEMENT SERVICE: CPT | Performed by: NURSE PRACTITIONER

## 2018-09-26 NOTE — MR AVS SNAPSHOT
After Visit Summary   9/26/2018    Elizabeth Goodson    MRN: [id number]           Patient Information     Date Of Birth          1975        Visit Information        Provider Department      9/26/2018 4:05 PM Keri Yu APRN CNP [facility]        Today's Diagnoses     Acute bronchitis, unspecified organism    -  1      Care Instructions       The symptoms you describe suggest a viral cause, which is much more common than a bacterial cause. Antibiotics will treat bacterial infections, but have no effect on viral infections. If possible, especially if improving, start with symptom care for the first 7-10 days, then consider seeking further treatment or taking an antibiotic. Bacterial infections generally are more severe, including symptoms such as pus, fever over 101degrees F, or rapidly worsening.  If you are not improved after  3-4 days, please be seen back in the clinic.   Thank you for choosing us for your care. I have placed an order for a prescription so that you can start treatment. View your full visit summary for details by clicking on the link below. Your pharmacist will able to address any questions you may have about the medication.     If you're not feeling better within 5-7 days, please schedule an appointment.  You can schedule an appointment right here in [facility], or call [phone]  If the visit is for the same symptoms as your e-visit, we'll refund the cost of your e-visit if seen within seven days.            Follow-ups after your visit        Who to contact     If you have questions or need follow up information about today's clinic visit or your schedule please contact [facility] directly at [phone].  Normal or non-critical lab and imaging results will be communicated to you by MyChart, letter or phone within 4 business days after the clinic has received the results. If you do not hear from us within 7 days, please contact the clinic  through [employer] or phone. If you have a critical or abnormal lab result, we will notify you by phone as soon as possible.  Submit refill requests through [employer] or call your pharmacy and they will forward the refill request to us. Please allow 3 business days for your refill to be completed.          Additional Information About Your Visit        [employer] Information     [employer] gives you secure access to your electronic health record. If you see a primary care provider, you can also send messages to your care team and make appointments. If you have questions, please call your primary care clinic.  If you do not have a primary care provider, please call [phone] and they will assist you.        Care EveryWhere ID     This is your Care EveryWhere ID. This could be used by other organizations to access your [facility] medical records  [id number]         Blood Pressure from Last 3 Encounters:   08/27/18 122/74   07/31/18 118/82   07/09/18 122/70    Weight from Last 3 Encounters:   08/27/18 178 lb (80.7 kg)   07/31/18 180 lb 4.8 oz (81.8 kg)   07/09/18 182 lb 9.6 oz (82.8 kg)              Today, you had the following     No orders found for display         Today's Medication Changes          These changes are accurate as of 9/26/18 11:59 PM.  If you have any questions, ask your nurse or doctor.               Start taking these medicines.        Dose/Directions    guaiFENesin-codeine 100-10 MG/5ML Soln solution   Commonly known as:  ROBITUSSIN AC   Used for:  Acute bronchitis, unspecified organism        Dose:  1-2 tsp.   Take 5-10 mLs by mouth nightly as needed for cough   Quantity:  100 mL   Refills:  0       predniSONE 20 MG tablet   Commonly known as:  DELTASONE   Used for:  Acute bronchitis, unspecified organism        40 mg PO every day X 2 days, then 20 mg every day X 5 days.   Quantity:  9 tablet   Refills:  0            Where to get your medicines      These medications were sent to [id number] [place] [facility] [address]  [address], [place] [address]    Hours:  test Rx sent successfully 12/26/02  KR Phone:  [phone]     predniSONE 20 MG tablet         Some of these will need a paper prescription and others can be bought over the counter.  Ask your nurse if you have questions.     Bring a paper prescription for each of these medications     guaiFENesin-codeine 100-10 MG/5ML Soln solution                Primary Care Provider Office Phone # Fax #    Keri RAÚL [facility] [phone] [address] [address]        Equal Access to Services     [facility] Center[facility] : Hadii aad ku hadasho Soomaali, waaxda luqadaha, qaybta kaalmada adeegyada, waxkatia youssef hayeda figueredo . So [facility] [phone].    ATENCIÓN: Si habla español, tiene a alston disposición servicios gratuitos de asistencia lingüística. [facility] [phone].    We comply with applicable federal civil rights laws and [place] laws. We do not discriminate on the basis of race, color, national origin, age, disability, sex, sexual orientation, or gender identity.            Thank you!     Thank you for choosing [facility]  for your care. Our goal is always to provide you with excellent care. Hearing back from our patients is one way we can continue to improve our services. Please take a few minutes to complete the written survey that you may receive in the mail after your visit with us. Thank you!             Your Updated Medication List - Protect others around you: Learn how to safely use, store and throw away your medicines at www.disposemymeds.org.          This list is accurate as of 9/26/18 11:59 PM.  Always use your most recent med list.                   Brand Name Dispense Instructions for use Diagnosis    albuterol 108 (90 Base) MCG/ACT inhaler    PROAIR HFA/PROVENTIL HFA/VENTOLIN HFA    1 Inhaler    Inhale 2 puffs into the lungs every 6 hours as needed for shortness of  breath / dyspnea or wheezing    Cough       azithromycin 250 MG tablet    ZITHROMAX    6 tablet    2 tablets daily on day one, then one tablet po daily until gone.    Acute recurrent maxillary sinusitis       cyclobenzaprine 10 MG tablet    FLEXERIL    90 tablet    TAKE ONE TABLET BY MOUTH EVERY NIGHT AT BEDTIME    Fibromyalgia       diltiazem 240 MG 24 hr capsule     90 capsule    TAKE ONE CAPSULE BY MOUTH ONCE DAILY    Essential hypertension       EPINEPHrine 0.3 MG/0.3ML injection 2-pack    EPIPEN 2-KIMBERLEE    0.6 mL    Inject 0.3 mLs (0.3 mg) into the muscle once as needed for anaphylaxis    Peanut allergy, Tree nut allergy       guaiFENesin-codeine 100-10 MG/5ML Soln solution    ROBITUSSIN AC    100 mL    Take 5-10 mLs by mouth nightly as needed for cough    Acute bronchitis, unspecified organism       hydrochlorothiazide 25 MG tablet    HYDRODIURIL    90 tablet    Take 1 tablet (25 mg) by mouth daily    Essential hypertension       leflunomide 20 MG tablet    ARAVA    30 tablet    Take 1 tablet (20 mg) by mouth daily    Rheumatoid arthritis, involving unspecified site, unspecified rheumatoid factor presence (H)       levothyroxine 112 MCG tablet    SYNTHROID/LEVOTHROID    90 tablet    Take 1 tablet (112 mcg) by mouth daily    Hypothyroidism due to acquired atrophy of thyroid       losartan 50 MG tablet    COZAAR    90 tablet    TAKE ONE TABLET BY MOUTH ONCE DAILY    Essential hypertension with goal blood pressure less than 140/90       metoprolol succinate 25 MG 24 hr tablet    TOPROL-XL    90 tablet    TAKE ONE TABLET BY MOUTH ONCE DAILY    HTN, goal below 140/90       omeprazole 40 MG capsule    priLOSEC    90 capsule    Take 1 capsule (40 mg) by mouth daily Take 30-60 minutes before a meal.    Cough with hemoptysis, Hiatal hernia, Gastroesophageal reflux disease without esophagitis       pantoprazole 40 MG EC tablet    PROTONIX     Take 40 mg by mouth        potassium chloride SA 20 MEQ CR tablet    KLOR-CON     95 tablet    1 tab po QID today, then TID tomorrow, then 1 po QD    Hypokalemia       predniSONE 20 MG tablet    DELTASONE    9 tablet    40 mg PO every day X 2 days, then 20 mg every day X 5 days.    Acute bronchitis, unspecified organism

## 2018-09-26 NOTE — TELEPHONE ENCOUNTER
KL to review and advise. RN unable to find documentation regarding Prednisone. Appears productive cough started about 2 weeks after treatment for sinus infection 08/27/2018. Would you prefer OV at this time to fully evaluate lung/cough concerns? Anna Thomas RN, BSN

## 2018-09-27 RX ORDER — CODEINE PHOSPHATE AND GUAIFENESIN 10; 100 MG/5ML; MG/5ML
1-2 SOLUTION ORAL
Qty: 100 ML | Refills: 0 | Status: SHIPPED | OUTPATIENT
Start: 2018-09-27 | End: 2019-03-22

## 2018-09-27 RX ORDER — PREDNISONE 20 MG/1
TABLET ORAL
Qty: 9 TABLET | Refills: 0 | Status: SHIPPED | OUTPATIENT
Start: 2018-09-27 | End: 2018-10-16

## 2018-09-27 NOTE — PATIENT INSTRUCTIONS
The symptoms you describe suggest a viral cause, which is much more common than a bacterial cause. Antibiotics will treat bacterial infections, but have no effect on viral infections. If possible, especially if improving, start with symptom care for the first 7-10 days, then consider seeking further treatment or taking an antibiotic. Bacterial infections generally are more severe, including symptoms such as pus, fever over 101degrees F, or rapidly worsening.  If you are not improved after  3-4 days, please be seen back in the clinic.   Thank you for choosing us for your care. I have placed an order for a prescription so that you can start treatment. View your full visit summary for details by clicking on the link below. Your pharmacist will able to address any questions you may have about the medication.     If you're not feeling better within 5-7 days, please schedule an appointment.  You can schedule an appointment right here in [facility], or call [phone]  If the visit is for the same symptoms as your e-visit, we'll refund the cost of your e-visit if seen within seven days.

## 2018-09-30 DIAGNOSIS — M79.7 FIBROMYALGIA: ICD-10-CM

## 2018-10-02 RX ORDER — CYCLOBENZAPRINE HCL 10 MG
TABLET ORAL
Qty: 90 TABLET | Refills: 1 | Status: SHIPPED | OUTPATIENT
Start: 2018-10-02 | End: 2019-03-12

## 2018-10-02 NOTE — TELEPHONE ENCOUNTER
Requested Prescriptions   Pending Prescriptions Disp Refills     cyclobenzaprine (FLEXERIL) 10 MG tablet [Pharmacy Med Name: CYCLOBENZAPRINE HCL 10MG TABS] 90 tablet 1     Sig: TAKE ONE TABLET BY MOUTH AT BEDTIME    There is no refill protocol information for this order        cyclobenzaprine (FLEXERIL) 10 MG tablet      Last Written Prescription Date:  03/30/2018  Last Fill Quantity: 90,   # refills: 1  Last Office Visit: 09/26/2018  Future Office visit:       Routing refill request to provider for review/approval because:  Drug not on the [facility], [facility] or [facility] refill protocol or controlled substance  Anna Thomas RN, BSN

## 2018-10-15 NOTE — PROGRESS NOTES
SUBJECTIVE:   Elizabeth Goodson is a 43 year old female who presents to clinic today for the following health issues:      HPI  Acute Illness   Acute illness concerns: cough, sinus, ear   Onset: off and on for 1 month     Fever: YES- low grade     Chills/Sweats: YES- sweats     Headache (location?): YES    Sinus Pressure:YES- jaw pain     Conjunctivitis:  no    Ear Pain: YES- bilateral ear pressure     Rhinorrhea: no     Congestion: YES    Sore Throat: no     Cough: got better     Wheeze: no     Decreased Appetite: YES    Nausea: no     Vomiting: no     Diarrhea:  no     Dysuria/Freq.: no     Fatigue/Achiness: YES-fatigue     Sick/Strep Exposure: no      Therapies Tried and outcome: completed course of antiboitics- some what better, the cold return about 5 days ago     Patient took a course of antibiotics and steroids one month ago and felt better but 5 days ago symptoms returned. Patient also complains of reflux symptoms. She is taking Prilosec daily. Patient has not been taking cough medicine because she feels her cough is controlled. Patient complains of nausea but is able to force herself to eat. She is concerned about the possibility of mono because has been unusually fatigued. She has been going to bed hours earlier than usual and still needing to take naps. Patient also notes an RA flare-up because her hands are red and painful.     Problem list and histories reviewed & adjusted, as indicated.  Additional history: as documented        Patient Active Problem List   Diagnosis     Syncope     Raynaud phenomenon     CARDIOVASCULAR SCREENING; LDL GOAL LESS THAN 160     Strain of neck muscle     Migraine headache     Cough with hemoptysis     Incidental lung nodule, less than or equal to 3mm     Need for prophylactic vaccination and inoculation against influenza     Fibromyalgia     HTN, goal below 140/90     H/O: hysterectomy     GERD (gastroesophageal reflux disease)     Edema     High risk medication use      Hypothyroidism     Ventral hernia without obstruction or gangrene     Hemoptysis     Rheumatoid arthritis, involving unspecified site, unspecified rheumatoid factor presence (H)     Penicillin allergy     Peanut allergy     Tree nut allergy     Anaphylaxis, subsequent encounter     Itching     History of hemoptysis     Atypical chest pain     Mass of finger, left     Past Surgical History:   Procedure Laterality Date     CHOLECYSTECTOMY, LAPOROSCOPIC  2007    Cholecystectomy, Laparoscopic     EXCISE MASS FOOT  11/22/2013    Procedure: EXCISE MASS FOOT;  Right Foot Soft Tissue Mass Excision;  Surgeon: Jose Cruz Garcia DPM;  Location: [facility] OR     EXCISE MASS FOOT Right 7/17/2015    Procedure: EXCISE MASS FOOT;  Surgeon: Pierre Adan DPM;  Location: [facility] OR     HYSTERECTOMY, VAGINAL  2002     LAPAROSCOPIC HERNIORRHAPHY VENTRAL N/A 2/24/2016    Procedure: LAPAROSCOPIC HERNIORRHAPHY VENTRAL;  Surgeon: Lars Dahl MD;  Location: [facility] OR     PANNICULECTOMY  07/28/2017    with liposuction, and hernia revision Dr. Heath     RELEASE TRIGGER FINGER Left 5/4/2018    Procedure: RELEASE TRIGGER FINGER;  Trigger Finger Release Left ring finger ;  Surgeon: Aaron Orta DO;  Location: [facility] OR       Social History   Substance Use Topics     Smoking status: Former Smoker     Years: 13.00     Types: Cigarettes     Quit date: 10/1/2012     Smokeless tobacco: Never Used     Alcohol use 0.0 oz/week     0 Standard drinks or equivalent per week      Comment: occ/ 1-2 per month     Family History   Problem Relation Age of Onset     Asthma Father      C.A.D. Father      Diabetes No family hx of      Hypertension No family hx of      Cerebrovascular Disease No family hx of      Breast Cancer No family hx of      Cancer - colorectal No family hx of      Prostate Cancer No family hx of      Alcohol/Drug No family hx of      Allergies No family hx of      Alzheimer Disease No family hx of      Anesthesia Reaction No  family hx of      Arthritis No family hx of      Blood Disease No family hx of      Cancer No family hx of      Cardiovascular No family hx of      Circulatory No family hx of      Congenital Anomalies No family hx of      Connective Tissue Disorder No family hx of      Neurologic Disorder No family hx of      Depression No family hx of      Endocrine Disease No family hx of      Eye Disorder No family hx of      Genetic Disorder No family hx of      GASTROINTESTINAL DISEASE No family hx of      Genitourinary Problems No family hx of      Gynecology No family hx of      HEART DISEASE No family hx of      Lipids No family hx of      Musculoskeletal Disorder No family hx of      Obesity No family hx of      Osteoporosis No family hx of      Psychotic Disorder No family hx of      Respiratory No family hx of      Hearing Loss No family hx of      Family History Negative No family hx of      Thyroid Disease No family hx of      Colon Cancer No family hx of      Hyperlipidemia No family hx of      Coronary Artery Disease No family hx of      Other Cancer No family hx of      Anxiety Disorder No family hx of      Mental Illness No family hx of      Substance Abuse No family hx of          Current Outpatient Prescriptions   Medication Sig Dispense Refill     albuterol (PROAIR HFA/PROVENTIL HFA/VENTOLIN HFA) 108 (90 Base) MCG/ACT inhaler Inhale 2 puffs into the lungs every 6 hours as needed for shortness of breath / dyspnea or wheezing 1 Inhaler 0     benzonatate (TESSALON) 200 MG capsule Take 1 capsule (200 mg) by mouth 3 times daily as needed for cough 21 capsule 0     cyclobenzaprine (FLEXERIL) 10 MG tablet TAKE ONE TABLET BY MOUTH AT BEDTIME 90 tablet 1     diltiazem 240 MG 24 hr capsule TAKE ONE CAPSULE BY MOUTH ONCE DAILY 90 capsule 1     doxycycline (VIBRAMYCIN) 100 MG capsule Take 1 capsule (100 mg) by mouth 2 times daily 20 capsule 0     EPINEPHrine (EPIPEN 2-KIMBERLEE) 0.3 MG/0.3ML injection 2-pack Inject 0.3 mLs (0.3  mg) into the muscle once as needed for anaphylaxis 0.6 mL 1     hydrochlorothiazide (HYDRODIURIL) 25 MG tablet Take 1 tablet (25 mg) by mouth daily 90 tablet 3     leflunomide (ARAVA) 20 MG tablet Take 1 tablet (20 mg) by mouth daily 30 tablet 2     levothyroxine (SYNTHROID/LEVOTHROID) 112 MCG tablet Take 1 tablet (112 mcg) by mouth daily 90 tablet 3     losartan (COZAAR) 50 MG tablet TAKE ONE TABLET BY MOUTH ONCE DAILY 90 tablet 1     metoprolol succinate (TOPROL-XL) 25 MG 24 hr tablet TAKE ONE TABLET BY MOUTH ONCE DAILY 90 tablet 1     omeprazole (PRILOSEC) 40 MG capsule Take 1 capsule (40 mg) by mouth daily Take 30-60 minutes before a meal. 90 capsule 1     pantoprazole (PROTONIX) 40 MG EC tablet Take 40 mg by mouth       potassium chloride SA (KLOR-CON) 20 MEQ CR tablet 1 tab po QID today, then TID tomorrow, then 1 po QD 95 tablet 1     albuterol (PROAIR HFA, PROVENTIL HFA, VENTOLIN HFA) 108 (90 BASE) MCG/ACT inhaler Inhale 2 puffs into the lungs every 6 hours as needed for shortness of breath / dyspnea or wheezing (Patient not taking: Reported on 7/31/2018) 1 Inhaler 1     guaiFENesin-codeine (ROBITUSSIN AC) 100-10 MG/5ML SOLN solution Take 5-10 mLs by mouth nightly as needed for cough (Patient not taking: Reported on 10/16/2018) 100 mL 0     Allergies   Allergen Reactions     Penicillins Anaphylaxis     Walnuts [Nuts] Anaphylaxis     All tree nuts     Norvasc [Amlodipine] Rash     Norvasc- rash at 10 mg dosing     Recent Labs   Lab Test  08/27/18   1041  07/31/18   1355  07/18/18   0925  04/16/18   1530   09/25/17   1550   08/23/16   1401   11/30/15   1449   10/08/14   1114   06/30/11   1222   LDL   --    --    --    --    --    --    --    --    --    --    --   85   --   75   HDL   --    --    --    --    --    --    --    --    --    --    --   39*   --   40*   TRIG   --    --    --    --    --    --    --    --    --    --    --   124   --   118   ALT   --    --    --   21   --    --    --   24   --   21    < >  20   < >   --    CR   --    --    --   0.81   --   0.65   < >   --    < >   --    < >  0.85   < >   --    GFRESTIMATED   --    --    --   77   --   >90   < >   --    < >   --    < >  75   < >   --    GFRESTBLACK   --    --    --   >90   --   >90   < >   --    < >   --    < >  >90   GFR Calc     < >   --    POTASSIUM  3.4  3.1*   --   3.4   --   3.5   < >   --    < >   --    < >  3.8   < >   --    TSH   --    --   2.39  4.86*   < >  10.01*   < >  5.27*   --    --    < >  6.94*   --   1.00    < > = values in this interval not displayed.      BP Readings from Last 3 Encounters:   10/16/18 110/86   08/27/18 122/74   07/31/18 118/82    Wt Readings from Last 3 Encounters:   10/16/18 182 lb (82.6 kg)   08/27/18 178 lb (80.7 kg)   07/31/18 180 lb 4.8 oz (81.8 kg)                    ROS:  Constitutional, HEENT, cardiovascular, pulmonary, GI, , musculoskeletal, neuro, skin, endocrine and psych systems are negative, except as otherwise noted.    This document serves as a record of the services and decisions personally performed by CAROLYN MAGANA. It was created on his/her behalf by Addy Nunez, a trained medical scribe. The creation of this document is based on the provider's statements to the medical scribe. Addy Nunez, October 16, 2018 3:14 PM  OBJECTIVE:     /86  Pulse 85  Temp 98.3  F (36.8  C) (Oral)  Wt 182 lb (82.6 kg)  SpO2 96%  BMI 32.86 kg/m2  Body mass index is 32.86 kg/(m^2).  GENERAL: healthy, alert and no distress  HENT: soft palate has throat irritation looks like excoriation/viral cause, no exudate, ear canals and TM's normal, nose without ulcers or lesions. No rhinorrhea   NECK: no adenopathy, no asymmetry, masses, or scars and thyroid normal to palpation  RESP: Dry hacky cough. lungs clear to auscultation - no rales, rhonchi or wheezes  CV: regular rate and rhythm, normal S1 S2, no S3 or S4, no murmur, click or rub, no peripheral edema and peripheral pulses strong  MS:  Swollen fingers, otherwise no gross musculoskeletal defects noted, no edema  SKIN: Lauri hands, no suspicious lesions or rashes  PSYCH: mentation appears normal, affect normal/bright    ASSESSMENT/PLAN:       ICD-10-CM    1. Fatigue, unspecified type R53.83 CBC with platelets and differential     Mononucleosis screen     Basic metabolic panel   2. Cough R05 CBC with platelets and differential     albuterol (PROAIR HFA/PROVENTIL HFA/VENTOLIN HFA) 108 (90 Base) MCG/ACT inhaler     doxycycline (VIBRAMYCIN) 100 MG capsule     benzonatate (TESSALON) 200 MG capsule   3. High risk medication use Z79.899 CBC with platelets and differential   4. Rheumatoid arthritis, involving unspecified site, unspecified rheumatoid factor presence (H) M06.9 CBC with platelets and differential     ESR: Erythrocyte sedimentation rate     CRP, inflammation     Basic metabolic panel   5. Congestion of paranasal sinus R09.81 CBC with platelets and differential     Patient has history of hemoptysis so I favor suppressing cough and reducing recurrence with antibiotics and staying on PPI.  Reviewed symptomatic management of symptoms. Patient education provided, including expected course of illness and symptoms that may occur which would require urgent evalution. All questions answered.   Patient understands and agrees with plan.  Re-checking labs today. Hx of hypokalemia.   Treating with HX of hemoptysis with cough.     Patient Instructions   Take the doxycycline with food because it can be hard on your stomach.     Suck on something that is not menthol like a butterscotch candy to help soothe your throat.     Schedule a medical assistant only appointment to get your flu shot when you are feeling better.         The information in this document, created by the medical scribe for me, accurately reflects the services I personally performed and the decisions made by me. I have reviewed and approved this document for accuracy.   RAÚL Flor  [facility] RUSTAM

## 2018-10-16 ENCOUNTER — OFFICE VISIT (OUTPATIENT)
Dept: FAMILY MEDICINE | Facility: CLINIC | Age: 43
End: 2018-10-16
Payer: COMMERCIAL

## 2018-10-16 VITALS
TEMPERATURE: 98.3 F | OXYGEN SATURATION: 96 % | HEART RATE: 85 BPM | WEIGHT: 182 LBS | DIASTOLIC BLOOD PRESSURE: 86 MMHG | BODY MASS INDEX: 32.86 KG/M2 | SYSTOLIC BLOOD PRESSURE: 110 MMHG

## 2018-10-16 DIAGNOSIS — Z79.899 HIGH RISK MEDICATION USE: ICD-10-CM

## 2018-10-16 DIAGNOSIS — R05.9 COUGH: ICD-10-CM

## 2018-10-16 DIAGNOSIS — M06.9 RHEUMATOID ARTHRITIS, INVOLVING UNSPECIFIED SITE, UNSPECIFIED RHEUMATOID FACTOR PRESENCE: ICD-10-CM

## 2018-10-16 DIAGNOSIS — R09.81 CONGESTION OF PARANASAL SINUS: ICD-10-CM

## 2018-10-16 DIAGNOSIS — R53.83 FATIGUE, UNSPECIFIED TYPE: Primary | ICD-10-CM

## 2018-10-16 LAB
ANION GAP SERPL CALCULATED.3IONS-SCNC: 5 MMOL/L (ref 3–14)
BASOPHILS # BLD AUTO: 0 10E9/L (ref 0–0.2)
BASOPHILS NFR BLD AUTO: 0.5 %
BUN SERPL-MCNC: 15 MG/DL (ref 7–30)
CALCIUM SERPL-MCNC: 8.8 MG/DL (ref 8.5–10.1)
CHLORIDE SERPL-SCNC: 107 MMOL/L (ref 94–109)
CO2 SERPL-SCNC: 29 MMOL/L (ref 20–32)
CREAT SERPL-MCNC: 0.9 MG/DL (ref 0.52–1.04)
CRP SERPL-MCNC: 4.3 MG/L (ref 0–8)
DIFFERENTIAL METHOD BLD: NORMAL
EOSINOPHIL # BLD AUTO: 0.2 10E9/L (ref 0–0.7)
EOSINOPHIL NFR BLD AUTO: 2.5 %
ERYTHROCYTE [DISTWIDTH] IN BLOOD BY AUTOMATED COUNT: 14.1 % (ref 10–15)
ERYTHROCYTE [SEDIMENTATION RATE] IN BLOOD BY WESTERGREN METHOD: 10 MM/H (ref 0–20)
GFR SERPL CREATININE-BSD FRML MDRD: 69 ML/MIN/1.7M2
GLUCOSE SERPL-MCNC: 106 MG/DL (ref 70–99)
HCT VFR BLD AUTO: 37.8 % (ref 35–47)
HETEROPH AB SER QL: NEGATIVE
HGB BLD-MCNC: 12.5 G/DL (ref 11.7–15.7)
LYMPHOCYTES # BLD AUTO: 2.8 10E9/L (ref 0.8–5.3)
LYMPHOCYTES NFR BLD AUTO: 35.1 %
MCH RBC QN AUTO: 29.8 PG (ref 26.5–33)
MCHC RBC AUTO-ENTMCNC: 33.1 G/DL (ref 31.5–36.5)
MCV RBC AUTO: 90 FL (ref 78–100)
MONOCYTES # BLD AUTO: 0.6 10E9/L (ref 0–1.3)
MONOCYTES NFR BLD AUTO: 7.7 %
NEUTROPHILS # BLD AUTO: 4.3 10E9/L (ref 1.6–8.3)
NEUTROPHILS NFR BLD AUTO: 54.2 %
PLATELET # BLD AUTO: 431 10E9/L (ref 150–450)
POTASSIUM SERPL-SCNC: 3.5 MMOL/L (ref 3.4–5.3)
RBC # BLD AUTO: 4.2 10E12/L (ref 3.8–5.2)
SODIUM SERPL-SCNC: 141 MMOL/L (ref 133–144)
WBC # BLD AUTO: 7.9 10E9/L (ref 4–11)

## 2018-10-16 PROCEDURE — 85025 COMPLETE CBC W/AUTO DIFF WBC: CPT | Performed by: NURSE PRACTITIONER

## 2018-10-16 PROCEDURE — 86140 C-REACTIVE PROTEIN: CPT | Performed by: NURSE PRACTITIONER

## 2018-10-16 PROCEDURE — 99214 OFFICE O/P EST MOD 30 MIN: CPT | Performed by: NURSE PRACTITIONER

## 2018-10-16 PROCEDURE — 85652 RBC SED RATE AUTOMATED: CPT | Performed by: NURSE PRACTITIONER

## 2018-10-16 PROCEDURE — 80048 BASIC METABOLIC PNL TOTAL CA: CPT | Performed by: NURSE PRACTITIONER

## 2018-10-16 PROCEDURE — 86308 HETEROPHILE ANTIBODY SCREEN: CPT | Performed by: NURSE PRACTITIONER

## 2018-10-16 PROCEDURE — 36415 COLL VENOUS BLD VENIPUNCTURE: CPT | Performed by: NURSE PRACTITIONER

## 2018-10-16 RX ORDER — ALBUTEROL SULFATE 90 UG/1
2 AEROSOL, METERED RESPIRATORY (INHALATION) EVERY 6 HOURS PRN
Qty: 1 INHALER | Refills: 0 | Status: SHIPPED | OUTPATIENT
Start: 2018-10-16 | End: 2019-11-11

## 2018-10-16 RX ORDER — BENZONATATE 200 MG/1
200 CAPSULE ORAL 3 TIMES DAILY PRN
Qty: 21 CAPSULE | Refills: 0 | Status: SHIPPED | OUTPATIENT
Start: 2018-10-16 | End: 2019-03-22

## 2018-10-16 RX ORDER — DOXYCYCLINE 100 MG/1
100 CAPSULE ORAL 2 TIMES DAILY
Qty: 20 CAPSULE | Refills: 0 | Status: SHIPPED | OUTPATIENT
Start: 2018-10-16 | End: 2019-11-11

## 2018-10-16 ASSESSMENT — PAIN SCALES - GENERAL: PAINLEVEL: NO PAIN (1)

## 2018-10-16 NOTE — MR AVS SNAPSHOT
After Visit Summary   10/16/2018    Elizabeth Goodson    MRN: [id number]           Patient Information     Date Of Birth          1975        Visit Information        Provider Department      10/16/2018 2:40 PM Keri Yu APRN CNP [facility]        Today's Diagnoses     Fatigue, unspecified type    -  1    Cough        High risk medication use        Rheumatoid arthritis, involving unspecified site, unspecified rheumatoid factor presence (H)        Congestion of paranasal sinus          Care Instructions    Take the doxycycline with food because it can be hard on your stomach.     Suck on something that is not menthol like a butterscotch candy to help soothe your throat.     Schedule a medical assistant only appointment to get your flu shot when you are feeling better.           Follow-ups after your visit        Follow-up notes from your care team     Return in about 6 months (around 4/16/2019) for Routine Visit, BP Recheck.      Your next 10 appointments already scheduled     Nov 30, 2018   Procedure with Richard Aviles MD   [facility]    [address]  [facility] [id number] [phone]            Apr 16, 2019  3:00 PM CDT   Office Visit with RAÚL Pat CNP   [facility]    [address], [address]  [place] [id number] [phone]           Bring a current list of meds and any records pertaining to this visit. For Physicals, please bring immunization records and any forms needing to be filled out. Please arrive 10 minutes early to complete paperwork.              Who to contact     If you have questions or need follow up information about today's clinic visit or your schedule please contact [facility] directly at [phone].  Normal or non-critical lab and imaging results will be communicated to you by MyChart, letter or phone within 4 business days after the  clinic has received the results. If you do not hear from us within 7 days, please contact the clinic through [employer] or phone. If you have a critical or abnormal lab result, we will notify you by phone as soon as possible.  Submit refill requests through [employer] or call your pharmacy and they will forward the refill request to us. Please allow 3 business days for your refill to be completed.          Additional Information About Your Visit        [employer] Information     [employer] gives you secure access to your electronic health record. If you see a primary care provider, you can also send messages to your care team and make appointments. If you have questions, please call your primary care clinic.  If you do not have a primary care provider, please call [phone] and they will assist you.        Care EveryWhere ID     This is your Care EveryWhere ID. This could be used by other organizations to access your [place] medical records  [id number]        Your Vitals Were     Pulse Temperature Pulse Oximetry BMI (Body Mass Index)          85 98.3  F (36.8  C) (Oral) 96% 32.86 kg/m2         Blood Pressure from Last 3 Encounters:   10/16/18 110/86   08/27/18 122/74   07/31/18 118/82    Weight from Last 3 Encounters:   10/16/18 182 lb (82.6 kg)   08/27/18 178 lb (80.7 kg)   07/31/18 180 lb 4.8 oz (81.8 kg)              We Performed the Following     Basic metabolic panel     CBC with platelets and differential     CRP, inflammation     ESR: Erythrocyte sedimentation rate     Mononucleosis screen          Today's Medication Changes          These changes are accurate as of 10/16/18  3:26 PM.  If you have any questions, ask your nurse or doctor.               Start taking these medicines.        Dose/Directions    benzonatate 200 MG capsule   Commonly known as:  TESSALON   Used for:  Cough   Started by:  Keri Yu APRN CNP        Dose:  200 mg   Take 1 capsule (200 mg) by mouth 3 times daily as needed for cough    Quantity:  21 capsule   Refills:  0       doxycycline 100 MG capsule   Commonly known as:  VIBRAMYCIN   Used for:  Cough   Started by:  Keri Yu APRN CNP        Dose:  100 mg   Take 1 capsule (100 mg) by mouth 2 times daily   Quantity:  20 capsule   Refills:  0         These medicines have changed or have updated prescriptions.        Dose/Directions    * albuterol 108 (90 Base) MCG/ACT inhaler   Commonly known as:  PROAIR HFA/PROVENTIL HFA/VENTOLIN HFA   This may have changed:  Another medication with the same name was added. Make sure you understand how and when to take each.   Used for:  Cough   Changed by:  Keri Yu APRN CNP        Dose:  2 puff   Inhale 2 puffs into the lungs every 6 hours as needed for shortness of breath / dyspnea or wheezing   Quantity:  1 Inhaler   Refills:  1       * albuterol 108 (90 Base) MCG/ACT inhaler   Commonly known as:  PROAIR HFA/PROVENTIL HFA/VENTOLIN HFA   This may have changed:  You were already taking a medication with the same name, and this prescription was added. Make sure you understand how and when to take each.   Used for:  Cough   Changed by:  Keri Yu APRN CNP        Dose:  2 puff   Inhale 2 puffs into the lungs every 6 hours as needed for shortness of breath / dyspnea or wheezing   Quantity:  1 Inhaler   Refills:  0       * Notice:  This list has 2 medication(s) that are the same as other medications prescribed for you. Read the directions carefully, and ask your doctor or other care provider to review them with you.         Where to get your medicines      These medications were sent to [id number] - [place], [place] - [address]  [address] [address]    Hours:  test Rx sent successfully 12/26/02   Phone:  [phone]     albuterol 108 (90 Base) MCG/ACT inhaler    benzonatate 200 MG capsule    doxycycline 100 MG capsule                Primary Care Provider Office Phone # Fax #    RAÚL Pat CNP  [phone]       [address] [address] [address]        Equal Access to Services     DENIA HARRIS : Hadii kiki bhatti uli Almazan, waaxda luqadaha, qaybta kaalmada candelario, jose torrezcely jason. So [facility] [phone].    ATENCIÓN: Si habla español, tiene a alston disposición servicios gratuitos de asistencia lingüística. Llame al [phone].    We comply with applicable federal civil rights laws and [place] laws. We do not discriminate on the basis of race, color, national origin, age, disability, sex, sexual orientation, or gender identity.            Thank you!     Thank you for choosing [facility]  for your care. Our goal is always to provide you with excellent care. Hearing back from our patients is one way we can continue to improve our services. Please take a few minutes to complete the written survey that you may receive in the mail after your visit with us. Thank you!             Your Updated Medication List - Protect others around you: Learn how to safely use, store and throw away your medicines at www.disposemymeds.org.          This list is accurate as of 10/16/18  3:26 PM.  Always use your most recent med list.                   Brand Name Dispense Instructions for use Diagnosis    * albuterol 108 (90 Base) MCG/ACT inhaler    PROAIR HFA/PROVENTIL HFA/VENTOLIN HFA    1 Inhaler    Inhale 2 puffs into the lungs every 6 hours as needed for shortness of breath / dyspnea or wheezing    Cough       * albuterol 108 (90 Base) MCG/ACT inhaler    PROAIR HFA/PROVENTIL HFA/VENTOLIN HFA    1 Inhaler    Inhale 2 puffs into the lungs every 6 hours as needed for shortness of breath / dyspnea or wheezing    Cough       benzonatate 200 MG capsule    TESSALON    21 capsule    Take 1 capsule (200 mg) by mouth 3 times daily as needed for cough    Cough       cyclobenzaprine 10 MG tablet    FLEXERIL    90 tablet    TAKE ONE TABLET BY MOUTH AT BEDTIME    Fibromyalgia        diltiazem 240 MG 24 hr capsule     90 capsule    TAKE ONE CAPSULE BY MOUTH ONCE DAILY    Essential hypertension       doxycycline 100 MG capsule    VIBRAMYCIN    20 capsule    Take 1 capsule (100 mg) by mouth 2 times daily    Cough       EPINEPHrine 0.3 MG/0.3ML injection 2-pack    EPIPEN 2-KIMBERLEE    0.6 mL    Inject 0.3 mLs (0.3 mg) into the muscle once as needed for anaphylaxis    Peanut allergy, Tree nut allergy       guaiFENesin-codeine 100-10 MG/5ML Soln solution    ROBITUSSIN AC    100 mL    Take 5-10 mLs by mouth nightly as needed for cough    Acute bronchitis, unspecified organism       hydrochlorothiazide 25 MG tablet    HYDRODIURIL    90 tablet    Take 1 tablet (25 mg) by mouth daily    Essential hypertension       leflunomide 20 MG tablet    ARAVA    30 tablet    Take 1 tablet (20 mg) by mouth daily    Rheumatoid arthritis, involving unspecified site, unspecified rheumatoid factor presence (H)       levothyroxine 112 MCG tablet    SYNTHROID/LEVOTHROID    90 tablet    Take 1 tablet (112 mcg) by mouth daily    Hypothyroidism due to acquired atrophy of thyroid       losartan 50 MG tablet    COZAAR    90 tablet    TAKE ONE TABLET BY MOUTH ONCE DAILY    Essential hypertension with goal blood pressure less than 140/90       metoprolol succinate 25 MG 24 hr tablet    TOPROL-XL    90 tablet    TAKE ONE TABLET BY MOUTH ONCE DAILY    HTN, goal below 140/90       omeprazole 40 MG capsule    priLOSEC    90 capsule    Take 1 capsule (40 mg) by mouth daily Take 30-60 minutes before a meal.    Cough with hemoptysis, Hiatal hernia, Gastroesophageal reflux disease without esophagitis       pantoprazole 40 MG EC tablet    PROTONIX     Take 40 mg by mouth        potassium chloride SA 20 MEQ CR tablet    KLOR-CON    95 tablet    1 tab po QID today, then TID tomorrow, then 1 po QD    Hypokalemia       * Notice:  This list has 2 medication(s) that are the same as other medications prescribed for you. Read the directions  carefully, and ask your doctor or other care provider to review them with you.

## 2018-10-16 NOTE — PATIENT INSTRUCTIONS
Take the doxycycline with food because it can be hard on your stomach.     Suck on something that is not menthol like a butterscotch candy to help soothe your throat.     Schedule a medical assistant only appointment to get your flu shot when you are feeling better.

## 2018-10-31 DIAGNOSIS — I10 ESSENTIAL HYPERTENSION WITH GOAL BLOOD PRESSURE LESS THAN 140/90: ICD-10-CM

## 2018-10-31 RX ORDER — LOSARTAN POTASSIUM 50 MG/1
TABLET ORAL
Qty: 90 TABLET | Refills: 2 | Status: SHIPPED | OUTPATIENT
Start: 2018-10-31 | End: 2019-12-02

## 2018-10-31 NOTE — TELEPHONE ENCOUNTER
"Requested Prescriptions   Pending Prescriptions Disp Refills     losartan (COZAAR) 50 MG tablet [Pharmacy Med Name: LOSARTAN POTASSIUM 50MG TABS] 90 tablet 1     Sig: TAKE ONE TABLET BY MOUTH ONCE DAILY    Angiotensin-II Receptors Passed    10/31/2018  3:03 PM       Passed - Blood pressure under 140/90 in past 12 months    BP Readings from Last 3 Encounters:   10/16/18 110/86   08/27/18 122/74   07/31/18 118/82          Passed - Recent (12 mo) or future (30 days) visit within the authorizing provider's specialty    Patient had office visit in the last 12 months or has a visit in the next 30 days with authorizing provider or within the authorizing provider's specialty.  See \"Patient Info\" tab in inbasket, or \"Choose Columns\" in Meds & Orders section of the refill encounter.         Passed - Patient is age 18 or older       Passed - No active pregnancy on record       Passed - Normal serum creatinine on file in past 12 months    Recent Labs   Lab Test  10/16/18   1526   CR  0.90          Passed - Normal serum potassium on file in past 12 months    Recent Labs   Lab Test  10/16/18   1526   POTASSIUM  3.5          Passed - No positive pregnancy test in past 12 months        losartan (COZAAR) 50 MG tablet  Prescription approved per [facility] Refill Protocol.    Anna Thomas, RN, BSN     "

## 2018-11-13 ENCOUNTER — TELEPHONE (OUTPATIENT)
Dept: FAMILY MEDICINE | Facility: CLINIC | Age: 43
End: 2018-11-13

## 2018-11-13 NOTE — TELEPHONE ENCOUNTER
Reason for Call:  Other call back    Detailed comments: Patient is scheduled for endoscopy on Friday and this is being done in [place].  She is seeing Dr Aviles and he asked her to contact you, and have you call Dr Aviles.  He is not understanding why you want her to have this procedure done.     Phone Number Patient can be reached at: Other phone number:  Dr Aviles [phone]*    Best Time: any    Can we leave a detailed message on this number? YES    Call taken on 11/13/2018 at 3:57 PM by Kala Rahman

## 2018-11-15 ENCOUNTER — MYC MEDICAL ADVICE (OUTPATIENT)
Dept: FAMILY MEDICINE | Facility: CLINIC | Age: 43
End: 2018-11-15

## 2018-11-16 ENCOUNTER — SURGERY (OUTPATIENT)
Age: 43
End: 2018-11-16
Payer: COMMERCIAL

## 2018-11-16 ENCOUNTER — HOSPITAL ENCOUNTER (OUTPATIENT)
Facility: AMBULATORY SURGERY CENTER | Age: 43
Discharge: HOME | End: 2018-11-16
Attending: INTERNAL MEDICINE | Admitting: INTERNAL MEDICINE
Payer: COMMERCIAL

## 2018-11-16 VITALS
HEIGHT: 63 IN | SYSTOLIC BLOOD PRESSURE: 108 MMHG | TEMPERATURE: 98.8 F | RESPIRATION RATE: 16 BRPM | DIASTOLIC BLOOD PRESSURE: 79 MMHG | OXYGEN SATURATION: 96 % | WEIGHT: 175 LBS | BODY MASS INDEX: 31.01 KG/M2

## 2018-11-16 DIAGNOSIS — R04.2 COUGH WITH HEMOPTYSIS: ICD-10-CM

## 2018-11-16 DIAGNOSIS — K44.9 HIATAL HERNIA: ICD-10-CM

## 2018-11-16 DIAGNOSIS — K29.60 BILE REFLUX GASTRITIS: Primary | ICD-10-CM

## 2018-11-16 DIAGNOSIS — K21.9 GASTROESOPHAGEAL REFLUX DISEASE WITHOUT ESOPHAGITIS: ICD-10-CM

## 2018-11-16 LAB — UPPER GI ENDOSCOPY: NORMAL

## 2018-11-16 PROCEDURE — 43239 EGD BIOPSY SINGLE/MULTIPLE: CPT

## 2018-11-16 PROCEDURE — G8907 PT DOC NO EVENTS ON DISCHARG: HCPCS

## 2018-11-16 PROCEDURE — G8918 PT W/O PREOP ORDER IV AB PRO: HCPCS

## 2018-11-16 PROCEDURE — 88305 TISSUE EXAM BY PATHOLOGIST: CPT | Performed by: INTERNAL MEDICINE

## 2018-11-16 PROCEDURE — 43239 EGD BIOPSY SINGLE/MULTIPLE: CPT | Performed by: INTERNAL MEDICINE

## 2018-11-16 RX ORDER — SUCRALFATE 1 G/1
1 TABLET ORAL
Qty: 360 TABLET | Refills: 3 | Status: SHIPPED | OUTPATIENT
Start: 2018-11-16 | End: 2019-11-11

## 2018-11-16 RX ORDER — DIPHENHYDRAMINE HYDROCHLORIDE 50 MG/ML
INJECTION INTRAMUSCULAR; INTRAVENOUS PRN
Status: DISCONTINUED | OUTPATIENT
Start: 2018-11-16 | End: 2018-11-16 | Stop reason: HOSPADM

## 2018-11-16 RX ORDER — OMEPRAZOLE 40 MG/1
40 CAPSULE, DELAYED RELEASE ORAL
Qty: 180 CAPSULE | Refills: 1 | Status: SHIPPED | OUTPATIENT
Start: 2018-11-16 | End: 2018-11-26

## 2018-11-16 RX ORDER — LIDOCAINE 40 MG/G
CREAM TOPICAL
Status: DISCONTINUED | OUTPATIENT
Start: 2018-11-16 | End: 2018-11-17 | Stop reason: HOSPADM

## 2018-11-16 RX ORDER — FENTANYL CITRATE 50 UG/ML
INJECTION, SOLUTION INTRAMUSCULAR; INTRAVENOUS PRN
Status: DISCONTINUED | OUTPATIENT
Start: 2018-11-16 | End: 2018-11-16 | Stop reason: HOSPADM

## 2018-11-16 RX ORDER — ONDANSETRON 2 MG/ML
4 INJECTION INTRAMUSCULAR; INTRAVENOUS
Status: COMPLETED | OUTPATIENT
Start: 2018-11-16 | End: 2018-11-16

## 2018-11-16 RX ADMIN — FENTANYL CITRATE 50 MCG: 50 INJECTION, SOLUTION INTRAMUSCULAR; INTRAVENOUS at 12:32

## 2018-11-16 RX ADMIN — DIPHENHYDRAMINE HYDROCHLORIDE 50 MG: 50 INJECTION INTRAMUSCULAR; INTRAVENOUS at 12:25

## 2018-11-16 RX ADMIN — FENTANYL CITRATE 100 MCG: 50 INJECTION, SOLUTION INTRAMUSCULAR; INTRAVENOUS at 12:26

## 2018-11-16 RX ADMIN — FENTANYL CITRATE 50 MCG: 50 INJECTION, SOLUTION INTRAMUSCULAR; INTRAVENOUS at 12:29

## 2018-11-16 RX ADMIN — ONDANSETRON 4 MG: 2 INJECTION INTRAMUSCULAR; INTRAVENOUS at 12:25

## 2018-11-20 LAB — COPATH REPORT: NORMAL

## 2018-11-21 ENCOUNTER — TELEPHONE (OUTPATIENT)
Dept: GASTROENTEROLOGY | Facility: CLINIC | Age: 43
End: 2018-11-21

## 2018-11-21 DIAGNOSIS — K21.9 GASTROESOPHAGEAL REFLUX DISEASE WITHOUT ESOPHAGITIS: ICD-10-CM

## 2018-11-21 DIAGNOSIS — K44.9 HIATAL HERNIA: ICD-10-CM

## 2018-11-21 DIAGNOSIS — R04.2 COUGH WITH HEMOPTYSIS: ICD-10-CM

## 2018-11-21 NOTE — TELEPHONE ENCOUNTER
Prior Authorization Retail Medication Request    Medication/Dose: Omeprazole 40 mg BID  ICD code (if different than what is on RX):    Previously Tried and Failed:    Rationale:  Please see EGD pathology report    Insurance Name:    Insurance ID:        Pharmacy Information (if different than what is on RX)  Name:    Phone:

## 2018-11-23 NOTE — TELEPHONE ENCOUNTER
PRIOR AUTHORIZATION DENIED - patient can still get 1 capsule per day for a max of 120 days within 365 days    Medication: omeprazole (PRILOSEC) 40 MG capsule (twice daily)- P/A DENIED    Denial Date: 11/23/2018    Denial Rational:       Appeal Information:

## 2018-11-23 NOTE — TELEPHONE ENCOUNTER
Central Prior Authorization Team   Phone: [phone]    PA Initiation    Medication: omeprazole (PRILOSEC) 40 MG capsule (twice daily)  Insurance Company: [employer] - Phone [phone] Fax [phone]  Pharmacy Filling the Rx: [id number] - [place], [place] - [address]  Filling Pharmacy Phone: [phone]  Filling Pharmacy Fax: [phone]  Start Date: 11/23/2018

## 2018-11-26 RX ORDER — OMEPRAZOLE 40 MG/1
40 CAPSULE, DELAYED RELEASE ORAL DAILY
Qty: 90 CAPSULE | Refills: 1 | Status: SHIPPED | OUTPATIENT
Start: 2018-11-26 | End: 2019-11-11

## 2018-11-30 ENCOUNTER — TELEPHONE (OUTPATIENT)
Dept: GASTROENTEROLOGY | Facility: CLINIC | Age: 43
End: 2018-11-30

## 2018-11-30 DIAGNOSIS — K21.9 GASTROESOPHAGEAL REFLUX DISEASE WITHOUT ESOPHAGITIS: Primary | ICD-10-CM

## 2018-11-30 NOTE — TELEPHONE ENCOUNTER
----- Message from Richard Aviles MD sent at 11/27/2018  5:01 PM CST -----  Regarding: schedule EGD in 3-6 months  Please schedule this patient with Yogi Ennis for repeat EGD with mapping biopsies for intestinal metaplasia of the stomach in 3-6 months    Thanks!  J

## 2019-01-23 ENCOUNTER — TELEPHONE (OUTPATIENT)
Dept: FAMILY MEDICINE | Facility: CLINIC | Age: 44
End: 2019-01-23

## 2019-01-23 NOTE — TELEPHONE ENCOUNTER
Spoke with patient.  She just wanted home cares for symptoms.    Home cares for frequent, watery diarrhea:  1.)  Drink unlimited fluids: preferably clear fluid: water, flat soda, clear broth for the next 12-24 hours of watery diarrhea in addition to selected starchy foods.    2.) After 6 hours of diarrhea and cramping, or if pain persists, OTC antidiarrheal medications (Imodium, Kaopectate, Pepto-Bismol) can be used. Follow instructions on the label.  3.) Yogurt-eat 2- 6 oz of active culture yogurt twice a day.  (Reason:  restores healthy bacteria to the GI tract)  4.) Table foods:  The choice of food is very important.  Starchy foods are digested best.  Examples of such foods are dried cereals, grains, bread, crackers, rice, noodles, mashed potatoes, carrots, applesauce and bananas.  Pretzels or salty crackers can help meet your need for sodium. Avoid dairy products, citrus fruits, raw fruits and vegetables, and fried or spicy foods for 2-5 days after diarrhea subsides.      Make an appointment if:  Signs of dehydration occur.  The diarrhea does not improve after 48 hours on the special diet   Yellow, bloody or green stools occur more than one time  Weakness or lethargy  Mild diarrhea lasts for longer than 2 weeks.    Guideline used:  Diarrhea  Telephone Triage Protocols for Nurses, Fourth Edition, Olivia Chase

## 2019-01-23 NOTE — TELEPHONE ENCOUNTER
Reason for call:  Patient reporting a symptom    Symptom or request: vomitting diaherra total body aches fever 101.7    Duration (how long have symptoms been present): yesterday    Have you been treated for this before? No    Additional comments: would like to know what she should be doing for this    Phone Number patient can be reached at:  Home number on file [phone] (home)    Best Time:  anytime    Can we leave a detailed message on this number:  YES    Call taken on 1/23/2019 at 1:50 PM by Ron Ramirez

## 2019-02-14 ENCOUNTER — TELEPHONE (OUTPATIENT)
Dept: GASTROENTEROLOGY | Facility: CLINIC | Age: 44
End: 2019-02-14

## 2019-02-14 NOTE — TELEPHONE ENCOUNTER
Pharmacist from [facility] request prior auth for Omeprazole 40mg BID dosing for this year. Notify pharmacist that it was denied last year, but we'll try again for this year.

## 2019-02-18 NOTE — TELEPHONE ENCOUNTER
Central Prior Authorization Team   Phone: [phone]      PA Initiation    Medication: omeprazole (PRILOSEC) 40 MG DR capsule-PA Initiated  Insurance Company: [employer] - Phone [phone] Fax [phone]  Pharmacy Filling the Rx: [id number] - [place], [place] - [address]  Filling Pharmacy Phone: [phone]  Filling Pharmacy Fax: [phone]  Start Date: 2/18/2019

## 2019-02-19 ENCOUNTER — MYC MEDICAL ADVICE (OUTPATIENT)
Dept: FAMILY MEDICINE | Facility: CLINIC | Age: 44
End: 2019-02-19

## 2019-02-19 ENCOUNTER — HOSPITAL ENCOUNTER (OUTPATIENT)
Facility: AMBULATORY SURGERY CENTER | Age: 44
End: 2019-02-19
Attending: INTERNAL MEDICINE
Payer: COMMERCIAL

## 2019-02-19 NOTE — TELEPHONE ENCOUNTER
PRIOR AUTHORIZATION DENIED    Medication: omeprazole (PRILOSEC) 40 MG  capsule-DENIED    Denial Date: 2/19/2019    Denial Rational:           Appeal Information:

## 2019-02-19 NOTE — TELEPHONE ENCOUNTER
Pt will need to follow up with Dr. Ennis for different medication recommendations. Called and left message on VM to return call.    Irish Bailey RN, BSN, PHN  [facility]  GI/Gen Surg/Hepatology Care Coordinator

## 2019-02-19 NOTE — TELEPHONE ENCOUNTER
Spoke with patient.  Has a nut allergy.  Ate a girl  cookie.  From toes to forehead and got super hot.  Looked in mirror and eyes bloodshot and puffy and face red. Took benadryl x 2 and then again in 4 hours later  Redness and puffiness is gone.   Worries about lungs to to the reaction but No trouble breathing at all.     Advised if the symptoms return she needs to be seen right away. If any breathing or swallowing issues needs to be seen right away and use EPI PEN as needed.    Can try OTC zyrtec if needed.  Follow up if not continuing to improve.    RECOMMENDED DISPOSITION:  Home care advice -   Will comply with recommendation: yes   If further questions/concerns or if Sx do not improve, worsen or new Sx develop, call your PCP or [facility] as soon as possible.    NOTES:  Disposition was determined by the first positive assessment question, therefore all previous assessment questions were negative.     Guideline used:  Telephone Triage Protocols for Nurses, Fifth Edition, Olivia Arndt, RN, BSN

## 2019-02-21 NOTE — TELEPHONE ENCOUNTER
Nurse called patient and scheduled her to see Dr. Ennis.    Irish Bailey RN, BSN, PHN  [facility]  GI/Gen Surg/Hepatology Care Coordinator

## 2019-02-26 DIAGNOSIS — I10 ESSENTIAL HYPERTENSION: ICD-10-CM

## 2019-02-27 RX ORDER — DILTIAZEM HYDROCHLORIDE 240 MG/1
CAPSULE, COATED, EXTENDED RELEASE ORAL
Qty: 90 CAPSULE | Refills: 0 | Status: SHIPPED | OUTPATIENT
Start: 2019-02-27 | End: 2019-06-28

## 2019-02-27 NOTE — TELEPHONE ENCOUNTER
"Requested Prescriptions   Pending Prescriptions Disp Refills     diltiazem ER COATED BEADS (CARDIZEM CD/CARTIA XT) 240 MG 24 hr capsule [Pharmacy Med Name: DILTIAZEM HCL ER COATED  CP24] 90 capsule 1     Sig: TAKE ONE CAPSULE BY MOUTH ONCE DAILY    Calcium Channel Blockers Protocol  Passed - 2/26/2019  7:52 PM       Passed - Blood pressure under 140/90 in past 12 months    BP Readings from Last 3 Encounters:   11/16/18 108/79   10/16/18 110/86   08/27/18 122/74          Passed - Normal ALT in past 12 months    Recent Labs   Lab Test 04/16/18  1530   ALT 21          Passed - Recent (12 mo) or future (30 days) visit within the authorizing provider's specialty    Patient had office visit in the last 12 months or has a visit in the next 30 days with authorizing provider or within the authorizing provider's specialty.  See \"Patient Info\" tab in inbasket, or \"Choose Columns\" in Meds & Orders section of the refill encounter.         Passed - Medication is active on med list       Passed - Patient is age 18 or older       Passed - No active pregnancy on record       Passed - Normal serum creatinine on file in past 12 months    Recent Labs   Lab Test 10/16/18  1526   CR 0.90          Passed - No positive pregnancy test in past 12 months        diltiazem 240 MG 24 hr capsule  Routing refill request to provider for review/approval because:  A break in medication, should have needed refills 11/2018    Anna Thomas, RN, BSN           "

## 2019-02-28 DIAGNOSIS — I10 ESSENTIAL HYPERTENSION: ICD-10-CM

## 2019-02-28 RX ORDER — DILTIAZEM HYDROCHLORIDE 240 MG/1
CAPSULE, COATED, EXTENDED RELEASE ORAL
Qty: 90 CAPSULE | Refills: 1 | OUTPATIENT
Start: 2019-02-28

## 2019-03-12 DIAGNOSIS — M79.7 FIBROMYALGIA: ICD-10-CM

## 2019-03-13 ENCOUNTER — MYC REFILL (OUTPATIENT)
Dept: FAMILY MEDICINE | Facility: CLINIC | Age: 44
End: 2019-03-13

## 2019-03-13 DIAGNOSIS — M79.7 FIBROMYALGIA: ICD-10-CM

## 2019-03-13 DIAGNOSIS — I10 ESSENTIAL HYPERTENSION: ICD-10-CM

## 2019-03-13 RX ORDER — CYCLOBENZAPRINE HCL 10 MG
TABLET ORAL
Qty: 90 TABLET | Refills: 1 | Status: SHIPPED | OUTPATIENT
Start: 2019-03-13 | End: 2019-10-27

## 2019-03-13 NOTE — TELEPHONE ENCOUNTER
Flexeril      Last Written Prescription Date:  10/2/2018  Last Fill Quantity: 90,   # refills: 1  Last Office Visit: 10/16/2018  Future Office visit:    Next 5 appointments (look out 90 days)    Apr 16, 2019  3:00 PM CDT  Office Visit with RAÚL Pat CNP  [facility] [address], [address]  [place] [id number] [phone]           Routing refill request to provider for review/approval because:  Drug not on the [facility], [facility] or  Health refill protocol or controlled substance    Negar Chapincito, RN, BSN

## 2019-03-14 RX ORDER — DILTIAZEM HYDROCHLORIDE 240 MG/1
CAPSULE, COATED, EXTENDED RELEASE ORAL
Qty: 90 CAPSULE | Refills: 0 | OUTPATIENT
Start: 2019-03-14

## 2019-03-14 RX ORDER — CYCLOBENZAPRINE HCL 10 MG
10 TABLET ORAL
Qty: 90 TABLET | Refills: 1 | OUTPATIENT
Start: 2019-03-14

## 2019-03-14 NOTE — TELEPHONE ENCOUNTER
"Requested Prescriptions   Pending Prescriptions Disp Refills     diltiazem ER COATED BEADS (CARDIZEM CD/CARTIA XT) 240 MG 24 hr capsule 90 capsule 0    Calcium Channel Blockers Protocol  Passed - 3/13/2019  8:00 PM       Passed - Blood pressure under 140/90 in past 12 months    BP Readings from Last 3 Encounters:   11/16/18 108/79   10/16/18 110/86   08/27/18 122/74          Passed - Normal ALT in past 12 months    Recent Labs   Lab Test 04/16/18  1530   ALT 21          Passed - Recent (12 mo) or future (30 days) visit within the authorizing provider's specialty    Patient had office visit in the last 12 months or has a visit in the next 30 days with authorizing provider or within the authorizing provider's specialty.  See \"Patient Info\" tab in inbasket, or \"Choose Columns\" in Meds & Orders section of the refill encounter.         Passed - Medication is active on med list       Passed - Patient is age 18 or older       Passed - No active pregnancy on record       Passed - Normal serum creatinine on file in past 12 months    Recent Labs   Lab Test 10/16/18  1526   CR 0.90          Passed - No positive pregnancy test in past 12 months        Last OV 10/16/2018  Last filled 02/27/2019 #90    Next 5 appointments (look out 90 days)    Apr 16, 2019  3:00 PM CDT  [facility] [address]  [place] [id number] [phone]      Should not need a refill at this time.    Gustavo Arndt, RN, BSN            "

## 2019-03-14 NOTE — TELEPHONE ENCOUNTER
Requested Prescriptions   Pending Prescriptions Disp Refills     cyclobenzaprine (FLEXERIL) 10 MG tablet 90 tablet 1     Sig: Take 1 tablet (10 mg) by mouth    There is no refill protocol information for this order      Last ov 10/16/2018  Last filled 03/13/2019    Should have refills.

## 2019-03-20 DIAGNOSIS — I10 HTN, GOAL BELOW 140/90: ICD-10-CM

## 2019-03-21 ENCOUNTER — TELEPHONE (OUTPATIENT)
Dept: FAMILY MEDICINE | Facility: CLINIC | Age: 44
End: 2019-03-21

## 2019-03-21 DIAGNOSIS — R05.9 COUGH: ICD-10-CM

## 2019-03-21 RX ORDER — ALBUTEROL SULFATE 90 UG/1
2 AEROSOL, METERED RESPIRATORY (INHALATION) EVERY 6 HOURS PRN
Qty: 18 G | Refills: 0 | Status: SHIPPED | OUTPATIENT
Start: 2019-03-21 | End: 2019-11-11

## 2019-03-21 RX ORDER — METOPROLOL SUCCINATE 25 MG/1
TABLET, EXTENDED RELEASE ORAL
Qty: 90 TABLET | Refills: 1 | Status: SHIPPED | OUTPATIENT
Start: 2019-03-21 | End: 2020-01-30

## 2019-03-21 NOTE — TELEPHONE ENCOUNTER
"Requested Prescriptions   Pending Prescriptions Disp Refills     metoprolol succinate ER (TOPROL-XL) 25 MG 24 hr tablet [Pharmacy Med Name: METOPROLOL SUCCINATE ER 25MG TB24] 90 tablet 1     Sig: TAKE ONE TABLET BY MOUTH ONCE DAILY    Beta-Blockers Protocol Passed - 3/20/2019 11:05 AM       Passed - Blood pressure under 140/90 in past 12 months    BP Readings from Last 3 Encounters:   11/16/18 108/79   10/16/18 110/86   08/27/18 122/74                Passed - Patient is age 6 or older       Passed - Recent (12 mo) or future (30 days) visit within the authorizing provider's specialty    Patient had office visit in the last 12 months or has a visit in the next 30 days with authorizing provider or within the authorizing provider's specialty.  See \"Patient Info\" tab in inbasket, or \"Choose Columns\" in Meds & Orders section of the refill encounter.             Passed - Medication is active on med list        Last seen 10/16/2018  Last filled 08/15/2018    Prescription approved per [facility] Refill Protocol.    "

## 2019-03-21 NOTE — TELEPHONE ENCOUNTER
Reason for call:  Patient reporting a symptom    Symptom or request: Pt states she has had a cough for over a week    Duration (how long have symptoms been present): Over a week    Have you been treated for this before? Yes    Additional comments: Patient is concerned about her cough. Pt is leaving for [place] on Tuesday and would like to stay on top of things. Patient is requesting that Dr Yu calls rather than a nurse. Please Advise.    Phone Number patient can be reached at:  Home number on file [phone] (home)    Best Time:  Any    Can we leave a detailed message on this number:  YES    Call taken on 3/21/2019 at 4:04 PM by Malina Jade

## 2019-03-22 ENCOUNTER — OFFICE VISIT (OUTPATIENT)
Dept: FAMILY MEDICINE | Facility: CLINIC | Age: 44
End: 2019-03-22
Payer: COMMERCIAL

## 2019-03-22 VITALS
RESPIRATION RATE: 18 BRPM | BODY MASS INDEX: 31.04 KG/M2 | HEIGHT: 63 IN | SYSTOLIC BLOOD PRESSURE: 110 MMHG | OXYGEN SATURATION: 100 % | DIASTOLIC BLOOD PRESSURE: 76 MMHG | TEMPERATURE: 99.2 F | WEIGHT: 175.2 LBS | HEART RATE: 120 BPM

## 2019-03-22 DIAGNOSIS — Z87.898 HISTORY OF HEMOPTYSIS: ICD-10-CM

## 2019-03-22 DIAGNOSIS — R68.83 CHILLS (WITHOUT FEVER): Primary | ICD-10-CM

## 2019-03-22 DIAGNOSIS — J11.1 INFLUENZA-LIKE ILLNESS: ICD-10-CM

## 2019-03-22 DIAGNOSIS — M06.9 RHEUMATOID ARTHRITIS, INVOLVING UNSPECIFIED SITE, UNSPECIFIED RHEUMATOID FACTOR PRESENCE: ICD-10-CM

## 2019-03-22 LAB
FLUAV+FLUBV AG SPEC QL: NEGATIVE
FLUAV+FLUBV AG SPEC QL: NEGATIVE
SPECIMEN SOURCE: NORMAL

## 2019-03-22 PROCEDURE — 99214 OFFICE O/P EST MOD 30 MIN: CPT | Performed by: NURSE PRACTITIONER

## 2019-03-22 PROCEDURE — 87804 INFLUENZA ASSAY W/OPTIC: CPT | Performed by: NURSE PRACTITIONER

## 2019-03-22 RX ORDER — OSELTAMIVIR PHOSPHATE 75 MG/1
75 CAPSULE ORAL 2 TIMES DAILY
Qty: 10 CAPSULE | Refills: 0 | Status: SHIPPED | OUTPATIENT
Start: 2019-03-22 | End: 2019-03-27

## 2019-03-22 ASSESSMENT — MIFFLIN-ST. JEOR: SCORE: 1412.44

## 2019-03-22 ASSESSMENT — PAIN SCALES - GENERAL: PAINLEVEL: MODERATE PAIN (4)

## 2019-03-22 NOTE — LETTER
March 22, 2019      Elizabeth Goodson  [id number] [address] [place] [id number]        To Whom It May Concern:    The above patient is being treated for influenza-like illness. They should not travel, be at work or school until they are fever-free for 24 hours without the aid of medications.    Sincerely,        RAÚL Flor CNP

## 2019-03-22 NOTE — PROGRESS NOTES
SUBJECTIVE:   Elizabeth Goodson is a 44 year old female who presents to clinic today for the following health issues:    HPI  Acute Illness   Acute illness concerns: cough, fever, body aches  Onset: 1 day    Fever: YES- 102F this morning     Chills/Sweats: YES    Headache (location?): YES    Sinus Pressure:YES    Conjunctivitis:  no    Ear Pain: YES: left    Rhinorrhea: YES    Congestion: YES    Sore Throat: YES     Cough: YES-productive of yellow sputum- Tessalon pearls are not helping    Wheeze: no     Decreased Appetite: YES    Nausea: no     Vomiting: no     Diarrhea:  no     Dysuria/Freq.: no     Fatigue/Achiness: YES    Sick/Strep Exposure: YES- 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 Ibuprofen helps for fever    Problem list and histories reviewed & adjusted, as indicated.  Additional history: as documented    Patient notes that she is flying to [place] on Tuesday, in 4 days.     Patient Active Problem List   Diagnosis     Syncope     Raynaud phenomenon     CARDIOVASCULAR SCREENING; LDL GOAL LESS THAN 160     Strain of neck muscle     Migraine headache     Cough with hemoptysis     Incidental lung nodule, less than or equal to 3mm     Need for prophylactic vaccination and inoculation against influenza     Fibromyalgia     HTN, goal below 140/90     H/O: hysterectomy     GERD (gastroesophageal reflux disease)     Edema     High risk medication use     Hypothyroidism     Ventral hernia without obstruction or gangrene     Hemoptysis     Rheumatoid arthritis, involving unspecified site, unspecified rheumatoid factor presence (H)     Penicillin allergy     Peanut allergy     Tree nut allergy     Anaphylaxis, subsequent encounter     Itching     History of hemoptysis     Atypical chest pain     Mass of finger, left     Past Surgical History:   Procedure Laterality Date     CHOLECYSTECTOMY, LAPOROSCOPIC  2007    Cholecystectomy,  Laparoscopic     COMBINED ESOPHAGOSCOPY, GASTROSCOPY, DUODENOSCOPY (EGD) WITH CO2 INSUFFLATION N/A 2018    Procedure: Egd;  Surgeon: Richard Aviles MD;  Location: [facility]     ESOPHAGOSCOPY, GASTROSCOPY, DUODENOSCOPY (EGD), COMBINED N/A 2018    Procedure: COMBINED ESOPHAGOSCOPY, GASTROSCOPY, DUODENOSCOPY (EGD), BIOPSY SINGLE OR MULTIPLE;  Surgeon: Richard Aviles MD;  Location: [facility]     EXCISE MASS FOOT  2013    Procedure: EXCISE MASS FOOT;  Right Foot Soft Tissue Mass Excision;  Surgeon: Jose Cruz Garcia DPM;  Location: [facility]     EXCISE MASS FOOT Right 2015    Procedure: EXCISE MASS FOOT;  Surgeon: Pierre Adan DPM;  Location: [facility]     HYSTERECTOMY, VAGINAL  2002     LAPAROSCOPIC HERNIORRHAPHY VENTRAL N/A 2016    Procedure: LAPAROSCOPIC HERNIORRHAPHY VENTRAL;  Surgeon: Lars Dahl MD;  Location: [facility]     PANNICULECTOMY  2017    with liposuction, and hernia revision Dr. Heath     RELEASE TRIGGER FINGER Left 2018    Procedure: RELEASE TRIGGER FINGER;  Trigger Finger Release Left ring finger ;  Surgeon: Aaron Orta DO;  Location: [facility]       Social History     Tobacco Use     Smoking status: Former Smoker     Years: 13.00     Types: Cigarettes     Last attempt to quit: 10/1/2012     Years since quittin.4     Smokeless tobacco: Never Used   Substance Use Topics     Alcohol use: Yes     Alcohol/week: 0.0 oz     Comment: occ/ 1-2 per month     Family History   Problem Relation Age of Onset     Asthma Father      C.A.D. Father      Diabetes No family hx of      Hypertension No family hx of      Cerebrovascular Disease No family hx of      Breast Cancer No family hx of      Cancer - colorectal No family hx of      Prostate Cancer No family hx of      Alcohol/Drug No family hx of      Allergies No family hx of      Alzheimer Disease No family hx of      Anesthesia Reaction No family hx of      Arthritis No family hx of       Blood Disease No family hx of      Cancer No family hx of      Cardiovascular No family hx of      Circulatory No family hx of      Congenital Anomalies No family hx of      Connective Tissue Disorder No family hx of      Neurologic Disorder No family hx of      Depression No family hx of      Endocrine Disease No family hx of      Eye Disorder No family hx of      Genetic Disorder No family hx of      Gastrointestinal Disease No family hx of      Genitourinary Problems No family hx of      Gynecology No family hx of      Heart Disease No family hx of      Lipids No family hx of      Musculoskeletal Disorder No family hx of      Obesity No family hx of      Osteoporosis No family hx of      Psychotic Disorder No family hx of      Respiratory No family hx of      Hearing Loss No family hx of      Family History Negative No family hx of      Thyroid Disease No family hx of      Colon Cancer No family hx of      Hyperlipidemia No family hx of      Coronary Artery Disease No family hx of      Other Cancer No family hx of      Anxiety Disorder No family hx of      Mental Illness No family hx of      Substance Abuse No family hx of          Current Outpatient Medications   Medication Sig Dispense Refill     albuterol (PROAIR HFA/PROVENTIL HFA/VENTOLIN HFA) 108 (90 Base) MCG/ACT inhaler Inhale 2 puffs into the lungs every 6 hours as needed for shortness of breath / dyspnea or wheezing 1 Inhaler 0     cyclobenzaprine (FLEXERIL) 10 MG tablet TAKE ONE TABLET BY MOUTH AT BEDTIME 90 tablet 1     diltiazem ER COATED BEADS (CARDIZEM CD/CARTIA XT) 240 MG 24 hr capsule TAKE ONE CAPSULE BY MOUTH ONCE DAILY 90 capsule 0     doxycycline (VIBRAMYCIN) 100 MG capsule Take 1 capsule (100 mg) by mouth 2 times daily 20 capsule 0     hydrochlorothiazide (HYDRODIURIL) 25 MG tablet Take 1 tablet (25 mg) by mouth daily 90 tablet 3     leflunomide (ARAVA) 20 MG tablet Take 1 tablet (20 mg) by mouth daily 30 tablet 2     levothyroxine  (SYNTHROID/LEVOTHROID) 112 MCG tablet Take 1 tablet (112 mcg) by mouth daily 90 tablet 3     losartan (COZAAR) 50 MG tablet TAKE ONE TABLET BY MOUTH ONCE DAILY 90 tablet 2     metoprolol succinate ER (TOPROL-XL) 25 MG 24 hr tablet TAKE ONE TABLET BY MOUTH ONCE DAILY 90 tablet 1     omeprazole (PRILOSEC) 40 MG DR capsule Take 1 capsule (40 mg) by mouth daily Take 30-60 minutes before a meal. 90 capsule 1     oseltamivir (TAMIFLU) 75 MG capsule Take 1 capsule (75 mg) by mouth 2 times daily for 5 days 10 capsule 0     potassium chloride SA (KLOR-CON) 20 MEQ CR tablet 1 tab po QID today, then TID tomorrow, then 1 po QD 95 tablet 1     sucralfate (CARAFATE) 1 GM tablet Take 1 tablet (1 g) by mouth 4 times daily (before meals and nightly) 360 tablet 3     albuterol (PROAIR HFA/PROVENTIL HFA/VENTOLIN HFA) 108 (90 Base) MCG/ACT inhaler Inhale 2 puffs into the lungs every 6 hours as needed for shortness of breath / dyspnea or wheezing (Patient not taking: Reported on 3/22/2019) 18 g 0     EPINEPHrine (EPIPEN 2-KIMBERLEE) 0.3 MG/0.3ML injection 2-pack Inject 0.3 mLs (0.3 mg) into the muscle once as needed for anaphylaxis (Patient not taking: Reported on 3/22/2019) 0.6 mL 1     Allergies   Allergen Reactions     Penicillins Anaphylaxis     Walnuts [Nuts] Anaphylaxis     All tree nuts     Norvasc [Amlodipine] Rash     Norvasc- rash at 10 mg dosing     Recent Labs   Lab Test 10/16/18  1526 08/27/18  1041  07/18/18  0925 04/16/18  1530  08/23/16  1401  11/30/15  1449  10/08/14  1114  06/30/11  1222   LDL  --   --   --   --   --   --   --   --   --   --  85  --  75   HDL  --   --   --   --   --   --   --   --   --   --  39*  --  40*   TRIG  --   --   --   --   --   --   --   --   --   --  124  --  118   ALT  --   --   --   --  21  --  24  --  21   < > 20   < >  --    CR 0.90  --   --   --  0.81   < >  --    < >  --    < > 0.85   < >  --    GFRESTIMATED 69  --   --   --  77   < >  --    < >  --    < > 75   < >  --    GFRESTBLACK 83  --    "--   --  >90   < >  --    < >  --    < > >90   GFR Calc     < >  --    POTASSIUM 3.5 3.4   < >  --  3.4   < >  --    < >  --    < > 3.8   < >  --    TSH  --   --   --  2.39 4.86*   < > 5.27*  --   --    < > 6.94*  --  1.00    < > = values in this interval not displayed.      BP Readings from Last 3 Encounters:   03/22/19 110/76   11/16/18 108/79   10/16/18 110/86    Wt Readings from Last 3 Encounters:   03/22/19 79.5 kg (175 lb 3.2 oz)   11/12/18 79.4 kg (175 lb)   10/16/18 82.6 kg (182 lb)        ROS:  Constitutional, HEENT, cardiovascular, pulmonary, GI, , musculoskeletal, neuro, skin, endocrine and psych systems are negative, except as otherwise noted.    This document serves as a record of the services and decisions personally performed and made by Keri Yu CNP. It was created on his/her behalf by Deirdre Patterson, trained medical scribe. The creation of this document is based the provider's statements to the medical scribes.    Scribtabitha Patterson 3:21 PM, March 22, 2019  OBJECTIVE:     /76   Pulse 120   Temp 99.2  F (37.3  C) (Temporal)   Resp 18   Ht 1.59 m (5' 2.6\")   Wt 79.5 kg (175 lb 3.2 oz)   SpO2 100%   BMI 31.44 kg/m    Body mass index is 31.44 kg/m .     GENERAL APPEARANCE: alert and no distress, fatigued, nontoxic appearing, warm without gross diaphoresis  EYES: Eyes grossly normal to inspection and conjunctivae and sclerae normal  HENT: ear canals and TM's normal, nose and mouth without ulcers or lesions and nasal mucosa edematous with mild to moderate rhinorrhea  NECK: no adenopathy, no asymmetry, masses, or scars and thyroid normal to palpation  RESP: lungs clear to auscultation - no rales, rhonchi or wheezes  CV: regular rates and rhythm, normal S1 S2, no S3 or S4 and no murmur, click or rub   SKIN: no suspicious lesions or rashes, warm to touch, not diaphoretic    Diagnostic Test Results:  No results found for this or any previous visit (from the past 24 " hour(s)).    ASSESSMENT/PLAN:       ICD-10-CM    1. Chills (without fever) R68.83 Influenza A/B antigen   2. Influenza-like illness R69 oseltamivir (TAMIFLU) 75 MG capsule   3. Rheumatoid arthritis, involving unspecified site, unspecified rheumatoid factor presence (H) M06.9    4. History of hemoptysis Z87.09      Reviewed symptoms and etiology patient presents with today. Influenza A/ antigen lab placed today; result was negative.  However, with sudden onset and timing of season, will treat for KATY, given RA and Arava medications with close follow-up. Warning signs discussed as going into the weekend and HX of hemoptysis- none currently present. .     Advised starting Tamiflu 75 mg; Rx provided. Reviewed directions, benefits, and side effects of medications with patient today. Discussed home cares, quarantining self, upright rest, keeping up fluid intake, and OTC NSAIDs for fever and pain control prn.    Work and travel letter provided and given to patient.    Patient instructed to report to the ED if she develops any new or worsening symptoms over the weekend.       The information in this document, created by the medical scribe for me, accurately reflects the services I personally performed and the decisions made by me. I have reviewed and approved this document for accuracy prior to leaving the patient care area.  Keri Yu CNP  3:21 PM, 03/22/19    RAÚL Flor [facility]

## 2019-04-26 ENCOUNTER — TELEPHONE (OUTPATIENT)
Dept: FAMILY MEDICINE | Facility: CLINIC | Age: 44
End: 2019-04-26

## 2019-04-26 NOTE — TELEPHONE ENCOUNTER
Panel Management Review      Patient has the following on her problem list:     Hypertension   Last three blood pressure readings:  BP Readings from Last 3 Encounters:   03/22/19 110/76   11/16/18 108/79   10/16/18 110/86     Blood pressure: Passed    HTN Guidelines:  Less than 140/90      Composite cancer screening  Chart review shows that this patient is due/due soon for the following Mammogram  Summary:    Patient is due/failing the following:   UDS, MAMMOGRAM and PHYSICAL    Action needed:   Patient needs office visit for follow up appointment that she had scheduled and cancelled. and mammogram scheduled.    Type of outreach:    Phone, spoke to patient.  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     Questions for provider review:    None                                                                                                                                    Lisa Grubbs MA       Chart routed to Care Team .

## 2019-05-13 ENCOUNTER — E-VISIT (OUTPATIENT)
Dept: FAMILY MEDICINE | Facility: CLINIC | Age: 44
End: 2019-05-13
Payer: COMMERCIAL

## 2019-05-13 DIAGNOSIS — J01.90 ACUTE SINUSITIS, RECURRENCE NOT SPECIFIED, UNSPECIFIED LOCATION: Primary | ICD-10-CM

## 2019-05-13 PROCEDURE — 99444 ZZC PHYSICIAN ONLINE EVALUATION & MANAGEMENT SERVICE: CPT | Performed by: NURSE PRACTITIONER

## 2019-05-14 RX ORDER — AZITHROMYCIN 250 MG/1
TABLET, FILM COATED ORAL
Qty: 6 TABLET | Refills: 0 | Status: SHIPPED | OUTPATIENT
Start: 2019-05-14 | End: 2019-11-11

## 2019-05-14 NOTE — PATIENT INSTRUCTIONS
Thank you for choosing us for your care. I have placed an order for a prescription so that you can start treatment. View your full visit summary for details by clicking on the link below. Your pharmacist will able to address any questions you may have about the medication.     If you're not feeling better within 5-7 days, please schedule an appointment.  You can schedule an appointment right here in [facility], or call [phone]  If the visit is for the same symptoms as your e-visit, we'll refund the cost of your e-visit if seen within seven days.      Sinusitis (Antibiotic Treatment)    The sinuses are air-filled spaces within the bones of the face. They connect to the inside of the nose. Sinusitis is an inflammation of the tissue that lines the sinuses. Sinusitis can occur during a cold. It can also happen due to allergies to pollens and other particles in the air. Sinusitis can cause symptoms of sinus congestion and a feeling of fullness. A sinus infection causes fever, headache, and facial pain. There is often green or yellow fluid draining from the nose or into the back of the throat (post-nasal drip). You have been given antibiotics to treat this condition.  Home care    Take the full course of antibiotics as instructed. Do not stop taking them, even when you feel better.    Drink plenty of water, hot tea, and other liquids. This may help thin nasal mucus. It also may help your sinuses drain fluids.    Heat may help soothe painful areas of your face. Use a towel soaked in hot water. Or,  the shower and direct the warm spray onto your face. Using a vaporizer along with a menthol rub at night may also help soothe symptoms.     An expectorant with guaifenesin may help thin nasal mucus and help your sinuses drain fluids.    You can use an over-the-counter decongestant, unless a similar medicine was prescribed to you. Nasal sprays work the fastest. Use one that contains phenylephrine or oxymetazoline.  First blow your nose gently. Then use the spray. Do not use these medicines more often than directed on the label. If you do, your symptoms may get worse. You may also take pills that contain pseudoephedrine. Don t use products that combine multiple medicines. This is because side effects may be increased. Read labels. You can also ask the pharmacist for help. (People with high blood pressure should not use decongestants. They can raise blood pressure.)    Over-the-counter antihistamines may help if allergies contributed to your sinusitis.      Do not use nasal rinses or irrigation during an acute sinus infection, unless your healthcare provider tells you to. Rinsing may spread the infection to other areas in your sinuses.    Use acetaminophen or ibuprofen to control pain, unless another pain medicine was prescribed to you. If you have chronic liver or kidney disease or ever had a stomach ulcer, talk with your healthcare provider before using these medicines. (Aspirin should never be taken by anyone under age 18 who is ill with a fever. It may cause severe liver damage.)    Don't smoke. This can make symptoms worse.  Follow-up care  Follow up with your healthcare provider or our staff if you are not better in 1 week.  When to seek medical advice  Call your healthcare provider if any of these occur:    Facial pain or headache that gets worse    Stiff neck    Unusual drowsiness or confusion    Swelling of your forehead or eyelids    Vision problems, such as blurred or double vision    Fever of 100.4 F (38 C) or higher, or as directed by your healthcare provider    Seizure    Breathing problems    Symptoms don't go away in 10 days  Prevention  Here are steps you can take to help prevent an infection:    Keep good hand washing habits.    Don t have close contact with people who have sore throats, colds, or other upper respiratory infections.    Don t smoke, and stay away from secondhand smoke.    Stay up to date with of  your vaccines.  Date Last Reviewed: 11/1/2017 2000-2018 [employer]. [address] [place] [address], [place] [address]. All rights reserved. This information is not intended as a substitute for professional medical care. Always follow your healthcare professional's instructions.

## 2019-06-28 DIAGNOSIS — I10 ESSENTIAL HYPERTENSION: ICD-10-CM

## 2019-06-28 RX ORDER — DILTIAZEM HYDROCHLORIDE 240 MG/1
CAPSULE, EXTENDED RELEASE ORAL
Qty: 90 CAPSULE | Refills: 0 | Status: SHIPPED | OUTPATIENT
Start: 2019-06-28 | End: 2019-11-11

## 2019-06-28 NOTE — TELEPHONE ENCOUNTER
[employer]  Routing refill request to provider for review/approval because:  Labs not current:  YAHIR Tracy, RN, BSN

## 2019-08-01 ENCOUNTER — OFFICE VISIT (OUTPATIENT)
Dept: DERMATOLOGY | Facility: CLINIC | Age: 44
End: 2019-08-01
Payer: COMMERCIAL

## 2019-08-01 DIAGNOSIS — D18.01 CHERRY ANGIOMA: ICD-10-CM

## 2019-08-01 DIAGNOSIS — L82.1 SEBORRHEIC KERATOSIS: ICD-10-CM

## 2019-08-01 DIAGNOSIS — D22.9 MULTIPLE BENIGN NEVI: ICD-10-CM

## 2019-08-01 DIAGNOSIS — Z85.828 HISTORY OF NONMELANOMA SKIN CANCER: ICD-10-CM

## 2019-08-01 DIAGNOSIS — L57.8 SUN-DAMAGED SKIN: ICD-10-CM

## 2019-08-01 DIAGNOSIS — L81.4 SOLAR LENTIGO: ICD-10-CM

## 2019-08-01 DIAGNOSIS — D48.9 NEOPLASM OF UNCERTAIN BEHAVIOR: Primary | ICD-10-CM

## 2019-08-01 PROCEDURE — 99214 OFFICE O/P EST MOD 30 MIN: CPT | Mod: 25 | Performed by: DERMATOLOGY

## 2019-08-01 PROCEDURE — 88305 TISSUE EXAM BY PATHOLOGIST: CPT | Mod: TC | Performed by: DERMATOLOGY

## 2019-08-01 PROCEDURE — 11102 TANGNTL BX SKIN SINGLE LES: CPT | Performed by: DERMATOLOGY

## 2019-08-01 NOTE — PATIENT INSTRUCTIONS

## 2019-08-01 NOTE — NURSING NOTE
The following medication was given:     MEDICATION:  Lidocaine with epinephrine 1% [id number]  ROUTE: SQ  SITE: see procedure note  DOSE: 2cc  LOT #: [id number]  : [place]  EXPIRATION DATE: 12/1/19  NDC#: 5249-0403-45   Was there drug waste? Yes  Multi-dose vial: Yes    Alison Padilla LPN  August 1, 2019

## 2019-08-01 NOTE — PROGRESS NOTES
[facility] Dermatology Note      Dermatology Problem List:  1. Relevant medical history: RA  -current immunosuppression: leflunomide 20 mg  1. NMSC  -BCC, left knee, s/p ED&C 2/2/2017  -BCC, left lower leg, s/p excision 2/2/2017  2. Actinic keratosis  -s/p cryotherapy  3. NUB, central chest, s/p biopsy 8/1/19    Encounter Date: Aug 1, 2019    CC:  Chief Complaint   Patient presents with     Skin Check     Personal hx of BCC, pt's father hx of BCC and Melanoma. Not taking immunosuppressants. Areas of concern: mole on upper left arm, spot on chest.         History of Present Illness:  Ms. Elizabeth Goodson is a 44 year old female who presents in self referral for a skin check. She is currently taking immunosuppressant leflunomide 20 mg for RA treatment. She has a personal history of BCC and a family history of skin cancer (definitely BCC, possibly melanoma - patient is unsure). Today, she has a mole of concern on her left upper arm that is new and another spot of concern on her chest that is a bump that has not healed in the last several weeks. The spot Dr. Turner wanted to monitor on the lower back has not changed to her knowledge. Denies any tender, nonhealing, bleeding skin lesions. No other concerns addressed today.      Past Medical History:   Patient Active Problem List   Diagnosis     Syncope     Raynaud phenomenon     CARDIOVASCULAR SCREENING; LDL GOAL LESS THAN 160     Strain of neck muscle     Migraine headache     Cough with hemoptysis     Incidental lung nodule, less than or equal to 3mm     Need for prophylactic vaccination and inoculation against influenza     Fibromyalgia     HTN, goal below 140/90     H/O: hysterectomy     GERD (gastroesophageal reflux disease)     Edema     High risk medication use     Hypothyroidism     Ventral hernia without obstruction or gangrene     Hemoptysis     Rheumatoid arthritis, involving unspecified site, unspecified rheumatoid factor presence (H)      Penicillin allergy     Peanut allergy     Tree nut allergy     Anaphylaxis, subsequent encounter     Itching     History of hemoptysis     Atypical chest pain     Mass of finger, left     Past Medical History:   Diagnosis Date     Adnexal mass 2017    not seen on 7/10/18 abdominal CT     H/O transfusion of whole blood 2001    with child labor/hysterectomy     Hemoptysis 11/12, 11/2015-mild    last episodes, mild, has had massive in HX     HTN (hypertension)      Incidental lung nodule, less than or equal to 3mm 11/12    NICHOLAS     Massive hemoptysis 11/12    due to pneumonia     Migraine headache      Tobacco abuse, in remission     quit 2012     Past Surgical History:   Procedure Laterality Date     CHOLECYSTECTOMY, LAPOROSCOPIC  2007    Cholecystectomy, Laparoscopic     COMBINED ESOPHAGOSCOPY, GASTROSCOPY, DUODENOSCOPY (EGD) WITH CO2 INSUFFLATION N/A 11/16/2018    Procedure: Egd;  Surgeon: Richard Aviles MD;  [facility]     ESOPHAGOSCOPY, GASTROSCOPY, DUODENOSCOPY (EGD), COMBINED N/A 11/16/2018    Procedure: COMBINED ESOPHAGOSCOPY, GASTROSCOPY, DUODENOSCOPY (EGD), BIOPSY SINGLE OR MULTIPLE;  Surgeon: Richard Aviles MD;  Location: [facility]     EXCISE MASS FOOT  11/22/2013    Procedure: EXCISE MASS FOOT;  Right Foot Soft Tissue Mass Excision;  Surgeon: Jose Cruz Garcia DPM;  [facility]     EXCISE MASS FOOT Right 7/17/2015    Procedure: EXCISE MASS FOOT;  Surgeon: Pierre Adan DPM;  [facility]     HYSTERECTOMY, VAGINAL  2002     LAPAROSCOPIC HERNIORRHAPHY VENTRAL N/A 2/24/2016    Procedure: LAPAROSCOPIC HERNIORRHAPHY VENTRAL;  Surgeon: Lars Dahl MD;  [facility]     PANNICULECTOMY  07/28/2017    with liposuction, and hernia revision Dr. Heath     RELEASE TRIGGER FINGER Left 5/4/2018    Procedure: RELEASE TRIGGER FINGER;  Trigger Finger Release Left ring finger ;  Surgeon: Aaron Orta DO;  Location: [facility]       Social History:  Patient reports  that she quit smoking about 6 years ago. Her smoking use included cigarettes. She quit after 13.00 years of use. She has never used smokeless tobacco. She reports that she drinks alcohol. She reports that she does not use drugs. 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    Family History:  Father had BCC and possibly a melanoma - patient will ask her mom to clarify    Medications:  Current Outpatient Medications   Medication Sig Dispense Refill     CARTIA  MG 24 hr capsule TAKE ONE CAPSULE BY MOUTH ONCE DAILY 90 capsule 0     cyclobenzaprine (FLEXERIL) 10 MG tablet TAKE ONE TABLET BY MOUTH AT BEDTIME 90 tablet 1     doxycycline (VIBRAMYCIN) 100 MG capsule Take 1 capsule (100 mg) by mouth 2 times daily 20 capsule 0     EPINEPHrine (EPIPEN 2-KIMBERLEE) 0.3 MG/0.3ML injection 2-pack Inject 0.3 mLs (0.3 mg) into the muscle once as needed for anaphylaxis 0.6 mL 1     hydrochlorothiazide (HYDRODIURIL) 25 MG tablet Take 1 tablet (25 mg) by mouth daily 90 tablet 3     leflunomide (ARAVA) 20 MG tablet Take 1 tablet (20 mg) by mouth daily 30 tablet 2     levothyroxine (SYNTHROID/LEVOTHROID) 112 MCG tablet Take 1 tablet (112 mcg) by mouth daily 90 tablet 3     losartan (COZAAR) 50 MG tablet TAKE ONE TABLET BY MOUTH ONCE DAILY 90 tablet 2     metoprolol succinate ER (TOPROL-XL) 25 MG 24 hr tablet TAKE ONE TABLET BY MOUTH ONCE DAILY 90 tablet 1     omeprazole (PRILOSEC) 40 MG DR capsule Take 1 capsule (40 mg) by mouth daily Take 30-60 minutes before a meal. 90 capsule 1     potassium chloride SA (KLOR-CON) 20 MEQ CR tablet 1 tab po QID today, then TID tomorrow, then 1 po QD 95 tablet 1     sucralfate (CARAFATE) 1 GM tablet Take 1 tablet (1 g) by mouth 4 times daily (before meals and nightly) 360 tablet 3     albuterol (PROAIR HFA/PROVENTIL HFA/VENTOLIN HFA) 108 (90 Base) MCG/ACT inhaler Inhale 2 puffs into the lungs every 6 hours as needed for shortness of breath / dyspnea or wheezing (Patient not taking: Reported on  3/22/2019) 18 g 0     albuterol (PROAIR HFA/PROVENTIL HFA/VENTOLIN HFA) 108 (90 Base) MCG/ACT inhaler Inhale 2 puffs into the lungs every 6 hours as needed for shortness of breath / dyspnea or wheezing (Patient not taking: Reported on 8/1/2019) 1 Inhaler 0     azithromycin (ZITHROMAX) 250 MG tablet 2 tablets daily on day one, then one tablet po daily until gone. (Patient not taking: Reported on 8/1/2019) 6 tablet 0       Allergies   Allergen Reactions     Penicillins Anaphylaxis     Walnuts [Nuts] Anaphylaxis     All tree nuts     Norvasc [Amlodipine] Rash     Norvasc- rash at 10 mg dosing       Review of Systems:  -Constitutional: Patient is otherwise feeling well, in usual state of health.   -Skin: As above in HPI. No additional skin concerns.    Physical exam:  Vitals: There were no vitals taken for this visit.  GEN: This is a well developed, well-nourished female in no acute distress, in a pleasant mood.    SKIN: Total skin excluding the undergarment areas was performed. The exam included the head/face, neck, both arms, chest, back, abdomen, both legs, digits and/or nails and buttocks  - Gilliland Type II  - Well healed scars in past areas of skin cancers. No evidence of recurrence.  - Numerous scattered brown macules on sun exposed areas.  - Central chest, excoriated pink papule 6 mm in size  - There are dome shaped bright red papules on the trunk.   - Multiple regular brown pigmented macules and papules are identified on the body,   - Right mid back, 8 mm brown oval macule with reticular network throughout, no atypia .   - There are waxy stuck on tan to brown papules on the trunk and extremities (area of patient concern).  - No other lesions of concern on areas examined.             Impression/Plan:    1. Current immunosuppression: leflunomide 20 mg for RA.     Did not discuss at today's visits, but because of this and personal history of skin cancer, will need yearly skin checks.     2. History of NMSC: No  evidence of recurrence.     Recommend sunscreens SPF #30 or greater, protective clothing and avoidance of tanning beds.    Recommended yearly skin checks.     3. Sun damaged skin with solar lentigines    Recommend sunscreens SPF #30 or greater, protective clothing and avoidance of tanning beds.    4. Benign findings including: seborrheic keratoses, cherry angioma    No further intervention required. Patient to report changes.     Patient reassured of the benign nature of these lesions.    5. Multiple clinically benign nevi    No further intervention required. Patient to report changes.     Patient reassured of the benign nature of these lesions.    6. Nevus, right lower back. No changes noted or signs of atypia on exam. Spot on left inframammary at last visit has since self resolved.     No further intervention required. Patient to report changes.     7. Neoplasm of uncertain behavior on the central chest. The differential diagnosis includes irritated SK vs NMSC.     Shave biopsy:  After discussion of benefits and risks including but not limited to bleeding/bruising, pain/swelling, infection, scar, incomplete removal, nerve damage/numbness, recurrence, and non-diagnostic biopsy, written consent, verbal consent and photographs were obtained. Time-out was performed. The area was cleaned with isopropyl alcohol. 0.5ml of 1% lidocaine with 1:100,000 epinephrine was injected to obtain adequate anesthesia. A shave biopsy was performed. Hemostasis was achieved with aluminium chloride. Vaseline and a sterile dressing were applied. The patient tolerated the procedure and no complications were noted. The patient was provided with verbal and written post care instructions.      Follow-up 1 year for skin check, earlier for new or changing lesions.       Staff Involved:  Scribe/Staff    Scribe Disclosure  I, Socorro Zimmerman am serving as a scribe to document services personally performed by Dr. Jennifer Duke MD, based on data  collection and the provider's statements to me.     Provider Disclosure:   The documentation recorded by the scribe accurately reflects the services I personally performed and the decisions made by me.    Jennifer Duke MD    Department of Dermatology  [facility]: Phone: [phone], Fax:[phone]  [facility]: Phone: [phone], Fax: [phone]

## 2019-08-01 NOTE — NURSING NOTE
Elizabeth Goodson's goals for this visit include:   Chief Complaint   Patient presents with     Skin Check     Personal hx of BCC, pt's father hx of BCC and Melanoma. Not taking immunosuppressants. Areas of concern: mole on upper left arm, spot on chest.       She requests these members of her care team be copied on today's visit information: Yes    PCP: Keri Yu    Referring Provider:  No referring provider defined for this encounter.    There were no vitals taken for this visit.    Do you need any medication refills at today's visit? No    Alison Padilla LPN

## 2019-08-01 NOTE — LETTER
8/1/2019         RE: Elizabeth Goodson  [id number] [facility] [address]        Dear Colleague,    Thank you for referring your patient, Elizabeth Goodson, to the [facility]. Please see a copy of my visit note below.    [facility] Dermatology Note      Dermatology Problem List:  1. Relevant medical history: RA  -current immunosuppression: leflunomide 20 mg  1. NMSC  -BCC, left knee, s/p ED&C 2/2/2017  -BCC, left lower leg, s/p excision 2/2/2017  2. Actinic keratosis  -s/p cryotherapy  3. NUB, central chest, s/p biopsy 8/1/19    Encounter Date: Aug 1, 2019    CC:  Chief Complaint   Patient presents with     Skin Check     Personal hx of BCC, pt's father hx of BCC and Melanoma. Not taking immunosuppressants. Areas of concern: mole on upper left arm, spot on chest.         History of Present Illness:  Ms. Elizabeth Goodson is a 44 year old female who presents in self referral for a skin check. She is currently taking immunosuppressant leflunomide 20 mg for RA treatment. She has a personal history of BCC and a family history of skin cancer (definitely BCC, possibly melanoma - patient is unsure). Today, she has a mole of concern on her left upper arm that is new and another spot of concern on her chest that is a bump that has not healed in the last several weeks. The spot Dr. Turner wanted to monitor on the lower back has not changed to her knowledge. Denies any tender, nonhealing, bleeding skin lesions. No other concerns addressed today.      Past Medical History:   Patient Active Problem List   Diagnosis     Syncope     Raynaud phenomenon     CARDIOVASCULAR SCREENING; LDL GOAL LESS THAN 160     Strain of neck muscle     Migraine headache     Cough with hemoptysis     Incidental lung nodule, less than or equal to 3mm     Need for prophylactic vaccination and inoculation against influenza     Fibromyalgia     HTN, goal below 140/90     H/O: hysterectomy     GERD  (gastroesophageal reflux disease)     Edema     High risk medication use     Hypothyroidism     Ventral hernia without obstruction or gangrene     Hemoptysis     Rheumatoid arthritis, involving unspecified site, unspecified rheumatoid factor presence (H)     Penicillin allergy     Peanut allergy     Tree nut allergy     Anaphylaxis, subsequent encounter     Itching     History of hemoptysis     Atypical chest pain     Mass of finger, left     Past Medical History:   Diagnosis Date     Adnexal mass 2017    not seen on 7/10/18 abdominal CT     H/O transfusion of whole blood 2001    with child labor/hysterectomy     Hemoptysis 11/12, 11/2015-mild    last episodes, mild, has had massive in HX     HTN (hypertension)      Incidental lung nodule, less than or equal to 3mm 11/12    NICHOLAS     Massive hemoptysis 11/12    due to pneumonia     Migraine headache      Tobacco abuse, in remission     quit 2012     Past Surgical History:   Procedure Laterality Date     CHOLECYSTECTOMY, LAPOROSCOPIC  2007    Cholecystectomy, Laparoscopic     COMBINED ESOPHAGOSCOPY, GASTROSCOPY, DUODENOSCOPY (EGD) WITH CO2 INSUFFLATION N/A 11/16/2018    Procedure: Egd;  Surgeon: Richard Aviles MD;  [facility]: [facility]     ESOPHAGOSCOPY, GASTROSCOPY, DUODENOSCOPY (EGD), COMBINED N/A 11/16/2018    Procedure: COMBINED ESOPHAGOSCOPY, GASTROSCOPY, DUODENOSCOPY (EGD), BIOPSY SINGLE OR MULTIPLE;  Surgeon: Richard Aviles MD;  [facility]: [facility]     EXCISE MASS FOOT  11/22/2013    Procedure: EXCISE MASS FOOT;  Right Foot Soft Tissue Mass Excision;  Surgeon: Jose Cruz Garcia DPM;  [facility]: [facility]     EXCISE MASS FOOT Right 7/17/2015    Procedure: EXCISE MASS FOOT;  Surgeon: Pierre Adan DPM;  [facility]     HYSTERECTOMY, VAGINAL  2002     LAPAROSCOPIC HERNIORRHAPHY VENTRAL N/A 2/24/2016    Procedure: LAPAROSCOPIC HERNIORRHAPHY VENTRAL;  Surgeon: Lars Dahl MD;  [facility]     PANNICULECTOMY  07/28/2017    with  liposuction, and hernia revision Dr. Heath     RELEASE TRIGGER FINGER Left 5/4/2018    Procedure: RELEASE TRIGGER FINGER;  Trigger Finger Release Left ring finger ;  Surgeon: Aaron Orta DO;  Location: [facility]       Social History:  Patient reports that she quit smoking about 6 years ago. Her smoking use included cigarettes. She quit after 13.00 years of use. She has never used smokeless tobacco. She reports that she drinks alcohol. She reports that she does not use drugs. 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    Family History:  Father had BCC and possibly a melanoma - patient will ask her mom to clarify    Medications:  Current Outpatient Medications   Medication Sig Dispense Refill     CARTIA  MG 24 hr capsule TAKE ONE CAPSULE BY MOUTH ONCE DAILY 90 capsule 0     cyclobenzaprine (FLEXERIL) 10 MG tablet TAKE ONE TABLET BY MOUTH AT BEDTIME 90 tablet 1     doxycycline (VIBRAMYCIN) 100 MG capsule Take 1 capsule (100 mg) by mouth 2 times daily 20 capsule 0     EPINEPHrine (EPIPEN 2-KIMBERLEE) 0.3 MG/0.3ML injection 2-pack Inject 0.3 mLs (0.3 mg) into the muscle once as needed for anaphylaxis 0.6 mL 1     hydrochlorothiazide (HYDRODIURIL) 25 MG tablet Take 1 tablet (25 mg) by mouth daily 90 tablet 3     leflunomide (ARAVA) 20 MG tablet Take 1 tablet (20 mg) by mouth daily 30 tablet 2     levothyroxine (SYNTHROID/LEVOTHROID) 112 MCG tablet Take 1 tablet (112 mcg) by mouth daily 90 tablet 3     losartan (COZAAR) 50 MG tablet TAKE ONE TABLET BY MOUTH ONCE DAILY 90 tablet 2     metoprolol succinate ER (TOPROL-XL) 25 MG 24 hr tablet TAKE ONE TABLET BY MOUTH ONCE DAILY 90 tablet 1     omeprazole (PRILOSEC) 40 MG DR capsule Take 1 capsule (40 mg) by mouth daily Take 30-60 minutes before a meal. 90 capsule 1     potassium chloride SA (KLOR-CON) 20 MEQ CR tablet 1 tab po QID today, then TID tomorrow, then 1 po QD 95 tablet 1     sucralfate (CARAFATE) 1 GM tablet Take 1 tablet (1 g) by mouth 4 times  daily (before meals and nightly) 360 tablet 3     albuterol (PROAIR HFA/PROVENTIL HFA/VENTOLIN HFA) 108 (90 Base) MCG/ACT inhaler Inhale 2 puffs into the lungs every 6 hours as needed for shortness of breath / dyspnea or wheezing (Patient not taking: Reported on 3/22/2019) 18 g 0     albuterol (PROAIR HFA/PROVENTIL HFA/VENTOLIN HFA) 108 (90 Base) MCG/ACT inhaler Inhale 2 puffs into the lungs every 6 hours as needed for shortness of breath / dyspnea or wheezing (Patient not taking: Reported on 8/1/2019) 1 Inhaler 0     azithromycin (ZITHROMAX) 250 MG tablet 2 tablets daily on day one, then one tablet po daily until gone. (Patient not taking: Reported on 8/1/2019) 6 tablet 0       Allergies   Allergen Reactions     Penicillins Anaphylaxis     Walnuts [Nuts] Anaphylaxis     All tree nuts     Norvasc [Amlodipine] Rash     Norvasc- rash at 10 mg dosing       Review of Systems:  -Constitutional: Patient is otherwise feeling well, in usual state of health.   -Skin: As above in HPI. No additional skin concerns.    Physical exam:  Vitals: There were no vitals taken for this visit.  GEN: This is a well developed, well-nourished female in no acute distress, in a pleasant mood.    SKIN: Total skin excluding the undergarment areas was performed. The exam included the head/face, neck, both arms, chest, back, abdomen, both legs, digits and/or nails and buttocks  - Gilliland Type II  - Well healed scars in past areas of skin cancers. No evidence of recurrence.  - Numerous scattered brown macules on sun exposed areas.  - Central chest, excoriated pink papule 6 mm in size  - There are dome shaped bright red papules on the trunk.   - Multiple regular brown pigmented macules and papules are identified on the body,   - Right mid back, 8 mm brown oval macule with reticular network throughout, no atypia .   - There are waxy stuck on tan to brown papules on the trunk and extremities (area of patient concern).  - No other lesions of  concern on areas examined.             Impression/Plan:    1. Current immunosuppression: leflunomide 20 mg for RA.     Did not discuss at today's visits, but because of this and personal history of skin cancer, will need yearly skin checks.     2. History of NMSC: No evidence of recurrence.     Recommend sunscreens SPF #30 or greater, protective clothing and avoidance of tanning beds.    Recommended yearly skin checks.     3. Sun damaged skin with solar lentigines    Recommend sunscreens SPF #30 or greater, protective clothing and avoidance of tanning beds.    4. Benign findings including: seborrheic keratoses, cherry angioma    No further intervention required. Patient to report changes.     Patient reassured of the benign nature of these lesions.    5. Multiple clinically benign nevi    No further intervention required. Patient to report changes.     Patient reassured of the benign nature of these lesions.    6. Nevus, right lower back. No changes noted or signs of atypia on exam. Spot on left inframammary at last visit has since self resolved.     No further intervention required. Patient to report changes.     7. Neoplasm of uncertain behavior on the central chest. The differential diagnosis includes irritated SK vs NMSC.     Shave biopsy:  After discussion of benefits and risks including but not limited to bleeding/bruising, pain/swelling, infection, scar, incomplete removal, nerve damage/numbness, recurrence, and non-diagnostic biopsy, written consent, verbal consent and photographs were obtained. Time-out was performed. The area was cleaned with isopropyl alcohol. 0.5ml of 1% lidocaine with 1:100,000 epinephrine was injected to obtain adequate anesthesia. A shave biopsy was performed. Hemostasis was achieved with aluminium chloride. Vaseline and a sterile dressing were applied. The patient tolerated the procedure and no complications were noted. The patient was provided with verbal and written post care  instructions.      Follow-up 1 year for skin check, earlier for new or changing lesions.       Staff Involved:  Scribe/Staff    Scribe Disclosure  I, Socorro Zimmerman, am serving as a scribe to document services personally performed by Dr. Jennifer Duke MD, based on data collection and the provider's statements to me.     Provider Disclosure:   The documentation recorded by the scribe accurately reflects the services I personally performed and the decisions made by me.    Jennifer Duke MD    Department of Dermatology  [facility]: Phone: [phone], Fax:[phone]  [facility]: Phone: [phone], Fax: [phone]                Again, thank you for allowing me to participate in the care of your patient.        Sincerely,        Jennifer Duke MD

## 2019-08-05 ENCOUNTER — TELEPHONE (OUTPATIENT)
Dept: FAMILY MEDICINE | Facility: CLINIC | Age: 44
End: 2019-08-05

## 2019-08-05 DIAGNOSIS — Z13.220 LIPID SCREENING: Primary | ICD-10-CM

## 2019-08-05 DIAGNOSIS — E03.4 HYPOTHYROIDISM DUE TO ACQUIRED ATROPHY OF THYROID: ICD-10-CM

## 2019-08-06 LAB — COPATH REPORT: NORMAL

## 2019-08-06 RX ORDER — LEVOTHYROXINE SODIUM 112 UG/1
TABLET ORAL
Qty: 30 TABLET | Refills: 0 | Status: SHIPPED | OUTPATIENT
Start: 2019-08-06 | End: 2019-09-15

## 2019-08-06 NOTE — TELEPHONE ENCOUNTER
Synthroid  Medication is being filled for 1 time refill only due to:  Patient needs labs TSH.    Pretty Dumont, RN, BSN

## 2019-08-07 ENCOUNTER — TELEPHONE (OUTPATIENT)
Dept: DERMATOLOGY | Facility: CLINIC | Age: 44
End: 2019-08-07

## 2019-08-07 NOTE — TELEPHONE ENCOUNTER
Notes recorded by Columba Soto LPN on 8/7/2019 at 11:35 AM CDT  Called and informed patient of results. Patient had no further questions or concerns at this time. Patient will follow up in one year as recommended.     Columba Soto LPN    ------    Notes recorded by Jennifer Lester MD on 8/7/2019 at 8:28 AM CDT  Please let patient know biopsy showed a harmless, inflamed sun spot or age spot. Nothing further needs to be done for this. Next skin check in one year as we discussed at her visit.    Thanks,    Jennifer Lester MD    Department of Dermatology  [facility]: Phone: [phone], Fax:[phone]  [facility]: Phone: [phone], Fax: [phone]       Dermatological path order and indications   Order: [id number]   Status:  Final result   Visible to patient:  Yes (MyChart) Dx:  Neoplasm of uncertain behavior   Component 6d ago   Copath Report Patient Name: MATA HARPER   MR#: [id number]   Specimen #: [id number]   Collected: 8/1/2019   Received: 8/1/2019   Reported: 8/6/2019 21:49   Ordering Phy(s): JENNIFER LESTER     For improved result formatting, select 'View Enhanced Report Format' under    Linked Documents section.     SPECIMEN(S):   Shave, central chest     FINAL DIAGNOSIS:   Shave, central chest:   - Benign lichenoid keratosis - see description           Columba Soto LPN

## 2019-08-07 NOTE — TELEPHONE ENCOUNTER
----- Message from Jennifer Duke MD sent at 8/7/2019  8:28 AM CDT -----  Please let patient know biopsy showed a harmless, inflamed sun spot or age spot. Nothing further needs to be done for this. Next skin check in one year as we discussed at her visit.    Thanks,    Jennifer Duke MD    Department of Dermatology  [facility]: Phone: [phone], Fax:[phone]  [facility]: Phone: [phone], Fax: [phone]

## 2019-08-08 DIAGNOSIS — Z13.220 LIPID SCREENING: ICD-10-CM

## 2019-08-08 DIAGNOSIS — E03.4 HYPOTHYROIDISM DUE TO ACQUIRED ATROPHY OF THYROID: ICD-10-CM

## 2019-08-08 LAB
CHOLEST SERPL-MCNC: 154 MG/DL
HDLC SERPL-MCNC: 32 MG/DL
LDLC SERPL CALC-MCNC: 67 MG/DL
NONHDLC SERPL-MCNC: 122 MG/DL
TRIGL SERPL-MCNC: 274 MG/DL
TSH SERPL DL<=0.005 MIU/L-ACNC: 3.64 MU/L (ref 0.4–4)

## 2019-08-08 PROCEDURE — 80061 LIPID PANEL: CPT | Performed by: NURSE PRACTITIONER

## 2019-08-08 PROCEDURE — 36415 COLL VENOUS BLD VENIPUNCTURE: CPT | Performed by: NURSE PRACTITIONER

## 2019-08-08 PROCEDURE — 84443 ASSAY THYROID STIM HORMONE: CPT | Performed by: NURSE PRACTITIONER

## 2019-09-15 DIAGNOSIS — I10 ESSENTIAL HYPERTENSION: ICD-10-CM

## 2019-09-15 DIAGNOSIS — E03.4 HYPOTHYROIDISM DUE TO ACQUIRED ATROPHY OF THYROID: ICD-10-CM

## 2019-09-16 ENCOUNTER — MYC MEDICAL ADVICE (OUTPATIENT)
Dept: FAMILY MEDICINE | Facility: CLINIC | Age: 44
End: 2019-09-16

## 2019-09-17 RX ORDER — LEVOTHYROXINE SODIUM 112 UG/1
112 TABLET ORAL DAILY
Qty: 90 TABLET | Refills: 1 | Status: SHIPPED | OUTPATIENT
Start: 2019-09-17 | End: 2020-07-20

## 2019-09-17 RX ORDER — HYDROCHLOROTHIAZIDE 25 MG/1
TABLET ORAL
Qty: 90 TABLET | Refills: 0 | Status: SHIPPED | OUTPATIENT
Start: 2019-09-17 | End: 2020-01-27

## 2019-09-17 NOTE — TELEPHONE ENCOUNTER
hydrochlorothiazide & Synthroid  Prescription approved per [facility] Refill Protocol.    Pretty Dumont, RN, BSN

## 2019-09-25 DIAGNOSIS — E87.6 HYPOKALEMIA: ICD-10-CM

## 2019-09-26 RX ORDER — POTASSIUM CHLORIDE 1500 MG/1
20 TABLET, EXTENDED RELEASE ORAL DAILY
Qty: 90 TABLET | Refills: 1 | Status: SHIPPED | OUTPATIENT
Start: 2019-09-26 | End: 2020-12-07

## 2019-09-26 NOTE — TELEPHONE ENCOUNTER
Potassium  Routing refill request to provider for review/approval because:  A break in medication    Pretty Dumont, RN, BSN

## 2019-10-27 DIAGNOSIS — I10 ESSENTIAL HYPERTENSION: ICD-10-CM

## 2019-10-27 DIAGNOSIS — M79.7 FIBROMYALGIA: ICD-10-CM

## 2019-10-28 RX ORDER — CYCLOBENZAPRINE HCL 10 MG
TABLET ORAL
Qty: 90 TABLET | Refills: 1 | Status: SHIPPED | OUTPATIENT
Start: 2019-10-28 | End: 2020-05-08

## 2019-10-28 RX ORDER — HYDROCHLOROTHIAZIDE 25 MG/1
TABLET ORAL
Qty: 30 TABLET | Refills: 0 | Status: SHIPPED | OUTPATIENT
Start: 2019-10-28 | End: 2019-11-11

## 2019-10-28 NOTE — TELEPHONE ENCOUNTER
Pending Prescriptions:                       Disp   Refills    hydrochlorothiazide (HYDRODIURIL) 25 MG t*90 tab*3            Sig: TAKE ONE TABLET BY MOUTH ONCE DAILY    Medication is being filled for 1 time refill only due to:  Patient needs labs creatinine, potassium, sodium.        cyclobenzaprine (FLEXERIL) 10 MG tablet [*90 tab*1            Sig: TAKE ONE TABLET BY MOUTH AT BEDTIME        Last Written Prescription Date:  3/13/19  Last Fill Quantity: 90,   # refills: 1  Last Office Visit: 5/13/19 -- evisit  Future Office visit:       Routing refill request to provider for review/approval because:  Drug not on the [facility], [facility] or [facility] refill protocol or controlled substance      Maida Grant, RN, BSN

## 2019-11-03 ENCOUNTER — HEALTH MAINTENANCE LETTER (OUTPATIENT)
Age: 44
End: 2019-11-03

## 2019-11-07 ENCOUNTER — ANCILLARY PROCEDURE (OUTPATIENT)
Dept: GENERAL RADIOLOGY | Facility: OTHER | Age: 44
End: 2019-11-07
Attending: ORTHOPAEDIC SURGERY
Payer: COMMERCIAL

## 2019-11-07 ENCOUNTER — OFFICE VISIT (OUTPATIENT)
Dept: ORTHOPEDICS | Facility: OTHER | Age: 44
End: 2019-11-07
Payer: COMMERCIAL

## 2019-11-07 VITALS
DIASTOLIC BLOOD PRESSURE: 86 MMHG | SYSTOLIC BLOOD PRESSURE: 134 MMHG | RESPIRATION RATE: 16 BRPM | BODY MASS INDEX: 31.43 KG/M2 | HEIGHT: 63 IN | HEART RATE: 98 BPM

## 2019-11-07 DIAGNOSIS — G56.21 CUBITAL TUNNEL SYNDROME ON RIGHT: Primary | ICD-10-CM

## 2019-11-07 DIAGNOSIS — M79.641 RIGHT HAND PAIN: ICD-10-CM

## 2019-11-07 PROCEDURE — 73130 X-RAY EXAM OF HAND: CPT | Mod: RT

## 2019-11-07 PROCEDURE — 99213 OFFICE O/P EST LOW 20 MIN: CPT | Performed by: ORTHOPAEDIC SURGERY

## 2019-11-07 ASSESSMENT — PAIN SCALES - GENERAL: PAINLEVEL: MILD PAIN (2)

## 2019-11-07 NOTE — PROGRESS NOTES
ORTHOPEDIC CONSULT      Chief Complaint: Elizabeth Goodson is a 44 year old female who is being seen for   Chief Complaint   Patient presents with     Musculoskeletal Problem     right hand pain       History of Present Illness:   Over last 6-8 months she is noticed discomfort associated with numbness and tingling in her small and ring finger.  Rates it as mild to moderate at times.  Worse with elbow flexion such as when talking on the phone.  Symptoms will last approximately 45 minutes throughout the day when they flare.  She taken some ibuprofen.  No other treatments.  No weakness.  No elbow pain.  No locking or catching to her digits.        Patient's past medical, surgical, social and family histories reviewed.     Past Medical History:   Diagnosis Date     Adnexal mass 2017    not seen on 7/10/18 abdominal CT     H/O transfusion of whole blood 2001    with child labor/hysterectomy     Hemoptysis 11/12, 11/2015-mild    last episodes, mild, has had massive in HX     HTN (hypertension)      Incidental lung nodule, less than or equal to 3mm 11/12    NICHOLAS     Massive hemoptysis 11/12    due to pneumonia     Migraine headache      Tobacco abuse, in remission     quit 2012       Past Surgical History:   Procedure Laterality Date     CHOLECYSTECTOMY, LAPOROSCOPIC  2007    Cholecystectomy, Laparoscopic     COMBINED ESOPHAGOSCOPY, GASTROSCOPY, DUODENOSCOPY (EGD) WITH CO2 INSUFFLATION N/A 11/16/2018    Procedure: Egd;  Surgeon: Richard Aviles MD;  [facility]     ESOPHAGOSCOPY, GASTROSCOPY, DUODENOSCOPY (EGD), COMBINED N/A 11/16/2018    Procedure: COMBINED ESOPHAGOSCOPY, GASTROSCOPY, DUODENOSCOPY (EGD), BIOPSY SINGLE OR MULTIPLE;  Surgeon: Richard Aviles MD;  Location: [facility]     EXCISE MASS FOOT  11/22/2013    Procedure: EXCISE MASS FOOT;  Right Foot Soft Tissue Mass Excision;  Surgeon: Jose Cruz Garcia DPM;  Location: [facility]     EXCISE MASS FOOT Right 7/17/2015    Procedure: EXCISE MASS  FOOT;  Surgeon: Pierre Adan DPM;  [facility]: [facility]     HYSTERECTOMY, VAGINAL  2002     LAPAROSCOPIC HERNIORRHAPHY VENTRAL N/A 2/24/2016    Procedure: LAPAROSCOPIC HERNIORRHAPHY VENTRAL;  Surgeon: Lars Dahl MD;  [facility]: [facility]     PANNICULECTOMY  07/28/2017    with liposuction, and hernia revision Dr. Heath     RELEASE TRIGGER FINGER Left 5/4/2018    Procedure: RELEASE TRIGGER FINGER;  Trigger Finger Release Left ring finger ;  Surgeon: Aaron Orta DO;  Location[facility]       Medications:  albuterol (PROAIR HFA/PROVENTIL HFA/VENTOLIN HFA) 108 (90 Base) MCG/ACT inhaler, Inhale 2 puffs into the lungs every 6 hours as needed for shortness of breath / dyspnea or wheezing  albuterol (PROAIR HFA/PROVENTIL HFA/VENTOLIN HFA) 108 (90 Base) MCG/ACT inhaler, Inhale 2 puffs into the lungs every 6 hours as needed for shortness of breath / dyspnea or wheezing  azithromycin (ZITHROMAX) 250 MG tablet, 2 tablets daily on day one, then one tablet po daily until gone.  CARTIA  MG 24 hr capsule, TAKE ONE CAPSULE BY MOUTH ONCE DAILY  cyclobenzaprine (FLEXERIL) 10 MG tablet, TAKE ONE TABLET BY MOUTH AT BEDTIME  doxycycline (VIBRAMYCIN) 100 MG capsule, Take 1 capsule (100 mg) by mouth 2 times daily  EPINEPHrine (EPIPEN 2-KIMBERLEE) 0.3 MG/0.3ML injection 2-pack, Inject 0.3 mLs (0.3 mg) into the muscle once as needed for anaphylaxis  hydrochlorothiazide (HYDRODIURIL) 25 MG tablet, TAKE ONE TABLET BY MOUTH ONCE DAILY  hydrochlorothiazide (HYDRODIURIL) 25 MG tablet, TAKE ONE TABLET BY MOUTH ONCE DAILY  leflunomide (ARAVA) 20 MG tablet, Take 1 tablet (20 mg) by mouth daily  levothyroxine (SYNTHROID/LEVOTHROID) 112 MCG tablet, Take 1 tablet (112 mcg) by mouth daily  losartan (COZAAR) 50 MG tablet, TAKE ONE TABLET BY MOUTH ONCE DAILY  metoprolol succinate ER (TOPROL-XL) 25 MG 24 hr tablet, TAKE ONE TABLET BY MOUTH ONCE DAILY  omeprazole (PRILOSEC) 40 MG DR capsule, Take 1 capsule (40 mg) by mouth daily Take 30-60  minutes before a meal.  potassium chloride ER (KLOR-CON) 20 MEQ CR tablet, Take 1 tablet (20 mEq) by mouth daily  sucralfate (CARAFATE) 1 GM tablet, Take 1 tablet (1 g) by mouth 4 times daily (before meals and nightly)    No current facility-administered medications on file prior to visit.       Allergies   Allergen Reactions     Penicillins Anaphylaxis     Walnuts [Nuts] Anaphylaxis     All tree nuts     Norvasc [Amlodipine] Rash     Norvasc- rash at 10 mg dosing       Social History     Occupational History     Comment[employer] [employer]   Tobacco Use     Smoking status: Former Smoker     Years: 13.00     Types: Cigarettes     Last attempt to quit: 10/1/2012     Years since quittin.1     Smokeless tobacco: Never Used   Substance and Sexual Activity     Alcohol use: Yes     Alcohol/week: 0.0 standard drinks     Comment: occ/ 1-2 per month     Drug use: No     Sexual activity: Yes     Partners: Male     Birth control/protection: Surgical, Female Surgical     Comment: hysterectomy       Family History   Problem Relation Age of Onset     Asthma Father      C.A.D. Father      Diabetes No family hx of      Hypertension No family hx of      Cerebrovascular Disease No family hx of      Breast Cancer No family hx of      Cancer - colorectal No family hx of      Prostate Cancer No family hx of      Alcohol/Drug No family hx of      Allergies No family hx of      Alzheimer Disease No family hx of      Anesthesia Reaction No family hx of      Arthritis No family hx of      Blood Disease No family hx of      Cancer No family hx of      Cardiovascular No family hx of      Circulatory No family hx of      Congenital Anomalies No family hx of      Connective Tissue Disorder No family hx of      Neurologic Disorder No family hx of      Depression No family hx of      Endocrine Disease No family hx of      Eye Disorder No family hx of      Genetic Disorder No family hx of      Gastrointestinal Disease No family  "hx of      Genitourinary Problems No family hx of      Gynecology No family hx of      Heart Disease No family hx of      Lipids No family hx of      Musculoskeletal Disorder No family hx of      Obesity No family hx of      Osteoporosis No family hx of      Psychotic Disorder No family hx of      Respiratory No family hx of      Hearing Loss No family hx of      Family History Negative No family hx of      Thyroid Disease No family hx of      Colon Cancer No family hx of      Hyperlipidemia No family hx of      Coronary Artery Disease No family hx of      Other Cancer No family hx of      Anxiety Disorder No family hx of      Mental Illness No family hx of      Substance Abuse No family hx of        REVIEW OF SYSTEMS  10 point review systems performed otherwise negative as noted as per history of present illness.    Physical Exam:  Vitals: /86   Pulse 98   Resp 16   Ht 1.59 m (5' 2.6\")   BMI 31.43 kg/m    BMI= Body mass index is 31.43 kg/m .  Constitutional: healthy, alert and no acute distress   Psychiatric: mentation appears normal and affect normal/bright  NEURO: no focal deficits  RESP: Normal with easy respirations and no use of accessory muscles to breathe, no audible wheezing or retractions  CV: RUE:  no edema         Regular rate and rhythm by palpation  SKIN: No erythema, rashes, excoriation, or breakdown. No evidence of infection.   JOINT/EXTREMITIES:right hand and wrist: No gross deformity or atrophy.  Full motion actively throughout.  No focal areas of weakness including ulnar motor testing.  Fingers are cool (consistent with current air temperature).  There is no focal areas discomfort.  Negative Tinel's at the elbow.     GAIT: not tested     Diagnostic Modalities:  right wrist X-ray: No fracture, dislocation and or lesions. Normal alignment.  Independent visualization of the images was performed.      Impression: right intermittent ulnar neuropathy-potentially cubital tunnel " syndrome    Plan:  All of the above pertinent physical exam and imaging modalities findings was reviewed with Elizabeth.    I reviewed her findings.  Nothing focal on exam today.  By history numbness and tingling into the small and ring finger worse with elbow flexion is consistent with cubital tunnel syndrome.  Recommend conservative care including occupational therapy, wrist bracing, elbow splinting at night.  Rationale and pathology discussed.    Could consider EMG if continued symptoms    Return to clinic 4-6 , week(s), PRN, or sooner as needed for changes.  Re-x-ray on return: No    Ashok Orta D.O.

## 2019-11-07 NOTE — LETTER
11/7/2019         RE: Elizabeth Goodson  [address] [facility] [id number]        Dear Colleague,    Thank you for referring your patient, Elizabeth Goodson, to the [facility]. Please see a copy of my visit note below.    ORTHOPEDIC CONSULT      Chief Complaint: Elizabeth Goodson is a 44 year old female who is being seen for   Chief Complaint   Patient presents with     Musculoskeletal Problem     right hand pain       History of Present Illness:   Over last 6-8 months she is noticed discomfort associated with numbness and tingling in her small and ring finger.  Rates it as mild to moderate at times.  Worse with elbow flexion such as when talking on the phone.  Symptoms will last approximately 45 minutes throughout the day when they flare.  She taken some ibuprofen.  No other treatments.  No weakness.  No elbow pain.  No locking or catching to her digits.        Patient's past medical, surgical, social and family histories reviewed.     Past Medical History:   Diagnosis Date     Adnexal mass 2017    not seen on 7/10/18 abdominal CT     H/O transfusion of whole blood 2001    with child labor/hysterectomy     Hemoptysis 11/12, 11/2015-mild    last episodes, mild, has had massive in HX     HTN (hypertension)      Incidental lung nodule, less than or equal to 3mm 11/12    NICHOLAS     Massive hemoptysis 11/12    due to pneumonia     Migraine headache      Tobacco abuse, in remission     quit 2012       Past Surgical History:   Procedure Laterality Date     CHOLECYSTECTOMY, LAPOROSCOPIC  2007    Cholecystectomy, Laparoscopic     COMBINED ESOPHAGOSCOPY, GASTROSCOPY, DUODENOSCOPY (EGD) WITH CO2 INSUFFLATION N/A 11/16/2018    Procedure: Egd;  Surgeon: Richard Aviles MD;  Location:  OR     ESOPHAGOSCOPY, GASTROSCOPY, DUODENOSCOPY (EGD), COMBINED N/A 11/16/2018    Procedure: COMBINED ESOPHAGOSCOPY, GASTROSCOPY, DUODENOSCOPY (EGD), BIOPSY SINGLE OR MULTIPLE;  Surgeon: Richard Aviles  MD Trent;  [facility]: [facility]     EXCISE MASS FOOT  11/22/2013    Procedure: EXCISE MASS FOOT;  Right Foot Soft Tissue Mass Excision;  Surgeon: Jose Cruz Garcia DPM;  [facility]     EXCISE MASS FOOT Right 7/17/2015    Procedure: EXCISE MASS FOOT;  Surgeon: Pierre Adan DPM;  [facility]     HYSTERECTOMY, VAGINAL  2002     LAPAROSCOPIC HERNIORRHAPHY VENTRAL N/A 2/24/2016    Procedure: LAPAROSCOPIC HERNIORRHAPHY VENTRAL;  Surgeon: Lars Dahl MD;  [facility]     PANNICULECTOMY  07/28/2017    with liposuction, and hernia revision Dr. Heath     RELEASE TRIGGER FINGER Left 5/4/2018    Procedure: RELEASE TRIGGER FINGER;  Trigger Finger Release Left ring finger ;  Surgeon: Aaron Orta DO;  [facility]       Medications:  albuterol (PROAIR HFA/PROVENTIL HFA/VENTOLIN HFA) 108 (90 Base) MCG/ACT inhaler, Inhale 2 puffs into the lungs every 6 hours as needed for shortness of breath / dyspnea or wheezing  albuterol (PROAIR HFA/PROVENTIL HFA/VENTOLIN HFA) 108 (90 Base) MCG/ACT inhaler, Inhale 2 puffs into the lungs every 6 hours as needed for shortness of breath / dyspnea or wheezing  azithromycin (ZITHROMAX) 250 MG tablet, 2 tablets daily on day one, then one tablet po daily until gone.  CARTIA  MG 24 hr capsule, TAKE ONE CAPSULE BY MOUTH ONCE DAILY  cyclobenzaprine (FLEXERIL) 10 MG tablet, TAKE ONE TABLET BY MOUTH AT BEDTIME  doxycycline (VIBRAMYCIN) 100 MG capsule, Take 1 capsule (100 mg) by mouth 2 times daily  EPINEPHrine (EPIPEN 2-KIMBERLEE) 0.3 MG/0.3ML injection 2-pack, Inject 0.3 mLs (0.3 mg) into the muscle once as needed for anaphylaxis  hydrochlorothiazide (HYDRODIURIL) 25 MG tablet, TAKE ONE TABLET BY MOUTH ONCE DAILY  hydrochlorothiazide (HYDRODIURIL) 25 MG tablet, TAKE ONE TABLET BY MOUTH ONCE DAILY  leflunomide (ARAVA) 20 MG tablet, Take 1 tablet (20 mg) by mouth daily  levothyroxine (SYNTHROID/LEVOTHROID) 112 MCG tablet, Take 1 tablet (112 mcg) by mouth  daily  losartan (COZAAR) 50 MG tablet, TAKE ONE TABLET BY MOUTH ONCE DAILY  metoprolol succinate ER (TOPROL-XL) 25 MG 24 hr tablet, TAKE ONE TABLET BY MOUTH ONCE DAILY  omeprazole (PRILOSEC) 40 MG DR capsule, Take 1 capsule (40 mg) by mouth daily Take 30-60 minutes before a meal.  potassium chloride ER (KLOR-CON) 20 MEQ CR tablet, Take 1 tablet (20 mEq) by mouth daily  sucralfate (CARAFATE) 1 GM tablet, Take 1 tablet (1 g) by mouth 4 times daily (before meals and nightly)    No current facility-administered medications on file prior to visit.       Allergies   Allergen Reactions     Penicillins Anaphylaxis     Walnuts [Nuts] Anaphylaxis     All tree nuts     Norvasc [Amlodipine] Rash     Norvasc- rash at 10 mg dosing       Social History     Occupational History     Comment: [employer]   Tobacco Use     Smoking status: Former Smoker     Years: 13.00     Types: Cigarettes     Last attempt to quit: 10/1/2012     Years since quittin.1     Smokeless tobacco: Never Used   Substance and Sexual Activity     Alcohol use: Yes     Alcohol/week: 0.0 standard drinks     Comment: occ/ 1-2 per month     Drug use: No     Sexual activity: Yes     Partners: Male     Birth control/protection: Surgical, Female Surgical     Comment: hysterectomy       Family History   Problem Relation Age of Onset     Asthma Father      C.A.D. Father      Diabetes No family hx of      Hypertension No family hx of      Cerebrovascular Disease No family hx of      Breast Cancer No family hx of      Cancer - colorectal No family hx of      Prostate Cancer No family hx of      Alcohol/Drug No family hx of      Allergies No family hx of      Alzheimer Disease No family hx of      Anesthesia Reaction No family hx of      Arthritis No family hx of      Blood Disease No family hx of      Cancer No family hx of      Cardiovascular No family hx of      Circulatory No family hx of      Congenital Anomalies No family hx of       "Connective Tissue Disorder No family hx of      Neurologic Disorder No family hx of      Depression No family hx of      Endocrine Disease No family hx of      Eye Disorder No family hx of      Genetic Disorder No family hx of      Gastrointestinal Disease No family hx of      Genitourinary Problems No family hx of      Gynecology No family hx of      Heart Disease No family hx of      Lipids No family hx of      Musculoskeletal Disorder No family hx of      Obesity No family hx of      Osteoporosis No family hx of      Psychotic Disorder No family hx of      Respiratory No family hx of      Hearing Loss No family hx of      Family History Negative No family hx of      Thyroid Disease No family hx of      Colon Cancer No family hx of      Hyperlipidemia No family hx of      Coronary Artery Disease No family hx of      Other Cancer No family hx of      Anxiety Disorder No family hx of      Mental Illness No family hx of      Substance Abuse No family hx of        REVIEW OF SYSTEMS  10 point review systems performed otherwise negative as noted as per history of present illness.    Physical Exam:  Vitals: /86   Pulse 98   Resp 16   Ht 1.59 m (5' 2.6\")   BMI 31.43 kg/m     BMI= Body mass index is 31.43 kg/m .  Constitutional: healthy, alert and no acute distress   Psychiatric: mentation appears normal and affect normal/bright  NEURO: no focal deficits  RESP: Normal with easy respirations and no use of accessory muscles to breathe, no audible wheezing or retractions  CV: RUE:  no edema         Regular rate and rhythm by palpation  SKIN: No erythema, rashes, excoriation, or breakdown. No evidence of infection.   JOINT/EXTREMITIES:right hand and wrist: No gross deformity or atrophy.  Full motion actively throughout.  No focal areas of weakness including ulnar motor testing.  Fingers are cool (consistent with current air temperature).  There is no focal areas discomfort.  Negative Tinel's at the elbow.     GAIT: " not tested     Diagnostic Modalities:  right wrist X-ray: No fracture, dislocation and or lesions. Normal alignment.  Independent visualization of the images was performed.      Impression: right intermittent ulnar neuropathy-potentially cubital tunnel syndrome    Plan:  All of the above pertinent physical exam and imaging modalities findings was reviewed with Elizabeth.    I reviewed her findings.  Nothing focal on exam today.  By history numbness and tingling into the small and ring finger worse with elbow flexion is consistent with cubital tunnel syndrome.  Recommend conservative care including occupational therapy, wrist bracing, elbow splinting at night.  Rationale and pathology discussed.    Could consider EMG if continued symptoms    Return to clinic 4-6 , week(s), PRN, or sooner as needed for changes.  Re-x-ray on return: No    Ashok Orta D.O.    Again, thank you for allowing me to participate in the care of your patient.        Sincerely,        Aaron Orta, DO

## 2019-11-11 ENCOUNTER — OFFICE VISIT (OUTPATIENT)
Dept: FAMILY MEDICINE | Facility: CLINIC | Age: 44
End: 2019-11-11
Payer: COMMERCIAL

## 2019-11-11 VITALS
RESPIRATION RATE: 18 BRPM | BODY MASS INDEX: 32.66 KG/M2 | HEART RATE: 77 BPM | TEMPERATURE: 98.4 F | OXYGEN SATURATION: 99 % | WEIGHT: 184.3 LBS | DIASTOLIC BLOOD PRESSURE: 64 MMHG | HEIGHT: 63 IN | SYSTOLIC BLOOD PRESSURE: 102 MMHG

## 2019-11-11 DIAGNOSIS — Z23 NEED FOR PROPHYLACTIC VACCINATION AND INOCULATION AGAINST INFLUENZA: ICD-10-CM

## 2019-11-11 DIAGNOSIS — K44.9 HIATAL HERNIA: ICD-10-CM

## 2019-11-11 DIAGNOSIS — J40 BRONCHITIS: Primary | ICD-10-CM

## 2019-11-11 DIAGNOSIS — R04.2 COUGH WITH HEMOPTYSIS: ICD-10-CM

## 2019-11-11 DIAGNOSIS — I10 HTN, GOAL BELOW 140/90: ICD-10-CM

## 2019-11-11 DIAGNOSIS — K21.9 GASTROESOPHAGEAL REFLUX DISEASE WITHOUT ESOPHAGITIS: ICD-10-CM

## 2019-11-11 PROCEDURE — 99214 OFFICE O/P EST MOD 30 MIN: CPT | Mod: 25 | Performed by: NURSE PRACTITIONER

## 2019-11-11 PROCEDURE — 90686 IIV4 VACC NO PRSV 0.5 ML IM: CPT | Performed by: NURSE PRACTITIONER

## 2019-11-11 PROCEDURE — 90471 IMMUNIZATION ADMIN: CPT | Performed by: NURSE PRACTITIONER

## 2019-11-11 PROCEDURE — 90472 IMMUNIZATION ADMIN EACH ADD: CPT | Performed by: NURSE PRACTITIONER

## 2019-11-11 PROCEDURE — 90670 PCV13 VACCINE IM: CPT | Performed by: NURSE PRACTITIONER

## 2019-11-11 RX ORDER — CODEINE PHOSPHATE AND GUAIFENESIN 10; 100 MG/5ML; MG/5ML
1-2 SOLUTION ORAL
Qty: 100 ML | Refills: 0 | Status: SHIPPED | OUTPATIENT
Start: 2019-11-11 | End: 2020-01-27

## 2019-11-11 RX ORDER — AZITHROMYCIN 250 MG/1
TABLET, FILM COATED ORAL
Qty: 6 TABLET | Refills: 0 | Status: SHIPPED | OUTPATIENT
Start: 2019-11-11 | End: 2020-01-27

## 2019-11-11 RX ORDER — OMEPRAZOLE 40 MG/1
40 CAPSULE, DELAYED RELEASE ORAL DAILY
Qty: 90 CAPSULE | Refills: 3 | Status: SHIPPED | OUTPATIENT
Start: 2019-11-11 | End: 2020-11-23

## 2019-11-11 ASSESSMENT — MIFFLIN-ST. JEOR: SCORE: 1453.76

## 2019-11-11 NOTE — PROGRESS NOTES
Subjective     Elizabeth Goodson is a 44 year old female who presents to clinic today for the following health issues:    HPI   Hypertension Follow-up    Do you check your blood pressure regularly outside of the clinic? No     Are you following a low salt diet? Yes    Are your blood pressures ever more than 140 on the top number (systolic) OR more   than 90 on the bottom number (diastolic), for example 140/90? No      Acute Illness   Acute illness concerns: cough and  sinus pain   Onset: 1 week     Fever: YES. 101 over the weekend.     Chills/Sweats: YES.both.    Headache (location?): YES. Sinus headaches.     Sinus Pressure:YES    Conjunctivitis:  no    Ear Pain: YES: bilateral    Rhinorrhea: no     Congestion: YES    Sore Throat: YES, started with sore throat , now subsided.      Cough: YES. productive cough.     Wheeze: no     Decreased Appetite: YES    Nausea: YES    Vomiting: YES. Only if coughing too much.     Diarrhea:  no     Dysuria/Freq.: no     Fatigue/Achiness: YES.both.    Sick/Strep Exposure: YES. Patient works at a [employer].      Therapies Tried and outcome: Dayquil , Nyquil, and IBU.         Patient Active Problem List   Diagnosis     Syncope     Raynaud phenomenon     CARDIOVASCULAR SCREENING; LDL GOAL LESS THAN 160     Strain of neck muscle     Migraine headache     Cough with hemoptysis     Incidental lung nodule, less than or equal to 3mm     Need for prophylactic vaccination and inoculation against influenza     Fibromyalgia     HTN, goal below 140/90     H/O: hysterectomy     GERD (gastroesophageal reflux disease)     Edema     High risk medication use     Hypothyroidism     Ventral hernia without obstruction or gangrene     Hemoptysis     Rheumatoid arthritis, involving unspecified site, unspecified rheumatoid factor presence (H)     Penicillin allergy     Peanut allergy     Tree nut allergy     Anaphylaxis, subsequent encounter     Itching     History of hemoptysis     Atypical chest pain      Mass of finger, left     Cubital tunnel syndrome on right     Past Surgical History:   Procedure Laterality Date     CHOLECYSTECTOMY, LAPOROSCOPIC      Cholecystectomy, Laparoscopic     COMBINED ESOPHAGOSCOPY, GASTROSCOPY, DUODENOSCOPY (EGD) WITH CO2 INSUFFLATION N/A 2018    Procedure: Egd;  Surgeon: Richard Aviles MD;  Location: [facility]     ESOPHAGOSCOPY, GASTROSCOPY, DUODENOSCOPY (EGD), COMBINED N/A 2018    Procedure: COMBINED ESOPHAGOSCOPY, GASTROSCOPY, DUODENOSCOPY (EGD), BIOPSY SINGLE OR MULTIPLE;  Surgeon: Richard Aviles MD;  Location: [facility]     EXCISE MASS FOOT  2013    Procedure: EXCISE MASS FOOT;  Right Foot Soft Tissue Mass Excision;  Surgeon: Jose Cruz Garcia DPM;  Location: [facility]     EXCISE MASS FOOT Right 2015    Procedure: EXCISE MASS FOOT;  Surgeon: Pierre Adan DPM;  Location: [facility]     HYSTERECTOMY, VAGINAL  2002     LAPAROSCOPIC HERNIORRHAPHY VENTRAL N/A 2016    Procedure: LAPAROSCOPIC HERNIORRHAPHY VENTRAL;  Surgeon: Lars Dahl MD;  Location: [facility]     PANNICULECTOMY  2017    with liposuction, and hernia revision Dr. Heath     RELEASE TRIGGER FINGER Left 2018    Procedure: RELEASE TRIGGER FINGER;  Trigger Finger Release Left ring finger ;  Surgeon: Aaron Orta DO;  Location: [facility]       Social History     Tobacco Use     Smoking status: Former Smoker     Years: 13.00     Types: Cigarettes     Last attempt to quit: 10/1/2012     Years since quittin.1     Smokeless tobacco: Never Used   Substance Use Topics     Alcohol use: Yes     Alcohol/week: 0.0 standard drinks     Comment: occ/ 1-2 per month     Family History   Problem Relation Age of Onset     Asthma Father      C.A.D. Father      Diabetes No family hx of      Hypertension No family hx of      Cerebrovascular Disease No family hx of      Breast Cancer No family hx of      Cancer - colorectal No family hx of      Prostate Cancer No family  hx of      Alcohol/Drug No family hx of      Allergies No family hx of      Alzheimer Disease No family hx of      Anesthesia Reaction No family hx of      Arthritis No family hx of      Blood Disease No family hx of      Cancer No family hx of      Cardiovascular No family hx of      Circulatory No family hx of      Congenital Anomalies No family hx of      Connective Tissue Disorder No family hx of      Neurologic Disorder No family hx of      Depression No family hx of      Endocrine Disease No family hx of      Eye Disorder No family hx of      Genetic Disorder No family hx of      Gastrointestinal Disease No family hx of      Genitourinary Problems No family hx of      Gynecology No family hx of      Heart Disease No family hx of      Lipids No family hx of      Musculoskeletal Disorder No family hx of      Obesity No family hx of      Osteoporosis No family hx of      Psychotic Disorder No family hx of      Respiratory No family hx of      Hearing Loss No family hx of      Family History Negative No family hx of      Thyroid Disease No family hx of      Colon Cancer No family hx of      Hyperlipidemia No family hx of      Coronary Artery Disease No family hx of      Other Cancer No family hx of      Anxiety Disorder No family hx of      Mental Illness No family hx of      Substance Abuse No family hx of          Current Outpatient Medications   Medication Sig Dispense Refill     azithromycin (ZITHROMAX) 250 MG tablet 2 tablets daily on day one, then one tablet po daily until gone. 6 tablet 0     cyclobenzaprine (FLEXERIL) 10 MG tablet TAKE ONE TABLET BY MOUTH AT BEDTIME 90 tablet 1     EPINEPHrine (EPIPEN 2-KIMBERLEE) 0.3 MG/0.3ML injection 2-pack Inject 0.3 mLs (0.3 mg) into the muscle once as needed for anaphylaxis 0.6 mL 1     guaiFENesin-codeine (ROBITUSSIN AC) 100-10 MG/5ML solution Take 5-10 mLs by mouth nightly as needed for cough 100 mL 0     hydrochlorothiazide (HYDRODIURIL) 25 MG tablet TAKE ONE TABLET BY  MOUTH ONCE DAILY 90 tablet 0     leflunomide (ARAVA) 20 MG tablet Take 1 tablet (20 mg) by mouth daily 30 tablet 2     levothyroxine (SYNTHROID/LEVOTHROID) 112 MCG tablet Take 1 tablet (112 mcg) by mouth daily 90 tablet 1     losartan (COZAAR) 50 MG tablet TAKE ONE TABLET BY MOUTH ONCE DAILY 90 tablet 2     metoprolol succinate ER (TOPROL-XL) 25 MG 24 hr tablet TAKE ONE TABLET BY MOUTH ONCE DAILY 90 tablet 1     omeprazole (PRILOSEC) 40 MG DR capsule Take 1 capsule (40 mg) by mouth daily Take 30-60 minutes before a meal. 90 capsule 3     potassium chloride ER (KLOR-CON) 20 MEQ CR tablet Take 1 tablet (20 mEq) by mouth daily 90 tablet 1     Allergies   Allergen Reactions     Penicillins Anaphylaxis     Walnuts [Nuts] Anaphylaxis     All tree nuts     Norvasc [Amlodipine] Rash     Norvasc- rash at 10 mg dosing     Recent Labs   Lab Test 08/08/19  1002 10/16/18  1526 08/27/18  1041  07/18/18  0925 04/16/18  1530  08/23/16  1401  11/30/15  1449  10/08/14  1114   LDL 67  --   --   --   --   --   --   --   --   --   --  85   HDL 32*  --   --   --   --   --   --   --   --   --   --  39*   TRIG 274*  --   --   --   --   --   --   --   --   --   --  124   ALT  --   --   --   --   --  21  --  24  --  21   < > 20   CR  --  0.90  --   --   --  0.81   < >  --    < >  --    < > 0.85   GFRESTIMATED  --  69  --   --   --  77   < >  --    < >  --    < > 75   GFRESTBLACK  --  83  --   --   --  >90   < >  --    < >  --    < > >90   GFR Calc     POTASSIUM  --  3.5 3.4   < >  --  3.4   < >  --    < >  --    < > 3.8   TSH 3.64  --   --   --  2.39 4.86*   < > 5.27*  --   --    < > 6.94*    < > = values in this interval not displayed.      BP Readings from Last 3 Encounters:   11/11/19 102/64   11/07/19 134/86   03/22/19 110/76    Wt Readings from Last 3 Encounters:   11/11/19 83.6 kg (184 lb 4.8 oz)   03/22/19 79.5 kg (175 lb 3.2 oz)   11/12/18 79.4 kg (175 lb)        Reviewed and updated as needed this visit by  "Provider  Tobacco  Allergies  Meds  Problems  Med Hx  Surg Hx  Fam Hx         Review of Systems   ROS COMP: Constitutional, HEENT, cardiovascular, pulmonary, GI, , musculoskeletal, neuro, skin, endocrine and psych systems are negative, except as otherwise noted.    This document serves as a record of the services and decisions personally performed and made by Keri Yu CNP. It was created on his/her behalf by Deirdre Patterson, trained medical scribe. The creation of this document is based the provider's statements to the medical scribes.    Scribe Deirdre Patterson 10:49 AM, November 11, 2019      Objective    /64   Pulse 77   Temp 98.4  F (36.9  C) (Oral)   Resp 18   Ht 1.59 m (5' 2.6\")   Wt 83.6 kg (184 lb 4.8 oz)   LMP  (LMP Unknown)   SpO2 99%   Breastfeeding? No   BMI 33.07 kg/m    Body mass index is 33.07 kg/m .     Physical Exam   GENERAL APPEARANCE: healthy, alert and no distress, fatigued but non toxic appearing.   EYES: Eyes grossly normal to inspection and conjunctivae and sclerae normal  HENT: ear canals and TM's normal, nose and mouth without ulcers or lesions and nasal mucosa edematous with moderate rhinorrhea, hoarse voice  NECK: no adenopathy, no asymmetry, masses, or scars and thyroid normal to palpation  RESP: lungs clear to auscultation, but fine rhonchi auscultated with coughing, breathing on labored.   CV: regular rates and rhythm, normal S1 S2, no S3 or S4 and no murmur, click or rub    Diagnostic Test Results:  Labs reviewed in Epic  No results found for this or any previous visit (from the past 24 hour(s)).        Assessment & Plan       ICD-10-CM    1. Bronchitis J40 guaiFENesin-codeine (ROBITUSSIN AC) 100-10 MG/5ML solution     azithromycin (ZITHROMAX) 250 MG tablet   2. Cough with hemoptysis R04.2 omeprazole (PRILOSEC) 40 MG DR capsule   3. Hiatal hernia K44.9 omeprazole (PRILOSEC) 40 MG DR capsule   4. Gastroesophageal reflux disease without esophagitis K21.9 " "omeprazole (PRILOSEC) 40 MG DR capsule   5. HTN, goal below 140/90 I10    6. Need for prophylactic vaccination and inoculation against influenza Z23 INFLUENZA VACCINE IM > 6 MONTHS VALENT IIV4 [29823]     Pneumococcal vaccine 13 valent PCV13 IM (Prevnar) [87029]     Vaccine Administration, Initial [76593]     Vaccine Administration, Each Additional [40060]     Reviewed symptoms and etiology patient presents with today. Advised starting Zithromax 250 mg and robitussin--10 mg/5mL solution; Rx provided. Reviewed directions, benefits, and side effects of medication with patient today. Discussed home cares, warning signs to monitor for, and return precautions with patient today. Can hold HCTZ if feeling dehydrated or very ill.     Blood pressure is well controlled within target range.   Patient denies any breakthrough symptoms on Prilosec. Medications are well tolerated with no reported side effects. Plan to continue on current doses; Refills provided.     Pneumonia-13 vaccination and influenza vaccination administered today.    Follow up with PCP in 5 months for medication management visit and routine lab work or if symptoms do not improve or worsen or PRN     BMI:   Estimated body mass index is 33.07 kg/m  as calculated from the following:    Height as of this encounter: 1.59 m (5' 2.6\").    Weight as of this encounter: 83.6 kg (184 lb 4.8 oz).   Weight management plan: Discussed healthy diet and exercise guidelines    The information in this document, created by the medical scribe for me, accurately reflects the services I personally performed and the decisions made by me. I have reviewed and approved this document for accuracy prior to leaving the patient care area.  Keri Yu CNP  10:49 AM, 11/11/19    RAÚL Flor [facility]    "

## 2019-11-11 NOTE — NURSING NOTE
Prior to immunization administration, verified patients identity using patient s name and date of birth. Please see Immunization Activity for additional information.     Screening Questionnaire for Adult Immunization    Are you sick today?   Yes   Do you have allergies to medications, food, a vaccine component or latex?   No   Have you ever had a serious reaction after receiving a vaccination?   No   Do you have a long-term health problem with heart disease, lung disease, asthma, kidney disease, metabolic disease (e.g. diabetes), anemia, or other blood disorder?   No   Do you have cancer, leukemia, HIV/AIDS, or any other immune system problem?   No   In the past 3 months, have you taken medications that affect  your immune system, such as prednisone, other steroids, or anticancer drugs; drugs for the treatment of rheumatoid arthritis, Crohn s disease, or psoriasis; or have you had radiation treatments?   No   Have you had a seizure, or a brain or other nervous system problem?   No   During the past year, have you received a transfusion of blood or blood     products, or been given immune (gamma) globulin or antiviral drug?   No   For women: Are you pregnant or is there a chance you could become        pregnant during the next month?   No   Have you received any vaccinations in the past 4 weeks?   No         Per orders of Dr. MA, injection of flu and PCV13 given by Meche Keyes. Patient instructed to remain in clinic for 15 minutes afterwards, and to report any adverse reaction to me immediately.

## 2019-12-02 ENCOUNTER — TELEPHONE (OUTPATIENT)
Dept: FAMILY MEDICINE | Facility: CLINIC | Age: 44
End: 2019-12-02

## 2019-12-02 DIAGNOSIS — I10 ESSENTIAL HYPERTENSION: ICD-10-CM

## 2019-12-02 DIAGNOSIS — I10 ESSENTIAL HYPERTENSION WITH GOAL BLOOD PRESSURE LESS THAN 140/90: ICD-10-CM

## 2019-12-03 RX ORDER — HYDROCHLOROTHIAZIDE 25 MG/1
TABLET ORAL
Qty: 90 TABLET | Refills: 0 | Status: SHIPPED | OUTPATIENT
Start: 2019-12-03 | End: 2020-03-06

## 2019-12-03 RX ORDER — LOSARTAN POTASSIUM 50 MG/1
TABLET ORAL
Qty: 90 TABLET | Refills: 2 | Status: SHIPPED | OUTPATIENT
Start: 2019-12-03 | End: 2020-12-07

## 2019-12-03 RX ORDER — DILTIAZEM HYDROCHLORIDE 240 MG/1
CAPSULE, EXTENDED RELEASE ORAL
Qty: 90 CAPSULE | Refills: 0 | Status: SHIPPED | OUTPATIENT
Start: 2019-12-03 | End: 2020-01-27

## 2019-12-03 NOTE — TELEPHONE ENCOUNTER
Pending Prescriptions:                       Disp   Refills    losartan (COZAAR) 50 MG tablet [Pharmacy *90 tab*2            Sig: TAKE ONE TABLET BY MOUTH ONCE DAILY      Routing refill request to provider for review/approval because:  Labs not current:  Creatinine and potassium    CARTIA  MG 24 hr capsule [Pharmacy *90 cap*0            Sig: TAKE ONE CAPSULE BY MOUTH ONCE DAILY    Routing refill request to provider for review/approval because:  Drug not active on patient's medication list      hydrochlorothiazide (HYDRODIURIL) 25 MG t*30 tab*0            Sig: TAKE ONE TABLET BY MOUTH ONCE DAILY    Routing refill request to provider for review/approval because:  Labs not current:  Creatinine, potassium and sodium      BP Readings from Last 3 Encounters:   11/11/19 102/64   11/07/19 134/86   03/22/19 110/76       Negar Beckham, RN, BSN

## 2019-12-03 NOTE — TELEPHONE ENCOUNTER
Call pt. Needs to update labs- sorry from me, the potassium was low at last check, and we should re-check it again the next couple weeks. RAÚL Flor CNP

## 2020-01-27 ENCOUNTER — OFFICE VISIT (OUTPATIENT)
Dept: FAMILY MEDICINE | Facility: CLINIC | Age: 45
End: 2020-01-27
Payer: COMMERCIAL

## 2020-01-27 VITALS
BODY MASS INDEX: 33.36 KG/M2 | HEIGHT: 63 IN | HEART RATE: 95 BPM | DIASTOLIC BLOOD PRESSURE: 76 MMHG | TEMPERATURE: 99.6 F | OXYGEN SATURATION: 99 % | SYSTOLIC BLOOD PRESSURE: 128 MMHG | RESPIRATION RATE: 14 BRPM | WEIGHT: 188.3 LBS

## 2020-01-27 DIAGNOSIS — Z91.010 PEANUT ALLERGY: ICD-10-CM

## 2020-01-27 DIAGNOSIS — N32.81 OVERACTIVE BLADDER: ICD-10-CM

## 2020-01-27 DIAGNOSIS — R59.1 LYMPHADENOPATHY: ICD-10-CM

## 2020-01-27 DIAGNOSIS — M06.9 RHEUMATOID ARTHRITIS, INVOLVING UNSPECIFIED SITE, UNSPECIFIED RHEUMATOID FACTOR PRESENCE: ICD-10-CM

## 2020-01-27 DIAGNOSIS — E03.9 HYPOTHYROIDISM, UNSPECIFIED TYPE: ICD-10-CM

## 2020-01-27 DIAGNOSIS — I10 HTN, GOAL BELOW 140/90: ICD-10-CM

## 2020-01-27 DIAGNOSIS — R05.9 COUGH: ICD-10-CM

## 2020-01-27 DIAGNOSIS — Z91.018 TREE NUT ALLERGY: ICD-10-CM

## 2020-01-27 DIAGNOSIS — J01.00 ACUTE NON-RECURRENT MAXILLARY SINUSITIS: Primary | ICD-10-CM

## 2020-01-27 DIAGNOSIS — N39.3 FEMALE STRESS INCONTINENCE: ICD-10-CM

## 2020-01-27 LAB
ANION GAP SERPL CALCULATED.3IONS-SCNC: 7 MMOL/L (ref 3–14)
BASOPHILS # BLD AUTO: 0.1 10E9/L (ref 0–0.2)
BASOPHILS NFR BLD AUTO: 0.6 %
BUN SERPL-MCNC: 9 MG/DL (ref 7–30)
CALCIUM SERPL-MCNC: 9.3 MG/DL (ref 8.5–10.1)
CHLORIDE SERPL-SCNC: 102 MMOL/L (ref 94–109)
CO2 SERPL-SCNC: 30 MMOL/L (ref 20–32)
CREAT SERPL-MCNC: 0.61 MG/DL (ref 0.52–1.04)
DIFFERENTIAL METHOD BLD: NORMAL
EOSINOPHIL # BLD AUTO: 0.1 10E9/L (ref 0–0.7)
EOSINOPHIL NFR BLD AUTO: 1.3 %
ERYTHROCYTE [DISTWIDTH] IN BLOOD BY AUTOMATED COUNT: 14 % (ref 10–15)
GFR SERPL CREATININE-BSD FRML MDRD: >90 ML/MIN/{1.73_M2}
GLUCOSE SERPL-MCNC: 106 MG/DL (ref 70–99)
HCT VFR BLD AUTO: 41.4 % (ref 35–47)
HGB BLD-MCNC: 13.6 G/DL (ref 11.7–15.7)
LYMPHOCYTES # BLD AUTO: 2.4 10E9/L (ref 0.8–5.3)
LYMPHOCYTES NFR BLD AUTO: 28.1 %
MCH RBC QN AUTO: 29.7 PG (ref 26.5–33)
MCHC RBC AUTO-ENTMCNC: 32.9 G/DL (ref 31.5–36.5)
MCV RBC AUTO: 90 FL (ref 78–100)
MONOCYTES # BLD AUTO: 0.6 10E9/L (ref 0–1.3)
MONOCYTES NFR BLD AUTO: 7.3 %
NEUTROPHILS # BLD AUTO: 5.2 10E9/L (ref 1.6–8.3)
NEUTROPHILS NFR BLD AUTO: 62.7 %
PLATELET # BLD AUTO: 418 10E9/L (ref 150–450)
POTASSIUM SERPL-SCNC: 3.2 MMOL/L (ref 3.4–5.3)
RBC # BLD AUTO: 4.58 10E12/L (ref 3.8–5.2)
SODIUM SERPL-SCNC: 139 MMOL/L (ref 133–144)
TSH SERPL DL<=0.005 MIU/L-ACNC: 2.87 MU/L (ref 0.4–4)
WBC # BLD AUTO: 8.4 10E9/L (ref 4–11)

## 2020-01-27 PROCEDURE — 99214 OFFICE O/P EST MOD 30 MIN: CPT | Performed by: NURSE PRACTITIONER

## 2020-01-27 PROCEDURE — 36415 COLL VENOUS BLD VENIPUNCTURE: CPT | Performed by: NURSE PRACTITIONER

## 2020-01-27 PROCEDURE — 80048 BASIC METABOLIC PNL TOTAL CA: CPT | Performed by: NURSE PRACTITIONER

## 2020-01-27 PROCEDURE — 84443 ASSAY THYROID STIM HORMONE: CPT | Performed by: NURSE PRACTITIONER

## 2020-01-27 PROCEDURE — 85025 COMPLETE CBC W/AUTO DIFF WBC: CPT | Performed by: NURSE PRACTITIONER

## 2020-01-27 RX ORDER — EPINEPHRINE 0.3 MG/.3ML
0.3 INJECTION SUBCUTANEOUS
Qty: 0.6 ML | Refills: 1 | Status: SHIPPED | OUTPATIENT
Start: 2020-01-27 | End: 2020-12-07

## 2020-01-27 RX ORDER — CLARITHROMYCIN 500 MG
500 TABLET ORAL 2 TIMES DAILY
Qty: 20 TABLET | Refills: 0 | Status: SHIPPED | OUTPATIENT
Start: 2020-01-27 | End: 2020-02-24

## 2020-01-27 ASSESSMENT — MIFFLIN-ST. JEOR: SCORE: 1470.31

## 2020-01-27 NOTE — PATIENT INSTRUCTIONS
Monitor bump for another 3 weeks. If there is still no change return for a follow-up for further evaluation. Try not to touch it    I will give you a referral for urology to discuss options for a urinary symptoms.

## 2020-01-29 ENCOUNTER — TELEPHONE (OUTPATIENT)
Dept: FAMILY MEDICINE | Facility: CLINIC | Age: 45
End: 2020-01-29

## 2020-01-29 NOTE — TELEPHONE ENCOUNTER
Reason for call:  Patient reporting a symptom    Symptom or request: upset stomach and diarrhea    Duration (how long have symptoms been present): since Monday    Have you been treated for this before? No    Additional comments: is this a side effect of the antibiotic that Keri put her on?    Phone Number patient can be reached at:  [phone]     Best Time:      Can we leave a detailed message on this number:  NO    Call taken on 1/29/2020 at 3:30 PM by Kimberlee Ng

## 2020-01-29 NOTE — TELEPHONE ENCOUNTER
Spoke to patient.     Started right after started the antibiotics. Usually within an hour after taking the antibiotic symptoms start. Tend to last quite a while.    Upset stomach, affecting taste in her mouth, and diarrhea.   States symptoms are manageable at that time.     Per Up to Date symptoms are consistent with side effects of medication.   RN advised some home to assist with managing symptoms and advised to call back if symptoms are not manageable for patient. Patient states understanding.     Raquel Hendrix RN BSN

## 2020-01-30 DIAGNOSIS — I10 HTN, GOAL BELOW 140/90: ICD-10-CM

## 2020-01-30 RX ORDER — METOPROLOL SUCCINATE 25 MG/1
TABLET, EXTENDED RELEASE ORAL
Qty: 90 TABLET | Refills: 1 | Status: SHIPPED | OUTPATIENT
Start: 2020-01-30 | End: 2020-10-26

## 2020-01-30 NOTE — TELEPHONE ENCOUNTER
"Metoprolol  Last Written Prescription Date:  03/21/2019  Last Fill Quantity: 90,  # refills: 1   Last office visit: 1/27/2020 with prescribing provider:  Gigi   Future Office Visit:  None  Prescription approved per [facility] [place].    Requested Prescriptions   Pending Prescriptions Disp Refills     metoprolol succinate ER (TOPROL-XL) 25 MG 24 hr tablet [Pharmacy Med Name: METOPROLOL SUCCINATE ER 25MG TB24] 90 tablet 1     Sig: TAKE ONE TABLET BY MOUTH ONCE DAILY       Beta-Blockers Protocol Passed - 1/30/2020  6:06 AM        Passed - Blood pressure under 140/90 in past 12 months     BP Readings from Last 3 Encounters:   01/27/20 128/76   11/11/19 102/64   11/07/19 134/86           Passed - Patient is age 6 or older        Passed - Recent (12 mo) or future (30 days) visit within the authorizing provider's specialty     Patient has had an office visit with the authorizing provider or a provider within the authorizing providers department within the previous 12 mos or has a future within next 30 days. See \"Patient Info\" tab in inbasket, or \"Choose Columns\" in Meds & Orders section of the refill encounter.          Passed - Medication is active on med list      Awa Contreras RN   " No significant past surgical history

## 2020-02-02 ENCOUNTER — MYC MEDICAL ADVICE (OUTPATIENT)
Dept: FAMILY MEDICINE | Facility: CLINIC | Age: 45
End: 2020-02-02

## 2020-02-03 NOTE — TELEPHONE ENCOUNTER
Called patient.   Had ED visit.   Having diarrhea on Cleocin.   Will see pt. In 2 weeks. Advised to stop Cleocin, and monitor neck symptoms. Warning signs discussed. Son has Influenza at home - started this weekend.   RAÚL Flor CNP

## 2020-02-20 NOTE — PROGRESS NOTES
Subjective     Elizabeth Goodson is a 44 year old female who presents to clinic today for the following health issues:    History of Present Illness        She eats 2-3 servings of fruits and vegetables daily.She consumes 1 sweetened beverage(s) daily.She exercises with enough effort to increase her heart rate 10 to 19 minutes per day.  She exercises with enough effort to increase her heart rate 4 days per week.   She is taking medications regularly.     Concern - Lymph nodes/ lumps   Onset: 1/2020    Description:   Back of neck, right side    Intensity: moderate    Progression of Symptoms: some improving and new ones develping    Accompanying Signs & Symptoms:  Feel like battling a cold    Previous history of similar problem:   No     Precipitating factors:   Worsened by: nothing    Alleviating factors:  Improved by: nothing    Therapies Tried and outcome: heating pads, Biaxin  Left ear pain- onset 2/21/20    Region of concern seems to have subsided on right side of neck. Pt. Feels some residual swelling, no tenderness, no new regions of swelling or pain.     Had some mild GI affects form the antibiotics- seems improved.     Taking HCTZ about every other day. BP is stable. Had low potassium previously, will need a re-check for this.         Patient Active Problem List   Diagnosis     Syncope     Raynaud phenomenon     CARDIOVASCULAR SCREENING; LDL GOAL LESS THAN 160     Strain of neck muscle     Migraine headache     Cough with hemoptysis     Incidental lung nodule, less than or equal to 3mm     Need for prophylactic vaccination and inoculation against influenza     Fibromyalgia     HTN, goal below 140/90     H/O: hysterectomy     GERD (gastroesophageal reflux disease)     Edema     High risk medication use     Hypothyroidism     Ventral hernia without obstruction or gangrene     Hemoptysis     Rheumatoid arthritis, involving unspecified site, unspecified rheumatoid factor presence (H)     Penicillin allergy      Peanut allergy     Tree nut allergy     Anaphylaxis, subsequent encounter     Itching     History of hemoptysis     Atypical chest pain     Mass of finger, left     Cubital tunnel syndrome on right     Past Surgical History:   Procedure Laterality Date     CHOLECYSTECTOMY, LAPOROSCOPIC      Cholecystectomy, Laparoscopic     COMBINED ESOPHAGOSCOPY, GASTROSCOPY, DUODENOSCOPY (EGD) WITH CO2 INSUFFLATION N/A 2018    Procedure: Egd;  Surgeon: Richard Aviles MD;  Location: [facility]     ESOPHAGOSCOPY, GASTROSCOPY, DUODENOSCOPY (EGD), COMBINED N/A 2018    Procedure: COMBINED ESOPHAGOSCOPY, GASTROSCOPY, DUODENOSCOPY (EGD), BIOPSY SINGLE OR MULTIPLE;  Surgeon: Richard Aviles MD;  Location: [facility]     EXCISE MASS FOOT  2013    Procedure: EXCISE MASS FOOT;  Right Foot Soft Tissue Mass Excision;  Surgeon: Jose Cruz Garcia DPM;  Location: [facility]     EXCISE MASS FOOT Right 2015    Procedure: EXCISE MASS FOOT;  Surgeon: Pierre Adan DPM;  Location: [facility]     HYSTERECTOMY, VAGINAL  2002     LAPAROSCOPIC HERNIORRHAPHY VENTRAL N/A 2016    Procedure: LAPAROSCOPIC HERNIORRHAPHY VENTRAL;  Surgeon: Lars Dahl MD;  Location: [facility]     PANNICULECTOMY  2017    with liposuction, and hernia revision Dr. Heath     RELEASE TRIGGER FINGER Left 2018    Procedure: RELEASE TRIGGER FINGER;  Trigger Finger Release Left ring finger ;  Surgeon: Aaron Orta DO;  Location: [facility]       Social History     Tobacco Use     Smoking status: Former Smoker     Years: 13.00     Types: Cigarettes     Last attempt to quit: 10/1/2012     Years since quittin.4     Smokeless tobacco: Never Used   Substance Use Topics     Alcohol use: Yes     Alcohol/week: 0.0 standard drinks     Comment: occ/ 1-2 per month     Family History   Problem Relation Age of Onset     Asthma Father      C.A.D. Father      Diabetes No family hx of      Hypertension No family hx of       "Cerebrovascular Disease No family hx of      Breast Cancer No family hx of      Cancer - colorectal No family hx of      Prostate Cancer No family hx of      Alcohol/Drug No family hx of      Allergies No family hx of      Alzheimer Disease No family hx of      Anesthesia Reaction No family hx of      Arthritis No family hx of      Blood Disease No family hx of      Cancer No family hx of      Cardiovascular No family hx of      Circulatory No family hx of      Congenital Anomalies No family hx of      Connective Tissue Disorder No family hx of      Neurologic Disorder No family hx of      Depression No family hx of      Endocrine Disease No family hx of      Eye Disorder No family hx of      Genetic Disorder No family hx of      Gastrointestinal Disease No family hx of      Genitourinary Problems No family hx of      Gynecology No family hx of      Heart Disease No family hx of      Lipids No family hx of      Musculoskeletal Disorder No family hx of      Obesity No family hx of      Osteoporosis No family hx of      Psychotic Disorder No family hx of      Respiratory No family hx of      Hearing Loss No family hx of      Family History Negative No family hx of      Thyroid Disease No family hx of      Colon Cancer No family hx of      Hyperlipidemia No family hx of      Coronary Artery Disease No family hx of      Other Cancer No family hx of      Anxiety Disorder No family hx of      Mental Illness No family hx of      Substance Abuse No family hx of            Reviewed and updated as needed this visit by Provider  Tobacco  Allergies  Meds  Problems  Med Hx  Surg Hx  Fam Hx         Review of Systems   ROS COMP: Constitutional, HEENT, cardiovascular, pulmonary, gi and gu systems are negative, except as otherwise noted.      Objective    /74   Pulse 82   Temp 99.6  F (37.6  C)   Resp 14   Ht 1.588 m (5' 2.5\")   Wt 86 kg (189 lb 9.6 oz)   LMP  (LMP Unknown)   SpO2 100%   BMI 34.13 kg/m    Body " mass index is 34.13 kg/m .  Physical Exam   GENERAL: healthy, alert and no distress  EYES: Eyes grossly normal to inspection, PERRL and conjunctivae and sclerae normal  HENT: ear canals and TM's normal, nose and mouth without ulcers or lesions  NECK: no adenopathy, no asymmetry, masses, or scars and thyroid normal to palpation- I cannot appreciate any concerning adenopathy, region on right occiput is non-tender, no discrete nodules really palpable at all.   RESP: lungs clear to auscultation - no rales, rhonchi or wheezes  CV: regular rate and rhythm, normal S1 S2, no S3 or S4, no murmur, click or rub, no peripheral edema and peripheral pulses strong  NEURO: Normal strength and tone, mentation intact and speech normal  PSYCH: mentation appears normal, affect normal/bright- slightly anxious    Diagnostic Test Results:  Labs reviewed in Epic  Results for orders placed or performed in visit on 02/24/20 (from the past 24 hour(s))   CBC with platelets and differential   Result Value Ref Range    WBC 10.2 4.0 - 11.0 10e9/L    RBC Count 4.37 3.8 - 5.2 10e12/L    Hemoglobin 13.0 11.7 - 15.7 g/dL    Hematocrit 39.2 35.0 - 47.0 %    MCV 90 78 - 100 fl    MCH 29.7 26.5 - 33.0 pg    MCHC 33.2 31.5 - 36.5 g/dL    RDW 14.1 10.0 - 15.0 %    Platelet Count 383 150 - 450 10e9/L    % Neutrophils 63.1 %    % Lymphocytes 27.8 %    % Monocytes 6.7 %    % Eosinophils 2.0 %    % Basophils 0.4 %    Absolute Neutrophil 6.4 1.6 - 8.3 10e9/L    Absolute Lymphocytes 2.8 0.8 - 5.3 10e9/L    Absolute Monocytes 0.7 0.0 - 1.3 10e9/L    Absolute Eosinophils 0.2 0.0 - 0.7 10e9/L    Absolute Basophils 0.0 0.0 - 0.2 10e9/L    Diff Method Automated Method            Assessment & Plan       ICD-10-CM    1. Lymphadenopathy R59.1 CBC with platelets and differential   2. Hypokalemia E87.6 Potassium         Discussed neck findings- not concerning.   Labs for reassurance, Close monitoring if masses or large swelling occurs. Discussed normal etiology of lymph  nodes and monitoring.   Potassium re-check today. BP stable.         Return in about 4 weeks (around 3/23/2020) for Physical Exam.    RA[facility]L Flor [facility]

## 2020-02-24 ENCOUNTER — OFFICE VISIT (OUTPATIENT)
Dept: FAMILY MEDICINE | Facility: CLINIC | Age: 45
End: 2020-02-24
Payer: COMMERCIAL

## 2020-02-24 VITALS
HEART RATE: 82 BPM | RESPIRATION RATE: 14 BRPM | HEIGHT: 63 IN | DIASTOLIC BLOOD PRESSURE: 74 MMHG | WEIGHT: 189.6 LBS | TEMPERATURE: 99.6 F | BODY MASS INDEX: 33.59 KG/M2 | OXYGEN SATURATION: 100 % | SYSTOLIC BLOOD PRESSURE: 118 MMHG

## 2020-02-24 DIAGNOSIS — E87.6 HYPOKALEMIA: ICD-10-CM

## 2020-02-24 DIAGNOSIS — R59.1 LYMPHADENOPATHY: Primary | ICD-10-CM

## 2020-02-24 LAB
BASOPHILS # BLD AUTO: 0 10E9/L (ref 0–0.2)
BASOPHILS NFR BLD AUTO: 0.4 %
DIFFERENTIAL METHOD BLD: NORMAL
EOSINOPHIL # BLD AUTO: 0.2 10E9/L (ref 0–0.7)
EOSINOPHIL NFR BLD AUTO: 2 %
ERYTHROCYTE [DISTWIDTH] IN BLOOD BY AUTOMATED COUNT: 14.1 % (ref 10–15)
HCT VFR BLD AUTO: 39.2 % (ref 35–47)
HGB BLD-MCNC: 13 G/DL (ref 11.7–15.7)
LYMPHOCYTES # BLD AUTO: 2.8 10E9/L (ref 0.8–5.3)
LYMPHOCYTES NFR BLD AUTO: 27.8 %
MCH RBC QN AUTO: 29.7 PG (ref 26.5–33)
MCHC RBC AUTO-ENTMCNC: 33.2 G/DL (ref 31.5–36.5)
MCV RBC AUTO: 90 FL (ref 78–100)
MONOCYTES # BLD AUTO: 0.7 10E9/L (ref 0–1.3)
MONOCYTES NFR BLD AUTO: 6.7 %
NEUTROPHILS # BLD AUTO: 6.4 10E9/L (ref 1.6–8.3)
NEUTROPHILS NFR BLD AUTO: 63.1 %
PLATELET # BLD AUTO: 383 10E9/L (ref 150–450)
RBC # BLD AUTO: 4.37 10E12/L (ref 3.8–5.2)
WBC # BLD AUTO: 10.2 10E9/L (ref 4–11)

## 2020-02-24 PROCEDURE — 36415 COLL VENOUS BLD VENIPUNCTURE: CPT | Performed by: NURSE PRACTITIONER

## 2020-02-24 PROCEDURE — 84132 ASSAY OF SERUM POTASSIUM: CPT | Performed by: NURSE PRACTITIONER

## 2020-02-24 PROCEDURE — 99214 OFFICE O/P EST MOD 30 MIN: CPT | Performed by: NURSE PRACTITIONER

## 2020-02-24 PROCEDURE — 85025 COMPLETE CBC W/AUTO DIFF WBC: CPT | Performed by: NURSE PRACTITIONER

## 2020-02-24 ASSESSMENT — MIFFLIN-ST. JEOR: SCORE: 1476.21

## 2020-02-24 ASSESSMENT — PAIN SCALES - GENERAL: PAINLEVEL: NO PAIN (0)

## 2020-02-25 LAB — POTASSIUM SERPL-SCNC: 3.6 MMOL/L (ref 3.4–5.3)

## 2020-03-05 DIAGNOSIS — I10 ESSENTIAL HYPERTENSION: ICD-10-CM

## 2020-03-06 RX ORDER — HYDROCHLOROTHIAZIDE 25 MG/1
TABLET ORAL
Qty: 90 TABLET | Refills: 2 | Status: SHIPPED | OUTPATIENT
Start: 2020-03-06 | End: 2020-12-22

## 2020-03-06 NOTE — TELEPHONE ENCOUNTER
Pending Prescriptions:                       Disp   Refills    hydrochlorothiazide (HYDRODIURIL) 25 MG t*90 tab*2            Sig: TAKE ONE TABLET BY MOUTH ONCE DAILY    Prescription approved per [facility] Refill Protocol.    Negar Beckham, MSN, RN

## 2020-04-03 ENCOUNTER — MYC MEDICAL ADVICE (OUTPATIENT)
Dept: FAMILY MEDICINE | Facility: CLINIC | Age: 45
End: 2020-04-03

## 2020-04-03 NOTE — TELEPHONE ENCOUNTER
Replied to patient via [employer] with COVID 19 Nurse Triage Plan/Patient Instructions.    Maida Grant, BSN, RN, PHN

## 2020-04-26 ENCOUNTER — E-VISIT (OUTPATIENT)
Dept: FAMILY MEDICINE | Facility: CLINIC | Age: 45
End: 2020-04-26
Payer: COMMERCIAL

## 2020-04-26 DIAGNOSIS — L30.9 DERMATITIS: Primary | ICD-10-CM

## 2020-04-26 PROCEDURE — 99421 OL DIG E/M SVC 5-10 MIN: CPT | Performed by: NURSE PRACTITIONER

## 2020-04-27 ENCOUNTER — MYC MEDICAL ADVICE (OUTPATIENT)
Dept: FAMILY MEDICINE | Facility: CLINIC | Age: 45
End: 2020-04-27

## 2020-04-27 RX ORDER — TRIAMCINOLONE ACETONIDE 1 MG/G
CREAM TOPICAL 2 TIMES DAILY
Qty: 30 G | Refills: 0 | Status: SHIPPED | OUTPATIENT
Start: 2020-04-27 | End: 2020-12-07

## 2020-04-27 NOTE — TELEPHONE ENCOUNTER
Provider E-Visit time total (minutes): 9    Spoke with pt. Unclear etiology. Lasting 1 month. Is seemingly recurrent. Advise trial of steroid cream. 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       Pt. Understands plan.     RAÚL Flor CNP

## 2020-05-07 ENCOUNTER — TELEPHONE (OUTPATIENT)
Dept: FAMILY MEDICINE | Facility: CLINIC | Age: 45
End: 2020-05-07

## 2020-05-07 DIAGNOSIS — M79.7 FIBROMYALGIA: ICD-10-CM

## 2020-05-07 DIAGNOSIS — I10 ESSENTIAL HYPERTENSION: ICD-10-CM

## 2020-05-07 NOTE — TELEPHONE ENCOUNTER
Routing refill request to provider for review/approval because:  Drug not on the [facility] refill protocol (Flexeril)  Drug not active on patient's medication list (Diltiazem)    Pretty Dumont, RN, BSN

## 2020-05-08 ENCOUNTER — MYC MEDICAL ADVICE (OUTPATIENT)
Dept: FAMILY MEDICINE | Facility: CLINIC | Age: 45
End: 2020-05-08

## 2020-05-08 RX ORDER — CYCLOBENZAPRINE HCL 10 MG
TABLET ORAL
Qty: 90 TABLET | Refills: 1 | Status: SHIPPED | OUTPATIENT
Start: 2020-05-08 | End: 2020-11-23

## 2020-05-08 RX ORDER — DILTIAZEM HYDROCHLORIDE 240 MG/1
CAPSULE, COATED, EXTENDED RELEASE ORAL
Qty: 90 CAPSULE | Refills: 1 | Status: SHIPPED | OUTPATIENT
Start: 2020-05-08 | End: 2020-12-07

## 2020-05-08 NOTE — TELEPHONE ENCOUNTER
Spoke with [facility] they are waiting for approval for Diltiazem 240mg ER capsules     Patient states she takes Diltiazem 1 tab every morning for her BP she has 3 left please send refills to [facility] in [place]  Patient states she has been on this for 3-4 years   Thanks  Lani García RT (R)

## 2020-06-03 ENCOUNTER — TELEPHONE (OUTPATIENT)
Dept: FAMILY MEDICINE | Facility: CLINIC | Age: 45
End: 2020-06-03

## 2020-06-03 DIAGNOSIS — K21.00 GASTROESOPHAGEAL REFLUX DISEASE WITH ESOPHAGITIS: Primary | ICD-10-CM

## 2020-06-03 NOTE — TELEPHONE ENCOUNTER
Central Prior Authorization Team   [phone]    PA Initiation    Medication: omeprazole (PRILOSEC) 40 MG DR capsule  Insurance Company: [employer] - Phone [phone] Fax [phone]  Pharmacy Filling the Rx: [id number] - [place], [place] - [address]  Filling Pharmacy Phone: [phone]  Filling Pharmacy Fax: [phone]  Start Date: 6/3/2020

## 2020-06-03 NOTE — TELEPHONE ENCOUNTER
Prior Authorization Retail Medication Request    Medication/Dose: Omeprazole DR capsules   ICD code (if different than what is on RX):    Previously Tried and Failed:    Rationale:      Insurance Name:  [employer]   Insurance ID:  [id number]      Pharmacy Information (if different than what is on RX)  Name:  [facility]   Phone:  [phone]     Alysha Salinas CMA

## 2020-06-04 NOTE — TELEPHONE ENCOUNTER
PRIOR AUTHORIZATION DENIED    Medication: omeprazole (PRILOSEC) 40 MG DR capsule-PA DENIED     Denial Date: 6/4/2020    Denial Rational: Criteria Not Met. Insurance stated that patient is able to get up to 30 capsules for a maximum of 120 days per year. Duration is set by patients plan for PPIs. Limit is shared across all PPIs.         Appeal Information:

## 2020-06-05 ENCOUNTER — E-VISIT (OUTPATIENT)
Dept: FAMILY MEDICINE | Facility: CLINIC | Age: 45
End: 2020-06-05
Payer: COMMERCIAL

## 2020-06-05 DIAGNOSIS — Z20.822 ENCOUNTER FOR LABORATORY TESTING FOR COVID-19 VIRUS: Primary | ICD-10-CM

## 2020-06-05 PROCEDURE — 99421 OL DIG E/M SVC 5-10 MIN: CPT | Performed by: NURSE PRACTITIONER

## 2020-06-05 RX ORDER — SUCRALFATE 1 G/1
1 TABLET ORAL 4 TIMES DAILY
Qty: 60 TABLET | Refills: 1 | Status: SHIPPED | OUTPATIENT
Start: 2020-06-05 | End: 2020-11-16

## 2020-06-05 NOTE — TELEPHONE ENCOUNTER
Advised pt. To try to add Sucralfate. GI referral placed.   Pt. Requesting Covid testing. Advised Evisit.     RAÚL Flor CNP

## 2020-06-08 NOTE — PATIENT INSTRUCTIONS
"  Instructions for Patients  Patient Education   COVID-19 Antibody Testing  Questions and Answers  You've been tested for COVID-19 (coronavirus) antibodies. Here are answers to some questions you may have.  When will my test result be ready?  Results are usually available within 7 days.   Where can I get my test result?    If you use [employer], you'll get a message from [employer] when your result is ready.    If you don't use [employer], you'll get a letter with your test results mailed to you. Please allow 7 to 10 days after the test to get your letter.  Please note: The place where you had the test won't get the results.   What is antibody testing?  This is a kind of blood test. We take a small sample of your blood, then test it for something called \"antibodies.\"   Your body makes antibodies to fight infection. If your blood has antibodies for a certain germ, it means you've been infected with that germ in the past.   Sometimes, antibodies stay in your body for years after you've had the infection. They can be there even if the germ didn't make you sick. They are a sign that your body fought off the infection.  Will this test find antibodies in everyone who's had COVID-19?  No. The test finds antibodies in most people 10 days after they get sick. For some people, it takes longer than 10 days for antibodies to show up. Others may never show antibodies against COVID-19, especially if they have weak immune systems.  What does it mean if my test finds COVID-19 antibodies?  If we find these antibodies, it suggests:     You have had the virus.    Your body's immune system fought the virus.  We don't know if this will help protect you from getting COVID-19 again. Scientists are still learning about this.  Where can I get more information?  To learn more, go to https://www.cdc.gov/coronavirus/2019-ncov/symptoms-testing/symptoms.html  For informational purposes only. Not to replace the advice of your health care provider. " Copyright   2020 [facility]. All rights reserved. Clinically reviewed by the [facility] COVID-19 Clinical Team. [employer] [id number] - 05/20.             Thank you for limiting contact with others, wearing a simple mask to cover your cough, practice good hand hygiene habits and accessing our virtual services where possible to limit the spread of this virus.    For more information about COVID19 and options for caring for yourself at home, please visit the CDC website at https://www.cdc.gov/coronavirus/2019-ncov/about/steps-when-sick.html  For more options for care at [facility], please visit our website at [email or web address]

## 2020-06-15 DIAGNOSIS — Z20.822 ENCOUNTER FOR LABORATORY TESTING FOR COVID-19 VIRUS: ICD-10-CM

## 2020-06-15 PROCEDURE — 36415 COLL VENOUS BLD VENIPUNCTURE: CPT | Performed by: FAMILY MEDICINE

## 2020-06-15 PROCEDURE — 86769 SARS-COV-2 COVID-19 ANTIBODY: CPT | Mod: 90 | Performed by: FAMILY MEDICINE

## 2020-06-15 PROCEDURE — 99000 SPECIMEN HANDLING OFFICE-LAB: CPT | Performed by: FAMILY MEDICINE

## 2020-06-17 ENCOUNTER — TELEPHONE (OUTPATIENT)
Dept: FAMILY MEDICINE | Facility: CLINIC | Age: 45
End: 2020-06-17

## 2020-06-17 LAB
COVID-19 SPIKE RBD ABY TITER: NORMAL
COVID-19 SPIKE RBD ABY: NEGATIVE

## 2020-06-17 NOTE — TELEPHONE ENCOUNTER
Reason for Call:  Other call back    Detailed comments: Patient has questions about her antibody testing results.  Please call declined scheduling a visit    Phone Number Patient can be reached at: Cell number on file:    Telephone Information:   Mobile [phone]       Best Time: any    Can we leave a detailed message on this number? YES    Call taken on 6/17/2020 at 10:58 AM by Kala Rahman

## 2020-06-17 NOTE — RESULT ENCOUNTER NOTE
Lani,  Your recent studies were negative for COVID.  Please let me know if you have any questions or concerns and follow up as discussed in clinic.    Sincerely.  Dr. NORMA Andrews MD, FAAFP  Family Medicine Physician  [facility]  [address] [place] [address]

## 2020-06-26 ENCOUNTER — E-VISIT (OUTPATIENT)
Dept: FAMILY MEDICINE | Facility: CLINIC | Age: 45
End: 2020-06-26
Payer: COMMERCIAL

## 2020-06-26 ENCOUNTER — MYC MEDICAL ADVICE (OUTPATIENT)
Dept: FAMILY MEDICINE | Facility: CLINIC | Age: 45
End: 2020-06-26

## 2020-06-26 DIAGNOSIS — B02.9 HERPES ZOSTER WITHOUT COMPLICATION: Primary | ICD-10-CM

## 2020-06-26 PROCEDURE — 99421 OL DIG E/M SVC 5-10 MIN: CPT | Performed by: NURSE PRACTITIONER

## 2020-06-26 RX ORDER — TRAMADOL HYDROCHLORIDE 50 MG/1
50 TABLET ORAL EVERY 6 HOURS PRN
Qty: 15 TABLET | Refills: 0 | Status: SHIPPED | OUTPATIENT
Start: 2020-06-26 | End: 2020-06-29 | Stop reason: SINTOL

## 2020-06-26 NOTE — TELEPHONE ENCOUNTER
"Provider E-Visit time total (minutes): 9  Spoke with pt.   Viral symptoms, HA, rash X 5 days.   Diarrhea.   No fever.   Rash- \"hurts\"  Starting to scab. Pics noted in chart. On left side.     Suspect Shingles- outside range of treatment.    Discussed transmission and treatments.     Tramadol use.     Viral cares.   Return to clinic with any new or worsening symptoms, and as needed.      RAÚL Flor CNP     "
yes

## 2020-06-28 ENCOUNTER — MYC MEDICAL ADVICE (OUTPATIENT)
Dept: FAMILY MEDICINE | Facility: CLINIC | Age: 45
End: 2020-06-28

## 2020-06-29 NOTE — TELEPHONE ENCOUNTER
No antibiotics recommended at this point.     Shoulder patch is improving.     Rec. Biopsy if not improving.     Steroid is working.     Can continue this for 2 weeks.     RAÚL Flor CNP

## 2020-07-19 ENCOUNTER — MYC MEDICAL ADVICE (OUTPATIENT)
Dept: FAMILY MEDICINE | Facility: CLINIC | Age: 45
End: 2020-07-19

## 2020-07-19 DIAGNOSIS — E03.4 HYPOTHYROIDISM DUE TO ACQUIRED ATROPHY OF THYROID: ICD-10-CM

## 2020-07-20 ENCOUNTER — E-VISIT (OUTPATIENT)
Dept: FAMILY MEDICINE | Facility: CLINIC | Age: 45
End: 2020-07-20
Payer: COMMERCIAL

## 2020-07-20 DIAGNOSIS — L30.9 DERMATITIS: Primary | ICD-10-CM

## 2020-07-20 PROCEDURE — 99421 OL DIG E/M SVC 5-10 MIN: CPT | Performed by: NURSE PRACTITIONER

## 2020-07-20 RX ORDER — LEVOTHYROXINE SODIUM 112 UG/1
TABLET ORAL
Qty: 90 TABLET | Refills: 1 | Status: SHIPPED | OUTPATIENT
Start: 2020-07-20 | End: 2020-12-07

## 2020-07-20 RX ORDER — PREDNISONE 20 MG/1
20 TABLET ORAL DAILY
Qty: 5 TABLET | Refills: 0 | Status: SHIPPED | OUTPATIENT
Start: 2020-07-20 | End: 2020-07-25

## 2020-07-20 NOTE — TELEPHONE ENCOUNTER
Pending Prescriptions:                       Disp   Refills    levothyroxine (SYNTHROID/LEVOTHROID) 112 *90 tab*1            Sig: TAKE ONE TABLET BY MOUTH ONCE DAILY    Prescription approved per [facility] Refill Protocol.    Negar Beckham, MSN, RN

## 2020-07-21 NOTE — TELEPHONE ENCOUNTER
Patient read message with no response. Closing encounter.  Katie Williamson CMA ([facility])

## 2020-08-19 ENCOUNTER — TELEPHONE (OUTPATIENT)
Dept: FAMILY MEDICINE | Facility: CLINIC | Age: 45
End: 2020-08-19

## 2020-08-19 NOTE — TELEPHONE ENCOUNTER
I spoke with the pt who has a pending Covid test for cold like symptoms, should have results in 3-5 days. Headache, sore throat, fatigue, 99 but feels like she is burning up. Dry cough, no SOB but does get winded easier.     KL-Pt is wondering if you have any recommendations for possible covid due to her medical history.     Negar Beckham, MSN, RN

## 2020-08-19 NOTE — TELEPHONE ENCOUNTER
Huddle with KL, no treatment at this time. Push fluids and monitor symptoms.    Negar Beckham, MSN, RN

## 2020-08-19 NOTE — TELEPHONE ENCOUNTER
Reason for Call:  Other covid testing    Detailed comments: patient was tested for covid at [facility] and told to let her pcp know due to her lung issues.    Phone Number Patient can be reached at: Home number on file [phone] (home)    Best Time: any    Can we leave a detailed message on this number? YES    Call taken on 8/19/2020 at 12:06 PM by Angelita Lopez

## 2020-08-21 ENCOUNTER — MYC MEDICAL ADVICE (OUTPATIENT)
Dept: FAMILY MEDICINE | Facility: CLINIC | Age: 45
End: 2020-08-21

## 2020-08-21 ENCOUNTER — E-VISIT (OUTPATIENT)
Dept: FAMILY MEDICINE | Facility: CLINIC | Age: 45
End: 2020-08-21
Payer: COMMERCIAL

## 2020-08-21 DIAGNOSIS — R09.81 CONGESTION OF PARANASAL SINUS: Primary | ICD-10-CM

## 2020-08-21 PROCEDURE — 99207 ZZC NO BILLABLE SERVICE THIS VISIT: CPT | Performed by: NURSE PRACTITIONER

## 2020-08-21 NOTE — PATIENT INSTRUCTIONS
The symptoms you describe suggest a viral cause, which is much more common than a bacterial cause. Antibiotics will treat bacterial infections, but have no effect on viral infections. If possible, especially if improving, start with symptom care for the first 7-10 days, then consider seeking further treatment or taking an antibiotic. Bacterial infections generally are more severe, including symptoms such as pus, fever over 101degrees F, or rapidly worsening.  You may want to try a nasal lavage (also known as nasal irrigation). You can find over-the-counter products, such as Neti-Pot, at retail locations or make your own at home. Instructions for homemade nasal lavage and more information on the process are available online at http://www.aafp.org/[email or web address].      Allergic Rhinitis  Allergic rhinitis is an allergic reaction that affects the nose, and often the eyes. It s often known as nasal allergies. Nasal allergies are often due to things in the environment that are breathed in. Depending what you are sensitive to, nasal allergies may occur only during certain seasons. Or they may occur year round. Common indoor allergens include house dust mites, mold, cockroaches, and pet dander. Outdoor allergens include pollen from trees, grasses, and weeds.   Symptoms include a drippy, stuffy, and itchy nose. They also include sneezing and red and itchy eyes. You may feel tired more often. Severe allergies may also affect your breathing and trigger a condition called asthma.   Tests can be done to see what allergens are affecting you. You may be referred to an allergy specialist for testing and further evaluation.  Home care  Your healthcare provider may prescribe medicines to help relieve allergy symptoms. These may include oral medicines, nasal sprays, or eye drops.  Ask your provider for advice on how to avoid substances that you are allergic to. Below are a few tips for each type of allergen.  Pet  dander:    Do not have pets with fur and feathers.    If you can't avoid having a pet, keep it out of your bedroom and off upholstered furniture.  Pollen:    When pollen counts are high, keep windows of your car and home closed. If possible, use an air conditioner instead.    Wear a filter mask when mowing or doing yard work.  House dust mites:    Wash bedding every week in warm water and detergent and dry on a hot setting.    Cover the mattress, box spring, and pillows with allergy covers.     If possible, sleep in a room with no carpet, curtains, or upholstered furniture.  Cockroaches:    Store food in sealed containers.    Remove garbage from the home promptly.    Fix water leaks  Mold:    Keep humidity low by using a dehumidifier or air conditioner. Keep the dehumidifier and air conditioner clean and free of mold.    Clean moldy areas with bleach and water.  In general:    Vacuum once or twice a week. If possible, use a vacuum with a high-efficiency particulate air (HEPA) filter.    Do not smoke. Avoid cigarette smoke. Cigarette smoke is an irritant that can make symptoms worse.  Follow-up care  Follow up as advised by the healthcare provider or our staff. If you were referred to an allergy specialist, make this appointment promptly.  When to seek medical advice  Call your healthcare provider right away if the following occur:    Coughing or wheezing    Fever of 100.4 F (38 C) or higher, or as directed by your healthcare provider    Raised red bumps (hives)    Continuing symptoms, new symptoms, or worsening symptoms  Call 911 if you have:    Trouble breathing    Severe swelling of the face or severe itching of the eyes or mouth  Date Last Reviewed: 3/1/2017    9935-5262 [employer]. [address] [place] [address], [place] [address]. All rights reserved. This information is not intended as a substitute for professional medical care. Always follow your healthcare professional's instructions.          When to  Use Antibiotics   Antibiotics are medicines used to treat infections caused by bacteria. They don t work for illnesses caused by viruses or an allergic reaction. In fact, taking antibiotics for reasons other than a bacterial infection can cause problems. For example, you may have side effects from the medicine. And if you really need an antibiotic, it may not work well.                                                                                                                                              When antibiotics won t help  Your healthcare provider won t usually prescribe antibiotics for the following conditions. You can help by not asking for them if you have:     A cold. This type of illness is caused by a virus. It can cause a runny nose, stuffed-up nose, sneezing, coughing, headache, mild body aches, and low fever. A cold gets better on its own in a few days to a week.    The flu (influenza). This is a respiratory illness caused by a virus. The flu usually goes away on its own in a week or so. It can cause fever, body aches, sore throat, and fatigue.    Bronchitis. This is an infection in the lungs most often caused by a virus. You may have coughing, phlegm, body aches, and a low fever. A common type of bronchitis is known as a chest cold (acute bronchitis). This often happens after you have a respiratory infection like a common cold. Bronchitis can take weeks to go away, but antibiotics usually don t help.    Most sore throats. Sore throats are most often caused by viruses. Your throat may feel scratchy or achy, and it may hurt to swallow. You may also have a low fever and body aches. A sore throat usually gets better in a few days.    Most ear infections. An ear infection may be caused by a virus or bacteria. It causes pain in the ear. Antibiotics usually don t help, and the infection goes away on its own.    Most sinus infections (sinusitis). This kind of infection causes sinus pain and swelling,  and a runny nose. In most cases, sinusitis goes away on its own, and antibiotics don t make recovery quicker.    Allergic rhinitis. This is a set of symptoms caused by an allergic reaction. You may have sneezing, a runny nose, itchy or watery eyes, or a sore throat. Allergies are not treated with antibiotics.    Low fever. A mild fever that s less than 100.4 F (38 C) most likely doesn t need treatment with antibiotics.   When antibiotics can help   Antibiotics can be used to treat:                                                       Strep throat. This is a throat infectioncaused by a certain type of bacteria. Symptoms of strep throat include a sore throat, white patches on the tonsils, red spots on the roof of the mouth, fever, body aches, and nausea and vomiting.    Urinary tract infection (UTI). This is a bacterial infection of the bladder and the tube that takes urine out of the body. It can cause burning pain and urine that s cloudy or tinted with blood. UTIs are very common. Antibiotics usually help treat these infections.    Some ear infections. In some cases, a healthcare provider may prescribe antibiotics for an ear infection. You may need a test to show what s causing the ear infection.    Some sinus infections. In some cases, yourhealthcare provider may give you antibiotics. He or she may first need to make sure your symptoms aren t caused by a virus, fungus, allergies, or air pollutants such as smoke.   Your doctor may also recommend antibiotics if you have a condition that can affect your immune system, such as diabetes or cancer.   Self-care at home   If your infection can t be treated with antibiotics, you can take other steps to feel better. Try the remedies below. In general:     Rest and sleep as much as needed.    Drink water and other clear fluids.    Don t smoke, and avoid smoke from other people.    Use over-the-counter medicine such as acetaminophen to ease pain or fever, as directed by your  healthcare provider.   To treat sinus pain or nasal congestion:     Put a warm, moist washcloth on your face where you feel sinus pain or pressure.    Use a nasal spray with medicine or saline, as directed by your healthcare provider.    Breathe in steam from a hot shower.    Use a humidifier or cool mist vaporizer.   To quiet a cough:     Use a humidifier or cool mist vaporizer.    Breathe in steam from a hot shower.    Use cough lozenges.   To sooth a sore throat:     Suck on ice chips, popsicles, or lozenges.    Use a sore throat spray.    Use a humidifier or cool mist vaporizer.    Gargle with saltwater.    Drink warm liquids.   To ease ear pain:     Hold a warm, moist washcloth on the ear for 10 minutes at a time.  Date Last Reviewed: 9/1/2016 2000-2019 [employer]. [address] [place] [address], [place] [address]. All rights reserved. This information is not intended as a substitute for professional medical care. Always follow your healthcare professional's instructions.

## 2020-08-21 NOTE — TELEPHONE ENCOUNTER
Responded via [employer] message.    KL - patient completed e-visit which Dr. Andrews completed. She does not appear to like the answer she received during the e-visit.    Juan Hills RN, BSN

## 2020-08-24 NOTE — TELEPHONE ENCOUNTER
Called and informed patient of message below. Patient stated that she is going to wait for her husbands results to come back.   Patient will request an e-visit if she feels she needs it.     Encouraged her to continue to push fluids and get plenty of rest. If she starts to have any concerns with breathing she needs to be evaluated in the ER. Patient was in agreement with plan.   Jluis Hutson RN  August 24, 2020

## 2020-08-24 NOTE — TELEPHONE ENCOUNTER
Call pt. I reviewed the notes. Let her know I am out of office, and not apologize I'm not able to call her.  Currently, she has a viral sinus infection. Covid test Should indicate she was positive at time of testing if she had the Covid Virus.    If symptoms last 7-10 days, can re-do Evisit for sinus infection for antibiotics.   If breathing difficulties, recommend another Evisit with me ( can triage any time if needed) for Covid re-testing.   RAÚL Flor CNP

## 2020-10-06 ENCOUNTER — MYC MEDICAL ADVICE (OUTPATIENT)
Dept: FAMILY MEDICINE | Facility: CLINIC | Age: 45
End: 2020-10-06

## 2020-10-06 DIAGNOSIS — M79.7 FIBROMYALGIA: Primary | ICD-10-CM

## 2020-10-06 DIAGNOSIS — M05.9 RHEUMATOID ARTHRITIS WITH POSITIVE RHEUMATOID FACTOR, INVOLVING UNSPECIFIED SITE (H): ICD-10-CM

## 2020-10-06 NOTE — TELEPHONE ENCOUNTER
Call pt. Recommend OV. Looks like needs to re-est. With rheumatology as well. Referral placed, give pt. Info. RAÚL Flor CNP

## 2020-10-12 ENCOUNTER — VIRTUAL VISIT (OUTPATIENT)
Dept: FAMILY MEDICINE | Facility: OTHER | Age: 45
End: 2020-10-12
Payer: COMMERCIAL

## 2020-10-12 DIAGNOSIS — R09.81 NASAL CONGESTION: ICD-10-CM

## 2020-10-12 DIAGNOSIS — R43.2 LOSS OF TASTE: ICD-10-CM

## 2020-10-12 DIAGNOSIS — R43.0 LOSS OF SMELL: Primary | ICD-10-CM

## 2020-10-12 PROCEDURE — 99213 OFFICE O/P EST LOW 20 MIN: CPT | Mod: 95 | Performed by: NURSE PRACTITIONER

## 2020-10-12 NOTE — PATIENT INSTRUCTIONS
"Discharge Instructions for COVID-19 Patients  You have--or may have--COVID-19. Please follow the instructions listed below.   If you have a weakened immune system, discuss with your doctor any other actions you need to take.  How can I protect others?  If you have symptoms (fever, cough, body aches or trouble breathing):    Stay home and away from others (self-isolate) until:  ? At least 10 days have passed since your symptoms started, And   ? You've had no fever--and no medicine that reduces fever--for 1 full day (24 hours), And    ? Your other symptoms have resolved (gotten better).  If you don't show symptoms, but testing showed that you have COVID-19:    Stay home and away from others (self-isolate). Follow the tips under \"How do I self-isolate?\" below for 10 days (20 days if you have a weak immune system).    You don't need to be retested for COVID-19 before going back to school or work. As long as you're fever-free and feeling better, you can go back to school, work and other activities after waiting the 10 or 20 days.   How do I self-isolate?    Stay in your own room, even for meals. Use your own bathroom if you can.    Stay away from others in your home. No hugging, kissing or shaking hands. No visitors.    Don't go to work, school or anywhere else.    Clean \"high touch\" surfaces often (doorknobs, counters, handles). Use household cleaning spray or wipes. You'll find a full list of  on the EPA website: www.epa.gov/pesticide-registration/list-n-disinfectants-use-against-sars-cov-2.    Cover your mouth and nose with a mask or other face covering to avoid spreading germs.    Wash your hands and face often. Use soap and water.    Caregivers in these groups are at risk for severe illness due to COVID-19:  ? People 65 years and older  ? People who live in a nursing home or long-term care facility  ? People with chronic disease (lung, heart, cancer, diabetes, kidney, liver, immunologic)  ? People who have a " weakened immune system, including those who:    Are in cancer treatment    Take medicine that weakens the immune system, such as corticosteroids    Had a bone marrow or organ transplant    Have an immune deficiency    Have poorly controlled HIV or AIDS    Are obese (body mass index of 40 or higher)    Smoke regularly    Caregivers should wear gloves while washing dishes, handling laundry and cleaning bedrooms and bathrooms.    Use caution when washing and drying laundry: Don't shake dirty laundry and use the warmest water setting that you can.    For more tips on managing your health at home, go to www.cdc.gov/coronavirus/2019-ncov/downloads/10Things.pdf.  How can I take care of myself at home?  1. Get lots of rest. Drink extra fluids (unless a doctor has told you not to).    2. Take Tylenol (acetaminophen) for fever or pain. If you have liver or kidney problems, ask your family doctor if it's okay to take Tylenol.     Adults can take either:  ? 650 mg (two 325 mg pills) every 4 to 6 hours, or   ? 1,000 mg (two 500 mg pills) every 8 hours as needed.  ? Note: Don't take more than 3,000 mg in one day. Acetaminophen is found in many medicines (both prescribed and over-the-counter medicines). Read all labels to be sure you don't take too much.   For children, check the Tylenol bottle for the right dose. The dose is based on the child's age or weight.  3. If you have other health problems (like cancer, heart failure, an organ transplant or severe kidney disease): Call your specialty clinic if you don't feel better in the next 2 days.    4. Know when to call 911. Emergency warning signs include:  ? Trouble breathing or shortness of breath  ? Pain or pressure in the chest that doesn't go away  ? Feeling confused like you haven't felt before, or not being able to wake up  ? Bluish-colored lips or face    5. Your doctor may have prescribed a blood thinner medicine. Follow their instructions.  Where can I get more  information?    [facility] - About COVID-19: [email or web address]    CDC - What to Do If You're Sick: www.cdc.gov/coronavirus/2019-ncov/about/steps-when-sick.html    CDC - Ending Home Isolation: www.cdc.gov/coronavirus/2019-ncov/hcp/disposition-in-home-patients.html    CDC - Caring for Someone: www.cdc.gov/coronavirus/2019-ncov/if-you-are-sick/care-for-someone.html    [facility] - Interim Guidance for Hospital Discharge to Home: www.health.[facility].[place]./diseases/coronavirus/hcp/hospdischarge.pdf    [facility] clinical trials (COVID-19 research studies): [email or web address]    Below are the COVID-19 hotlines at the [place] Department of [facility] ([facility]). Interpreters are available.  ? For health questions: Call 114-945-0507 or 1-463.894.1061 (7 a.m. to 7 p.m.)  ? For questions about schools and childcare: Call 734-539-9511 or 1-289.862.6782 (7 a.m. to 7 p.m.)    For informational purposes only. Not to replace the advice of your health care provider. Clinically reviewed by the Infection Prevention Team. Copyright   2020 [facility]. All rights reserved. [address] [id number] - REV 08/04/20.

## 2020-10-12 NOTE — PROGRESS NOTES
"Elizabeth Goodson is a 45 year old female who is being evaluated via a billable telephone visit.      The patient has been notified of following:     \"This telephone visit will be conducted via a call between you and your physician/provider. We have found that certain health care needs can be provided without the need for a physical exam.  This service lets us provide the care you need with a short phone conversation.  If a prescription is necessary we can send it directly to your pharmacy.  If lab work is needed we can place an order for that and you can then stop by our lab to have the test done at a later time.    Telephone visits are billed at different rates depending on your insurance coverage. During this emergency period, for some insurers they may be billed the same as an in-person visit.  Please reach out to your insurance provider with any questions.    If during the course of the call the physician/provider feels a telephone visit is not appropriate, you will not be charged for this service.\"    Patient has given verbal consent for Telephone visit?  Yes    What phone number would you like to be contacted at?  [phone]    How would you like to obtain your AVS? María Elena Joseph     Elizabeth Goodson is a 45 year old female who presents via phone visit today for the following health issues:    HPI     Acute Illness  Acute illness concerns:   Onset/Duration: Monday last week   Symptoms:  Fever: no  Chills/Sweats: sweats   Headache (location?): no  Sinus Pressure: no  Conjunctivitis:  no  Ear Pain: no  Rhinorrhea: no  Congestion: YES- started Wednesday   Sore Throat: no  Cough: YES - occasionally  Wheeze: no  Decreased Appetite: YES- noticed no taste or smell today   Nausea: no  Vomiting: no  Diarrhea: YES- off and on   Dysuria/Freq.: no  Dysuria or Hematuria: no  Fatigue/Achiness: YES- early last week   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     Therapies tried and outcome: otc cold and flu meds, nyquil, robitussin    She has RA and last Monday she thought she was having a RA flare from the weather change. Her vega hurt for a couple days then went away. Woke up Wednesday super congested. Does not seem like a regular cold. Nothing is helping her. Woke up this morning, had coffee and had a hard time smelling and tasting it. Can't smell or tasting the coffee. 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   Had off and on diarrhea.   Loss of appetite  Fatigue  Has not had a temp   99.8 was the highest.     Review of Systems          Objective          Vitals:  No vitals were obtained today due to virtual visit.    fatigued and ill sounding   PSYCH: Alert and oriented times 3; coherent speech, normal   rate and volume, able to articulate logical thoughts, able   to abstract reason, no tangential thoughts, no hallucinations   or delusions  Her affect is normal  RESP: Coughing, no audible wheezing, able to talk in full sentences  Remainder of exam unable to be completed due to telephone visits    No results found for this or any previous visit (from the past 24 hour(s)).        Assessment/Plan:    Assessment & Plan     Loss of smell  - Patient with new onset of loss of taste and smell today, symptoms of nasal congestion started last week. Has been in contact with other positive individuals not within the time frame of testing for exposure risks. Given her symptoms she meets requirements for covid-19 testing. Advised self- isolating for 10 days form the onset of symptoms, no fever for 24 hours without fever reducing medication. Covid-19 testing as well scheduled.   - Advised on warning symptoms to monitor for.   - Symptomatic COVID-19 Virus (Coronavirus) by PCR; Future    Loss of taste  - as noted above   - Symptomatic COVID-19 Virus (Coronavirus) by PCR; Future    Nasal congestion  - Discussed OTC medications   - Symptomatic COVID-19 Virus  "(Coronavirus) by PCR; Future           The patient indicates understanding of these issues and agrees with the plan.    Patient Instructions   Discharge Instructions for COVID-19 Patients  You have--or may have--COVID-19. Please follow the instructions listed below.   If you have a weakened immune system, discuss with your doctor any other actions you need to take.  How can I protect others?  If you have symptoms (fever, cough, body aches or trouble breathing):    Stay home and away from others (self-isolate) until:  ? At least 10 days have passed since your symptoms started, And   ? You've had no fever--and no medicine that reduces fever--for 1 full day (24 hours), And    ? Your other symptoms have resolved (gotten better).  If you don't show symptoms, but testing showed that you have COVID-19:    Stay home and away from others (self-isolate). Follow the tips under \"How do I self-isolate?\" below for 10 days (20 days if you have a weak immune system).    You don't need to be retested for COVID-19 before going back to school or work. As long as you're fever-free and feeling better, you can go back to school, work and other activities after waiting the 10 or 20 days.   How do I self-isolate?    Stay in your own room, even for meals. Use your own bathroom if you can.    Stay away from others in your home. No hugging, kissing or shaking hands. No visitors.    Don't go to work, school or anywhere else.    Clean \"high touch\" surfaces often (doorknobs, counters, handles). Use household cleaning spray or wipes. You'll find a full list of  on the EPA website: www.epa.gov/pesticide-registration/list-n-disinfectants-use-against-sars-cov-2.    Cover your mouth and nose with a mask or other face covering to avoid spreading germs.    Wash your hands and face often. Use soap and water.    Caregivers in these groups are at risk for severe illness due to COVID-19:  ? People 65 years and older  ? People who live in a nursing " home or long-term care facility  ? People with chronic disease (lung, heart, cancer, diabetes, kidney, liver, immunologic)  ? People who have a weakened immune system, including those who:    Are in cancer treatment    Take medicine that weakens the immune system, such as corticosteroids    Had a bone marrow or organ transplant    Have an immune deficiency    Have poorly controlled HIV or AIDS    Are obese (body mass index of 40 or higher)    Smoke regularly    Caregivers should wear gloves while washing dishes, handling laundry and cleaning bedrooms and bathrooms.    Use caution when washing and drying laundry: Don't shake dirty laundry and use the warmest water setting that you can.    For more tips on managing your health at home, go to www.cdc.gov/coronavirus/2019-ncov/downloads/10Things.pdf.  How can I take care of myself at home?  1. Get lots of rest. Drink extra fluids (unless a doctor has told you not to).    2. Take Tylenol (acetaminophen) for fever or pain. If you have liver or kidney problems, ask your family doctor if it's okay to take Tylenol.     Adults can take either:  ? 650 mg (two 325 mg pills) every 4 to 6 hours, or   ? 1,000 mg (two 500 mg pills) every 8 hours as needed.  ? Note: Don't take more than 3,000 mg in one day. Acetaminophen is found in many medicines (both prescribed and over-the-counter medicines). Read all labels to be sure you don't take too much.   For children, check the Tylenol bottle for the right dose. The dose is based on the child's age or weight.  3. If you have other health problems (like cancer, heart failure, an organ transplant or severe kidney disease): Call your specialty clinic if you don't feel better in the next 2 days.    4. Know when to call 911. Emergency warning signs include:  ? Trouble breathing or shortness of breath  ? Pain or pressure in the chest that doesn't go away  ? Feeling confused like you haven't felt before, or not being able to wake  up  ? Bluish-colored lips or face    5. Your doctor may have prescribed a blood thinner medicine. Follow their instructions.  Where can I get more information?    [facility] - About COVID-19: [email or web address]    CDC - What to Do If You're Sick: www.cdc.gov/coronavirus/2019-ncov/about/steps-when-sick.html    CDC - Ending Home Isolation: www.cdc.gov/coronavirus/2019-ncov/hcp/disposition-in-home-patients.html    CDC - Caring for Someone: www.cdc.gov/coronavirus/2019-ncov/if-you-are-sick/care-for-someone.html    [facility] - Interim Guidance for Hospital Discharge to Home: www.health.[facility].[place]/diseases/coronavirus/hcp/hospdischarge.pdf    [facility] clinical trials (COVID-19 research studies): clinicalaffairs.[facility]-clinical-trials    Below are the COVID-19 hotlines at the [place] Department of Health ([facility]). Interpreters are available.  ? For health questions: Call 266-086-3713 or 1-539.636.6145 (7 a.m. to 7 p.m.)  ? For questions about schools and childcare: Call 903-595-3501 or 1-122.437.5192 (7 a.m. to 7 p.m.)    For informational purposes only. Not to replace the advice of your health care provider. Clinically reviewed by the Infection Prevention Team. Copyright   2020 [facility]. All rights reserved. DocOnYou 179980 - REV 08/04/20.          No follow-ups on file.    RAÚL Kwong CNP  [facility]    Phone call duration:  8 minutes

## 2020-10-15 ENCOUNTER — NURSE TRIAGE (OUTPATIENT)
Dept: FAMILY MEDICINE | Facility: CLINIC | Age: 45
End: 2020-10-15

## 2020-10-15 NOTE — TELEPHONE ENCOUNTER
Reason for Call:  Other call back    Detailed comments: Elizabeth is calling she tested positive today for covid, she is wondering if there anything she should be doing with her condition.  Please call    Phone Number Patient can be reached at: Home number on file [phone] (home)    Best Time: any    Can we leave a detailed message on this number? YES    Call taken on 10/15/2020 at 10:55 AM by Kala Rahman

## 2020-10-15 NOTE — TELEPHONE ENCOUNTER
"Diagnosed with COVID today. Having mild symptoms of cough and body aches, has lost her sense of smell and taste. Denies shortness of breath, chest pain, wheezing,  or fever. Patient was advised to continue to monitor at home anabel follow recommended quarantine. Advised patient to increase fluids, get good rest, can do warm inhalation/mist for cough along with lozenges and warm fluids. Patient was advised to call back with any changes or worsening of symptoms.    Sunitha Locke RN      1. COVID-19 DIAGNOSIS: \"Who made your Coronavirus (COVID-19) diagnosis?\" \"Was it confirmed by a positive lab test?\" If not diagnosed by a HCP, ask \"Are there lots of cases (community spread) where you live?\" (See public health department website, if unsure)      Positive lab test today  2. ONSET: \"When did the COVID-19 symptoms start?\"       10/10  3. WORST SYMPTOM: \"What is your worst symptom?\" (e.g., cough, fever, shortness of breath, muscle aches)      cough  4. COUGH: \"Do you have a cough?\" If so, ask: \"How bad is the cough?\"        Yes, mild-mod  5. FEVER: \"Do you have a fever?\" If so, ask: \"What is your temperature, how was it measured, and when did it start?\"      No fever  6. RESPIRATORY STATUS: \"Describe your breathing?\" (e.g., shortness of breath, wheezing, unable to speak)       Pretty normal per patient, slight SOB with activity  7. BETTER-SAME-WORSE: \"Are you getting better, staying the same or getting worse compared to yesterday?\"  If getting worse, ask, \"In what way?\"      Getting better  8. HIGH RISK DISEASE: \"Do you have any chronic medical problems?\" (e.g., asthma, heart or lung disease, weak immune system, etc.)      None  9. PREGNANCY: \"Is there any chance you are pregnant?\" \"When was your last menstrual period?\"      No  10. OTHER SYMPTOMS: \"Do you have any other symptoms?\"  (e.g., chills, fatigue, headache, loss of smell or taste, muscle pain, sore throat)        None  Reason for Disposition    [1] COVID-19 " diagnosed by positive lab test AND [2] mild symptoms (e.g., cough, fever, others) AND [3] no complications or SOB    Additional Information    Negative: SEVERE difficulty breathing (e.g., struggling for each breath, speaks in single words)    Negative: Difficult to awaken or acting confused (e.g., disoriented, slurred speech)    Negative: Bluish (or gray) lips or face now    Negative: Shock suspected (e.g., cold/pale/clammy skin, too weak to stand, low BP, rapid pulse)    Negative: Sounds like a life-threatening emergency to the triager    Negative: [1] COVID-19 exposure AND [2] no symptoms    Negative: COVID-19 and Breastfeeding, questions about    Negative: [1] Adult with possible COVID-19 symptoms AND [2] triager concerned about severity of symptoms or other causes    Negative: SEVERE or constant chest pain or pressure (Exception: mild central chest pain, present only when coughing)    Negative: MODERATE difficulty breathing (e.g., speaks in phrases, SOB even at rest, pulse 100-120)    Negative: Patient sounds very sick or weak to the triager    Negative: MILD difficulty breathing (e.g., minimal/no SOB at rest, SOB with walking, pulse <100)    Negative: Chest pain or pressure    Negative: Fever > 103 F (39.4 C)    Negative: [1] Fever > 101 F (38.3 C) AND [2] age > 60    Negative: [1] Fever > 100.0 F (37.8 C) AND [2] bedridden (e.g., nursing home patient, CVA, chronic illness, recovering from surgery)    Negative: HIGH RISK patient (e.g., age > 64 years, diabetes, heart or lung disease, weak immune system) (Exception: Has already been evaluated by healthcare provider and has no new or worsening symptoms)    Negative: Fever present > 3 days (72 hours)    Negative: [1] Fever returns after gone for over 24 hours AND [2] symptoms worse or not improved    Negative: [1] Continuous (nonstop) coughing interferes with work or school AND [2] no improvement using cough treatment per protocol    Negative: [1] COVID-19  infection suspected by caller or triager AND [2] mild symptoms (cough, fever, or others) AND [3] no complications or SOB    Negative: Cough present > 3 weeks    Protocols used: CORONAVIRUS (COVID-19) DIAGNOSED OR [id number] 8.4.20

## 2020-10-25 DIAGNOSIS — I10 HTN, GOAL BELOW 140/90: ICD-10-CM

## 2020-10-25 NOTE — TELEPHONE ENCOUNTER
Pending Prescriptions:                       Disp   Refills    metoprolol succinate ER (TOPROL-XL) 25 MG *90 tab*1        Sig: TAKE ONE TABLET BY MOUTH ONCE DAILY      Routing refill request to provider for review/approval because:  A break in medication    Sunitha Locke RN

## 2020-10-26 RX ORDER — METOPROLOL SUCCINATE 25 MG/1
TABLET, EXTENDED RELEASE ORAL
Qty: 90 TABLET | Refills: 1 | Status: SHIPPED | OUTPATIENT
Start: 2020-10-26 | End: 2021-05-10

## 2020-10-27 ENCOUNTER — TELEPHONE (OUTPATIENT)
Dept: FAMILY MEDICINE | Facility: CLINIC | Age: 45
End: 2020-10-27

## 2020-10-27 DIAGNOSIS — R05.9 COUGH: Primary | ICD-10-CM

## 2020-10-27 RX ORDER — CODEINE PHOSPHATE AND GUAIFENESIN 10; 100 MG/5ML; MG/5ML
1-2 SOLUTION ORAL EVERY 4 HOURS PRN
Qty: 75 ML | Refills: 0 | Status: SHIPPED | OUTPATIENT
Start: 2020-10-27 | End: 2020-12-07

## 2020-10-27 NOTE — TELEPHONE ENCOUNTER
Reason for call:  Patient reporting a symptom    Symptom or request: symptoms    Duration (how long have symptoms been present): 15 days    Have you been treated for this before? Yes    Additional comments: Patient had tested positive for covid 15 days ago.  She is wondering what she can take for her cough.  She cough all day and night.  Please call    Phone Number patient can be reached at:  Home number on file [phone] (home)    Best Time:  any    Can we leave a detailed message on this number:  YES    Call taken on 10/27/2020 at 3:08 PM by Kala Rahman

## 2020-10-27 NOTE — TELEPHONE ENCOUNTER
Called to speak with the patient   Patient is requesting cough syrup with codeine.Patient uses [facility] [place].   Keri please advise.     Patient has tried hot tea, day/nyquil, Tylenol, IBU, mucinex, and cough drops. Patient is concerned because she has coughed so hard before and ruptured something. Patient is having a difficult time sleeping. She has tried raising her head during sleep and that doesn't work.   Home Care provided below.     Cough Home Remedies:       Drink six to eight glasses of water daily.     Warm mist may help improve conditions. Breath through a warm wet washcloth placed over the mouth and nose or sit in a steam-filled bathroom for twenty to thirty minutes.     You could give 1/2 tsp mixed with 1/2 tsp of honey or corn syrup.     Drink warm lemonade, apple cider, or tea to help soothe the cough.     Avoid irritants such as being around smoke, smog, or chemicals.     Turn down the heat, open the windows, or go into cooler air to help suppress cough.     If congested avoid milk products.     Take cough suppressant (ask pharmacists for product suggestions) if cough is interfering with activity, causing chest pain, vomiting, or interrupting night sleep.     Take OTC medication as needed, being sure to follow instructions on the label.   o For a wet cough, use a decongestant; for a moctezuma cough, use an expectorant during the day and a suppressant at night; for an allergy, use an antihistamine or decongestant.       Guideline used: Cough  Telephone Triage Protocols for Nurses, Fifth Edition, Olivia Hutson RN  October 27, 2020

## 2020-11-16 ENCOUNTER — VIRTUAL VISIT (OUTPATIENT)
Dept: FAMILY MEDICINE | Facility: CLINIC | Age: 45
End: 2020-11-16
Payer: COMMERCIAL

## 2020-11-16 ENCOUNTER — MYC MEDICAL ADVICE (OUTPATIENT)
Dept: FAMILY MEDICINE | Facility: CLINIC | Age: 45
End: 2020-11-16

## 2020-11-16 ENCOUNTER — TELEPHONE (OUTPATIENT)
Dept: FAMILY MEDICINE | Facility: CLINIC | Age: 45
End: 2020-11-16

## 2020-11-16 ENCOUNTER — HEALTH MAINTENANCE LETTER (OUTPATIENT)
Age: 45
End: 2020-11-16

## 2020-11-16 DIAGNOSIS — U07.1 2019 NOVEL CORONAVIRUS DISEASE (COVID-19): Primary | ICD-10-CM

## 2020-11-16 PROCEDURE — 99213 OFFICE O/P EST LOW 20 MIN: CPT | Mod: 95 | Performed by: NURSE PRACTITIONER

## 2020-11-16 NOTE — LETTER
November 16, 2020      Elizabeth GALEANA Hamzah  [id number] [facility] [address]        To Whom It May Concern:    Elizabeth Goodson may return to work without restrictions.     Sincerely,        RAÚL Flor CNP

## 2020-11-16 NOTE — LETTER
November 16, 2020      Elizabeth Goodson  [id number] [address] [facility] [address]        To Whom It May Concern:    Elizabeth Goodson  was evaluated on 11/16/2020.  Please excuse her to return without restrictions on Nov. 18th, 2020 due to Covid illness.        Sincerely,    Electronically signed.     RAÚL Flor CNP

## 2020-11-16 NOTE — TELEPHONE ENCOUNTER
Reason for Call:  Other Note     Detailed comments: Patient called and is requesting a note for work. She is still having side effects form COVID. She is still having headaches and body aches but she does not need an medication for it she said its common for her and her job needs a note for work. Per patient she feels fine and she is able to work. If possible for the note to put it in mychart. Please Advise thank you    Phone Number Patient can be reached at: Home number on file [phone] (home)    Best Time: anytime    Can we leave a detailed message on this number? YES    Call taken on 11/16/2020 at 8:16 AM by Irish Cedillo

## 2020-11-16 NOTE — TELEPHONE ENCOUNTER
I agree should not come in. Ask if she can do a Evisit and I will call to review symptoms.   RAÚL Flor CNP

## 2020-11-16 NOTE — PROGRESS NOTES
"Elizabeth Goodson is a 45 year old female who is being evaluated via a billable telephone visit.      The patient has been notified of following:     \"This telephone visit will be conducted via a call between you and your physician/provider. We have found that certain health care needs can be provided without the need for a physical exam.  This service lets us provide the care you need with a short phone conversation.  If a prescription is necessary we can send it directly to your pharmacy.  If lab work is needed we can place an order for that and you can then stop by our lab to have the test done at a later time.    Telephone visits are billed at different rates depending on your insurance coverage. During this emergency period, for some insurers they may be billed the same as an in-person visit.  Please reach out to your insurance provider with any questions.    If during the course of the call the physician/provider feels a telephone visit is not appropriate, you will not be charged for this service.\"    Patient has given verbal consent for Telephone visit?  Yes    What phone number would you like to be contacted at? [phone]    How would you like to obtain your AVS? María Elena Joseph     Elizabeth Goodson is a 45 year old female who presents via phone visit today for the following health issues:    HPI      Called to discuss further with the patient.      Patient stated that she was positive for COVID on 10/15/20 and than quarantined for 10 days (10/25/20). She currently has body aches and a headache.   She is wondering if she can get a letter excusing her from work today and tomorrow, due to the symptoms above. She did call  over the weekend and they told her not to come in, but she may have lingering symptoms.      Still having HA's- dull ache, and body aches.   SOB-improving.   Cough- lingering, but improving.   No hemoptysis.   Still fatigued.     Had cough meds with Codeine. "     Diarrhea-no  PO-back to normal. But lost 10 LBS. During illness.   Able to sleep through night.       Review of Systems   Constitutional, HEENT, cardiovascular, pulmonary, gi and gu systems are negative, except as otherwise noted.       Objective          Vitals:  No vitals were obtained today due to virtual visit.    healthy, alert and no distress  PSYCH: Alert and oriented times 3; coherent speech, normal   rate and volume, able to articulate logical thoughts, able   to abstract reason, no tangential thoughts, no hallucinations   or delusions  Her affect is normal and pleasant  RESP: Occ cough, no audible wheezing, able to talk in full sentences  Remainder of exam unable to be completed due to telephone visits    Orders Only on 06/15/2020   Component Date Value Ref Range Status     COVID-19 Antibody Screen 06/15/2020 Negative   Final    Comment: No COVID-19 antibodies detected.  Patients within 10 days of symptom onset for   COVID-19 may not produce sufficient levels of detectable antibodies.    Immunocompromised COVID-19 patients may take longer to develop antibodies.       COVID-19 Antibody, IgG Titer 06/15/2020 Not Applicable   Final    Comment: Qualitative screen for total antibodies to COVID-19 (SARS-CoV-2) with   semi-quantitative measurement of IgG COVID-19 antibodies by endpoint titer.    COVID-19 antibodies may be elevated due to a past or current infection.  Negative results do not rule out COVID-19 infection.  Results from antibody   testing should not be used as the sole basis to diagnose or exclude SARS-CoV-2   infection or to inform infection status.  COVID-19 PCR test should be ordered   if current infection is suspected.  False positive results may occur in rare   cases due to cross-reacting antibodies.  This test was developed and its performance characteristics determined by the   [facility] ([facility]),   which is regulated under CLIA as  "qualified to perform high-complexity testing.    This test has not been reviewed by the FDA.  Testing performed by [facility], [facility], [address], [address], [place] [address]               Assessment/Plan:    Assessment & Plan     Elizabeth was seen today for covid letter.    Diagnoses and all orders for this visit:    2019 novel coronavirus disease (COVID-19)         BMI:   Estimated body mass index is 34.13 kg/m  as calculated from the following:    Height as of 2/24/20: 1.588 m (5' 2.5\").    Weight as of 2/24/20: 86 kg (189 lb 9.6 oz).   Weight management plan: Advance activity as tolerated.          Flu shot Friday.     Return in about 2 weeks (around 11/30/2020) for Physical Exam.    RAÚL Flor [facility]    Phone call duration:  6 minutes              "

## 2020-11-16 NOTE — TELEPHONE ENCOUNTER
Called to discuss further with the patient.     Patient stated that she was positive for COVID on 10/15/20 and than quarantined for 10 days (10/25/20). She currently has body aches and a headache.   She is wondering if she can get a letter excusing her from work today and tomorrow, due to the symptoms above. She did call [facility] over the weekend and they told her not to come in, but she may have lingering symptoms.     Please advise.   Jluis Hutson RN  November 16, 2020

## 2020-11-21 DIAGNOSIS — K44.9 HIATAL HERNIA: ICD-10-CM

## 2020-11-21 DIAGNOSIS — R04.2 COUGH WITH HEMOPTYSIS: ICD-10-CM

## 2020-11-21 DIAGNOSIS — K21.9 GASTROESOPHAGEAL REFLUX DISEASE WITHOUT ESOPHAGITIS: ICD-10-CM

## 2020-11-22 DIAGNOSIS — M79.7 FIBROMYALGIA: ICD-10-CM

## 2020-11-23 RX ORDER — OMEPRAZOLE 40 MG/1
CAPSULE, DELAYED RELEASE ORAL
Qty: 90 CAPSULE | Refills: 3 | Status: SHIPPED | OUTPATIENT
Start: 2020-11-23 | End: 2021-11-22

## 2020-11-23 RX ORDER — CYCLOBENZAPRINE HCL 10 MG
TABLET ORAL
Qty: 90 TABLET | Refills: 1 | Status: SHIPPED | OUTPATIENT
Start: 2020-11-23 | End: 2021-06-24

## 2020-11-23 NOTE — TELEPHONE ENCOUNTER
Prescription approved per [facility] Refill Protocol.  Jluis Hutson RN  November 23, 2020

## 2020-11-23 NOTE — TELEPHONE ENCOUNTER
Pending Prescriptions:                       Disp   Refills    cyclobenzaprine (FLEXERIL) 10 MG tablet [P*90 tab*1        Sig: TAKE ONE TABLET BY MOUTH ONCE DAILY AT BEDTIME    Routing refill request to provider for review/approval because:  Drug not on the [facility] refill protocol

## 2020-11-26 ENCOUNTER — MYC MEDICAL ADVICE (OUTPATIENT)
Dept: FAMILY MEDICINE | Facility: CLINIC | Age: 45
End: 2020-11-26

## 2020-11-30 NOTE — PROGRESS NOTES
"Elizabeth Goodson is a 45 year old female who is being evaluated via a billable telephone visit.      The patient has been notified of following:     \"This telephone visit will be conducted via a call between you and your physician/provider. We have found that certain health care needs can be provided without the need for a physical exam.  This service lets us provide the care you need with a short phone conversation.  If a prescription is necessary we can send it directly to your pharmacy.  If lab work is needed we can place an order for that and you can then stop by our lab to have the test done at a later time.    Telephone visits are billed at different rates depending on your insurance coverage. During this emergency period, for some insurers they may be billed the same as an in-person visit.  Please reach out to your insurance provider with any questions.    If during the course of the call the physician/provider feels a telephone visit is not appropriate, you will not be charged for this service.\"    Patient has given verbal consent for Telephone visit?  Yes    What phone number would you like to be contacted at? [phone]    How would you like to obtain your AVS? María Elena Joseph     Elizabeth Goodson is a 45 year old female who presents via phone visit today for the following health issues:    HPI     Rash  Onset/Duration: a few months was previously treated.   Description  Location: Right arm  Character: round, raised, red  Itching: moderate  Intensity:  mild  Progression of Symptoms:  same  Accompanying signs and symptoms:   Fever: no  Body aches or joint pain: YES  Sore throat symptoms: no  Recent cold symptoms: no  History:           Previous episodes of similar rash: Yes   New exposures:  None  Recent travel: no  Exposure to similar rash: no  Precipitating or alleviating factors: None  Therapies tried and outcome: hydrocortisone cream -  not effective and Benadryl/diphenhydramine -  not " effective    Recurrent dermatitis.  Patient feels it is embarrassing.  It is very itchy.  She does have a cat.  Patient feels like it is blotchy and people ask her if she has chickenpox.  Last prescription for steroids, prednisone for dermatitis resolved the issue at that time.  That was noted late August.    Patient with RA on Arava.        Review of Systems   Constitutional, HEENT, cardiovascular, pulmonary, gi and gu systems are negative, except as otherwise noted.       Objective          Vitals:  No vitals were obtained today due to virtual visit.    healthy, alert and no distress  PSYCH: Alert and oriented times 3; coherent speech, normal   rate and volume, able to articulate logical thoughts, able   to abstract reason, no tangential thoughts, no hallucinations   or delusions  Her affect is normal and pleasant  RESP: No cough, no audible wheezing, able to talk in full sentences  Remainder of exam unable to be completed due to telephone visits            Assessment/Plan:    Assessment & Plan     Elizabeth was seen today for derm problem.    Diagnoses and all orders for this visit:    Dermatitis    Rheumatoid arthritis involving multiple sites, unspecified whether rheumatoid factor present (H)            Due to recurrent dermatitis and comorbid RA.  Recommend biopsy.  Want to avoid treatments that would suppress the issue as this is now recurrent need a formal diagnosis.  Can consider flu vaccine at time of diagnosis.  Patient will be scheduled in the next week.    Return in about 3 days (around 12/6/2020) for re-check office visit.    RAÚL Flor [facility]    Phone call duration:  9 minutes

## 2020-11-30 NOTE — TELEPHONE ENCOUNTER
Pt needs visit- please assist her with scheduling. Keri is out of the office today. Pretty Garcia PA-C

## 2020-12-02 NOTE — PROGRESS NOTES
"   SUBJECTIVE:   CC: Elizabeth Goodson is an 45 year old woman who presents for preventive health visit.     Patient has been advised of split billing requirements and indicates understanding: Yes  Healthy Habits:     Getting at least 3 servings of Calcium per day:  Yes    Bi-annual eye exam:  Yes    Dental care twice a year:  Yes    Sleep apnea or symptoms of sleep apnea:  None    Diet:  Low salt    Frequency of exercise:  1 day/week    Duration of exercise:  15-30 minutes    Taking medications regularly:  Yes    Medication side effects:  Not applicable    PHQ-2 Total Score: 0    Additional concerns today:  No    Has had an ongoing dermatitis skin reaction for the last several months. Was on steroids back in June/July 2020 which did help. After treatment, additional \"blotchy\" red spots came back. Areas are itchy but not painful. Has continued to spread to different areas of her upper body. Patient has 4 cats.     Has not been having any drainage or associated fevers.     Attempts to cover areas with additional clothing and is tired of having to do so.    Has been using OTC hydrocortisone cream to help with the itching.     Also has a mole on her right upper back that she would like looked at. No pain or drainage just becomes irritated when brushed by clothing. Has never bled.  History of mole removal on her leg.     Has not been taking Arava for her rheumatoid arthritis since the spring due to concern for getting COVID-19 and being immunosuppressed. Only symptom she has been having is bilateral ankle swelling. Needs to re-est. With rheumatology.     Feels like her mood is good.    Today's PHQ-2 Score:   PHQ-2 ( 1999 Pfizer) 12/7/2020   Q1: Little interest or pleasure in doing things 0   Q2: Feeling down, depressed or hopeless 0   PHQ-2 Score 0   Q1: Little interest or pleasure in doing things Not at all   Q2: Feeling down, depressed or hopeless Not at all   PHQ-2 Score 0       Abuse: Current or Past (Physical, " Sexual or Emotional) - No  Do you feel safe in your environment? Yes        Social History     Tobacco Use     Smoking status: Former Smoker     Packs/day: 0.00     Years: 13.00     Pack years: 0.00     Types: Cigarettes     Quit date: 10/1/2012     Years since quittin.1     Smokeless tobacco: Never Used   Substance Use Topics     Alcohol use: Not Currently     Alcohol/week: 0.0 standard drinks     Comment: nothing in the past 3 months - occ/ 1-2 per month     If you drink alcohol do you typically have >3 drinks per day or >7 drinks per week? No    Alcohol Use 2020   Prescreen: >3 drinks/day or >7 drinks/week? No   Prescreen: >3 drinks/day or >7 drinks/week? -       Reviewed orders with patient.  Reviewed health maintenance and updated orders accordingly - Yes  BP Readings from Last 3 Encounters:   20 (!) 124/90   20 118/74   20 128/76    Wt Readings from Last 3 Encounters:   20 85.6 kg (188 lb 11.2 oz)   20 86 kg (189 lb 9.6 oz)   20 85.4 kg (188 lb 4.8 oz)                  Patient Active Problem List   Diagnosis     Syncope     Raynaud phenomenon     CARDIOVASCULAR SCREENING; LDL GOAL LESS THAN 160     Strain of neck muscle     Migraine headache     Cough with hemoptysis     Incidental lung nodule, less than or equal to 3mm     Need for prophylactic vaccination and inoculation against influenza     Fibromyalgia     HTN, goal below 140/90     H/O: hysterectomy     GERD (gastroesophageal reflux disease)     Edema     High risk medication use     Hypothyroidism     Ventral hernia without obstruction or gangrene     Hemoptysis     Rheumatoid arthritis, involving unspecified site, unspecified rheumatoid factor presence     Penicillin allergy     Peanut allergy     Tree nut allergy     Anaphylaxis, subsequent encounter     Itching     History of hemoptysis     Atypical chest pain     Mass of finger, left     Cubital tunnel syndrome on right     Past Surgical History:    Procedure Laterality Date     CHOLECYSTECTOMY, LAPOROSCOPIC      Cholecystectomy, Laparoscopic     COMBINED ESOPHAGOSCOPY, GASTROSCOPY, DUODENOSCOPY (EGD) WITH CO2 INSUFFLATION N/A 2018    Procedure: Egd;  Surgeon: Richard Aviles MD;  Location: [facility]     ESOPHAGOSCOPY, GASTROSCOPY, DUODENOSCOPY (EGD), COMBINED N/A 2018    Procedure: COMBINED ESOPHAGOSCOPY, GASTROSCOPY, DUODENOSCOPY (EGD), BIOPSY SINGLE OR MULTIPLE;  Surgeon: Richard Aviles MD;  Location: [facility]     EXCISE MASS FOOT  2013    Procedure: EXCISE MASS FOOT;  Right Foot Soft Tissue Mass Excision;  Surgeon: Jose Cruz Garcia DPM;  [facility]     EXCISE MASS FOOT Right 2015    Procedure: EXCISE MASS FOOT;  Surgeon: Pierre Adan DPM;  [facility]     HYSTERECTOMY, VAGINAL  2002     LAPAROSCOPIC HERNIORRHAPHY VENTRAL N/A 2016    Procedure: LAPAROSCOPIC HERNIORRHAPHY VENTRAL;  Surgeon: Lars Dahl MD;  [facility]     PANNICULECTOMY  2017    with liposuction, and hernia revision Dr. Heath     RELEASE TRIGGER FINGER Left 2018    Procedure: RELEASE TRIGGER FINGER;  Trigger Finger Release Left ring finger ;  Surgeon: Aaron Orta DO;  Location: [facility]       Social History     Tobacco Use     Smoking status: Former Smoker     Packs/day: 0.00     Years: 13.00     Pack years: 0.00     Types: Cigarettes     Quit date: 10/1/2012     Years since quittin.1     Smokeless tobacco: Never Used   Substance Use Topics     Alcohol use: Not Currently     Alcohol/week: 0.0 standard drinks     Comment: nothing in the past 3 months - occ/ 1-2 per month     Family History   Problem Relation Age of Onset     Asthma Father      C.A.D. Father      Diabetes No family hx of      Hypertension No family hx of      Cerebrovascular Disease No family hx of      Breast Cancer No family hx of      Cancer - colorectal No family hx of      Prostate Cancer No family hx of       Alcohol/Drug No family hx of      Allergies No family hx of      Alzheimer Disease No family hx of      Anesthesia Reaction No family hx of      Arthritis No family hx of      Blood Disease No family hx of      Cancer No family hx of      Cardiovascular No family hx of      Circulatory No family hx of      Congenital Anomalies No family hx of      Connective Tissue Disorder No family hx of      Neurologic Disorder No family hx of      Depression No family hx of      Endocrine Disease No family hx of      Eye Disorder No family hx of      Genetic Disorder No family hx of      Gastrointestinal Disease No family hx of      Genitourinary Problems No family hx of      Gynecology No family hx of      Heart Disease No family hx of      Lipids No family hx of      Musculoskeletal Disorder No family hx of      Obesity No family hx of      Osteoporosis No family hx of      Psychotic Disorder No family hx of      Respiratory No family hx of      Hearing Loss No family hx of      Family History Negative No family hx of      Thyroid Disease No family hx of      Colon Cancer No family hx of      Hyperlipidemia No family hx of      Coronary Artery Disease No family hx of      Other Cancer No family hx of      Anxiety Disorder No family hx of      Mental Illness No family hx of      Substance Abuse No family hx of          Current Outpatient Medications   Medication Sig Dispense Refill     cyclobenzaprine (FLEXERIL) 10 MG tablet TAKE ONE TABLET BY MOUTH ONCE DAILY AT BEDTIME 90 tablet 1     diltiazem ER COATED BEADS (CARDIZEM CD/CARTIA XT) 240 MG 24 hr capsule TAKE ONE CAPSULE BY MOUTH ONCE DAILY 90 capsule 1     EPINEPHrine (EPIPEN 2-KIMBERLEE) 0.3 MG/0.3ML injection 2-pack Inject 0.3 mLs (0.3 mg) into the muscle once as needed for anaphylaxis 0.6 mL 1     hydrochlorothiazide (HYDRODIURIL) 25 MG tablet TAKE ONE TABLET BY MOUTH ONCE DAILY 90 tablet 2     leflunomide (ARAVA) 20 MG tablet Take 1 tablet (20 mg) by mouth daily 30 tablet 2      levothyroxine (SYNTHROID/LEVOTHROID) 112 MCG tablet Take 1 tablet (112 mcg) by mouth daily 90 tablet 1     losartan (COZAAR) 50 MG tablet Take 1 tablet (50 mg) by mouth daily 90 tablet 2     metoprolol succinate ER (TOPROL-XL) 25 MG 24 hr tablet TAKE ONE TABLET BY MOUTH ONCE DAILY 90 tablet 1     omeprazole (PRILOSEC) 40 MG DR capsule TAKE ONE CAPSULE BY MOUTH ONCE DAILY 30-60 MINUTES BEFORE A MEAL 90 capsule 3     potassium chloride ER (KLOR-CON) 20 MEQ CR tablet Take 1 tablet (20 mEq) by mouth daily 90 tablet 1       Mammogram Screening: Patient under age 50, mutual decision reflected in health maintenance.      Pertinent mammograms are reviewed under the imaging tab.  History of complete hysterectomy    Reviewed and updated as needed this visit by clinical staff  Tobacco  Allergies  Meds   Med Hx  Surg Hx  Fam Hx  Soc Hx        Reviewed and updated as needed this visit by Provider                Past Medical History:   Diagnosis Date     Adnexal mass 2017    not seen on 7/10/18 abdominal CT     H/O transfusion of whole blood 2001    with child labor/hysterectomy     Hemoptysis 11/12, 11/2015-mild    last episodes, mild, has had massive in HX     HTN (hypertension)      Incidental lung nodule, less than or equal to 3mm 11/12    [facility]AS     Massive hemoptysis 11/12    due to pneumonia     Migraine headache      Tobacco abuse, in remission     quit 2012      Past Surgical History:   Procedure Laterality Date     CHOLECYSTECTOMY, LAPOROSCOPIC  2007    Cholecystectomy, Laparoscopic     COMBINED ESOPHAGOSCOPY, GASTROSCOPY, DUODENOSCOPY (EGD) WITH CO2 INSUFFLATION N/A 11/16/2018    Procedure: Egd;  Surgeon: Richard Aviles MD;  [facility]     ESOPHAGOSCOPY, GASTROSCOPY, DUODENOSCOPY (EGD), COMBINED N/A 11/16/2018    Procedure: COMBINED ESOPHAGOSCOPY, GASTROSCOPY, DUODENOSCOPY (EGD), BIOPSY SINGLE OR MULTIPLE;  Surgeon: Richard Aviles MD;  Location[facility]     EXCISE MASS FOOT  11/22/2013  "   Procedure: EXCISE MASS FOOT;  Right Foot Soft Tissue Mass Excision;  Surgeon: Jose Cruz Garcia DPM;  [facility]     EXCISE MASS FOOT Right 7/17/2015    Procedure: EXCISE MASS FOOT;  Surgeon: Pierre Adan DPM;  [facility]     HYSTERECTOMY, VAGINAL  2002     LAPAROSCOPIC HERNIORRHAPHY VENTRAL N/A 2/24/2016    Procedure: LAPAROSCOPIC HERNIORRHAPHY VENTRAL;  Surgeon: Lars Dahl MD;  [facility]     PANNICULECTOMY  07/28/2017    with liposuction, and hernia revision Dr. Heath     RELEASE TRIGGER FINGER Left 5/4/2018    Procedure: RELEASE TRIGGER FINGER;  Trigger Finger Release Left ring finger ;  Surgeon: Aaron Orta DO;  [facility]     Review of Systems   Constitutional: Negative for chills and fever.   HENT: Negative for congestion and ear pain.    Eyes: Negative for pain.   Respiratory: Negative for cough.    Cardiovascular: Negative for chest pain.   Gastrointestinal: Negative for abdominal pain, constipation, diarrhea and hematochezia.   Breasts:  Negative for breast mass.   Genitourinary: Negative for frequency, genital sores, hematuria, pelvic pain and vaginal discharge.   Neurological: Negative for dizziness and headaches.   Psychiatric/Behavioral: The patient is not nervous/anxious.           OBJECTIVE:   BP (!) 124/90   Pulse 98   Temp 99.3  F (37.4  C) (Oral)   Resp 18   Ht 1.588 m (5' 2.5\")   Wt 85.6 kg (188 lb 11.2 oz)   LMP  (LMP Unknown)   SpO2 100%   BMI 33.96 kg/m    Physical Exam  GENERAL: healthy, alert and no distress  EYES: Eyes grossly normal to inspection, PERRL and conjunctivae and sclerae normal  HENT: ear canals and TM's normal, nose and mouth without ulcers or lesions  NECK: no adenopathy, no asymmetry, masses, or scars and thyroid normal to palpation  RESP: lungs clear to auscultation - no rales, rhonchi or wheezes  BREAST: normal without masses, tenderness or nipple discharge and no palpable axillary masses or adenopathy  CV: " regular rate and rhythm, normal S1 S2, no S3 or S4, no murmur, click or rub.   ABDOMEN: soft, nontender, no hepatosplenomegaly, no masses and bowel sounds normal  MS: no gross musculoskeletal defects noted. Bilateral lower extremity ankle swelling.  SKIN: Multiple scattered erythema patches approximately 1-4 cm in diameter to upper torso, arms and shoulders.  Focal erythema  to right shoulder that is annular shaped with elevated borders, approximately 4 cm in diameter. No drainage or excoriation.   Focal scaly brown/black colored nevi located on back/posteriorly near right scapula, elevated and raised, approximately 6-8 mm in diameter. No drainage.  NEURO: Normal strength and tone, mentation intact and speech normal  PSYCH: mentation appears normal, affect normal/bright    Diagnostic Test Results:  Labs reviewed in Epic  No results found for this or any previous visit (from the past 24 hour(s)).         After informed written consent was obtained, using Betadine for cleansing and 1% Lidocaine with epinephrine for anesthetic, with sterile technique a 5 mm punch biopsy was used to obtain a biopsy specimen of the 2 cm patch on her left upper chest at the 3 o'clock position. Specimen was placed in formalin, labeled, and sent for pathology. Hemostasis was obtained by Drysol solution. Bacitracin was applied followed by appropriate dressing.      Shave excision:  using the same cleansing, anesthetic, and sterile method, a shave biopsy was done to remove the nevi located on her back near her right scapula. A Dermablade was used to remove the nevi with additional 1+ mm borders for complete shave excision,  and it was placed in formalin, labeled, and sent to pathology. Hemostasis was obtained by Drysol  solution. Bacitracin was applied followed by appropriate dressing.     The procedures were well tolerated without complications.      ASSESSMENT/PLAN:       ICD-10-CM    1. Routine general medical examination at [facility]  facility  Z00.00    2. Peanut allergy  Z91.010 EPINEPHrine (EPIPEN 2-KIMBERLEE) 0.3 MG/0.3ML injection 2-pack   3. Tree nut allergy  Z91.018 EPINEPHrine (EPIPEN 2-KIMBERELE) 0.3 MG/0.3ML injection 2-pack   4. Essential hypertension  I10 diltiazem ER COATED BEADS (CARDIZEM CD/CARTIA XT) 240 MG 24 hr capsule   5. Essential hypertension with goal blood pressure less than 140/90  I10 losartan (COZAAR) 50 MG tablet     Comprehensive metabolic panel   6. Hypokalemia  E87.6 potassium chloride ER (KLOR-CON) 20 MEQ CR tablet   7. Routine medical exam  Z00.00 CBC with platelets differential     Lipid panel reflex to direct LDL Non-fasting   8. Rheumatoid arthritis involving multiple sites, unspecified whether rheumatoid factor present (H)  M06.9 leflunomide (ARAVA) 20 MG tablet     Rheumatology Referral   9. Hypothyroidism, unspecified type  E03.9 TSH with free T4 reflex   10. Hypothyroidism due to acquired atrophy of thyroid  E03.4 levothyroxine (SYNTHROID/LEVOTHROID) 112 MCG tablet   11. Need for hepatitis C screening test  Z11.59 Hepatitis C Screen Reflex to HCV RNA Quant and Genotype   12. Breast screening  Z12.39 *MA Screening Digital Bilateral     Routine labs drawn today- non fasting lipids, CBC. One time hepatitis C screen.  Assessing TSH while on synthroid for thyroid dysfunction.  CMP to assess electrolytes and kidney function while on antihypertensive medications.  Mammogram ordered    Educated to keep both areas covered with bacitracin and a band-aide until this evening. Continue to monitor areas for signs of infection that might include increased redness, purulent drainage, or fevers.   Keep area clean and dry. A scab is to be expected as part of the healing process. Will likely have a scar, make sure it is always covered with cloth or sunscreen when exposed to sun.     Will follow up with the biopsy results once they are back.     Medication refills provided today.    Rheumatology referral placed for management of RA.   Can  "resume Arava now since she had COVID-19 back in October.     Start taking oral fluconazole 150 mg once weekly for four weeks to cover multiple fungal patches to skin. Treating for Tinea infection. Biopsy due to recurrence and definitive diagnosis.   Can also use OTC antifungal cream/ointment such as clotrimazole to help with itching.    Patient has been advised of split billing requirements and indicates understanding: Yes  COUNSELING:  Special attention given to:        Regular exercise       Healthy diet/nutrition       Immunizations    Vaccinated for: Influenza      Healthy diet and exercise discussed with patient due to BMI. Mediterranean diet and DASH diet encouraged and to limit salt and trans fat intake. Increase fruit and vegetable intake. Increase in exercise routine to 150 minutes of exercise per week and 2 sessions of strength training. Can also do 10 minute intervals of exercise throughout the day.       Estimated body mass index is 33.96 kg/m  as calculated from the following:    Height as of this encounter: 1.588 m (5' 2.5\").    Weight as of this encounter: 85.6 kg (188 lb 11.2 oz).    Weight management plan: Discussed healthy diet and exercise guidelines    She reports that she quit smoking about 8 years ago. Her smoking use included cigarettes. She smoked 0.00 packs per day for 13.00 years. She has never used smokeless tobacco.      Follow up in 6 months for blood pressure check and medication management.     Counseling Resources:  ATP IV Guidelines  Pooled Cohorts Equation Calculator  Breast Cancer Risk Calculator  BRCA-Related Cancer Risk Assessment: FHS-7 Tool  FRAX Risk Assessment  ICSI Preventive Guidelines  Dietary Guidelines for Americans, 2010  USDA's MyPlate  ASA Prophylaxis  Lung CA Screening    Patient seen by Risa Goodman RN, FNP student. Patient examined and note authored by RAÚL Jacobo, CNP.    RA[facility]L Flor CNP  [facility]  "

## 2020-12-03 ENCOUNTER — VIRTUAL VISIT (OUTPATIENT)
Dept: FAMILY MEDICINE | Facility: CLINIC | Age: 45
End: 2020-12-03
Payer: COMMERCIAL

## 2020-12-03 DIAGNOSIS — M06.9 RHEUMATOID ARTHRITIS INVOLVING MULTIPLE SITES, UNSPECIFIED WHETHER RHEUMATOID FACTOR PRESENT (H): ICD-10-CM

## 2020-12-03 DIAGNOSIS — L30.9 DERMATITIS: Primary | ICD-10-CM

## 2020-12-03 PROCEDURE — 99213 OFFICE O/P EST LOW 20 MIN: CPT | Mod: 95 | Performed by: NURSE PRACTITIONER

## 2020-12-04 NOTE — PROGRESS NOTES
Subjective     Elizabeth Goodson is a 45 year old female who presents to clinic today for the following health issues:    HPI         {SUPERLIST (Optional):219472}  {additonal problems for provider to add (Optional):051083}    Review of Systems   {ROS COMP (Optional):414420}      Objective    LMP  (LMP Unknown)   There is no height or weight on file to calculate BMI.  Physical Exam   {Exam List (Optional):049077}    {Diagnostic Test Results (Optional):988061}        {PROVIDER CHARTING PREFERENCE:525642}

## 2020-12-07 ENCOUNTER — OFFICE VISIT (OUTPATIENT)
Dept: FAMILY MEDICINE | Facility: CLINIC | Age: 45
End: 2020-12-07
Payer: COMMERCIAL

## 2020-12-07 VITALS
RESPIRATION RATE: 18 BRPM | HEIGHT: 63 IN | BODY MASS INDEX: 33.43 KG/M2 | WEIGHT: 188.7 LBS | SYSTOLIC BLOOD PRESSURE: 124 MMHG | TEMPERATURE: 99.3 F | DIASTOLIC BLOOD PRESSURE: 90 MMHG | HEART RATE: 98 BPM | OXYGEN SATURATION: 100 %

## 2020-12-07 DIAGNOSIS — D22.9 ATYPICAL MOLE: ICD-10-CM

## 2020-12-07 DIAGNOSIS — Z00.00 ROUTINE MEDICAL EXAM: ICD-10-CM

## 2020-12-07 DIAGNOSIS — Z91.018 TREE NUT ALLERGY: ICD-10-CM

## 2020-12-07 DIAGNOSIS — M06.9 RHEUMATOID ARTHRITIS INVOLVING MULTIPLE SITES, UNSPECIFIED WHETHER RHEUMATOID FACTOR PRESENT (H): ICD-10-CM

## 2020-12-07 DIAGNOSIS — I10 ESSENTIAL HYPERTENSION WITH GOAL BLOOD PRESSURE LESS THAN 140/90: ICD-10-CM

## 2020-12-07 DIAGNOSIS — I10 ESSENTIAL HYPERTENSION: ICD-10-CM

## 2020-12-07 DIAGNOSIS — L30.9 DERMATITIS: ICD-10-CM

## 2020-12-07 DIAGNOSIS — Z91.010 PEANUT ALLERGY: ICD-10-CM

## 2020-12-07 DIAGNOSIS — Z53.9 ERRONEOUS ENCOUNTER--DISREGARD: Primary | ICD-10-CM

## 2020-12-07 DIAGNOSIS — B35.4 TINEA CORPORIS: ICD-10-CM

## 2020-12-07 DIAGNOSIS — E03.4 HYPOTHYROIDISM DUE TO ACQUIRED ATROPHY OF THYROID: ICD-10-CM

## 2020-12-07 DIAGNOSIS — E87.6 HYPOKALEMIA: ICD-10-CM

## 2020-12-07 DIAGNOSIS — Z11.59 NEED FOR HEPATITIS C SCREENING TEST: ICD-10-CM

## 2020-12-07 DIAGNOSIS — Z12.39 BREAST SCREENING: ICD-10-CM

## 2020-12-07 DIAGNOSIS — Z00.00 ROUTINE GENERAL MEDICAL EXAMINATION AT A HEALTH CARE FACILITY: Primary | ICD-10-CM

## 2020-12-07 DIAGNOSIS — E03.9 HYPOTHYROIDISM, UNSPECIFIED TYPE: ICD-10-CM

## 2020-12-07 LAB
BASOPHILS # BLD AUTO: 0.1 10E9/L (ref 0–0.2)
BASOPHILS NFR BLD AUTO: 0.4 %
DIFFERENTIAL METHOD BLD: ABNORMAL
EOSINOPHIL # BLD AUTO: 0.1 10E9/L (ref 0–0.7)
EOSINOPHIL NFR BLD AUTO: 0.7 %
ERYTHROCYTE [DISTWIDTH] IN BLOOD BY AUTOMATED COUNT: 14.2 % (ref 10–15)
HCT VFR BLD AUTO: 43.4 % (ref 35–47)
HGB BLD-MCNC: 14.3 G/DL (ref 11.7–15.7)
LYMPHOCYTES # BLD AUTO: 3.4 10E9/L (ref 0.8–5.3)
LYMPHOCYTES NFR BLD AUTO: 26.4 %
MCH RBC QN AUTO: 29.9 PG (ref 26.5–33)
MCHC RBC AUTO-ENTMCNC: 32.9 G/DL (ref 31.5–36.5)
MCV RBC AUTO: 91 FL (ref 78–100)
MONOCYTES # BLD AUTO: 1 10E9/L (ref 0–1.3)
MONOCYTES NFR BLD AUTO: 7.4 %
NEUTROPHILS # BLD AUTO: 8.4 10E9/L (ref 1.6–8.3)
NEUTROPHILS NFR BLD AUTO: 65.1 %
PLATELET # BLD AUTO: 427 10E9/L (ref 150–450)
RBC # BLD AUTO: 4.79 10E12/L (ref 3.8–5.2)
WBC # BLD AUTO: 12.9 10E9/L (ref 4–11)

## 2020-12-07 PROCEDURE — 11301 SHAVE SKIN LESION 0.6-1.0 CM: CPT | Mod: 59 | Performed by: NURSE PRACTITIONER

## 2020-12-07 PROCEDURE — 99396 PREV VISIT EST AGE 40-64: CPT | Mod: 25 | Performed by: NURSE PRACTITIONER

## 2020-12-07 PROCEDURE — 11104 PUNCH BX SKIN SINGLE LESION: CPT | Performed by: NURSE PRACTITIONER

## 2020-12-07 PROCEDURE — 80050 GENERAL HEALTH PANEL: CPT | Performed by: NURSE PRACTITIONER

## 2020-12-07 PROCEDURE — 90686 IIV4 VACC NO PRSV 0.5 ML IM: CPT | Performed by: NURSE PRACTITIONER

## 2020-12-07 PROCEDURE — 90471 IMMUNIZATION ADMIN: CPT | Performed by: NURSE PRACTITIONER

## 2020-12-07 PROCEDURE — 86803 HEPATITIS C AB TEST: CPT | Performed by: NURSE PRACTITIONER

## 2020-12-07 PROCEDURE — 36415 COLL VENOUS BLD VENIPUNCTURE: CPT | Performed by: NURSE PRACTITIONER

## 2020-12-07 PROCEDURE — 80061 LIPID PANEL: CPT | Performed by: NURSE PRACTITIONER

## 2020-12-07 PROCEDURE — 99213 OFFICE O/P EST LOW 20 MIN: CPT | Mod: 25 | Performed by: NURSE PRACTITIONER

## 2020-12-07 RX ORDER — LEVOTHYROXINE SODIUM 112 UG/1
112 TABLET ORAL DAILY
Qty: 90 TABLET | Refills: 1 | Status: SHIPPED | OUTPATIENT
Start: 2020-12-07 | End: 2021-05-10

## 2020-12-07 RX ORDER — DILTIAZEM HYDROCHLORIDE 240 MG/1
CAPSULE, COATED, EXTENDED RELEASE ORAL
Qty: 90 CAPSULE | Refills: 1 | Status: SHIPPED | OUTPATIENT
Start: 2020-12-07 | End: 2021-02-09

## 2020-12-07 RX ORDER — FLUCONAZOLE 150 MG/1
150 TABLET ORAL WEEKLY
Qty: 4 TABLET | Refills: 0 | Status: SHIPPED | OUTPATIENT
Start: 2020-12-07 | End: 2020-12-29

## 2020-12-07 RX ORDER — LEFLUNOMIDE 20 MG/1
20 TABLET ORAL DAILY
Qty: 30 TABLET | Refills: 2 | Status: SHIPPED | OUTPATIENT
Start: 2020-12-07 | End: 2024-04-08

## 2020-12-07 RX ORDER — LOSARTAN POTASSIUM 50 MG/1
50 TABLET ORAL DAILY
Qty: 90 TABLET | Refills: 2 | Status: SHIPPED | OUTPATIENT
Start: 2020-12-07 | End: 2021-10-20

## 2020-12-07 RX ORDER — POTASSIUM CHLORIDE 1500 MG/1
20 TABLET, EXTENDED RELEASE ORAL DAILY
Qty: 90 TABLET | Refills: 1 | Status: SHIPPED | OUTPATIENT
Start: 2020-12-07 | End: 2022-05-03

## 2020-12-07 RX ORDER — EPINEPHRINE 0.3 MG/.3ML
0.3 INJECTION SUBCUTANEOUS
Qty: 0.6 ML | Refills: 1 | Status: SHIPPED | OUTPATIENT
Start: 2020-12-07 | End: 2021-05-10

## 2020-12-07 ASSESSMENT — MIFFLIN-ST. JEOR: SCORE: 1467.13

## 2020-12-07 ASSESSMENT — ENCOUNTER SYMPTOMS
FEVER: 0
ABDOMINAL PAIN: 0
COUGH: 0
HEMATURIA: 0
BREAST MASS: 0
EYE PAIN: 0
DIZZINESS: 0
NERVOUS/ANXIOUS: 0
HEADACHES: 0
FREQUENCY: 0
CONSTIPATION: 0
DIARRHEA: 0
CHILLS: 0
HEMATOCHEZIA: 0

## 2020-12-08 LAB
ALBUMIN SERPL-MCNC: 4.2 G/DL (ref 3.4–5)
ALP SERPL-CCNC: 73 U/L (ref 40–150)
ALT SERPL W P-5'-P-CCNC: 24 U/L (ref 0–50)
ANION GAP SERPL CALCULATED.3IONS-SCNC: 5 MMOL/L (ref 3–14)
AST SERPL W P-5'-P-CCNC: 13 U/L (ref 0–45)
BILIRUB SERPL-MCNC: 0.5 MG/DL (ref 0.2–1.3)
BUN SERPL-MCNC: 9 MG/DL (ref 7–30)
CALCIUM SERPL-MCNC: 9.3 MG/DL (ref 8.5–10.1)
CHLORIDE SERPL-SCNC: 101 MMOL/L (ref 94–109)
CHOLEST SERPL-MCNC: 175 MG/DL
CO2 SERPL-SCNC: 31 MMOL/L (ref 20–32)
CREAT SERPL-MCNC: 0.68 MG/DL (ref 0.52–1.04)
GFR SERPL CREATININE-BSD FRML MDRD: >90 ML/MIN/{1.73_M2}
GLUCOSE SERPL-MCNC: 109 MG/DL (ref 70–99)
HCV AB SERPL QL IA: NONREACTIVE
HDLC SERPL-MCNC: 32 MG/DL
LDLC SERPL CALC-MCNC: 111 MG/DL
NONHDLC SERPL-MCNC: 143 MG/DL
POTASSIUM SERPL-SCNC: 3.3 MMOL/L (ref 3.4–5.3)
PROT SERPL-MCNC: 7.6 G/DL (ref 6.8–8.8)
SODIUM SERPL-SCNC: 137 MMOL/L (ref 133–144)
TRIGL SERPL-MCNC: 161 MG/DL
TSH SERPL DL<=0.005 MIU/L-ACNC: 1.47 MU/L (ref 0.4–4)

## 2020-12-15 ENCOUNTER — MYC MEDICAL ADVICE (OUTPATIENT)
Dept: FAMILY MEDICINE | Facility: CLINIC | Age: 45
End: 2020-12-15

## 2020-12-15 LAB — COPATH REPORT: NORMAL

## 2020-12-16 ENCOUNTER — MYC MEDICAL ADVICE (OUTPATIENT)
Dept: FAMILY MEDICINE | Facility: CLINIC | Age: 45
End: 2020-12-16

## 2020-12-16 NOTE — TELEPHONE ENCOUNTER
Patient notified of skin pathology/Timid Lupus. Has dermatology appt. today. Needs follow-up with Rheumatology, patient is re-establishing with Dr. Randolph, with her RA. Will need interdepartmental treatments. RAÚL Flor CNP

## 2020-12-17 ENCOUNTER — VIRTUAL VISIT (OUTPATIENT)
Dept: DERMATOLOGY | Facility: CLINIC | Age: 45
End: 2020-12-17
Payer: COMMERCIAL

## 2020-12-17 DIAGNOSIS — L93.0 LUPUS ERYTHEMATOSUS TUMIDUS: Primary | ICD-10-CM

## 2020-12-17 PROCEDURE — 99213 OFFICE O/P EST LOW 20 MIN: CPT | Mod: 95 | Performed by: DERMATOLOGY

## 2020-12-17 ASSESSMENT — PAIN SCALES - GENERAL: PAINLEVEL: NO PAIN (0)

## 2020-12-17 NOTE — PROGRESS NOTES
[facility] Dermatology Record:  Store and Forward and Telephone [phone]       Dermatology Problem List:  1. Relevant medical history: RA  -current immunosuppression: leflunomide 20 mg  1. NMSC  -BCC, left knee, s/p ED&C 2/2/2017  -BCC, left lower leg, s/p excision 2/2/2017  2. Actinic keratosis  -s/p cryotherapy    Encounter Date: Dec 17, 2020    CC:   Chief Complaint   Patient presents with     Derm Problem     Anterior chest s/p 12/7/2020        History of Present Illness:  Elizabeth Harper is a 45 year old female who presents for follow for tumid lupus. Patient wants to discuss results today. Was biopsied by PCP. Has tried topical steroid po and creams. Po works better than creams. On an antifungal po.     Reports this summer had 30-40 on her back she was very miserable. She used prednisone and it was helpful. By day 3 she was much better.       ROS: Patient is generally feeling well today   Patient is not pregnant or BF    Physical Examination:  General: Well-sounding    Skin:  Exam was performed by photo review and is significant for the following:  -2 faint annular possibly  slightly raised erythematous plaques on the right upper arm    Labs:  Patient Name: ELIZABETH HARPER   MR#: [id number]   Specimen #: [id number]   Collected: 12/7/2020   Received: 12/9/2020   Reported: 12/15/2020 23:36   Ordering Phy(s): CAROLYN MAGANA     For improved result formatting, select 'View Enhanced Report Format' under    Linked Documents section.     SPECIMEN(S):   A: Skin, anterior chest   B: Skin, mole, back     FINAL DIAGNOSIS:   A. Skin, anterior chest:   - Most consistent with tumid lupus erythematosus - (see comment)     B. Skin, mole, back:   - Seborrheic keratosis - (see description)         Past Medical History:   Patient Active Problem List   Diagnosis     Syncope     Raynaud phenomenon     CARDIOVASCULAR SCREENING; LDL GOAL LESS THAN 160     Strain of neck muscle     Migraine headache     Cough with hemoptysis      Incidental lung nodule, less than or equal to 3mm     Need for prophylactic vaccination and inoculation against influenza     Fibromyalgia     HTN, goal below 140/90     H/O: hysterectomy     GERD (gastroesophageal reflux disease)     Edema     High risk medication use     Hypothyroidism     Ventral hernia without obstruction or gangrene     Hemoptysis     Rheumatoid arthritis, involving unspecified site, unspecified rheumatoid factor presence     Penicillin allergy     Peanut allergy     Tree nut allergy     Anaphylaxis, subsequent encounter     Itching     History of hemoptysis     Atypical chest pain     Mass of finger, left     Cubital tunnel syndrome on right     Past Medical History:   Diagnosis Date     Adnexal mass 2017    not seen on 7/10/18 abdominal CT     H/O transfusion of whole blood 2001    with child labor/hysterectomy     Hemoptysis 11/12, 11/2015-mild    last episodes, mild, has had massive in HX     HTN (hypertension)      Incidental lung nodule, less than or equal to 3mm 11/12    NICHOLAS     Massive hemoptysis 11/12    due to pneumonia     Migraine headache      Tobacco abuse, in remission     quit 2012     Past Surgical History:   Procedure Laterality Date     CHOLECYSTECTOMY, LAPOROSCOPIC  2007    Cholecystectomy, Laparoscopic     COMBINED ESOPHAGOSCOPY, GASTROSCOPY, DUODENOSCOPY (EGD) WITH CO2 INSUFFLATION N/A 11/16/2018    Procedure: Egd;  Surgeon: Richard Aviles MD;  Location: [facility] OR     ESOPHAGOSCOPY, GASTROSCOPY, DUODENOSCOPY (EGD), COMBINED N/A 11/16/2018    Procedure: COMBINED ESOPHAGOSCOPY, GASTROSCOPY, DUODENOSCOPY (EGD), BIOPSY SINGLE OR MULTIPLE;  Surgeon: Richard Aviles MD;  Location: [facility] OR     EXCISE MASS FOOT  11/22/2013    Procedure: EXCISE MASS FOOT;  Right Foot Soft Tissue Mass Excision;  Surgeon: Jose Cruz Garcia DPM;  Location:  OR     EXCISE MASS FOOT Right 7/17/2015    Procedure: EXCISE MASS FOOT;  Surgeon: Pierre Adan DPM;  Location: [facility]  OR     HYSTERECTOMY, VAGINAL  2002     LAPAROSCOPIC HERNIORRHAPHY VENTRAL N/A 2/24/2016    Procedure: LAPAROSCOPIC HERNIORRHAPHY VENTRAL;  Surgeon: Lars Dahl MD;  Location: [facility] OR     PANNICULECTOMY  07/28/2017    with liposuction, and hernia revision Dr. Heath     RELEASE TRIGGER FINGER Left 5/4/2018    Procedure: RELEASE TRIGGER FINGER;  Trigger Finger Release Left ring finger ;  Surgeon: Aaron Orta DO;  Location: [facility] OR       Social History:  Patient reports that she quit smoking about 8 years ago. Her smoking use included cigarettes. She smoked 0.00 packs per day for 13.00 years. She has never used smokeless tobacco. She reports previous alcohol use. She reports that she does not use drugs.    Family History:  Family History   Problem Relation Age of Onset     Asthma Father      C.A.D. Father      Diabetes No family hx of      Hypertension No family hx of      Cerebrovascular Disease No family hx of      Breast Cancer No family hx of      Cancer - colorectal No family hx of      Prostate Cancer No family hx of      Alcohol/Drug No family hx of      Allergies No family hx of      Alzheimer Disease No family hx of      Anesthesia Reaction No family hx of      Arthritis No family hx of      Blood Disease No family hx of      Cancer No family hx of      Cardiovascular No family hx of      Circulatory No family hx of      Congenital Anomalies No family hx of      Connective Tissue Disorder No family hx of      Neurologic Disorder No family hx of      Depression No family hx of      Endocrine Disease No family hx of      Eye Disorder No family hx of      Genetic Disorder No family hx of      Gastrointestinal Disease No family hx of      Genitourinary Problems No family hx of      Gynecology No family hx of      Heart Disease No family hx of      Lipids No family hx of      Musculoskeletal Disorder No family hx of      Obesity No family hx of      Osteoporosis No family hx of      Psychotic  Disorder No family hx of      Respiratory No family hx of      Hearing Loss No family hx of      Family History Negative No family hx of      Thyroid Disease No family hx of      Colon Cancer No family hx of      Hyperlipidemia No family hx of      Coronary Artery Disease No family hx of      Other Cancer No family hx of      Anxiety Disorder No family hx of      Mental Illness No family hx of      Substance Abuse No family hx of        Medications:  Current Outpatient Medications   Medication     cyclobenzaprine (FLEXERIL) 10 MG tablet     diltiazem ER COATED BEADS (CARDIZEM CD/CARTIA XT) 240 MG 24 hr capsule     EPINEPHrine (EPIPEN 2-KIMBERLEE) 0.3 MG/0.3ML injection 2-pack     fluconazole (DIFLUCAN) 150 MG tablet     hydrochlorothiazide (HYDRODIURIL) 25 MG tablet     leflunomide (ARAVA) 20 MG tablet     levothyroxine (SYNTHROID/LEVOTHROID) 112 MCG tablet     losartan (COZAAR) 50 MG tablet     metoprolol succinate ER (TOPROL-XL) 25 MG 24 hr tablet     omeprazole (PRILOSEC) 40 MG DR capsule     potassium chloride ER (KLOR-CON) 20 MEQ CR tablet     No current facility-administered medications for this visit.           Allergies   Allergen Reactions     Penicillins Anaphylaxis     Walnuts [Nuts] Anaphylaxis     All tree nuts     Norvasc [Amlodipine] Rash     Norvasc- rash at 10 mg dosing     Tramadol      paradoxical affect           Impression and Recommendations (Patient Counseled on the Following):  1. Immunosuppression and Hx of NMSC   -due for skin exam     2. Biopsy proven tumid lupus, reports was worse in the past (pt also has RA), raynauds also (hasnt had for years she reports)  -recommend she follow up with lupus, need systemic lupus screening  -recommend sunscreen counseling  -lidex cream twice daily for 2 weeks, weekends only for 2 week, stop if lesions dissappear   -Reviewed risks of skin thinning.  -hold fluconazole  -LYNNE and dbls stranded DNA placed  -consider plaquenil at follow (handout provided)            Follow-up:  In 6 weeks with derm with photos and phone and we also need to see her in person for skin exam.  IF flaring contact dermatology, send mychart may have a delay.      Janine Olivas DO observing by phone    Berta Turner MD    Department of Dermatology  [facility]: Phone: [phone], Fax:[phone]  [facility]: Phone: [phone], Fax: [phone]      _____________________________________________________________________________    Teledermatology information:  - Patient presented as: return  - Location of teledermatologist:  [facility] )  - Image quality and interpretability: acceptable  - Physician has received verbal consent for a Video/Photos Visit from the patient? YES  - In-person dermatology visit recommendation: no  - Date of images: 11/26/2020  - Service start time:15:29pm  - Service end time:15:43pm  - Date of report: 12/17/2020

## 2020-12-17 NOTE — PATIENT INSTRUCTIONS
[facility] Dermatology Visit    Thank you for allowing us to participate in your care. Your findings, instructions and follow-up plan are as follows:     Zinc oxide or titanium dioxide sunscreen (such as Cerave-check ingredients)    When should I call my doctor?    If you are worsening or not improving, please, contact us or seek urgent care as noted below.     Who should I call with questions (adults)?    [facility] (adult and pediatric): [phone]     [facility] (adult): [phone]    For urgent needs outside of business hours call the [facility] at [phone] and ask for the dermatology resident on call    If this is a medical emergency and you are unable to reach an ER, Call [phone]      Who should I call with questions (pediatric)?  [facility]- Pediatric Dermatology  Dr. Juju Felix, Dr. Zahira Mendez, Dr. Alexandra Beltran, Elzbieta Kong, PA  Dr. Harmony Bardales, Dr. Radha Hill & Dr. Chuy Hines  Non Urgent  Nurse Triage Line; [phone]- Mercedes and Lidia RN Care Coordinators   Delaney (/Complex ) [phone]    If you need a prescription refill, please contact your pharmacy. Refills are approved or denied by our Physicians during normal business hours, Monday through Fridays  Per office policy, refills will not be granted if you have not been seen within the past year (or sooner depending on your child's condition)    Scheduling Information:  Pediatric Appointment Scheduling and Call Center [phone]  Radiology Scheduling- [phone]  Sedation Unit Scheduling- [phone]  [place] Scheduling- General [phone]; Pediatric Dermatology [phone]  Main  Services: [phone]  Khmer: [phone]  Greek: [phone]  Hmong/Riley/Kiswahili: [phone]  Preadmission Nursing Department Fax Number: [phone] (Fax all pre-operative  paperwork to this number)    For urgent matters arising during evenings, weekends, or holidays that cannot wait for normal business hours please call [phone] and ask for the Dermatology Resident On-Call to be paged.

## 2020-12-17 NOTE — NURSING NOTE
Teledermatology Nurse Call Patients:     Are you  in the [facility] at the time of the encounter? yes    Today's visit will be billed to you and your insurance.    A teledermatology visit is not as thorough as an in-person visit and the quality of the photograph sent may not be of the same quality as that taken by the dermatology clinic.         Chief Complaint   Patient presents with     Derm Problem     Anterior chest s/p 12/7/2020      1. Anterior chest, Dx: Most consistent with tumid lupus erythematosus.  Patient to discuss results with provider.   Questions: Any alternative treatments that MD recommends? Patietnt have tried steroid PO and creams in the past. She voices that PO (prefers) worked better than the creams.   She is currently on antifungal PO and that has very little effect- 2 weeks of it left.     LXIONG3, MEDICAL ASSISTANT

## 2020-12-17 NOTE — LETTER
12/17/2020         RE: Elizabeth Harper  [address] [facility] [address]        Dear Colleague,    Thank you for referring your patient, Elizabeth Harper, to the [facility]. Please see a copy of my visit note below.    [facility] Dermatology Record:  Store and Forward and Telephone [phone]       Dermatology Problem List:  1. Relevant medical history: RA  -current immunosuppression: leflunomide 20 mg  1. NMSC  -BCC, left knee, s/p ED&C 2/2/2017  -BCC, left lower leg, s/p excision 2/2/2017  2. Actinic keratosis  -s/p cryotherapy    Encounter Date: Dec 17, 2020    CC:   Chief Complaint   Patient presents with     Derm Problem     Anterior chest s/p 12/7/2020        History of Present Illness:  Elizabeth Harper is a 45 year old female who presents for follow for tumid lupus. Patient wants to discuss results today. Was biopsied by PCP. Has tried topical steroid po and creams. Po works better than creams. On an antifungal po.     Reports this summer had 30-40 on her back she was very miserable. She used prednisone and it was helpful. By day 3 she was much better.       ROS: Patient is generally feeling well today   Patient is not pregnant or BF    Physical Examination:  General: Well-sounding    Skin:  Exam was performed by photo review and is significant for the following:  -2 faint annular possibly  slightly raised erythematous plaques on the right upper arm    Labs:  Patient Name: ELIZABETH HARPER   MR#: [id number]   Specimen #: [id number]   Collected: 12/7/2020   Received: 12/9/2020   Reported: 12/15/2020 23:36   Ordering Phy(s): CAROLYN MAGANA     For improved result formatting, select 'View Enhanced Report Format' under    Linked Documents section.     SPECIMEN(S):   A: Skin, anterior chest   B: Skin, mole, back     FINAL DIAGNOSIS:   A. Skin, anterior chest:   - Most consistent with tumid lupus erythematosus - (see comment)     B. Skin, mole, back:   - Seborrheic  keratosis - (see description)         Past Medical History:   Patient Active Problem List   Diagnosis     Syncope     Raynaud phenomenon     CARDIOVASCULAR SCREENING; LDL GOAL LESS THAN 160     Strain of neck muscle     Migraine headache     Cough with hemoptysis     Incidental lung nodule, less than or equal to 3mm     Need for prophylactic vaccination and inoculation against influenza     Fibromyalgia     HTN, goal below 140/90     H/O: hysterectomy     GERD (gastroesophageal reflux disease)     Edema     High risk medication use     Hypothyroidism     Ventral hernia without obstruction or gangrene     Hemoptysis     Rheumatoid arthritis, involving unspecified site, unspecified rheumatoid factor presence     Penicillin allergy     Peanut allergy     Tree nut allergy     Anaphylaxis, subsequent encounter     Itching     History of hemoptysis     Atypical chest pain     Mass of finger, left     Cubital tunnel syndrome on right     Past Medical History:   Diagnosis Date     Adnexal mass 2017    not seen on 7/10/18 abdominal CT     H/O transfusion of whole blood 2001    with child labor/hysterectomy     Hemoptysis 11/12, 11/2015-mild    last episodes, mild, has had massive in HX     HTN (hypertension)      Incidental lung nodule, less than or equal to 3mm 11/12    NICHOLAS     Massive hemoptysis 11/12    due to pneumonia     Migraine headache      Tobacco abuse, in remission     quit 2012     Past Surgical History:   Procedure Laterality Date     CHOLECYSTECTOMY, LAPOROSCOPIC  2007    Cholecystectomy, Laparoscopic     COMBINED ESOPHAGOSCOPY, GASTROSCOPY, DUODENOSCOPY (EGD) WITH CO2 INSUFFLATION N/A 11/16/2018    Procedure: Egd;  Surgeon: Richard Aviles MD;  Location:  OR     ESOPHAGOSCOPY, GASTROSCOPY, DUODENOSCOPY (EGD), COMBINED N/A 11/16/2018    Procedure: COMBINED ESOPHAGOSCOPY, GASTROSCOPY, DUODENOSCOPY (EGD), BIOPSY SINGLE OR MULTIPLE;  Surgeon: Richard Aviles MD;  Location:  OR     EXCISE  MASS FOOT  11/22/2013    Procedure: EXCISE MASS FOOT;  Right Foot Soft Tissue Mass Excision;  Surgeon: Jose Cruz Garcia DPM;  [facility] OR     EXCISE MASS FOOT Right 7/17/2015    Procedure: EXCISE MASS FOOT;  Surgeon: Pierre Adan DPM;  [facility]: [facility]     HYSTERECTOMY, VAGINAL  2002     LAPAROSCOPIC HERNIORRHAPHY VENTRAL N/A 2/24/2016    Procedure: LAPAROSCOPIC HERNIORRHAPHY VENTRAL;  Surgeon: Lars Dahl MD;  Location: [facility] OR     PANNICULECTOMY  07/28/2017    with liposuction, and hernia revision Dr. Heath     RELEASE TRIGGER FINGER Left 5/4/2018    Procedure: RELEASE TRIGGER FINGER;  Trigger Finger Release Left ring finger ;  Surgeon: Aaron Orta DO;  Location: [facility] OR       Social History:  Patient reports that she quit smoking about 8 years ago. Her smoking use included cigarettes. She smoked 0.00 packs per day for 13.00 years. She has never used smokeless tobacco. She reports previous alcohol use. She reports that she does not use drugs.    Family History:  Family History   Problem Relation Age of Onset     Asthma Father      C.A.D. Father      Diabetes No family hx of      Hypertension No family hx of      Cerebrovascular Disease No family hx of      Breast Cancer No family hx of      Cancer - colorectal No family hx of      Prostate Cancer No family hx of      Alcohol/Drug No family hx of      Allergies No family hx of      Alzheimer Disease No family hx of      Anesthesia Reaction No family hx of      Arthritis No family hx of      Blood Disease No family hx of      Cancer No family hx of      Cardiovascular No family hx of      Circulatory No family hx of      Congenital Anomalies No family hx of      Connective Tissue Disorder No family hx of      Neurologic Disorder No family hx of      Depression No family hx of      Endocrine Disease No family hx of      Eye Disorder No family hx of      Genetic Disorder No family hx of      Gastrointestinal Disease No family hx of       Genitourinary Problems No family hx of      Gynecology No family hx of      Heart Disease No family hx of      Lipids No family hx of      Musculoskeletal Disorder No family hx of      Obesity No family hx of      Osteoporosis No family hx of      Psychotic Disorder No family hx of      Respiratory No family hx of      Hearing Loss No family hx of      Family History Negative No family hx of      Thyroid Disease No family hx of      Colon Cancer No family hx of      Hyperlipidemia No family hx of      Coronary Artery Disease No family hx of      Other Cancer No family hx of      Anxiety Disorder No family hx of      Mental Illness No family hx of      Substance Abuse No family hx of        Medications:  Current Outpatient Medications   Medication     cyclobenzaprine (FLEXERIL) 10 MG tablet     diltiazem ER COATED BEADS (CARDIZEM CD/CARTIA XT) 240 MG 24 hr capsule     EPINEPHrine (EPIPEN 2-KIMBERLEE) 0.3 MG/0.3ML injection 2-pack     fluconazole (DIFLUCAN) 150 MG tablet     hydrochlorothiazide (HYDRODIURIL) 25 MG tablet     leflunomide (ARAVA) 20 MG tablet     levothyroxine (SYNTHROID/LEVOTHROID) 112 MCG tablet     losartan (COZAAR) 50 MG tablet     metoprolol succinate ER (TOPROL-XL) 25 MG 24 hr tablet     omeprazole (PRILOSEC) 40 MG DR capsule     potassium chloride ER (KLOR-CON) 20 MEQ CR tablet     No current facility-administered medications for this visit.           Allergies   Allergen Reactions     Penicillins Anaphylaxis     Walnuts [Nuts] Anaphylaxis     All tree nuts     Norvasc [Amlodipine] Rash     Norvasc- rash at 10 mg dosing     Tramadol      paradoxical affect           Impression and Recommendations (Patient Counseled on the Following):  1. Immunosuppression and Hx of NMSC   -due for skin exam     2. Biopsy proven tumid lupus, reports was worse in the past (pt also has RA), raynauds also (hasnt had for years she reports)  -recommend she follow up with lupus, need systemic lupus screening  -recommend  sunscreen counseling  -lidex cream twice daily for 2 weeks, weekends only for 2 week, stop if lesions dissappear   -Reviewed risks of skin thinning.  -hold fluconazole  -LYNNE and dbls stranded DNA placed  -consider plaquenil at follow (handout provided)           Follow-up:  In 6 weeks with derm with photos and phone and we also need to see her in person for skin exam.  IF flaring contact dermatology, send mychart may have a delay.      Janine Olivas DO observing by phone    Berta Turner MD    Department of Dermatology  [facility]: Phone: [phone], Fax:[phone]  [facility]: Phone: [phone], Fax: [phone]      _____________________________________________________________________________    Teledermatology information:  - Patient presented as: return  - Location of teledermatologist:  ([facility] )  - Image quality and interpretability: acceptable  - Physician has received verbal consent for a Video/Photos Visit from the patient? YES  - In-person dermatology visit recommendation: no  - Date of images: 11/26/2020  - Service start time:15:29pm  - Service end time:15:43pm  - Date of report: 12/17/2020         Again, thank you for allowing me to participate in the care of your patient.        Sincerely,        Berta Turner MD

## 2020-12-19 DIAGNOSIS — I10 ESSENTIAL HYPERTENSION: ICD-10-CM

## 2020-12-22 ENCOUNTER — MYC MEDICAL ADVICE (OUTPATIENT)
Dept: DERMATOLOGY | Facility: CLINIC | Age: 45
End: 2020-12-22

## 2020-12-22 DIAGNOSIS — L93.0 LUPUS ERYTHEMATOSUS TUMIDUS: Primary | ICD-10-CM

## 2020-12-22 RX ORDER — HYDROCHLOROTHIAZIDE 25 MG/1
TABLET ORAL
Qty: 90 TABLET | Refills: 2 | Status: SHIPPED | OUTPATIENT
Start: 2020-12-22 | End: 2021-05-10

## 2020-12-22 NOTE — TELEPHONE ENCOUNTER
Pending Prescriptions:                       Disp   Refills    hydrochlorothiazide (HYDRODIURIL) 25 MG ta*90 tab*2        Sig: TAKE ONE TABLET BY MOUTH ONCE DAILY      Routing refill request to provider for review/approval because:  Labs out of range:    BP Readings from Last 3 Encounters:   12/07/20 (!) 124/90   02/24/20 118/74   01/27/20 128/76     Potassium   Date Value Ref Range Status   12/07/2020 3.3 (L) 3.4 - 5.3 mmol/L Final         Sunitha Locke RN

## 2020-12-23 RX ORDER — FLUOCINONIDE 0.5 MG/G
CREAM TOPICAL
Qty: 60 G | Refills: 1 | Status: SHIPPED | OUTPATIENT
Start: 2020-12-23 | End: 2021-07-20

## 2020-12-23 NOTE — TELEPHONE ENCOUNTER
Please, call pharmacy and make sure they have the script. Then, let patient know if its resolved. Please, apologize to patient for any delays in cream     Berta Turner MD    Department of Dermatology  [facility]: Phone: [phone], Fax:[phone]  [facility]: Phone: [phone], Fax: [phone]

## 2021-01-25 ENCOUNTER — ANCILLARY PROCEDURE (OUTPATIENT)
Dept: MAMMOGRAPHY | Facility: CLINIC | Age: 46
End: 2021-01-25
Attending: NURSE PRACTITIONER
Payer: COMMERCIAL

## 2021-01-25 DIAGNOSIS — Z12.39 BREAST SCREENING: ICD-10-CM

## 2021-01-25 PROCEDURE — 77063 BREAST TOMOSYNTHESIS BI: CPT | Performed by: RADIOLOGY

## 2021-01-25 PROCEDURE — 77067 SCR MAMMO BI INCL CAD: CPT | Performed by: RADIOLOGY

## 2021-01-29 ENCOUNTER — TELEPHONE (OUTPATIENT)
Dept: FAMILY MEDICINE | Facility: CLINIC | Age: 46
End: 2021-01-29

## 2021-01-29 NOTE — TELEPHONE ENCOUNTER
Spoke with pt. Answered questions.   Is that particular system guideline, no tree nut reaction that I can see in the data. RAÚL Flor CNP

## 2021-01-29 NOTE — TELEPHONE ENCOUNTER
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  She would like to ask you a questions.  They won't give her it because it is Pfizer and she has a peanut/tree nut allergy.  She would like to talk to you about this.

## 2021-03-15 NOTE — PROGRESS NOTES
RHEUMATOLOGY INITIAL CONSULT NOTE    Chief Complaint: arthralgia    Reason for consult: Keri Yu from  has requested consultation for this patient for arthralgia    History of Present Illness:    Elizabeth Goodson is a 46 year old female with pmhx migraines, HTN, raynaud's, tumid lupus, fibromyalgia massive hemoptysis (2012, pulmonary nodule, without identified etiology -negative LYNNE, ANCA, anti-GBM) is referred to rheumatology clinic for arthralgia.     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 She initially noticed the rash ~1.5 years ago. Her rash is mostly over UEs, back and occasionally her neck. She is following with dermatology. She has been on higher dose of steroid cream which has been helping. Her rash is photosensitive. At one point, she was given prednisone x 5 days which resolved her rash.     She reports onset of joint pain ~7 years ago. Patient was seen Dr. Griggs in 2013 for synovitis, joint stiffness, modest elevation of rheumatoid factor.  She was diagnosed with seropositive RA and started on methotrexate therapy which was subsequently discontinued due to concern for pulmonary nodule.  She was started on leflunomide and was subsequently lost to follow-up since 2015.  She reports being on leflunomide (was getting from PCP) since 2018/2019 but stopped it October 2020 when she was diagnosed with COVID-19. Denies being on leflunomide during her prior episodes of hemoptysis. She noticed some relief with leflunomide and feels that her joint pain is worse after stopping leflunomide. She also has a history of Raynaud's; no nailfold capillary changes were noted during her rheumatology visits.  Interestingly, she has history of massive hemoptysis without clear identified etiology (negative LYNNE, ANCA, MPO, PR3, GBM). She reports being on prednisone for joint pain which was helpful.     She has made some dietary changes over the last few months. Her joint pain predominantly  "involves her wrists, ankles and knees. She reports intermittent swelling over these joints (mostly ankles and wrists). Reports AM stiffness lasting ~20-30 minutes. Pain is better with activity. Resting/sleeping makes pain worse. She does not have any issues with raynaud's anymore - she reports resolution of symptoms once she stopped smoking.     Denies fevers, chills, weight loss, night sweats, vision changes, eye pain/redness, dry eyes, dry mouth, oral/nasal ulcers, hair loss/thinning, sinusitis, +tumid lupus rash, denies other rashes, +hx of raynaud's but no symptoms since 7 years, denies cough, SOB, pleurisy, chest pain, edema, +heartburn for which she takes PPI/antacids, denies difficulty swallowing, abdominal pain, diarrhea, hematochezia, melena, dysuria, recurrent genital ulcers, back pain, enthesitis, dactylitis, numbness, weakness, tingling. No hx of blood clots. +Hx of one, 1st trimester miscarriage at 3 weeks    From pulmonary note (2/10/15):  \"Background:  She had an episode of hemoptysis in 11/12 - admitted to [facility] [place] [facility] and treated with a course of levofloxacin. She had bronchoscopy completed 11/3/12 - there was blood seen at the RUL, apical subsegment. She then underwent right pulmonary artery angiography 11/3/12 - no PE or AVM was noted. She had resolution of the hemoptysis with antibiotic therapy. Chest CT 11/1/12 demonstrated GGO in the RUL.  She then had another episode of hemoptysis in January 2013. Bronchoscopy was repeated 1/9/13 - a small NICHOLAS mucosal abnormality was noted, brushings and cytology were negative. Connective tissue diseae panel was completed - negative except for RF. She then had a sinus and neck CT completed - clear sinuses, no neck abnormality, RUL GGO had resolved.   She was seen by rheumatology for positive RF. She was found to have RA and started on methotrexate about 4 months ago.  Over Memorial [place], [place] and again coughed up about 4 " "blood clots - mostly old appearing blood. She did have some associated epistaxsis at that time. She's had no further cough, hemoptysis, or epistaxsis. No chest pain or dyspnea. She quit smoking 11/12 at the time of hemoptysis.  6/19/14  She had another episode where she coughed up blood streaked sputum in Feb 2014. Chest CT done - no inflammation noted. She hasn't had any recurrence of hemoptysis since that time. No fevers, chills, or cough. No shortness of breath.  Her family was vacationing this past week. Her daughter fell out of a golf cart and has a large foot wound. She spent the week in the hospital with her daughter and is quite tearful in recalling all the events surrounding this.  2/10/15  Ms. Goodson has done well for the past few months. No further episodes of hemoptysis. No cough or dyspnea. She has RA, but stopped taking both methotrexate and prednisone. Hasn't seen her rheumatologist lately either. She returns to clinic today to discuss results of chest CT - no change in the NICHOLAS 3mm pulmonary nodule.\"    Pertinent labs and imaging: (per chart review in [facility] and care everywhere)    Labs:   12/7/2020: negative Hep C  2/28/13: +RF (23), negative CCP, negative cryoglobulins  1/2/2013: negative LYNNE, RF, MPO, PR3, anti GBM, negative Scl-70  2009: negative LYNNE, RF    Rheumatological History:  Previous Rheumatologist(s): Dr Worthington  Last rheum visit: 10/28/15  Current treatment: none  Past treatments: methotrexate (discontinued due to pulmonary nodule), leflunomide (pt self-discontinued October 2020 due to COVID-19 infection)    Past Medical History:    Past Medical History:   Diagnosis Date     Adnexal mass 2017    not seen on 7/10/18 abdominal CT     H/O transfusion of whole blood 2001    with child labor/hysterectomy     Hemoptysis 11/12, 11/2015-mild    last episodes, mild, has had massive in HX     HTN (hypertension)      Incidental lung nodule, less than or equal to 3mm 11/12    NICHOLAS     Massive " hemoptysis 11/12    due to pneumonia     Migraine headache      Tobacco abuse, in remission     quit 2012     Past Surgical History:   Past Surgical History:   Procedure Laterality Date     CHOLECYSTECTOMY, LAPOROSCOPIC  2007    Cholecystectomy, Laparoscopic     COMBINED ESOPHAGOSCOPY, GASTROSCOPY, DUODENOSCOPY (EGD) WITH CO2 INSUFFLATION N/A 11/16/2018    Procedure: Egd;  Surgeon: Richard Aviles MD;  Location: [facility] OR     ESOPHAGOSCOPY, GASTROSCOPY, DUODENOSCOPY (EGD), COMBINED N/A 11/16/2018    Procedure: COMBINED ESOPHAGOSCOPY, GASTROSCOPY, DUODENOSCOPY (EGD), BIOPSY SINGLE OR MULTIPLE;  Surgeon: Richard Aviles MD;  Location: [facility] OR     EXCISE MASS FOOT  11/22/2013    Procedure: EXCISE MASS FOOT;  Right Foot Soft Tissue Mass Excision;  Surgeon: Jose Cruz Garcia DPM;  Location: [facility] OR     EXCISE MASS FOOT Right 7/17/2015    Procedure: EXCISE MASS FOOT;  Surgeon: Pierre Adan DPM;  Location: [facility] OR     HYSTERECTOMY, VAGINAL  2002     LAPAROSCOPIC HERNIORRHAPHY VENTRAL N/A 2/24/2016    Procedure: LAPAROSCOPIC HERNIORRHAPHY VENTRAL;  Surgeon: Lars Dahl MD;  Location: [facility] OR     PANNICULECTOMY  07/28/2017    with liposuction, and hernia revision Dr. Heath     RELEASE TRIGGER FINGER Left 5/4/2018    Procedure: RELEASE TRIGGER FINGER;  Trigger Finger Release Left ring finger ;  Surgeon: Aaron Orta DO;  Location: [facility] OR     Family History:   Denies family hx of RA, Sjogren's syndrome, scleroderma, PsA, ankylosing spondylitis, psoriasis, vasculitis, gout, pseudogout.    Maternal grandmother: RA  Mother: OA    Social History:   Alcohol use: on average, once a month  Tobacco use: ex-smoker 0.5 PPD for 13-14 years, quit 7 years ago  Occupational hx: works as a behavioral specialist  Sexual history: currently sexually active  Contraception use: none, hx of hysterectomy    Allergies   Allergen Reactions     Penicillins Anaphylaxis     Walnuts [Nuts] Anaphylaxis     All tree  nuts     Norvasc [Amlodipine] Rash     Norvasc- rash at 10 mg dosing     Tramadol      paradoxical affect      Immunization History   Administered Date(s) Administered     FLU 6-35 months 11/04/2012     Historical DTP/aP 06/06/1991, 10/20/2002     Influenza (IIV3) PF 11/09/2007, 11/02/2012     Influenza Vaccine IM > 6 months Valent IIV4 11/01/2013, 10/31/2014, 10/07/2016, 11/11/2019, 12/07/2020     MMR 08/12/1991     Pneumo Conj 13-V (2010&after) 11/11/2019     Tdap (Adacel,Boostrix) 11/02/2012          Medications:  Current Outpatient Medications   Medication Sig Dispense Refill     cyclobenzaprine (FLEXERIL) 10 MG tablet TAKE ONE TABLET BY MOUTH ONCE DAILY AT BEDTIME 90 tablet 1     diltiazem ER COATED BEADS (CARTIA XT) 240 MG 24 hr capsule TAKE ONE CAPSULE BY MOUTH ONCE DAILY 30 capsule 0     EPINEPHrine (EPIPEN 2-KIMBERLEE) 0.3 MG/0.3ML injection 2-pack Inject 0.3 mLs (0.3 mg) into the muscle once as needed for anaphylaxis 0.6 mL 1     fluocinonide (LIDEX) 0.05 % external cream Apply twice daily for weeks, weekends only for 2 weeks, repeat cycle if needed 60 g 1     hydrochlorothiazide (HYDRODIURIL) 25 MG tablet TAKE ONE TABLET BY MOUTH ONCE DAILY 90 tablet 2     leflunomide (ARAVA) 20 MG tablet Take 1 tablet (20 mg) by mouth daily 30 tablet 2     levothyroxine (SYNTHROID/LEVOTHROID) 112 MCG tablet Take 1 tablet (112 mcg) by mouth daily 90 tablet 1     metoprolol succinate ER (TOPROL-XL) 25 MG 24 hr tablet TAKE ONE TABLET BY MOUTH ONCE DAILY 90 tablet 1     omeprazole (PRILOSEC) 40 MG DR capsule TAKE ONE CAPSULE BY MOUTH ONCE DAILY 30-60 MINUTES BEFORE A MEAL 90 capsule 3     potassium chloride ER (KLOR-CON) 20 MEQ CR tablet Take 1 tablet (20 mEq) by mouth daily 90 tablet 1     losartan (COZAAR) 50 MG tablet Take 1 tablet (50 mg) by mouth daily (Patient not taking: Reported on 3/17/2021) 90 tablet 2         PHYSICAL EXAMINATION:   Vital signs:  BP (!) 138/91 (BP Location: Left arm, Patient Position: Sitting, Cuff  "Size: Adult Regular)   Pulse 108   Temp 97.7  F (36.5  C) (Oral)   Ht 1.6 m (5' 3\")   Wt 86.7 kg (191 lb 1.6 oz)   LMP  (LMP Unknown)   SpO2 100%   Breastfeeding No   BMI 33.85 kg/m      MSK: mild joint tenderness over PIP joints over L hand, no over synovitis of any joints, no joint effusions or warmth  Skin: 2-3 raised papules noted over RUE (consistent with pt's tumid lupus rash)  HEENT: MMM, no oral ulcers/lesions, no alopecia  CV: RRR  Pulm: CTAB, non-labored respirations, no c/r/w  GI: +BS, soft, no TTP  Neuro: A&O x3, no focal deficits    Labs:      I have reviewed all pertinent investigations including labs, including outside records if relevant    RF/CCP  Recent Labs   Lab Test 02/28/13  0942   CCPABY <20 Interpretation:  Negative   RHF 23*     Antiphospholipid Antibodies  Recent Labs   Lab Test 02/28/13  0942   CARG <15.0 Interpretation:  Negative   RAJENDRA <12.5 Interpretation:  Negative     Cryoglobulins  Recent Labs   Lab Test 05/03/13  1211 02/28/13  0942   CRYOG Negative  No cryoprecipitate observed Canceled, Test credited  Lab accident - specimen processed incorrectly*     CBC  Recent Labs   Lab Test 12/07/20  1621 02/24/20  1621 01/27/20  1208   WBC 12.9* 10.2 8.4   RBC 4.79 4.37 4.58   HGB 14.3 13.0 13.6   HCT 43.4 39.2 41.4   MCV 91 90 90   RDW 14.2 14.1 14.0    383 418   MCH 29.9 29.7 29.7   MCHC 32.9 33.2 32.9   NEUTROPHIL 65.1 63.1 62.7   LYMPH 26.4 27.8 28.1   MONOCYTE 7.4 6.7 7.3   EOSINOPHIL 0.7 2.0 1.3   BASOPHIL 0.4 0.4 0.6   ANEU 8.4* 6.4 5.2   ALYM 3.4 2.8 2.4   SHARAD 1.0 0.7 0.6   AEOS 0.1 0.2 0.1   ABAS 0.1 0.0 0.1     CMP  Recent Labs   Lab Test 12/07/20  1621 02/24/20  1621 01/27/20  1208 10/16/18  1526 04/16/18  1530 04/16/18  1530 09/25/17  1550 07/14/17  1128 08/23/16  1401 08/23/16  1401 11/30/15  1449 11/30/15  1449     --  139 141  --  136 138 139   < >  --    < >  --    POTASSIUM 3.3* 3.6 3.2* 3.5   < > 3.4 3.5 4.1   < >  --    < >  --    CHLORIDE 101  --  102 " 107  --  99 100 104   < >  --    < >  --    CO2 31  --  30 29  --  29 28 26   < >  --    < >  --    ANIONGAP 5  --  7 5  --  8 10 9   < >  --    < >  --    *  --  106* 106*  --  103* 95 99   < >  --    < >  --    BUN 9  --  9 15  --  14 13 11   < >  --    < >  --    CR 0.68  --  0.61 0.90  --  0.81 0.65 0.76   < >  --    < >  --    GFRESTIMATED >90  --  >90 69  --  77 >90 83   < >  --    < >  --    GFRESTBLACK >90  --  >90 83  --  >90 >90 >90  African American GFR Calc     < >  --    < >  --    ELLIE 9.3  --  9.3 8.8  --  9.3 9.2 9.8   < >  --    < >  --    BILITOTAL 0.5  --   --   --   --  0.2  --   --   --  0.3  --  0.2   ALBUMIN 4.2  --   --   --   --  3.8  --   --   --  3.9  --  3.7   PROTTOTAL 7.6  --   --   --   --  7.6  --   --   --  7.4  --  6.8   ALKPHOS 73  --   --   --   --  64  --   --   --  73  --  72   AST 13  --   --   --   --  17  --   --   --  13  --  11   ALT 24  --   --   --   --  21  --   --   --  24  --  21    < > = values in this interval not displayed.     Calcium/VitaminD  Recent Labs   Lab Test 12/07/20  1621 01/27/20  1208 10/16/18  1526   ELLIE 9.3 9.3 8.8     ESR/CRP  Recent Labs   Lab Test 10/16/18  1526 02/28/13  0942 01/31/13  1401   SED 10 4 4   CRP 4.3 <5.0  --      TSH/T4  Recent Labs   Lab Test 12/07/20  1621 01/27/20  1208 08/08/19  1002 04/16/18  1530 04/16/18  1530 01/02/18  1554 09/25/17  1550   TSH 1.47 2.87 3.64   < > 4.86* 8.66* 10.01*   T4  --   --   --   --  1.08 0.93 0.95    < > = values in this interval not displayed.     Lipid Panel  Recent Labs   Lab Test 12/07/20  1621 08/08/19  1002 10/08/14  1114   CHOL 175 154 149   TRIG 161* 274* 124   HDL 32* 32* 39*   * 67 85   VLDL  --   --  25   CHOLHDLRATIO  --   --  3.8   NHDL 143* 122  --      Hepatitis C  Recent Labs   Lab Test 12/07/20  1621   HCVAB Nonreactive     HIV Screening  Recent Labs   Lab Test 07/31/18  1355   HIAGAB Nonreactive     Pathology (12/7/2020):  FINAL DIAGNOSIS:   A. Skin, anterior chest:   -  Most consistent with tumid lupus erythematosus - (see comment)     B. Skin, mole, back:   - Seborrheic keratosis - (see description)     2012:  MICROSCOPIC  Negative for malignancy. Scattered squamous epithelial cells and  ciliated respiratory epithelial cells. The background contains  numerous macrophages with scattered lymphocytes and neutrophils.  A small cluster of Actinomyces is identified suggesting an oral  contamination.     Conclusion  Lung, bronchial brushings  Negative for malignancy.     Imaging:      I have reviewed all pertinent investigations including imaging, including outside records if relevant     XR L hand (4/19/18)                                                                   IMPRESSION: Negative left hand x-rays.    XR R hand (11/7/19)  IMPRESSION: No bony or soft tissue abnormality.    CT chest pulm angio (7/26/18)  Result Impression   IMPRESSION:   1.  No pulmonary embolism.  2.  New groundglass opacity in the right upper lobe. Differential diagnosis would include pulmonary hemorrhage versus an infectious/inflammatory process. Recommend short-term follow-up low-dose thoracic CT in 3 months to document resolution.         Assessment / Plan:    Elizabeth Goodson is a 46 year old female with pmhx migraines, HTN, raynaud's, tumid lupus, fibromyalgia massive hemoptysis (2012, pulmonary nodule, without identified etiology -negative LYNNE, ANCA, anti-GBM) is referred to rheumatology clinic for arthralgia. Any prior notes, outside records, laboratory results, and imaging studies were reviewed if relevant. Pertinent work-up thus far includes negative LYNNE x 2, RF 23, negative Hep C, negative CCP, negative cryoglobulins, MPO, PR3, anti GBM, negative Scl-70. She has a diagnosis of RA from prior rheumatology assessment with Dr Worthington and was started on methotrexate injections which she did not find helpful. She was started on leflunomide thereafter but pt was lost to follow-up. She reports taking  leflunomide around 2019 (prescribed by PCP) and has been off it since October 2020 when she was diagnosed with COVID-19. In December 2020, she was diagnosed with tumid lupus based on skin biopsy. Interestingly, she has hx of massive hemoptysis few years ago, etiology has been unclear. Evaluation of hemoptysis was negative for LYNNE, MPO, PR3, and anti-GBM antibodies. She has not had an episode since 2018. Pt denies being on leflunomide during her episodes of hemoptysis. With her new diagnosis of tumid lupus, SLE is on the differential for her arthralgia. Other ddx include RA vs overlap syndrome vs MCTD. Though tumid lupus rash is rarely associated with SLE, we discussed evaluation for SLE is indicated. She notices worsening of polyarthralgia since stopping leflunomide -her pain sounds inflammatory in nature and I think it is reasonable to resume leflunomide. Per ACR guidelines, [place] to continue while getting COVID-19 vaccinations.    1) Polyarthralgia   -prior RF 23 was modest elevation -pt reports hx of smoking which can contribute to low RF positivity. Will repeat to see if any changes since smoking cessation  -check RF, CCP, LYNNE, SPEP, Hep C, ESR, CRP  -resume leflunomide 20 mg daily. She is advised to inform me if any new symptoms after starting leflunomide  -Discussed risks and side effects of arava (leflunomide).  Leflunomide is an immunosuppressant medication used for RA and other autoimmune diseases.  Monitoring is required as it can affect the liver, cause GI symptoms such as abdominal pain, nausea, diarrhea. Other side effects include cytopenias, hepatoxocitiy, weight loss, skin rash, neuropathy and pneumonitis. It is also teratogenic so contraception may be needed.  Counselled on limiting alcohol use to 1-2 drinks/week while on arava given potential liver toxicity.  # Leflunomide (Arava) Risks and Benefits: The risks and benefits of leflunomide were discussed in detail and the patient verbalized  understanding.  The risks discussed include, but are not limited to, the risk for hypersensitivity, anaphylaxis, anaphylactoid reactions, hepatotoxicity, infections, interstitial lung disease, alopecia, rash, nausea, and diarrhea.  The impact on malignancies is not fully defined.   Alcohol should be avoided while taking leflunomide.  Pregnancy prevention and planning was discussed; it is recommended that women of childbearing potential use reliable contraception before, during, and for a period of 2 years after treatment with leflunomide; if pregnancy is planned within 2 years of discontinuing medication then women should undergo drug elimination procedures (i.e. cholestyramine). Routine laboratory monitoring is required during leflunomide therapy. I encouraged reviewing the package insert and asking any questions about the medication.      2) Tumid lupus rash  -check LYNNE, dsDNA, C3, C4, RAKESH, UA, UPC  -if serologies positive, HCQ indicated  -no alopecia, pleurisy, mucocutaneous lesions, current raynaud's (no symptoms since smoking cessation 7 years ago)    3) Health Maintenance:  -Immunizations: Discussed importance of staying up-to-date on immunizations especially while on immunosuppressive therapy  -PCV13 2019  -PPSV23 -advised pt to get it at PCP office  -Influenza -12/7/2020  -COVID-19 -s/p 1st dose, will be getting 2nd dose in the next few weeks  -Discussed importance of taking/continuing standard precautionary measures such as hand hygiene, social distancing, wearing a mask, and avoiding sick contacts  -Contraception use: hx of hysterectomy      Ms. Goodson verbalized agreement with and understanding of the rationale for the diagnosis and treatment plan.  All questions were answered to best of my ability and the patient's satisfaction. Ms. Goodson was advised to contact the clinic with any questions that may arise after the clinic visit.        Chart documentation done in part with Dragon Voice recognition  Software. Although reviewed after completion, some word and grammatical error may remain.      RTC 3 months, sooner KAY Baca MD

## 2021-03-17 ENCOUNTER — OFFICE VISIT (OUTPATIENT)
Dept: RHEUMATOLOGY | Facility: CLINIC | Age: 46
End: 2021-03-17
Attending: NURSE PRACTITIONER
Payer: COMMERCIAL

## 2021-03-17 VITALS
HEART RATE: 108 BPM | WEIGHT: 191.1 LBS | OXYGEN SATURATION: 100 % | TEMPERATURE: 97.7 F | HEIGHT: 63 IN | BODY MASS INDEX: 33.86 KG/M2 | SYSTOLIC BLOOD PRESSURE: 136 MMHG | DIASTOLIC BLOOD PRESSURE: 89 MMHG

## 2021-03-17 DIAGNOSIS — L93.0 LUPUS ERYTHEMATOSUS TUMIDUS: Primary | ICD-10-CM

## 2021-03-17 DIAGNOSIS — M06.9 RHEUMATOID ARTHRITIS INVOLVING MULTIPLE SITES, UNSPECIFIED WHETHER RHEUMATOID FACTOR PRESENT (H): ICD-10-CM

## 2021-03-17 LAB
ALBUMIN UR-MCNC: NEGATIVE MG/DL
APPEARANCE UR: CLEAR
BILIRUB UR QL STRIP: NEGATIVE
COLOR UR AUTO: ABNORMAL
CREAT UR-MCNC: 36 MG/DL
CRP SERPL-MCNC: 9 MG/L (ref 0–8)
ERYTHROCYTE [SEDIMENTATION RATE] IN BLOOD BY WESTERGREN METHOD: 9 MM/H (ref 0–20)
GLUCOSE UR STRIP-MCNC: NEGATIVE MG/DL
HGB UR QL STRIP: NEGATIVE
KETONES UR STRIP-MCNC: NEGATIVE MG/DL
LEUKOCYTE ESTERASE UR QL STRIP: NEGATIVE
NITRATE UR QL: NEGATIVE
NON-SQ EPI CELLS #/AREA URNS LPF: ABNORMAL /LPF
PH UR STRIP: 5.5 PH (ref 5–7)
PROT UR-MCNC: <0.05 G/L
PROT/CREAT 24H UR: NORMAL G/G CR (ref 0–0.2)
RBC #/AREA URNS AUTO: ABNORMAL /HPF
SOURCE: ABNORMAL
SP GR UR STRIP: 1 (ref 1–1.03)
UROBILINOGEN UR STRIP-MCNC: NORMAL MG/DL (ref 0–2)
WBC #/AREA URNS AUTO: ABNORMAL /HPF

## 2021-03-17 PROCEDURE — 86038 ANTINUCLEAR ANTIBODIES: CPT | Performed by: STUDENT IN AN ORGANIZED HEALTH CARE EDUCATION/TRAINING PROGRAM

## 2021-03-17 PROCEDURE — 36415 COLL VENOUS BLD VENIPUNCTURE: CPT | Performed by: STUDENT IN AN ORGANIZED HEALTH CARE EDUCATION/TRAINING PROGRAM

## 2021-03-17 PROCEDURE — 84165 PROTEIN E-PHORESIS SERUM: CPT | Performed by: PATHOLOGY

## 2021-03-17 PROCEDURE — 81001 URINALYSIS AUTO W/SCOPE: CPT | Performed by: STUDENT IN AN ORGANIZED HEALTH CARE EDUCATION/TRAINING PROGRAM

## 2021-03-17 PROCEDURE — 99N1036 PR STATISTIC TOTAL PROTEIN: Performed by: STUDENT IN AN ORGANIZED HEALTH CARE EDUCATION/TRAINING PROGRAM

## 2021-03-17 PROCEDURE — 86200 CCP ANTIBODY: CPT | Performed by: STUDENT IN AN ORGANIZED HEALTH CARE EDUCATION/TRAINING PROGRAM

## 2021-03-17 PROCEDURE — 86431 RHEUMATOID FACTOR QUANT: CPT | Performed by: STUDENT IN AN ORGANIZED HEALTH CARE EDUCATION/TRAINING PROGRAM

## 2021-03-17 PROCEDURE — 85652 RBC SED RATE AUTOMATED: CPT | Performed by: STUDENT IN AN ORGANIZED HEALTH CARE EDUCATION/TRAINING PROGRAM

## 2021-03-17 PROCEDURE — 86235 NUCLEAR ANTIGEN ANTIBODY: CPT | Performed by: STUDENT IN AN ORGANIZED HEALTH CARE EDUCATION/TRAINING PROGRAM

## 2021-03-17 PROCEDURE — 86225 DNA ANTIBODY NATIVE: CPT | Performed by: STUDENT IN AN ORGANIZED HEALTH CARE EDUCATION/TRAINING PROGRAM

## 2021-03-17 PROCEDURE — 86140 C-REACTIVE PROTEIN: CPT | Performed by: STUDENT IN AN ORGANIZED HEALTH CARE EDUCATION/TRAINING PROGRAM

## 2021-03-17 PROCEDURE — 84156 ASSAY OF PROTEIN URINE: CPT | Performed by: STUDENT IN AN ORGANIZED HEALTH CARE EDUCATION/TRAINING PROGRAM

## 2021-03-17 PROCEDURE — 99204 OFFICE O/P NEW MOD 45 MIN: CPT | Performed by: STUDENT IN AN ORGANIZED HEALTH CARE EDUCATION/TRAINING PROGRAM

## 2021-03-17 PROCEDURE — 86803 HEPATITIS C AB TEST: CPT | Performed by: STUDENT IN AN ORGANIZED HEALTH CARE EDUCATION/TRAINING PROGRAM

## 2021-03-17 PROCEDURE — 86160 COMPLEMENT ANTIGEN: CPT | Performed by: STUDENT IN AN ORGANIZED HEALTH CARE EDUCATION/TRAINING PROGRAM

## 2021-03-17 ASSESSMENT — MIFFLIN-ST. JEOR: SCORE: 1480.95

## 2021-03-17 NOTE — NURSING NOTE
"Elizabeth Goodson's goals for this visit include: discuss RA, multiple joint pain.   She requests these members of her care team be copied on today's visit information: n/a    PCP: Keri Yu    Referring Provider:  RAÚL Pat CNP  [id number] [place] [id number]    BP (!) 138/91 (BP Location: Left arm, Patient Position: Sitting, Cuff Size: Adult Regular)   Pulse 108   Temp 97.7  F (36.5  C) (Oral)   Ht 1.6 m (5' 3\")   Wt 86.7 kg (191 lb 1.6 oz)   LMP  (LMP Unknown)   SpO2 100%   Breastfeeding No   BMI 33.85 kg/m      Do you need any medication refills at today's visit? None      CLAUDIA Farnsworth [facility]       "

## 2021-03-17 NOTE — PATIENT INSTRUCTIONS
1. Please get labs today and make appointment for x-rays  2. Continue follow-up with dermatology  3. Can resume leflunomide 20 mg daily  4. I will be in touch once labs results are back  5. Follow-up in 3 months      Patient Education     Hydroxychloroquine Sulfate Oral tablet  What is this medicine?  HYDROXYCHLOROQUINE (cristy drox ee KLOR oh kwin) is used to treat rheumatoid arthritis and systemic lupus erythematosus. It is also used to treat malaria.  This medicine may be used for other purposes; ask your health care provider or pharmacist if you have questions.  What should I tell my health care provider before I take this medicine?  They need to know if you have any of these conditions:    alcoholism    anemia or other blood disorder    eye disease    glucose 6-phosphate dehydrogenase (G6PD) deficiency    liver disease    porphyria    psoriasis    an unusual or allergic reaction to chloroquine, hydroxychloroquine, other medicines, foods, dyes, or preservatives    pregnant or trying to get pregnant    breast-feeding  How should I use this medicine?  Take this medicine by mouth with a glass of water. Follow the directions on the prescription label. If this medicine upsets your stomach take it with food or milk. Take your doses at regular intervals. Do not take your medicine more often than directed.  Talk to your pediatrician regarding the use of this medicine in children. Special care may be needed.  Overdosage: If you think you have taken too much of this medicine contact a poison control center or emergency room at once.  NOTE: This medicine is only for you. Do not share this medicine with others.  What if I miss a dose?  If you miss a dose, take it as soon as you can. If it is almost time for your next dose, take only that dose. Do not take double or extra doses.  What may interact with this medicine?    antacids    botulinum toxins    digoxin    kaolin    penicillamine  This list may not describe all possible  interactions. Give your health care provider a list of all the medicines, herbs, non-prescription drugs, or dietary supplements you use. Also tell them if you smoke, drink alcohol, or use illegal drugs. Some items may interact with your medicine.  What should I watch for while using this medicine?  Visit your doctor or health care professional for regular check ups. Tell your doctor if your symptoms do not improve. Arthritis symptoms may take several weeks to improve. If you are taking this medicine for a long time, you will need important blood work done. You will also need to have your eyes checked as directed.  This medicine can make you more sensitive to the sun. Keep out of the sun. If you cannot avoid being in the sun, wear protective clothing and use sunscreen. Do not use sun lamps or tanning beds/booths.  Avoid antacids and kaolin containing products for 2 hours before and after taking a dose of this medicine.  What side effects may I notice from receiving this medicine?  Side effects that you should report to your doctor or health care professional as soon as possible:    allergic reactions like skin rash, itching or hives, swelling of the face, lips, or tongue    change in vision    fever, infection    hearing loss or ringing    muscle weakness, tremor, or numbness    redness, blistering, peeling or loosening of the skin, including inside the mouth    seizures    unusual bleeding or bruising    unusually weak or tired  Side effects that usually do not require medical attention (report to your doctor or health care professional if they continue or are bothersome):    change in coloration of the mouth or skin    dizziness    hair loss, lightening    headache    irritability, nervousness, nightmares    loss of appetite    stomach upset, diarrhea  This list may not describe all possible side effects. Call your doctor for medical advice about side effects. You may report side effects to FDA at  1-800-FDA-1088.  Where should I keep my medicine?  Keep out of the reach of children. In children, this medicine can cause overdose with small doses.  Store at room temperature between 15 and 30 degrees C (59 and 86 degrees F). Protect from moisture and light. Throw away any unused medicine after the expiration date.  NOTE:This sheet is a summary. It may not cover all possible information. If you have questions about this medicine, talk to your doctor, pharmacist, or health care provider. Copyright  2016 Gold Standard

## 2021-03-18 LAB
ALBUMIN SERPL ELPH-MCNC: 4.4 G/DL (ref 3.7–5.1)
ALPHA1 GLOB SERPL ELPH-MCNC: 0.3 G/DL (ref 0.2–0.4)
ALPHA2 GLOB SERPL ELPH-MCNC: 0.9 G/DL (ref 0.5–0.9)
ANA SER QL IF: NEGATIVE
B-GLOBULIN SERPL ELPH-MCNC: 0.9 G/DL (ref 0.6–1)
C3 SERPL-MCNC: 151 MG/DL (ref 81–157)
C4 SERPL-MCNC: 25 MG/DL (ref 13–39)
CCP AB SER IA-ACNC: 2 U/ML
DSDNA AB SER-ACNC: 1 IU/ML
ENA RNP IGG SER IA-ACNC: 0.3 AI (ref 0–0.9)
ENA SM IGG SER-ACNC: <0.2 AI (ref 0–0.9)
ENA SS-A IGG SER IA-ACNC: <0.2 AI (ref 0–0.9)
ENA SS-B IGG SER IA-ACNC: <0.2 AI (ref 0–0.9)
GAMMA GLOB SERPL ELPH-MCNC: 0.9 G/DL (ref 0.7–1.6)
HCV AB SERPL QL IA: NONREACTIVE
M PROTEIN SERPL ELPH-MCNC: 0 G/DL
PROT PATTERN SERPL ELPH-IMP: NORMAL
RHEUMATOID FACT SER NEPH-ACNC: 24 IU/ML (ref 0–20)

## 2021-03-19 DIAGNOSIS — I10 ESSENTIAL HYPERTENSION: ICD-10-CM

## 2021-03-19 RX ORDER — DILTIAZEM HYDROCHLORIDE 240 MG/1
CAPSULE, COATED, EXTENDED RELEASE ORAL
Qty: 30 CAPSULE | Refills: 0 | Status: SHIPPED | OUTPATIENT
Start: 2021-03-19 | End: 2021-04-27

## 2021-03-19 NOTE — RESULT ENCOUNTER NOTE
Dear Lani,     Antibodies for lupus all returned negative, which is great. This means that at this time, this is no indication that you have systemic lupus. Your rheumatoid factor is still mildly elevated like before. Second antibody for rheumatoid arthritis is negative. One of the inflammatory markers (CRP) is mildly elevated, which could be related to your joint symptoms. No changes in our plan. Recommend continuing to follow-up with dermatology for the rash. I will await your x-ray results.    Please let us know if you have any questions or concerns.    Regards,  Bernadine Baca MD

## 2021-03-19 NOTE — TELEPHONE ENCOUNTER
"Requested Prescriptions   Pending Prescriptions Disp Refills     diltiazem ER COATED BEADS (CARTIA XT) 240 MG 24 hr capsule [Pharmacy Med Name: CARTIA XT 240MG CP24] 30 capsule 0     Sig: TAKE ONE CAPSULE BY MOUTH ONCE DAILY       Calcium Channel Blockers Protocol  Passed - 3/19/2021  6:09 AM        Passed - Blood pressure under 140/90 in past 12 months     BP Readings from Last 3 Encounters:   03/17/21 136/89   12/07/20 (!) 124/90   02/24/20 118/74                 Passed - Normal ALT in past 12 months     Recent Labs   Lab Test 12/07/20  1621   ALT 24             Passed - Recent (12 mo) or future (30 days) visit within the authorizing provider's specialty     Patient has had an office visit with the authorizing provider or a provider within the authorizing providers department within the previous 12 mos or has a future within next 30 days. See \"Patient Info\" tab in inbasket, or \"Choose Columns\" in Meds & Orders section of the refill encounter.              Passed - Medication is active on med list        Passed - Patient is age 18 or older        Passed - No active pregnancy on record        Passed - Normal serum creatinine on file in past 12 months     Recent Labs   Lab Test 12/07/20  1621   CR 0.68       Ok to refill medication if creatinine is low          Passed - No positive pregnancy test in past 12 months           Prescription approved per [facility] Refill Protocol.    Juan Hills, RN, BSN    "

## 2021-04-26 ENCOUNTER — TELEPHONE (OUTPATIENT)
Dept: FAMILY MEDICINE | Facility: CLINIC | Age: 46
End: 2021-04-26

## 2021-04-26 DIAGNOSIS — I10 ESSENTIAL HYPERTENSION: ICD-10-CM

## 2021-04-27 ENCOUNTER — MYC MEDICAL ADVICE (OUTPATIENT)
Dept: FAMILY MEDICINE | Facility: OTHER | Age: 46
End: 2021-04-27

## 2021-04-27 RX ORDER — DILTIAZEM HYDROCHLORIDE 240 MG/1
CAPSULE, COATED, EXTENDED RELEASE ORAL
Qty: 30 CAPSULE | Refills: 0 | Status: SHIPPED | OUTPATIENT
Start: 2021-04-27 | End: 2021-05-10

## 2021-04-27 NOTE — TELEPHONE ENCOUNTER
Pending Prescriptions:                       Disp   Refills    diltiazem ER COATED BEADS (CARTIA XT) 240 *30 cap*0        Sig: TAKE ONE CAPSULE BY MOUTH ONCE DAILY      Routing refill request to provider for review/approval because:  Drug interaction/ allergy warning    Lani Milian RN on 4/27/2021 at 1:27 PM

## 2021-04-27 NOTE — TELEPHONE ENCOUNTER
LM for the patient to return call to the clinic. Please find out if she is taking Cozaar for hypertension.   Please schedule patient for medication check with PCP. Diltiazem was sent to pharmacy for 30 days.   [employer] message sent.       Jluis Hutson RN, BSN  [place]  April 27, 2021

## 2021-04-27 NOTE — TELEPHONE ENCOUNTER
Verify why not on Cozaar- cannot remember stopping. Needs OV with med check for HTN. RAÚL Flor CNP

## 2021-04-28 NOTE — TELEPHONE ENCOUNTER
Prescription approved per [facility] Refill Protocol.  Jluis Hutson RN, BSN  [place]  April 28, 2021

## 2021-04-29 NOTE — TELEPHONE ENCOUNTER
Patient was scheduled for 5/10/21.   Jluis Hutson RN, BSN  [place]  April 29, 2021

## 2021-05-10 ENCOUNTER — OFFICE VISIT (OUTPATIENT)
Dept: FAMILY MEDICINE | Facility: CLINIC | Age: 46
End: 2021-05-10
Payer: COMMERCIAL

## 2021-05-10 VITALS
RESPIRATION RATE: 18 BRPM | BODY MASS INDEX: 34.02 KG/M2 | TEMPERATURE: 99.3 F | HEIGHT: 63 IN | HEART RATE: 86 BPM | WEIGHT: 192 LBS | OXYGEN SATURATION: 97 % | DIASTOLIC BLOOD PRESSURE: 84 MMHG | SYSTOLIC BLOOD PRESSURE: 128 MMHG

## 2021-05-10 DIAGNOSIS — Z71.89 ADVANCED DIRECTIVES, COUNSELING/DISCUSSION: ICD-10-CM

## 2021-05-10 DIAGNOSIS — E87.6 HYPOKALEMIA: ICD-10-CM

## 2021-05-10 DIAGNOSIS — E03.4 HYPOTHYROIDISM DUE TO ACQUIRED ATROPHY OF THYROID: ICD-10-CM

## 2021-05-10 DIAGNOSIS — I10 ESSENTIAL HYPERTENSION: Primary | ICD-10-CM

## 2021-05-10 DIAGNOSIS — Z91.018 TREE NUT ALLERGY: ICD-10-CM

## 2021-05-10 DIAGNOSIS — M05.9 RHEUMATOID ARTHRITIS WITH POSITIVE RHEUMATOID FACTOR, INVOLVING UNSPECIFIED SITE (H): ICD-10-CM

## 2021-05-10 DIAGNOSIS — J01.00 ACUTE NON-RECURRENT MAXILLARY SINUSITIS: ICD-10-CM

## 2021-05-10 DIAGNOSIS — I10 HTN, GOAL BELOW 140/90: ICD-10-CM

## 2021-05-10 DIAGNOSIS — Z79.899 ENCOUNTER FOR LONG-TERM CURRENT USE OF HIGH RISK MEDICATION: Primary | ICD-10-CM

## 2021-05-10 DIAGNOSIS — Z91.010 PEANUT ALLERGY: ICD-10-CM

## 2021-05-10 DIAGNOSIS — M06.9 RHEUMATOID ARTHRITIS INVOLVING MULTIPLE SITES, UNSPECIFIED WHETHER RHEUMATOID FACTOR PRESENT (H): ICD-10-CM

## 2021-05-10 LAB — POTASSIUM SERPL-SCNC: 3.4 MMOL/L (ref 3.4–5.3)

## 2021-05-10 PROCEDURE — 84132 ASSAY OF SERUM POTASSIUM: CPT | Performed by: NURSE PRACTITIONER

## 2021-05-10 PROCEDURE — 36415 COLL VENOUS BLD VENIPUNCTURE: CPT | Performed by: NURSE PRACTITIONER

## 2021-05-10 PROCEDURE — 99214 OFFICE O/P EST MOD 30 MIN: CPT | Performed by: NURSE PRACTITIONER

## 2021-05-10 RX ORDER — EPINEPHRINE 0.3 MG/.3ML
0.3 INJECTION SUBCUTANEOUS
Qty: 0.6 ML | Refills: 1 | Status: SHIPPED | OUTPATIENT
Start: 2021-05-10 | End: 2022-09-12

## 2021-05-10 RX ORDER — AZITHROMYCIN 250 MG/1
TABLET, FILM COATED ORAL
Qty: 6 TABLET | Refills: 0 | Status: SHIPPED | OUTPATIENT
Start: 2021-05-10 | End: 2022-01-27

## 2021-05-10 RX ORDER — DILTIAZEM HYDROCHLORIDE 240 MG/1
CAPSULE, COATED, EXTENDED RELEASE ORAL
Qty: 90 CAPSULE | Refills: 3 | Status: SHIPPED | OUTPATIENT
Start: 2021-05-10 | End: 2022-05-03

## 2021-05-10 RX ORDER — METOPROLOL SUCCINATE 25 MG/1
25 TABLET, EXTENDED RELEASE ORAL DAILY
Qty: 90 TABLET | Refills: 1 | Status: SHIPPED | OUTPATIENT
Start: 2021-05-10 | End: 2021-12-16

## 2021-05-10 RX ORDER — LEFLUNOMIDE 20 MG/1
20 TABLET ORAL DAILY
Qty: 90 TABLET | Refills: 1 | Status: CANCELLED | OUTPATIENT
Start: 2021-05-10

## 2021-05-10 RX ORDER — LEVOTHYROXINE SODIUM 112 UG/1
112 TABLET ORAL DAILY
Qty: 90 TABLET | Refills: 2 | Status: SHIPPED | OUTPATIENT
Start: 2021-05-10 | End: 2022-05-03

## 2021-05-10 RX ORDER — HYDROCHLOROTHIAZIDE 25 MG/1
25 TABLET ORAL DAILY
Qty: 90 TABLET | Refills: 3 | Status: SHIPPED | OUTPATIENT
Start: 2021-05-10 | End: 2022-06-27

## 2021-05-10 ASSESSMENT — PAIN SCALES - GENERAL: PAINLEVEL: MILD PAIN (3)

## 2021-05-10 ASSESSMENT — MIFFLIN-ST. JEOR: SCORE: 1485.04

## 2021-05-10 NOTE — PROGRESS NOTES
Assessment & Plan       ICD-10-CM    1. Essential hypertension  I10 diltiazem ER COATED BEADS (CARTIA XT) 240 MG 24 hr capsule     hydrochlorothiazide (HYDRODIURIL) 25 MG tablet   2. Rheumatoid arthritis with positive rheumatoid factor, involving unspecified site (H)  M05.9 Drug Confirmation Panel Urine with Creat     CANCELED: Drug Confirmation Panel Urine with Creat   3. Tree nut allergy  Z91.018 EPINEPHrine (EPIPEN 2-KIMBERLEE) 0.3 MG/0.3ML injection 2-pack   4. Hypokalemia  E87.6 Potassium   5. Hypothyroidism due to acquired atrophy of thyroid  E03.4 levothyroxine (SYNTHROID/LEVOTHROID) 112 MCG tablet   6. HTN, goal below 140/90  I10 metoprolol succinate ER (TOPROL-XL) 25 MG 24 hr tablet   7. Peanut allergy  Z91.010 EPINEPHrine (EPIPEN 2-KIMBERLEE) 0.3 MG/0.3ML injection 2-pack   8. Advanced directives, counseling/discussion  Z71.89     forms given   9. Acute non-recurrent maxillary sinusitis  J01.00 azithromycin (ZITHROMAX) 250 MG tablet      HTN- controlled, meds filled, updating labs. Working on exercise and weight loss.   RA- getting UDS updated.   Refilling allergy pen, had recent allergy/anaphalyxis only treated with Benadryl, add in H2 blocker, advised about future caution of this.   Rechecking potassium studies.   Living will info. given to pt.   Treating sinuses.   sending Rheum. Message about Arava.     Return to clinic if symptoms persist or worsen. Antipyretics/analgesics prn, fluids, rest, supportive cares, avoid OTC decongestants.  See orders.              Return in about 7 months (around 12/10/2021) for Physical Exam.    RAÚL Flor CNP  [facility]     Elizabeth KERVIN Hopesheryl is a 46 year old female who presents to clinic today for the following health issues accompanied by her self:    History of Present Illness       Hypertension: She presents for follow up of hypertension.  She does not check blood pressure  regularly outside of the clinic. Outpatient blood pressures  "have not been over 140/90. She follows a low salt diet.     She eats 0-1 servings of fruits and vegetables daily.She consumes 0 sweetened beverage(s) daily.She exercises with enough effort to increase her heart rate 10 to 19 minutes per day.  She exercises with enough effort to increase her heart rate 3 or less days per week.   She is taking medications regularly.       Acute Illness  Acute illness concerns: sinus  Onset/Duration: 2 weeks  Symptoms:  Fever: no  Chills/Sweats: no  Headache (location?): YES  Sinus Pressure: YES  Conjunctivitis:  no  Ear Pain: no  Rhinorrhea: no  Congestion: YES  Sore Throat: no  Cough: no  Wheeze: no  Decreased Appetite: no  Nausea: no  Vomiting: no  Diarrhea: no  Dysuria/Freq.: no  Dysuria or Hematuria: no  Fatigue/Achiness: no  Sick/Strep Exposure: no  Therapies tried and outcome: tylenol cold and flu      RA- needs refill of Arava, looks like is the plan from rheumatology. 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   Has chronic pain, due for Utox per guidelines. No medication concerns.     TSH- due for labs. Feels steady, no significant skin/hair changes.     Weight- working on this a bit. No changes. Not really wanting medication management with weight mgmt. Will consider.             Review of Systems   Constitutional, HEENT, cardiovascular, pulmonary, gi and gu systems are negative, except as otherwise noted.      Objective    /84 (BP Location: Left arm, Patient Position: Chair, Cuff Size: Adult Regular)   Pulse 86   Temp 99.3  F (37.4  C) (Temporal)   Resp 18   Ht 1.6 m (5' 3\")   Wt 87.1 kg (192 lb)   LMP  (Exact Date)   SpO2 97%   Breastfeeding No   BMI 34.01 kg/m    Body mass index is 34.01 kg/m .  Physical Exam   GENERAL healthy, alert and no distress  EYES sclera clear, PERRLA  HENT: nose,mouth without ulcers or lesions, Normal ear canals, TM's pearly grey, normal light reflex bilaterally , turbinates mild swelling  NECK: no adenopathy, no asymmetry, masses, or " scars and thyroid normal to palpation  RESP: Clear to auscultation  CV: RRR, no murmur, no edema  NEURO: NEGATIVE, alert/oriented to person, location and time  PSYCH: normal pleasant affect      Results for orders placed or performed in visit on 05/10/21   Potassium     Status: None   Result Value Ref Range    Potassium 3.4 3.4 - 5.3 mmol/L

## 2021-05-11 NOTE — TELEPHONE ENCOUNTER
Please inform pt to get labs -it's been a while since her last CBC and CMP. Will resume leflunomide pending lab results. She also has x-rays pending from me that she can also make an appointment for. Thanks

## 2021-05-11 NOTE — TELEPHONE ENCOUNTER
María Elena sent to patient.     Pretty Davison, BSN, RN  Medical Specialty Care Coordinator  [facility]

## 2021-06-23 DIAGNOSIS — M79.7 FIBROMYALGIA: ICD-10-CM

## 2021-06-24 RX ORDER — CYCLOBENZAPRINE HCL 10 MG
TABLET ORAL
Qty: 90 TABLET | Refills: 1 | Status: SHIPPED | OUTPATIENT
Start: 2021-06-24 | End: 2022-01-24

## 2021-06-24 NOTE — TELEPHONE ENCOUNTER
Pending Prescriptions:                       Disp   Refills    cyclobenzaprine (FLEXERIL) 10 MG tablet [P*90 tab*1        Sig: TAKE ONE TABLET BY MOUTH AT BEDTIME    Routing refill request to provider for review/approval because:  Drug not on the [facility] refill protocol

## 2021-07-20 ENCOUNTER — OFFICE VISIT (OUTPATIENT)
Dept: DERMATOLOGY | Facility: CLINIC | Age: 46
End: 2021-07-20
Payer: COMMERCIAL

## 2021-07-20 DIAGNOSIS — Z92.25 HISTORY OF IMMUNOSUPPRESSION: ICD-10-CM

## 2021-07-20 DIAGNOSIS — D18.01 CHERRY ANGIOMA: ICD-10-CM

## 2021-07-20 DIAGNOSIS — Z85.828 HISTORY OF NONMELANOMA SKIN CANCER: Primary | ICD-10-CM

## 2021-07-20 DIAGNOSIS — L55.9 SUNBURN: ICD-10-CM

## 2021-07-20 DIAGNOSIS — L93.0 LUPUS ERYTHEMATOSUS TUMIDUS: ICD-10-CM

## 2021-07-20 PROCEDURE — 99214 OFFICE O/P EST MOD 30 MIN: CPT | Performed by: DERMATOLOGY

## 2021-07-20 RX ORDER — HYDROCORTISONE VALERATE 2 MG/G
OINTMENT TOPICAL
Qty: 30 G | Refills: 3 | Status: SHIPPED | OUTPATIENT
Start: 2021-07-20

## 2021-07-20 RX ORDER — FLUOCINONIDE 0.5 MG/G
CREAM TOPICAL
Qty: 60 G | Refills: 1 | Status: SHIPPED | OUTPATIENT
Start: 2021-07-20

## 2021-07-20 ASSESSMENT — PAIN SCALES - GENERAL: PAINLEVEL: NO PAIN (0)

## 2021-07-20 NOTE — PROGRESS NOTES
"[facility] Dermatology Note  Encounter Date: Jul 20, 2021  Office Visit     Dermatology Problem List:  1. Relevant medical history:   - RA: current immunosuppression: leflunomide 20 mg  - Biopsy proven tumid lupus. Two active lesions today. Has lidex and westcort. - following with rheumatology  2. NMSC  -BCC, left knee, s/p ED&C 2/2/2017  -BCC, left lower leg, s/p excision 2/2/2017  3. Actinic keratosis  -s/p cryotherapy    ____________________________________________    Assessment & Plan:    # Immunosuppressed with leflunomide for RA. Discussed increased risk of skin cancers with immunosuppressing medications.   - Recommend sunscreens SPF #30 or greater, protective clothing and avoidance of tanning beds.   - Recommended yearly skin exams.    # History of NMSC. No evidence of recurrence.   - Recommend sunscreens SPF #30 or greater, protective clothing and avoidance of tanning beds.   - Recommended yearly skin exams.  - Handout provided for [employer] to check benzene contamination of sunscreens.    # Biopsy proven tumid lupus. Two active lesions today. Has lidex and westcort. - following with rheumatology  - Continue Lidex prn, reviewed risks of skin thinning.  - Start Westcort BID x 2 weeks at a time for the face.  - LYNNE and double stranded DNA labs were negative. Reviewed labs from 3/17/21.    # Cherry angioma. - right dorsal hand.  - No further intervention needed.    # Sunburn - thighs  - Recommend sunscreens SPF #30 or greater and sun protective clothing.  - Handout given for \"Coolibar\" sun protective clothing.    Procedures Performed:   None.    Follow-up: 1 year in-person for skin check, or earlier for new or changing lesions.    Staff and Scribe:     Scribe Disclosure:   ISocorro, am serving as a scribe to document services personally performed by this physician, Dr. Berta Turner, based on data collection and the provider's statements to me.     Provider Disclosure:   The " documentation recorded by the scribe accurately reflects the services I personally performed and the decisions made by me.    Berta Turner MD    Department of Dermatology  [facility]: Phone: [phone], Fax:[phone]  [facility]: Phone: [phone], Fax: [phone]      ____________________________________________    CC: Skin Check (area of concern right dorsal hand hx NMSC and immunosuppression) and Derm Problem (recently diagnosed with Lupus of the Skin using lidex cream )    HPI:  Ms. Elizabeth Goodson is a(n) 46 year old female who presents today as a return patient for skin check.    Last seen 12/7/2020 by a virtual visit. At that time, patient was to apply lidex for 2 weeks, then 2 weekends, then stop. LYNNE and 2x strand DNA labs ordered. Not completed.    Today patient notes concern of an area on the right dorsal hand. Patient also notes she was recently diagnosed with tumid lupus and has been using lidex cream on the skin. Does note 2 active lesions today and appear intermittently.    Patient is otherwise feeling well, without additional skin concerns.    Labs Reviewed:  N/A    Physical Exam:  Vitals: There were no vitals taken for this visit.  SKIN: Total skin excluding the undergarment areas was performed. The exam included the head/face, neck, both arms, chest, back, abdomen, both legs, digits and/or nails. Refuses genital exam  - There is a dome shaped bright red papule on the right dorsal hand.   - 1 erythematous papule on the left cheek  - Similar erythematous papules at the left chest.  - 1 erythematous plaque on the right shoulder.  - There is a well healed surgical scar without erythema, nodularity or telangiectasias on the areas of previous NMSC.   - There is a dry red scale on the bilateral thighs, consistent with a sunburn.  - No other lesions of concern on areas  examined.     Medications:  Current Outpatient Medications   Medication     cyclobenzaprine (FLEXERIL) 10 MG tablet     diltiazem ER COATED BEADS (CARTIA XT) 240 MG 24 hr capsule     EPINEPHrine (EPIPEN 2-KIMBERLEE) 0.3 MG/0.3ML injection 2-pack     fluocinonide (LIDEX) 0.05 % external cream     hydrochlorothiazide (HYDRODIURIL) 25 MG tablet     leflunomide (ARAVA) 20 MG tablet     levothyroxine (SYNTHROID/LEVOTHROID) 112 MCG tablet     losartan (COZAAR) 50 MG tablet     metoprolol succinate ER (TOPROL-XL) 25 MG 24 hr tablet     omeprazole (PRILOSEC) 40 MG DR capsule     potassium chloride ER (KLOR-CON) 20 MEQ CR tablet     azithromycin (ZITHROMAX) 250 MG tablet     No current facility-administered medications for this visit.      Past Medical History:   Patient Active Problem List   Diagnosis     Syncope     Raynaud phenomenon     CARDIOVASCULAR SCREENING; LDL GOAL LESS THAN 160     Strain of neck muscle     Migraine headache     Cough with hemoptysis     Incidental lung nodule, less than or equal to 3mm     Need for prophylactic vaccination and inoculation against influenza     Fibromyalgia     HTN, goal below 140/90     H/O: hysterectomy     GERD (gastroesophageal reflux disease)     Edema     High risk medication use     Hypothyroidism     Ventral hernia without obstruction or gangrene     Hemoptysis     Rheumatoid arthritis, involving unspecified site, unspecified rheumatoid factor presence     Penicillin allergy     Peanut allergy     Tree nut allergy     Anaphylaxis, subsequent encounter     Itching     History of hemoptysis     Atypical chest pain     Mass of finger, left     Cubital tunnel syndrome on right     Past Medical History:   Diagnosis Date     Adnexal mass 2017    not seen on 7/10/18 abdominal CT     H/O transfusion of whole blood 2001    with child labor/hysterectomy     Hemoptysis 11/12, 11/2015-mild    last episodes, mild, has had massive in HX     HTN (hypertension)      Incidental lung nodule, less  than or equal to 3mm 11/12    NICHOLAS     Massive hemoptysis 11/12    due to pneumonia     Migraine headache      Tobacco abuse, in remission     quit 2012        CC No referring provider defined for this encounter. on close of this encounter.

## 2021-07-20 NOTE — NURSING NOTE
Elizabeth Goodson's goals for this visit include:   Chief Complaint   Patient presents with     Skin Check     area of concern right dorsal hand hx NMSC and immunosuppression     Derm Problem     recently diagnosed with Lupus of the Skin using lidex cream        She requests these members of her care team be copied on today's visit information:     PCP: Keri Yu    Referring Provider:  No referring provider defined for this encounter.    There were no vitals taken for this visit.    Do you need any medication refills at today's visit? Kay Soto LPN

## 2021-07-20 NOTE — LETTER
"    7/20/2021         RE: Elizabeth Goodson  [address] [facility] [address]        Dear Colleague,    Thank you for referring your patient, Elizabeth Goodson, to the [facility]. Please see a copy of my visit note below.    [facility] Dermatology Note  Encounter Date: Jul 20, 2021  Office Visit     Dermatology Problem List:  1. Relevant medical history:   - RA: current immunosuppression: leflunomide 20 mg  - Biopsy proven tumid lupus. Two active lesions today. Has lidex and westcort. - following with rheumatology  2. NMSC  -BCC, left knee, s/p ED&C 2/2/2017  -BCC, left lower leg, s/p excision 2/2/2017  3. Actinic keratosis  -s/p cryotherapy    ____________________________________________    Assessment & Plan:    # Immunosuppressed with leflunomide for RA. Discussed increased risk of skin cancers with immunosuppressing medications.   - Recommend sunscreens SPF #30 or greater, protective clothing and avoidance of tanning beds.   - Recommended yearly skin exams.    # History of NMSC. No evidence of recurrence.   - Recommend sunscreens SPF #30 or greater, protective clothing and avoidance of tanning beds.   - Recommended yearly skin exams.  - Handout provided for [employer] to check benzene contamination of sunscreens.    # Biopsy proven tumid lupus. Two active lesions today. Has lidex and westcort. - following with rheumatology  - Continue Lidex prn, reviewed risks of skin thinning.  - Start Westcort BID x 2 weeks at a time for the face.  - LYNNE and double stranded DNA labs were negative. Reviewed labs from 3/17/21.    # Cherry angioma. - right dorsal hand.  - No further intervention needed.    # Sunburn - thighs  - Recommend sunscreens SPF #30 or greater and sun protective clothing.  - Handout given for \"Coolibar\" sun protective clothing.    Procedures Performed:   None.    Follow-up: 1 year in-person for skin check, or earlier for new or changing lesions.    Staff " and Scribe:     Scribe Disclosure:   I, Socorro Cruzin, am serving as a scribe to document services personally performed by this physician, Dr. Berta Turner, based on data collection and the provider's statements to me.     Provider Disclosure:   The documentation recorded by the scribe accurately reflects the services I personally performed and the decisions made by me.    Berta Turner MD    Department of Dermatology  [facility]: Phone: [phone], Fax:[phone]  [facility]: Phone: [phone], Fax: [phone]      ____________________________________________    CC: Skin Check (area of concern right dorsal hand hx NMSC and immunosuppression) and Derm Problem (recently diagnosed with Lupus of the Skin using lidex cream )    HPI:  Ms. Elizabeth Goodson is a(n) 46 year old female who presents today as a return patient for skin check.    Last seen 12/7/2020 by a virtual visit. At that time, patient was to apply lidex for 2 weeks, then 2 weekends, then stop. LYNNE and 2x strand DNA labs ordered. Not completed.    Today patient notes concern of an area on the right dorsal hand. Patient also notes she was recently diagnosed with tumid lupus and has been using lidex cream on the skin. Does note 2 active lesions today and appear intermittently.    Patient is otherwise feeling well, without additional skin concerns.    Labs Reviewed:  N/A    Physical Exam:  Vitals: There were no vitals taken for this visit.  SKIN: Total skin excluding the undergarment areas was performed. The exam included the head/face, neck, both arms, chest, back, abdomen, both legs, digits and/or nails. Refuses genital exam  - There is a dome shaped bright red papule on the right dorsal hand.   - 1 erythematous papule on the left cheek  - Similar erythematous papules at the left chest.  - 1 erythematous plaque on the  right shoulder.  - There is a well healed surgical scar without erythema, nodularity or telangiectasias on the areas of previous NMSC.   - There is a dry red scale on the bilateral thighs, consistent with a sunburn.  - No other lesions of concern on areas examined.     Medications:  Current Outpatient Medications   Medication     cyclobenzaprine (FLEXERIL) 10 MG tablet     diltiazem ER COATED BEADS (CARTIA XT) 240 MG 24 hr capsule     EPINEPHrine (EPIPEN 2-KIMBERLEE) 0.3 MG/0.3ML injection 2-pack     fluocinonide (LIDEX) 0.05 % external cream     hydrochlorothiazide (HYDRODIURIL) 25 MG tablet     leflunomide (ARAVA) 20 MG tablet     levothyroxine (SYNTHROID/LEVOTHROID) 112 MCG tablet     losartan (COZAAR) 50 MG tablet     metoprolol succinate ER (TOPROL-XL) 25 MG 24 hr tablet     omeprazole (PRILOSEC) 40 MG DR capsule     potassium chloride ER (KLOR-CON) 20 MEQ CR tablet     azithromycin (ZITHROMAX) 250 MG tablet     No current facility-administered medications for this visit.      Past Medical History:   Patient Active Problem List   Diagnosis     Syncope     Raynaud phenomenon     CARDIOVASCULAR SCREENING; LDL GOAL LESS THAN 160     Strain of neck muscle     Migraine headache     Cough with hemoptysis     Incidental lung nodule, less than or equal to 3mm     Need for prophylactic vaccination and inoculation against influenza     Fibromyalgia     HTN, goal below 140/90     H/O: hysterectomy     GERD (gastroesophageal reflux disease)     Edema     High risk medication use     Hypothyroidism     Ventral hernia without obstruction or gangrene     Hemoptysis     Rheumatoid arthritis, involving unspecified site, unspecified rheumatoid factor presence     Penicillin allergy     Peanut allergy     Tree nut allergy     Anaphylaxis, subsequent encounter     Itching     History of hemoptysis     Atypical chest pain     Mass of finger, left     Cubital tunnel syndrome on right     Past Medical History:   Diagnosis Date     Adnexal  mass 2017    not seen on 7/10/18 abdominal CT     H/O transfusion of whole blood 2001    with child labor/hysterectomy     Hemoptysis 11/12, 11/2015-mild    last episodes, mild, has had massive in HX     HTN (hypertension)      Incidental lung nodule, less than or equal to 3mm 11/12    NICHOLAS     Massive hemoptysis 11/12    due to pneumonia     Migraine headache      Tobacco abuse, in remission     quit 2012        CC No referring provider defined for this encounter. on close of this encounter.      Again, thank you for allowing me to participate in the care of your patient.        Sincerely,        Berta Turner MD

## 2021-07-20 NOTE — PATIENT INSTRUCTIONS
[email or web address]- has lists of sunscreens tested positive    [employer]  [facility] Dermatology Visit    Thank you for allowing us to participate in your care. Your findings, instructions and follow-up plan are as follows:         When should I call my doctor?    If you are worsening or not improving, please, contact us or seek urgent care as noted below.     Who should I call with questions (adults)?    [facility] (adult and pediatric): [phone]     [facility] (adult): [phone]    For urgent needs outside of business hours call the [facility] at [phone] and ask for the dermatology resident on call    If this is a medical emergency and you are unable to reach an ER, Call [phone]      Who should I call with questions (pediatric)?  [facility]- Pediatric Dermatology  Dr. Juju Felix, Dr. Zahira Mendez, Dr. Alexandra Beltran, Elzbieta Kong, PA  Dr. Harmony Bardales, Dr. Radha Hill & Dr. Chuy Hines  Non Urgent  Nurse Triage Line; [phone]- Mercedes and Lidia STOLL Care Coordinators   Delaney (/Complex ) [phone]    If you need a prescription refill, please contact your pharmacy. Refills are approved or denied by our Physicians during normal business hours, Monday through Fridays  Per office policy, refills will not be granted if you have not been seen within the past year (or sooner depending on your child's condition)    Scheduling Information:  Pediatric Appointment Scheduling and Call Center [phone]  Radiology Scheduling- [phone]  Sedation Unit Scheduling- [phone]  [place] Scheduling- General [phone]; Pediatric Dermatology [phone]  Main  Services: [phone]  Surinamese: [phone]  Gambian: [phone]  Hmong/Martiniquais/Dick: [phone]  Preadmission Nursing Department Fax Number: [phone] (Fax all pre-operative  paperwork to this number)    For urgent matters arising during evenings, weekends, or holidays that cannot wait for normal business hours please call [phone] and ask for the Dermatology Resident On-Call to be paged.

## 2021-09-18 ENCOUNTER — HEALTH MAINTENANCE LETTER (OUTPATIENT)
Age: 46
End: 2021-09-18

## 2021-10-17 DIAGNOSIS — I10 ESSENTIAL HYPERTENSION WITH GOAL BLOOD PRESSURE LESS THAN 140/90: ICD-10-CM

## 2021-10-20 RX ORDER — LOSARTAN POTASSIUM 50 MG/1
TABLET ORAL
Qty: 90 TABLET | Refills: 0 | Status: SHIPPED | OUTPATIENT
Start: 2021-10-20 | End: 2022-01-24

## 2021-10-20 NOTE — TELEPHONE ENCOUNTER
datatracker message sent to patient.    - appointment needed prior to further refills being approved    Lorena WENO/

## 2021-10-20 NOTE — TELEPHONE ENCOUNTER
Pending Prescriptions:                       Disp   Refills    losartan (COZAAR) 50 MG tablet [Pharmacy *90 tab*0            Sig: TAKE ONE TABLET BY MOUTH ONCE DAILY    Medication is being filled for 1 time ascencion refill only due to:  Patient is due for physical exam    Please call and help schedule.  Thank you!

## 2021-11-21 DIAGNOSIS — K21.9 GASTROESOPHAGEAL REFLUX DISEASE WITHOUT ESOPHAGITIS: ICD-10-CM

## 2021-11-21 DIAGNOSIS — R04.2 COUGH WITH HEMOPTYSIS: ICD-10-CM

## 2021-11-21 DIAGNOSIS — K44.9 HIATAL HERNIA: ICD-10-CM

## 2021-11-22 RX ORDER — OMEPRAZOLE 40 MG/1
CAPSULE, DELAYED RELEASE ORAL
Qty: 90 CAPSULE | Refills: 0 | Status: SHIPPED | OUTPATIENT
Start: 2021-11-22 | End: 2022-05-03

## 2021-12-13 DIAGNOSIS — I10 HTN, GOAL BELOW 140/90: ICD-10-CM

## 2021-12-16 RX ORDER — METOPROLOL SUCCINATE 25 MG/1
TABLET, EXTENDED RELEASE ORAL
Qty: 90 TABLET | Refills: 0 | Status: SHIPPED | OUTPATIENT
Start: 2021-12-16 | End: 2022-03-01

## 2021-12-16 NOTE — TELEPHONE ENCOUNTER
Pending Prescriptions:                       Disp   Refills    metoprolol succinate ER (TOPROL-XL) 25 MG*90 tab*0            Sig: TAKE ONE TABLET BY MOUTH ONCE DAILY    Medication is being filled for 1 time ascencion refill only due to:  Patient is due for physical.     Please call and help schedule.  Thank you!    GABINO RoweN, RN, PHN  [place]  December 16, 2021

## 2022-01-08 ENCOUNTER — HEALTH MAINTENANCE LETTER (OUTPATIENT)
Age: 47
End: 2022-01-08

## 2022-01-21 ENCOUNTER — TELEPHONE (OUTPATIENT)
Dept: FAMILY MEDICINE | Facility: CLINIC | Age: 47
End: 2022-01-21
Payer: COMMERCIAL

## 2022-01-21 NOTE — TELEPHONE ENCOUNTER
Pt states that she made an appointment to follow up so she could get her medication but then she had to cancel because she had covid.    Pt is scheduled for a virtual visit on 1/27/22. Pt only has 2 pills left of cyclobenzaprine and losartan. Please refill to get pt through to appointment.     Tracie Rivero, GABINON, RN, PHN  Registered Nurse-Clinic Triage  [facility]  1/21/2022 at 4:16 PM

## 2022-01-25 NOTE — PROGRESS NOTES
"Lani is a 46 year old who is being evaluated via a billable telephone visit.      What phone number would you like to be contacted at? [phone]  How would you like to obtain your AVS? MyChart    Assessment & Plan     HTN, goal below 140/90  Stable, updating labs, meds are current.   PE advised.     Acquired hypothyroidism  Likely stable, needs TSH done. Meds current.   - TSH WITH FREE T4 REFLEX; Future    Screen for colon cancer  Pt. Prefers cologuard, ordered.     Rheumatoid arthritis with positive rheumatoid factor, involving unspecified site (H)  Continue current plan- labs are due.     Visit for screening mammogram  Ordered.   - MA SCREENING DIGITAL BILAT - Future  (s+30); Future      Infection due to 2019 novel coronavirus  Improved, but dry cough. Supressant ordered. Has inhaler at home, seems to be helping, needs refill. Call if not continuing to improve. Monitor for hemoptysis.     - albuterol (PROAIR HFA/PROVENTIL HFA/VENTOLIN HFA) 108 (90 Base) MCG/ACT inhaler; Inhale 2 puffs into the lungs every 6 hours  - benzonatate (TESSALON) 200 MG capsule; Take 1 capsule (200 mg) by mouth 3 times daily as needed for cough    Screening for lipoid disorders    - Lipid panel reflex to direct LDL Fasting; Future    Screening for diabetes mellitus    - Hemoglobin A1c; Future             BMI:   Estimated body mass index is 34.01 kg/m  as calculated from the following:    Height as of 5/10/21: 1.6 m (5' 3\").    Weight as of 5/10/21: 87.1 kg (192 lb).   Weight management plan: Discussed healthy diet and exercise guidelines    MEDICATIONS:  Continue other medications without change    Return in about 3 months (around 4/27/2022) for Physical Exam.    RAÚL Flor CNP  [facility] [place] is a 46 year old who presents for the following health issues     HPI     Hypertension Follow-up      Do you check your blood pressure regularly outside of the clinic? Yes     Are you following a " low salt diet? Yes    Are your blood pressures ever more than 140 on the top number (systolic) OR more   than 90 on the bottom number (diastolic), for example 140/90? No  138/88    Weight is up.     Migraine     Since your last clinic visit, how have your headaches changed?  Improved    How often are you getting headaches or migraines? About 2 times a month      Are you able to do normal daily activities when you have a migraine? No    Are you taking rescue/relief medications? (Select all that apply) No    How helpful is your rescue/relief medication?  I get no relief    Are you taking any medications to prevent migraines? (Select all that apply)  No    In the past 4 weeks, how often have you gone to urgent care or the emergency room because of your headaches?  0    Hypothyroidism Follow-up      Since last visit, patient describes the following symptoms: Weight stable, no hair loss, no skin changes, no constipation, no loose stools    Day 11 of Covid . HX of hemoptysis- no current symptoms.     Dry hacky cough. Is back to work . sats 95% on home oximeter.   No significant TEJEDA.           Review of Systems   Constitutional, HEENT, cardiovascular, pulmonary, gi and gu systems are negative, except as otherwise noted.      Objective           Vitals:  No vitals were obtained today due to virtual visit.    Physical Exam   healthy, alert and no distress  PSYCH: Alert and oriented times 3; coherent speech, normal   rate and volume, able to articulate logical thoughts, able   to abstract reason, no tangential thoughts, no hallucinations   or delusions  Her affect is normal  RESP: No cough, no audible wheezing, able to talk in full sentences  Remainder of exam unable to be completed due to telephone visits    Office Visit on 05/10/2021   Component Date Value Ref Range Status     Potassium 05/10/2021 3.4  3.4 - 5.3 mmol/L Final               Phone call duration: 8 minutes

## 2022-01-27 ENCOUNTER — VIRTUAL VISIT (OUTPATIENT)
Dept: FAMILY MEDICINE | Facility: CLINIC | Age: 47
End: 2022-01-27
Payer: COMMERCIAL

## 2022-01-27 DIAGNOSIS — Z12.11 SCREEN FOR COLON CANCER: ICD-10-CM

## 2022-01-27 DIAGNOSIS — U07.1 INFECTION DUE TO 2019 NOVEL CORONAVIRUS: ICD-10-CM

## 2022-01-27 DIAGNOSIS — Z13.1 SCREENING FOR DIABETES MELLITUS: ICD-10-CM

## 2022-01-27 DIAGNOSIS — Z12.31 VISIT FOR SCREENING MAMMOGRAM: ICD-10-CM

## 2022-01-27 DIAGNOSIS — I10 HTN, GOAL BELOW 140/90: Primary | ICD-10-CM

## 2022-01-27 DIAGNOSIS — M05.9 RHEUMATOID ARTHRITIS WITH POSITIVE RHEUMATOID FACTOR, INVOLVING UNSPECIFIED SITE (H): ICD-10-CM

## 2022-01-27 DIAGNOSIS — E03.9 ACQUIRED HYPOTHYROIDISM: ICD-10-CM

## 2022-01-27 DIAGNOSIS — Z13.220 SCREENING FOR LIPOID DISORDERS: ICD-10-CM

## 2022-01-27 PROCEDURE — 99214 OFFICE O/P EST MOD 30 MIN: CPT | Mod: 95 | Performed by: NURSE PRACTITIONER

## 2022-01-27 RX ORDER — ALBUTEROL SULFATE 90 UG/1
2 AEROSOL, METERED RESPIRATORY (INHALATION) EVERY 6 HOURS
Qty: 18 G | Refills: 0 | Status: SHIPPED | OUTPATIENT
Start: 2022-01-27 | End: 2022-09-12

## 2022-01-27 RX ORDER — BENZONATATE 200 MG/1
200 CAPSULE ORAL 3 TIMES DAILY PRN
Qty: 30 CAPSULE | Refills: 0 | Status: SHIPPED | OUTPATIENT
Start: 2022-01-27 | End: 2022-09-12

## 2022-02-16 NOTE — PROGRESS NOTES
"  Assessment & Plan     Concussion without loss of consciousness, subsequent encounter  Improving, is back to work. Has intermittent symptoms.   Letter given. Dicussed cares, intermittent symptoms and PT if not continuing to improve or concussion referral.       Screen for colon cancer  Updating, prefers stool testing. Risks discussed.   - COLOGUARD(EXACT SCIENCES)    Need for prophylactic vaccination and inoculation against influenza  Updating today.   - HC FLU VAC PRESRV FREE QUAD SPLIT VIR > 6 MONTHS IM ([id number])    Fibromyalgia  Updating utox.   - Drug Confirmation Panel Urine with Creatinine; Future  - Drug Confirmation Panel Urine with Creatinine             MEDICATIONS:  Continue current medications without change    Return in about 2 weeks (around 3/7/2022) for Physical Exam.    RAÚL Flor CNP  [facility] [place] Garibay is a 46 year old who presents for the following health issues .    [facility]     ED/UC Followup:    Facility:  [facility]  Date of visit: 2/10/22- DOI  Reason for visit: Fall, Headache, dizziness  Hit back of head, slipped on driveway at home.   Current Status: ok. Still getting headaches lingering. Body feels better.neck muscles are still sore but better.   Intermittent HA- \"comes out of no where.\"rates HA- 3-4/10, behind left eye and occiput.   Gets right away in AM or after work.   Improving, but intermittent symptoms at times.   Is at work.   Needs a note in case needing to leave due to HA's.        Concussion  CONCUSSION SYMPTOMS ASSESSMENT 2/21/2022   Headache or Pressure In Head 3 - moderate   Upset Stomach or Throwing Up 0 - none   Problems with Balance 0 - none   Feeling Dizzy 1 - mild   Sensitivity to Light 3 - moderate   Sensitivity to Noise 0 - none   Mood Changes 0 - none   Feeling sluggish, hazy, or foggy 1 - mild   Trouble Concentrating, Lack of Focus 0 - none   Motion Sickness 0 - none   Vision Changes 0 - none "   Memory Problems 1 - mild   Feeling Confused 0 - none   Neck Pain 3 - moderate   Trouble Sleeping 3 - moderate   Total Number of Symptoms 7   Symptom Severity Score 15        Needs medication refills.   Mood stable.   Due for PE.           Review of Systems   Constitutional, HEENT, cardiovascular, pulmonary, gi and gu systems are negative, except as otherwise noted.      Objective    /82   Pulse 90   Temp 98.4  F (36.9  C) (Temporal)   Resp 14   Wt 89 kg (196 lb 3.2 oz)   SpO2 99%   BMI 34.76 kg/m    Body mass index is 34.76 kg/m .  Physical Exam   GENERAL: healthy, alert and no distress  EYES: Eyes grossly normal to inspection, PERRL and conjunctivae and sclerae normal  HENT: ear canals and TM's normal, nose and mouth without ulcers or lesions  NECK: no adenopathy, no asymmetry, masses, or scars and thyroid normal to palpation  RESP: lungs clear to auscultation - no rales, rhonchi or wheezes  CV: regular rate and rhythm, normal S1 S2, no S3 or S4, no murmur, click or rub, no peripheral edema and peripheral pulses strong  MS: no gross musculoskeletal defects noted, no edema  SKIN: no suspicious lesions or rashes  NEURO: Normal strength and tone, sensory exam grossly normal, mentation intact and cranial nerves 2-12 intact  PSYCH: mentation appears normal, affect normal/bright    Results for orders placed or performed in visit on 02/21/22   Urine Creatinine for Drug Screen Panel     Status: None   Result Value Ref Range    Creatinine Urine for Drug Screen 38 mg/dL   Drug Confirmation Panel Urine with Creatinine     Status: None (In process)    Narrative    The following orders were created for panel order Drug Confirmation Panel Urine with Creatinine.  Procedure                               Abnormality         Status                     ---------                               -----------         ------                     Urine Drug Confirmation ...[[id number]]                      In process                  Urine Creatinine for Herb...[[id number]]                      Final result                 Please view results for these tests on the individual orders.

## 2022-02-21 ENCOUNTER — OFFICE VISIT (OUTPATIENT)
Dept: FAMILY MEDICINE | Facility: CLINIC | Age: 47
End: 2022-02-21
Payer: COMMERCIAL

## 2022-02-21 VITALS
DIASTOLIC BLOOD PRESSURE: 82 MMHG | HEART RATE: 90 BPM | BODY MASS INDEX: 34.76 KG/M2 | TEMPERATURE: 98.4 F | OXYGEN SATURATION: 99 % | RESPIRATION RATE: 14 BRPM | SYSTOLIC BLOOD PRESSURE: 122 MMHG | WEIGHT: 196.2 LBS

## 2022-02-21 DIAGNOSIS — M79.7 FIBROMYALGIA: ICD-10-CM

## 2022-02-21 DIAGNOSIS — Z23 NEED FOR PROPHYLACTIC VACCINATION AND INOCULATION AGAINST INFLUENZA: ICD-10-CM

## 2022-02-21 DIAGNOSIS — S06.0X0D CONCUSSION WITHOUT LOSS OF CONSCIOUSNESS, SUBSEQUENT ENCOUNTER: Primary | ICD-10-CM

## 2022-02-21 DIAGNOSIS — Z12.11 SCREEN FOR COLON CANCER: ICD-10-CM

## 2022-02-21 LAB — CREAT UR-MCNC: 38 MG/DL

## 2022-02-21 PROCEDURE — 80307 DRUG TEST PRSMV CHEM ANLYZR: CPT | Performed by: NURSE PRACTITIONER

## 2022-02-21 PROCEDURE — 90471 IMMUNIZATION ADMIN: CPT | Performed by: NURSE PRACTITIONER

## 2022-02-21 PROCEDURE — 99214 OFFICE O/P EST MOD 30 MIN: CPT | Mod: 25 | Performed by: NURSE PRACTITIONER

## 2022-02-21 PROCEDURE — 90686 IIV4 VACC NO PRSV 0.5 ML IM: CPT | Performed by: NURSE PRACTITIONER

## 2022-02-21 RX ORDER — METHOCARBAMOL 500 MG/1
TABLET, FILM COATED ORAL
COMMUNITY
Start: 2022-02-11 | End: 2022-02-23

## 2022-02-21 NOTE — LETTER
[facility]  [address], [address]  [place] [address]  Phone: [phone]  Fax: [phone]    February 21, 2022      To Whom It May Concern:    Elizabeth Goodson, 1975, is under my care for a concussion that occurred on 2/10/2022.      The following work accommodations may help in reducing the cognitive load, thereby minimizing post-concussion symptoms.   If symptoms persist, more formal accommodations may be necessary.    Current attendance restrictions: Full days as tolerated.    Please consider the following upon return to school:    1)  Allow 10 minute breaks up to 1/hour.   2)  Allow for early dismissal once/week.   3)  Absence: allow 1/week.    Above guidelines are allowed for one month.     Full or partial days missed due to post-concussion symptoms should be medically excused.    Follow up evaluation and revision of recommendations to occur as needed.        Sincerely,       Keri Yu DNP

## 2022-02-24 NOTE — RESULT ENCOUNTER NOTE
Lani,  I have reviewed your labs, and they are all normal. If you have questions, please notify me through MyChart or a telephone call.   Keri Yu, DNP

## 2022-02-27 DIAGNOSIS — I10 HTN, GOAL BELOW 140/90: ICD-10-CM

## 2022-03-01 RX ORDER — METOPROLOL SUCCINATE 25 MG/1
TABLET, EXTENDED RELEASE ORAL
Qty: 90 TABLET | Refills: 0 | Status: SHIPPED | OUTPATIENT
Start: 2022-03-01 | End: 2022-06-27

## 2022-03-15 ENCOUNTER — TELEPHONE (OUTPATIENT)
Dept: FAMILY MEDICINE | Facility: CLINIC | Age: 47
End: 2022-03-15

## 2022-03-15 ENCOUNTER — DOCUMENTATION ONLY (OUTPATIENT)
Dept: LAB | Facility: CLINIC | Age: 47
End: 2022-03-15

## 2022-03-15 ENCOUNTER — LAB (OUTPATIENT)
Dept: LAB | Facility: CLINIC | Age: 47
End: 2022-03-15
Payer: COMMERCIAL

## 2022-03-15 DIAGNOSIS — I10 HTN, GOAL BELOW 140/90: Primary | ICD-10-CM

## 2022-03-15 DIAGNOSIS — E03.9 ACQUIRED HYPOTHYROIDISM: ICD-10-CM

## 2022-03-15 DIAGNOSIS — Z13.220 SCREENING FOR LIPOID DISORDERS: ICD-10-CM

## 2022-03-15 DIAGNOSIS — Z13.1 SCREENING FOR DIABETES MELLITUS: ICD-10-CM

## 2022-03-15 LAB
ANION GAP SERPL CALCULATED.3IONS-SCNC: 6 MMOL/L (ref 3–14)
BASOPHILS # BLD AUTO: 0 10E3/UL (ref 0–0.2)
BASOPHILS NFR BLD AUTO: 0 %
BUN SERPL-MCNC: 11 MG/DL (ref 7–30)
CALCIUM SERPL-MCNC: 9.6 MG/DL (ref 8.5–10.1)
CHLORIDE BLD-SCNC: 103 MMOL/L (ref 94–109)
CHOLEST SERPL-MCNC: 161 MG/DL
CO2 SERPL-SCNC: 30 MMOL/L (ref 20–32)
CREAT SERPL-MCNC: 0.61 MG/DL (ref 0.52–1.04)
EOSINOPHIL # BLD AUTO: 0.1 10E3/UL (ref 0–0.7)
EOSINOPHIL NFR BLD AUTO: 1 %
ERYTHROCYTE [DISTWIDTH] IN BLOOD BY AUTOMATED COUNT: 14.8 % (ref 10–15)
FASTING STATUS PATIENT QL REPORTED: YES
GFR SERPL CREATININE-BSD FRML MDRD: >90 ML/MIN/1.73M2
GLUCOSE BLD-MCNC: 122 MG/DL (ref 70–99)
HBA1C MFR BLD: 6.2 % (ref 0–5.6)
HCT VFR BLD AUTO: 41.6 % (ref 35–47)
HDLC SERPL-MCNC: 33 MG/DL
HGB BLD-MCNC: 13.2 G/DL (ref 11.7–15.7)
LDLC SERPL CALC-MCNC: 92 MG/DL
LYMPHOCYTES # BLD AUTO: 1.9 10E3/UL (ref 0.8–5.3)
LYMPHOCYTES NFR BLD AUTO: 22 %
MCH RBC QN AUTO: 28.8 PG (ref 26.5–33)
MCHC RBC AUTO-ENTMCNC: 31.7 G/DL (ref 31.5–36.5)
MCV RBC AUTO: 91 FL (ref 78–100)
MONOCYTES # BLD AUTO: 0.8 10E3/UL (ref 0–1.3)
MONOCYTES NFR BLD AUTO: 9 %
NEUTROPHILS # BLD AUTO: 5.9 10E3/UL (ref 1.6–8.3)
NEUTROPHILS NFR BLD AUTO: 68 %
NONHDLC SERPL-MCNC: 128 MG/DL
PLATELET # BLD AUTO: 390 10E3/UL (ref 150–450)
POTASSIUM BLD-SCNC: 3.6 MMOL/L (ref 3.4–5.3)
RBC # BLD AUTO: 4.58 10E6/UL (ref 3.8–5.2)
SODIUM SERPL-SCNC: 139 MMOL/L (ref 133–144)
TRIGL SERPL-MCNC: 182 MG/DL
TSH SERPL DL<=0.005 MIU/L-ACNC: 2 MU/L (ref 0.4–4)
WBC # BLD AUTO: 8.7 10E3/UL (ref 4–11)

## 2022-03-15 PROCEDURE — 80048 BASIC METABOLIC PNL TOTAL CA: CPT

## 2022-03-15 PROCEDURE — 85025 COMPLETE CBC W/AUTO DIFF WBC: CPT

## 2022-03-15 PROCEDURE — 80061 LIPID PANEL: CPT

## 2022-03-15 PROCEDURE — 36415 COLL VENOUS BLD VENIPUNCTURE: CPT

## 2022-03-15 PROCEDURE — 84443 ASSAY THYROID STIM HORMONE: CPT

## 2022-03-15 PROCEDURE — 83036 HEMOGLOBIN GLYCOSYLATED A1C: CPT

## 2022-03-15 NOTE — PROGRESS NOTES
Elizabeth KERVIN Goodson has an upcoming lab appointment:    Future Appointments   Date Time Provider Department Center   3/30/2022  3:30 PM Keri Yu, APRN CNP [facility] [place]     Patient is scheduled for the following lab(s): There is a BMP pending in the patient's chart. Per HMPO she is due for this. Patient had a lab appointment this morning and blood was drawn. If the BMP is needed, please place an add on order.     There is no order available. Please review and place either future orders or HMPO (Review of Health Maintenance Protocol Orders), as appropriate.    Health Maintenance Due   Topic     BMP          Nilda Willis

## 2022-03-15 NOTE — TELEPHONE ENCOUNTER
Patient calling to discuss what she can eat/not eat since her glucose came back pre-diabetic.     Discussed the following information.     Diet: Diabetes  Food is an important tool that you can use to control diabetes and stay healthy. Eating well-balanced meals in the correct amounts will help you control your blood glucose levels and prevent low blood sugar reactions. It will also help you reduce the health risks of diabetes. There is no one specific diet that is right for everyone with diabetes. But there are general guidelines to follow. A registered dietitian (LIZZETH) will create a tailored diet approach that s just right for you. He or she will also help you plan healthy meals and snacks. If you have any questions, call your dietitian for advice.  Guidelines for success  Talk with your healthcare provider before starting a diabetes diet or weight loss program. If you haven't talked with a dietitian yet, ask your provider for a referral. The following guidelines can help you succeed:    Select foods from the 6 food groups below. Your dietitian will help you find food choices within each group. He or she will also show you serving sizes and how many servings you can have at each meal.  ? Grains, beans, and starchy vegetables  ? Vegetables  ? Fruit  ? Milk or yogurt  ? Meat, poultry, fish, or tofu  ? Healthy fats    Check your blood sugar levels as directed by your provider. Take any medicine as prescribed by your provider.    Learn to read food labels and pick the right portion sizes.    Limit carbohydrates at each meal to help manage your diabetes. The carbohydrates you eat become glucose in the blood. Talk with your healthcare provider about how many grams of carbohydrates are recommended for you at each meal. Eat 3 meals a day, at consistent times. Don't skip meals. If you are hungry between meals, eat a small, low-carbohydrate snack.    Talk with your healthcare provider if you drink alcohol. Alcohol can have  unpredictable effects on blood glucose. It's also high in empty calories and can raise a type of blood fat called triglycerides. Drink water or calorie-free diet drinks instead.    Eat less fat to help lower your risk of heart disease. Use nonfat or low-fat dairy products and lean meats. Avoid fried foods. Use cooking oils that are unsaturated, such as olive, canola, or peanut oil.    Don't eat foods with added salt. Salt can contribute to high blood pressure, which can cause heart disease. People with diabetes already have a risk for high blood pressure and heart disease.    Stay at a healthy weight. If you need to lose weight, cut down on your portion sizes. But don t skip meals. Exercise is an important part of any weight management program. Talk with your provider about an exercise program that s right for you.    For more information about the best diet plan for you, talk with an RD. To find an RD in your area, contact:  ? Academy of Nutrition and Dietetics www.eatright.org  ? American Diabetes Association [phone] www.diabetes.org

## 2022-03-15 NOTE — RESULT ENCOUNTER NOTE
Your labs are showing pre-diabetes. This is a level of higher blood sugars before your body develops full diabetes.   At this time I really encourage more formal exercise and weight loss with healthy nutrition to avoid developing diabetes. Re-check this lab again in 6-12 months.   More labs processing. RAÚL Flor CNP

## 2022-04-22 ENCOUNTER — E-VISIT (OUTPATIENT)
Dept: URGENT CARE | Facility: CLINIC | Age: 47
End: 2022-04-22
Payer: COMMERCIAL

## 2022-04-22 DIAGNOSIS — J01.90 ACUTE SINUSITIS WITH SYMPTOMS > 10 DAYS: Primary | ICD-10-CM

## 2022-04-22 PROCEDURE — 99421 OL DIG E/M SVC 5-10 MIN: CPT | Performed by: PHYSICIAN ASSISTANT

## 2022-04-22 RX ORDER — DOXYCYCLINE HYCLATE 100 MG
100 TABLET ORAL 2 TIMES DAILY
Qty: 14 TABLET | Refills: 0 | Status: SHIPPED | OUTPATIENT
Start: 2022-04-22 | End: 2022-10-05

## 2022-04-22 NOTE — PATIENT INSTRUCTIONS
Dear Elizabeth Goodson    After reviewing your responses, I've been able to diagnose you with?a sinus infection caused by bacteria.?     Based on your responses and diagnosis, I have prescribed Doxycycline to treat your symptoms(z pack does not have good coverage for the bacteria that cause sinus infections). I have sent this to your pharmacy.?     It is also important to stay well hydrated, get lots of rest and take over-the-counter decongestants,?tylenol?or ibuprofen if you?are able to?take those medications per your primary care provider to help relieve discomfort.?     It is important that you take?all of?your prescribed medication even if your symptoms are improving after a few doses.? Taking?all of?your medicine helps prevent the symptoms from returning.?     If your symptoms worsen, you develop severe headache, vomiting, high fever (>102), or are not improving in 7 days, please contact your primary care provider for an appointment or visit any of our convenient Walk-in Care or Urgent Care Centers to be seen which can be found on our website?here.?     Thanks again for choosing?us?as your health care partner,?   ?  Nicole Oshea PA-C?

## 2022-04-29 DIAGNOSIS — I10 ESSENTIAL HYPERTENSION: ICD-10-CM

## 2022-04-29 DIAGNOSIS — E03.4 HYPOTHYROIDISM DUE TO ACQUIRED ATROPHY OF THYROID: ICD-10-CM

## 2022-04-29 DIAGNOSIS — K44.9 HIATAL HERNIA: ICD-10-CM

## 2022-04-29 DIAGNOSIS — E87.6 HYPOKALEMIA: ICD-10-CM

## 2022-04-29 DIAGNOSIS — I10 ESSENTIAL HYPERTENSION WITH GOAL BLOOD PRESSURE LESS THAN 140/90: ICD-10-CM

## 2022-04-29 DIAGNOSIS — K21.9 GASTROESOPHAGEAL REFLUX DISEASE WITHOUT ESOPHAGITIS: ICD-10-CM

## 2022-04-29 DIAGNOSIS — R04.2 COUGH WITH HEMOPTYSIS: ICD-10-CM

## 2022-04-29 DIAGNOSIS — M79.7 FIBROMYALGIA: ICD-10-CM

## 2022-04-30 ENCOUNTER — HEALTH MAINTENANCE LETTER (OUTPATIENT)
Age: 47
End: 2022-04-30

## 2022-05-03 RX ORDER — CYCLOBENZAPRINE HCL 10 MG
TABLET ORAL
Qty: 90 TABLET | Refills: 0 | Status: SHIPPED | OUTPATIENT
Start: 2022-05-03 | End: 2022-06-25

## 2022-05-03 RX ORDER — DILTIAZEM HYDROCHLORIDE 240 MG/1
CAPSULE, COATED, EXTENDED RELEASE ORAL
Qty: 90 CAPSULE | Refills: 3 | Status: SHIPPED | OUTPATIENT
Start: 2022-05-03 | End: 2022-09-12

## 2022-05-03 RX ORDER — POTASSIUM CHLORIDE 1500 MG/1
TABLET, EXTENDED RELEASE ORAL
Qty: 90 TABLET | Refills: 1 | Status: SHIPPED | OUTPATIENT
Start: 2022-05-03 | End: 2022-09-12

## 2022-05-03 RX ORDER — OMEPRAZOLE 40 MG/1
CAPSULE, DELAYED RELEASE ORAL
Qty: 90 CAPSULE | Refills: 0 | Status: SHIPPED | OUTPATIENT
Start: 2022-05-03 | End: 2022-07-25

## 2022-05-03 RX ORDER — LOSARTAN POTASSIUM 50 MG/1
TABLET ORAL
Qty: 90 TABLET | Refills: 0 | Status: SHIPPED | OUTPATIENT
Start: 2022-05-03 | End: 2022-06-25

## 2022-05-03 RX ORDER — LEVOTHYROXINE SODIUM 112 UG/1
TABLET ORAL
Qty: 90 TABLET | Refills: 0 | Status: SHIPPED | OUTPATIENT
Start: 2022-05-03 | End: 2022-06-25

## 2022-05-03 NOTE — TELEPHONE ENCOUNTER
Pending Prescriptions:                       Disp   Refills    cyclobenzaprine (FLEXERIL) 10 MG tablet [P*90 tab*0        Sig: TAKE ONE TABLET BY MOUTH AT BEDTIME    potassium chloride ER (KLOR-CON M) 20 MEQ *90 tab*1        Sig: TAKE ONE TABLET BY MOUTH ONCE DAILY    diltiazem ER COATED BEADS (CARDIZEM CD/CAR*90 cap*3        Sig: TAKE ONE CAPSULE BY MOUTH ONCE DAILY    Signed Prescriptions:                        Disp   Refills    omeprazole (PRILOSEC) 40 MG DR capsule     90 cap*0        Sig: TAKE ONE CAPSULE BY MOUTH ONCE DAILY 30-60MINS BEFORE           A MEAL  Authorizing Provider: CAROLYN MAGANA  Ordering User: LORAINE DU    losartan (COZAAR) 50 MG tablet             90 tab*0        Sig: TAKE ONE TABLET BY MOUTH ONCE DAILY  Authorizing Provider: CAROLYN MAGANA  Ordering User: LORAINE DU    levothyroxine (SYNTHROID/LEVOTHROID) 112 M*90 tab*0        Sig: TAKE ONE TABLET BY MOUTH ONCE DAILY  Authorizing Provider: CAROLYN MAGANA  Ordering User: LORAINE DU    Routing refill request to provider for review/approval because:  Drug not on the [facility] refill protocol   A break in medication  See below    Next 5 appointments (look out 90 days)      May 23, 2022  4:00 PM  (Arrive by 3:40 PM)  Adult Preventative Visit with RAÚL Pat CNP  [facility] ([facility] ) [address] [place] [address], [address]  [facility] [id number] [phone]              Requested Prescriptions   Pending Prescriptions Disp Refills    cyclobenzaprine (FLEXERIL) 10 MG tablet [Pharmacy Med Name: CYCLOBENZAPRINE HCL 10MG TABS] 90 tablet 0     Sig: TAKE ONE TABLET BY MOUTH AT BEDTIME        There is no refill protocol information for this order        potassium chloride ER (KLOR-CON M) 20 MEQ CR tablet [Pharmacy Med Name: POTASSIUM CHLORIDE VÍCTOR ER 20 TBCR] 90 tablet 1     Sig: TAKE ONE TABLET BY MOUTH ONCE DAILY        Potassium Supplements Protocol Passed - 4/29/2022  1:41 PM         "Passed - Recent (12 mo) or future (30 days) visit within the authorizing provider's department     Patient has had an office visit with the authorizing provider or a provider within the authorizing providers department within the previous 12 mos or has a future within next 30 days. See \"Patient Info\" tab in inbasket, or \"Choose Columns\" in Meds & Orders section of the refill encounter.              Passed - Medication is active on med list        Passed - Patient is age 18 or older        Passed - Normal serum potassium in past 12 months       Recent Labs   Lab Test 03/15/22  0709   POTASSIUM 3.6                       diltiazem ER COATED BEADS (CARDIZEM CD/CARTIA XT) 240 MG 24 hr capsule [Pharmacy Med Name: DILTIAZEM HCL ER COATED  CP24] 90 capsule 3     Sig: TAKE ONE CAPSULE BY MOUTH ONCE DAILY        Calcium Channel Blockers Protocol  Failed - 4/29/2022  1:41 PM        Failed - Normal ALT in past 12 months     Recent Labs   Lab Test 12/07/20  1621   ALT 24               Passed - Blood pressure under 140/90 in past 12 months       BP Readings from Last 3 Encounters:   02/21/22 122/82   05/10/21 128/84   03/17/21 136/89                 Passed - Recent (12 mo) or future (30 days) visit within the authorizing provider's specialty     Patient has had an office visit with the authorizing provider or a provider within the authorizing providers department within the previous 12 mos or has a future within next 30 days. See \"Patient Info\" tab in inbasket, or \"Choose Columns\" in Meds & Orders section of the refill encounter.              Passed - Medication is active on med list        Passed - Patient is age 18 or older        Passed - No active pregnancy on record        Passed - Normal serum creatinine on file in past 12 months     Recent Labs   Lab Test 03/15/22  0709   CR 0.61       Ok to refill medication if creatinine is low          Passed - No positive pregnancy test in past 12 months          Signed " "Prescriptions Disp Refills    omeprazole (PRILOSEC) 40 MG DR capsule 90 capsule 0     Sig: TAKE ONE CAPSULE BY MOUTH ONCE DAILY 30-60MINS BEFORE A MEAL        PPI Protocol Passed - 4/29/2022  1:41 PM        Passed - Not on Clopidogrel (unless Pantoprazole ordered)        Passed - No diagnosis of osteoporosis on record        Passed - Recent (12 mo) or future (30 days) visit within the authorizing provider's specialty     Patient has had an office visit with the authorizing provider or a provider within the authorizing providers department within the previous 12 mos or has a future within next 30 days. See \"Patient Info\" tab in inbasket, or \"Choose Columns\" in Meds & Orders section of the refill encounter.              Passed - Medication is active on med list        Passed - Patient is age 18 or older        Passed - No active pregnacy on record        Passed - No positive pregnancy test in past 12 months           losartan (COZAAR) 50 MG tablet 90 tablet 0     Sig: TAKE ONE TABLET BY MOUTH ONCE DAILY        Angiotensin-II Receptors Passed - 4/29/2022  1:41 PM        Passed - Last blood pressure under 140/90 in past 12 months       BP Readings from Last 3 Encounters:   02/21/22 122/82   05/10/21 128/84   03/17/21 136/89                 Passed - Recent (12 mo) or future (30 days) visit within the authorizing provider's specialty     Patient has had an office visit with the authorizing provider or a provider within the authorizing providers department within the previous 12 mos or has a future within next 30 days. See \"Patient Info\" tab in inbasket, or \"Choose Columns\" in Meds & Orders section of the refill encounter.              Passed - Medication is active on med list        Passed - Patient is age 18 or older        Passed - No active pregnancy on record        Passed - Normal serum creatinine on file in past 12 months     Recent Labs   Lab Test 03/15/22  0709   CR 0.61       Ok to refill medication if creatinine " "is low          Passed - Normal serum potassium on file in past 12 months       Recent Labs   Lab Test 03/15/22  0709   POTASSIUM 3.6                    Passed - No positive pregnancy test in past 12 months           levothyroxine (SYNTHROID/LEVOTHROID) 112 MCG tablet 90 tablet 0     Sig: TAKE ONE TABLET BY MOUTH ONCE DAILY        Thyroid Protocol Passed - 4/29/2022  1:41 PM        Passed - Patient is 12 years or older        Passed - Recent (12 mo) or future (30 days) visit within the authorizing provider's specialty     Patient has had an office visit with the authorizing provider or a provider within the authorizing providers department within the previous 12 mos or has a future within next 30 days. See \"Patient Info\" tab in inbasket, or \"Choose Columns\" in Meds & Orders section of the refill encounter.              Passed - Medication is active on med list        Passed - Normal TSH on file in past 12 months     Recent Labs   Lab Test 03/15/22  0709   TSH 2.00                Passed - No active pregnancy on record     If patient is pregnant or has had a positive pregnancy test, please check TSH.          Passed - No positive pregnancy test in past 12 months     If patient is pregnant or has had a positive pregnancy test, please check TSH.                      "

## 2022-06-06 ENCOUNTER — TELEPHONE (OUTPATIENT)
Dept: FAMILY MEDICINE | Facility: CLINIC | Age: 47
End: 2022-06-06
Payer: COMMERCIAL

## 2022-06-06 NOTE — TELEPHONE ENCOUNTER
Routing to PA Pool please start a PRIOR AUTHORIZATION for omeprazole (PRILOSEC) 40 MG DR capsule    Thanks  Lani García RT (R)

## 2022-06-09 NOTE — TELEPHONE ENCOUNTER
Prior Authorization Approval    Authorization Effective Date: 3/9/2022  Authorization Expiration Date: 6/9/2023  Medication: omeprazole (PRILOSEC) 40 MG DR capsule  Approved Dose/Quantity:    Reference #:     Insurance Company: [employer] [place] - Phone [phone] Fax [phone]  Expected CoPay:       CoPay Card Available:      Foundation Assistance Needed:    Which Pharmacy is filling the prescription (Not needed for infusion/clinic administered): [id number] - [place], [place] - [address]  Pharmacy Notified: Yes  Patient Notified: Yes  **Instructed pharmacy to notify patient when script is ready to /ship.**

## 2022-06-09 NOTE — TELEPHONE ENCOUNTER
Central Prior Authorization Team   Phone: [phone]    PA Initiation    Medication: omeprazole (PRILOSEC) 40 MG DR capsule  Insurance Company: [facility] - Phone [phone] Fax [phone]  Pharmacy Filling the Rx: [id number] - [place], [place] - [address]  Filling Pharmacy Phone: [phone]  Filling Pharmacy Fax:    Start Date: 6/9/2022

## 2022-06-25 ENCOUNTER — MYC REFILL (OUTPATIENT)
Dept: FAMILY MEDICINE | Facility: CLINIC | Age: 47
End: 2022-06-25

## 2022-06-25 DIAGNOSIS — E03.4 HYPOTHYROIDISM DUE TO ACQUIRED ATROPHY OF THYROID: ICD-10-CM

## 2022-06-25 DIAGNOSIS — I10 ESSENTIAL HYPERTENSION WITH GOAL BLOOD PRESSURE LESS THAN 140/90: ICD-10-CM

## 2022-06-25 DIAGNOSIS — M79.7 FIBROMYALGIA: ICD-10-CM

## 2022-06-25 DIAGNOSIS — I10 HTN, GOAL BELOW 140/90: ICD-10-CM

## 2022-06-25 DIAGNOSIS — I10 ESSENTIAL HYPERTENSION: ICD-10-CM

## 2022-06-25 NOTE — LETTER
June 30, 2022      Elizabeth Goodson  [address] [place] [id number]                  Jordan Garibay,     We just wanted to let you know that we sent in a refill for your metoprolol and hydrochlorothiazide but you are due for a physical and med visit before your next refill is due.      Please give us a call at [phone] or use [employer] to schedule.      Have a great day.     Your [facility] Team

## 2022-06-27 ENCOUNTER — MYC REFILL (OUTPATIENT)
Dept: FAMILY MEDICINE | Facility: CLINIC | Age: 47
End: 2022-06-27

## 2022-06-27 DIAGNOSIS — I10 ESSENTIAL HYPERTENSION: ICD-10-CM

## 2022-06-27 RX ORDER — METOPROLOL SUCCINATE 25 MG/1
25 TABLET, EXTENDED RELEASE ORAL DAILY
Qty: 90 TABLET | Refills: 0 | OUTPATIENT
Start: 2022-06-27

## 2022-06-27 RX ORDER — HYDROCHLOROTHIAZIDE 25 MG/1
TABLET ORAL
Qty: 90 TABLET | Refills: 0 | Status: SHIPPED | OUTPATIENT
Start: 2022-06-27 | End: 2022-09-12

## 2022-06-27 RX ORDER — LEVOTHYROXINE SODIUM 112 UG/1
112 TABLET ORAL DAILY
Qty: 90 TABLET | Refills: 0 | Status: SHIPPED | OUTPATIENT
Start: 2022-06-27 | End: 2022-09-12

## 2022-06-27 RX ORDER — CYCLOBENZAPRINE HCL 10 MG
10 TABLET ORAL 2 TIMES DAILY PRN
Qty: 90 TABLET | Refills: 0 | Status: SHIPPED | OUTPATIENT
Start: 2022-06-27 | End: 2022-09-12

## 2022-06-27 RX ORDER — METOPROLOL SUCCINATE 25 MG/1
TABLET, EXTENDED RELEASE ORAL
Qty: 90 TABLET | Refills: 0 | Status: SHIPPED | OUTPATIENT
Start: 2022-06-27 | End: 2022-09-12

## 2022-06-27 RX ORDER — LOSARTAN POTASSIUM 50 MG/1
50 TABLET ORAL DAILY
Qty: 90 TABLET | Refills: 0 | Status: SHIPPED | OUTPATIENT
Start: 2022-06-27 | End: 2022-09-12

## 2022-06-27 NOTE — TELEPHONE ENCOUNTER
Pending Prescriptions:                       Disp   Refills    metoprolol succinate ER (TOPROL XL) 25 MG*90 tab*0            Sig: TAKE ONE TABLET BY MOUTH ONCE DAILY    Signed Prescriptions:                        Disp   Refills    hydrochlorothiazide (HYDRODIURIL) 25 MG ta*90 tab*0        Sig: TAKE ONE TABLET BY MOUTH ONCE DAILY  Authorizing Provider: CAROLYN MAGANA  Ordering User: LORAINE DU    Medication is being filled for 1 time ascencion refill only due to:  Patient is due for physical and med check    Please call and help schedule.  Thank you!

## 2022-06-27 NOTE — TELEPHONE ENCOUNTER
"Requested Prescriptions   Pending Prescriptions Disp Refills     metoprolol succinate ER (TOPROL XL) 25 MG 24 hr tablet 90 tablet 0     Sig: Take 1 tablet (25 mg) by mouth daily       Beta-Blockers Protocol Passed - 6/25/2022  3:15 PM        Passed - Blood pressure under 140/90 in past 12 months     BP Readings from Last 3 Encounters:   02/21/22 122/82   05/10/21 128/84   03/17/21 136/89                 Passed - Patient is age 6 or older        Passed - Recent (12 mo) or future (30 days) visit within the authorizing provider's specialty     Patient has had an office visit with the authorizing provider or a provider within the authorizing providers department within the previous 12 mos or has a future within next 30 days. See \"Patient Info\" tab in inbasket, or \"Choose Columns\" in Meds & Orders section of the refill encounter.              Passed - Medication is active on med list           cyclobenzaprine (FLEXERIL) 10 MG tablet 90 tablet 0     Sig: Take 1 tablet (10 mg) by mouth       There is no refill protocol information for this order        losartan (COZAAR) 50 MG tablet 90 tablet 0     Sig: Take 1 tablet (50 mg) by mouth daily       Angiotensin-II Receptors Passed - 6/25/2022  3:15 PM        Passed - Last blood pressure under 140/90 in past 12 months     BP Readings from Last 3 Encounters:   02/21/22 122/82   05/10/21 128/84   03/17/21 136/89                 Passed - Recent (12 mo) or future (30 days) visit within the authorizing provider's specialty     Patient has had an office visit with the authorizing provider or a provider within the authorizing providers department within the previous 12 mos or has a future within next 30 days. See \"Patient Info\" tab in inbasket, or \"Choose Columns\" in Meds & Orders section of the refill encounter.              Passed - Medication is active on med list        Passed - Patient is age 18 or older        Passed - No active pregnancy on record        Passed - Normal serum " "creatinine on file in past 12 months     Recent Labs   Lab Test 03/15/22  0709   CR 0.61       Ok to refill medication if creatinine is low          Passed - Normal serum potassium on file in past 12 months     Recent Labs   Lab Test 03/15/22  0709   POTASSIUM 3.6                    Passed - No positive pregnancy test in past 12 months           levothyroxine (SYNTHROID/LEVOTHROID) 112 MCG tablet 90 tablet 0     Sig: Take 1 tablet (112 mcg) by mouth daily       Thyroid Protocol Passed - 6/25/2022  3:15 PM        Passed - Patient is 12 years or older        Passed - Recent (12 mo) or future (30 days) visit within the authorizing provider's specialty     Patient has had an office visit with the authorizing provider or a provider within the authorizing providers department within the previous 12 mos or has a future within next 30 days. See \"Patient Info\" tab in inbasket, or \"Choose Columns\" in Meds & Orders section of the refill encounter.              Passed - Medication is active on med list        Passed - Normal TSH on file in past 12 months     Recent Labs   Lab Test 03/15/22  0709   TSH 2.00              Passed - No active pregnancy on record     If patient is pregnant or has had a positive pregnancy test, please check TSH.          Passed - No positive pregnancy test in past 12 months     If patient is pregnant or has had a positive pregnancy test, please check TSH.               "

## 2022-06-27 NOTE — TELEPHONE ENCOUNTER
"Pending Prescriptions:                       Disp   Refills    cyclobenzaprine (FLEXERIL) 10 MG tablet    90 tab*0        Sig: Take 1 tablet (10 mg) by mouth    Signed Prescriptions:                        Disp   Refills    losartan (COZAAR) 50 MG tablet             90 tab*0        Sig: Take 1 tablet (50 mg) by mouth daily  Authorizing Provider: CAROLYN MAGANA  Ordering User: LORAINE DU    levothyroxine (SYNTHROID/LEVOTHROID) 112 M*90 tab*0        Sig: Take 1 tablet (112 mcg) by mouth daily  Authorizing Provider: CAROLYN MAGANA  Ordering User: LORAINE DU    Refused Prescriptions:                       Disp   Refills    metoprolol succinate ER (TOPROL XL) 25 MG *90 tab*0        Sig: Take 1 tablet (25 mg) by mouth daily  Refused By: LORAINE DU  Reason for Refusal: Duplicate    Routing refill request to provider for review/approval because:  Drug not on the [facility] refill protocol     Requested Prescriptions   Pending Prescriptions Disp Refills    cyclobenzaprine (FLEXERIL) 10 MG tablet 90 tablet 0     Sig: Take 1 tablet (10 mg) by mouth        There is no refill protocol information for this order       Signed Prescriptions Disp Refills    losartan (COZAAR) 50 MG tablet 90 tablet 0     Sig: Take 1 tablet (50 mg) by mouth daily        Angiotensin-II Receptors Passed - 6/27/2022  1:40 PM        Passed - Last blood pressure under 140/90 in past 12 months       BP Readings from Last 3 Encounters:   02/21/22 122/82   05/10/21 128/84   03/17/21 136/89                 Passed - Recent (12 mo) or future (30 days) visit within the authorizing provider's specialty     Patient has had an office visit with the authorizing provider or a provider within the authorizing providers department within the previous 12 mos or has a future within next 30 days. See \"Patient Info\" tab in inbasket, or \"Choose Columns\" in Meds & Orders section of the refill encounter.              Passed - Medication is active on med list    " "    Passed - Patient is age 18 or older        Passed - No active pregnancy on record        Passed - Normal serum creatinine on file in past 12 months     Recent Labs   Lab Test 03/15/22  0709   CR 0.61       Ok to refill medication if creatinine is low          Passed - Normal serum potassium on file in past 12 months       Recent Labs   Lab Test 03/15/22  0709   POTASSIUM 3.6                    Passed - No positive pregnancy test in past 12 months           levothyroxine (SYNTHROID/LEVOTHROID) 112 MCG tablet 90 tablet 0     Sig: Take 1 tablet (112 mcg) by mouth daily        Thyroid Protocol Passed - 6/27/2022  1:40 PM        Passed - Patient is 12 years or older        Passed - Recent (12 mo) or future (30 days) visit within the authorizing provider's specialty     Patient has had an office visit with the authorizing provider or a provider within the authorizing providers department within the previous 12 mos or has a future within next 30 days. See \"Patient Info\" tab in inbasket, or \"Choose Columns\" in Meds & Orders section of the refill encounter.              Passed - Medication is active on med list        Passed - Normal TSH on file in past 12 months     Recent Labs   Lab Test 03/15/22  0709   TSH 2.00                Passed - No active pregnancy on record     If patient is pregnant or has had a positive pregnancy test, please check TSH.          Passed - No positive pregnancy test in past 12 months     If patient is pregnant or has had a positive pregnancy test, please check TSH.            Refused Prescriptions Disp Refills    metoprolol succinate ER (TOPROL XL) 25 MG 24 hr tablet 90 tablet 0     Sig: Take 1 tablet (25 mg) by mouth daily        Beta-Blockers Protocol Passed - 6/27/2022  1:40 PM        Passed - Blood pressure under 140/90 in past 12 months       BP Readings from Last 3 Encounters:   02/21/22 122/82   05/10/21 128/84   03/17/21 136/89                 Passed - Patient is age 6 or older        " "Passed - Recent (12 mo) or future (30 days) visit within the authorizing provider's specialty     Patient has had an office visit with the authorizing provider or a provider within the authorizing providers department within the previous 12 mos or has a future within next 30 days. See \"Patient Info\" tab in inbasket, or \"Choose Columns\" in Meds & Orders section of the refill encounter.              Passed - Medication is active on med list                    "

## 2022-06-28 RX ORDER — HYDROCHLOROTHIAZIDE 25 MG/1
25 TABLET ORAL DAILY
Qty: 90 TABLET | Refills: 0 | OUTPATIENT
Start: 2022-06-28

## 2022-07-25 DIAGNOSIS — K21.9 GASTROESOPHAGEAL REFLUX DISEASE WITHOUT ESOPHAGITIS: ICD-10-CM

## 2022-07-25 DIAGNOSIS — K44.9 HIATAL HERNIA: ICD-10-CM

## 2022-07-25 DIAGNOSIS — R04.2 COUGH WITH HEMOPTYSIS: ICD-10-CM

## 2022-07-25 RX ORDER — OMEPRAZOLE 40 MG/1
CAPSULE, DELAYED RELEASE ORAL
Qty: 90 CAPSULE | Refills: 0 | Status: SHIPPED | OUTPATIENT
Start: 2022-07-25 | End: 2022-09-12

## 2022-09-11 ASSESSMENT — ENCOUNTER SYMPTOMS
ABDOMINAL PAIN: 0
DYSURIA: 0
WEAKNESS: 0
SHORTNESS OF BREATH: 0
MYALGIAS: 0
BREAST MASS: 0
ARTHRALGIAS: 0
COUGH: 0
HEMATURIA: 0
CHILLS: 0
HEMATOCHEZIA: 0
SORE THROAT: 0
NAUSEA: 0
FREQUENCY: 0
DIZZINESS: 0
HEADACHES: 0
DIARRHEA: 0
CONSTIPATION: 0
EYE PAIN: 0
NERVOUS/ANXIOUS: 0
HEARTBURN: 0
JOINT SWELLING: 0
PARESTHESIAS: 0
PALPITATIONS: 0
FEVER: 0

## 2022-09-12 ENCOUNTER — TELEPHONE (OUTPATIENT)
Dept: FAMILY MEDICINE | Facility: CLINIC | Age: 47
End: 2022-09-12

## 2022-09-12 ENCOUNTER — OFFICE VISIT (OUTPATIENT)
Dept: FAMILY MEDICINE | Facility: CLINIC | Age: 47
End: 2022-09-12
Payer: COMMERCIAL

## 2022-09-12 VITALS
DIASTOLIC BLOOD PRESSURE: 70 MMHG | SYSTOLIC BLOOD PRESSURE: 116 MMHG | RESPIRATION RATE: 18 BRPM | HEART RATE: 88 BPM | OXYGEN SATURATION: 98 % | WEIGHT: 192.1 LBS | TEMPERATURE: 98.2 F | BODY MASS INDEX: 35.35 KG/M2 | HEIGHT: 62 IN

## 2022-09-12 DIAGNOSIS — I10 HTN, GOAL BELOW 140/90: ICD-10-CM

## 2022-09-12 DIAGNOSIS — Z91.018 TREE NUT ALLERGY: ICD-10-CM

## 2022-09-12 DIAGNOSIS — R73.03 PREDIABETES: ICD-10-CM

## 2022-09-12 DIAGNOSIS — E87.6 HYPOKALEMIA: ICD-10-CM

## 2022-09-12 DIAGNOSIS — Z12.11 SCREEN FOR COLON CANCER: ICD-10-CM

## 2022-09-12 DIAGNOSIS — K44.9 HIATAL HERNIA: ICD-10-CM

## 2022-09-12 DIAGNOSIS — Z00.00 ROUTINE GENERAL MEDICAL EXAMINATION AT A HEALTH CARE FACILITY: Primary | ICD-10-CM

## 2022-09-12 DIAGNOSIS — I10 ESSENTIAL HYPERTENSION WITH GOAL BLOOD PRESSURE LESS THAN 140/90: ICD-10-CM

## 2022-09-12 DIAGNOSIS — R04.2 COUGH WITH HEMOPTYSIS: ICD-10-CM

## 2022-09-12 DIAGNOSIS — M06.9 RHEUMATOID ARTHRITIS INVOLVING MULTIPLE SITES, UNSPECIFIED WHETHER RHEUMATOID FACTOR PRESENT (H): ICD-10-CM

## 2022-09-12 DIAGNOSIS — I10 ESSENTIAL HYPERTENSION: ICD-10-CM

## 2022-09-12 DIAGNOSIS — K21.9 GASTROESOPHAGEAL REFLUX DISEASE WITHOUT ESOPHAGITIS: ICD-10-CM

## 2022-09-12 DIAGNOSIS — Z91.010 PEANUT ALLERGY: ICD-10-CM

## 2022-09-12 DIAGNOSIS — E03.4 HYPOTHYROIDISM DUE TO ACQUIRED ATROPHY OF THYROID: ICD-10-CM

## 2022-09-12 DIAGNOSIS — M79.7 FIBROMYALGIA: ICD-10-CM

## 2022-09-12 PROCEDURE — 99214 OFFICE O/P EST MOD 30 MIN: CPT | Mod: 25 | Performed by: NURSE PRACTITIONER

## 2022-09-12 PROCEDURE — 90686 IIV4 VACC NO PRSV 0.5 ML IM: CPT | Performed by: NURSE PRACTITIONER

## 2022-09-12 PROCEDURE — 99396 PREV VISIT EST AGE 40-64: CPT | Mod: 25 | Performed by: NURSE PRACTITIONER

## 2022-09-12 PROCEDURE — 90471 IMMUNIZATION ADMIN: CPT | Performed by: NURSE PRACTITIONER

## 2022-09-12 RX ORDER — POTASSIUM CHLORIDE 1500 MG/1
20 TABLET, EXTENDED RELEASE ORAL DAILY
Qty: 90 TABLET | Refills: 3 | Status: SHIPPED | OUTPATIENT
Start: 2022-09-12 | End: 2024-04-08

## 2022-09-12 RX ORDER — LEFLUNOMIDE 20 MG/1
20 TABLET ORAL DAILY
Qty: 30 TABLET | Refills: 2 | Status: CANCELLED | OUTPATIENT
Start: 2022-09-12

## 2022-09-12 RX ORDER — CYCLOBENZAPRINE HCL 10 MG
10 TABLET ORAL 2 TIMES DAILY PRN
Qty: 90 TABLET | Refills: 0 | Status: SHIPPED | OUTPATIENT
Start: 2022-09-12 | End: 2022-11-15

## 2022-09-12 RX ORDER — HYDROCHLOROTHIAZIDE 25 MG/1
25 TABLET ORAL DAILY
Qty: 90 TABLET | Refills: 3 | Status: SHIPPED | OUTPATIENT
Start: 2022-09-12 | End: 2023-09-28

## 2022-09-12 RX ORDER — OMEPRAZOLE 40 MG/1
CAPSULE, DELAYED RELEASE ORAL
Qty: 90 CAPSULE | Refills: 3 | Status: SHIPPED | OUTPATIENT
Start: 2022-09-12 | End: 2023-11-17

## 2022-09-12 RX ORDER — DILTIAZEM HYDROCHLORIDE 240 MG/1
CAPSULE, COATED, EXTENDED RELEASE ORAL
Qty: 90 CAPSULE | Refills: 3 | Status: SHIPPED | OUTPATIENT
Start: 2022-09-12 | End: 2023-10-11

## 2022-09-12 RX ORDER — LEVOTHYROXINE SODIUM 112 UG/1
112 TABLET ORAL DAILY
Qty: 90 TABLET | Refills: 3 | Status: SHIPPED | OUTPATIENT
Start: 2022-09-12 | End: 2023-09-28

## 2022-09-12 RX ORDER — METOPROLOL SUCCINATE 25 MG/1
25 TABLET, EXTENDED RELEASE ORAL DAILY
Qty: 90 TABLET | Refills: 3 | Status: SHIPPED | OUTPATIENT
Start: 2022-09-12 | End: 2023-09-28

## 2022-09-12 RX ORDER — EPINEPHRINE 0.3 MG/.3ML
0.3 INJECTION SUBCUTANEOUS
Qty: 0.6 ML | Refills: 1 | Status: SHIPPED | OUTPATIENT
Start: 2022-09-12

## 2022-09-12 RX ORDER — LOSARTAN POTASSIUM 50 MG/1
50 TABLET ORAL DAILY
Qty: 90 TABLET | Refills: 3 | Status: SHIPPED | OUTPATIENT
Start: 2022-09-12 | End: 2023-12-05

## 2022-09-12 ASSESSMENT — ENCOUNTER SYMPTOMS
CONSTIPATION: 0
HEARTBURN: 0
NERVOUS/ANXIOUS: 0
COUGH: 0
SHORTNESS OF BREATH: 0
DIARRHEA: 0
BREAST MASS: 0
ARTHRALGIAS: 0
FEVER: 0
HEMATOCHEZIA: 0
PALPITATIONS: 0
WEAKNESS: 0
PARESTHESIAS: 0
ABDOMINAL PAIN: 0
CHILLS: 0
MYALGIAS: 0
NAUSEA: 0
EYE PAIN: 0
JOINT SWELLING: 0
DYSURIA: 0
SORE THROAT: 0
FREQUENCY: 0
DIZZINESS: 0
HEMATURIA: 0
HEADACHES: 0

## 2022-09-12 ASSESSMENT — PAIN SCALES - GENERAL: PAINLEVEL: NO PAIN (0)

## 2022-09-12 NOTE — TELEPHONE ENCOUNTER
Spoke with Lisa, they do not have record of receiving the kit back.     Patient is to call [employer] at [phone], they will look for any possible clerical errors and if they still don't find anything then they will send out another kit to be recollected.     Left message for pt to call back and also sent a [employer] message

## 2022-09-12 NOTE — PROGRESS NOTES
SUBJECTIVE:   CC: Elizabeth Goodson is an 47 year old woman who presents for preventive health visit.       Patient has been advised of split billing requirements and indicates understanding: Yes  Healthy Habits:     Getting at least 3 servings of Calcium per day:  Yes    Bi-annual eye exam:  Yes    Dental care twice a year:  Yes    Sleep apnea or symptoms of sleep apnea:  None    Diet:  Low salt and Carbohydrate counting    Frequency of exercise:  None    Taking medications regularly:  Yes    Medication side effects:  None    PHQ-2 Total Score: 0    Additional concerns today:  No      Hypothyroidism fatigue, chronic overlapping issue with RA  On immunosuppressive.     HTN- weight is up. Trying to exercise, started walking with her daughter recently with plans to do more.     Due for labs and medication refills. Not fasting today.       Today's PHQ-2 Score:   PHQ-2 (  [employer]) 2022   Q1: Little interest or pleasure in doing things 0   Q2: Feeling down, depressed or hopeless 0   PHQ-2 Score 0   PHQ-2 Total Score (12-17 Years)- Positive if 3 or more points; Administer PHQ-A if positive -   Q1: Little interest or pleasure in doing things Not at all   Q2: Feeling down, depressed or hopeless Not at all   PHQ-2 Score 0       Abuse: Current or Past (Physical, Sexual or Emotional) - No  Do you feel safe in your environment? Yes        Social History     Tobacco Use     Smoking status: Former Smoker     Packs/day: 0.00     Years: 13.00     Pack years: 0.00     Types: Cigarettes     Quit date: 10/1/2012     Years since quittin.9     Smokeless tobacco: Never Used   Substance Use Topics     Alcohol use: Not Currently     Alcohol/week: 0.0 standard drinks     Comment: nothing in the past 3 months - occ/ 1-2 per month         Alcohol Use 2022   Prescreen: >3 drinks/day or >7 drinks/week? No   Prescreen: >3 drinks/day or >7 drinks/week? -       Reviewed orders with patient.  Reviewed health maintenance and  "updated orders accordingly - Yes  Labs reviewed in EPIC    Breast Cancer Screening: mammogram UTD- not need for genetic testing    FHS-7: No flowsheet data found.    Mammogram Screening: Recommended annual mammography  Pertinent mammograms are reviewed under the imaging tab.    History of abnormal Pap smear: Status post benign hysterectomy. Health Maintenance and Surgical History updated.  PAP / HPV 6/30/2011   PAP (Historical) NIL     Reviewed and updated as needed this visit by clinical staff   Tobacco  Allergies                 Reviewed and updated as needed this visit by Provider                   Pt. Stated Cologuard is UTD- checking on result.       Review of Systems   Constitutional: Negative for chills and fever.   HENT: Negative for congestion, ear pain, hearing loss and sore throat.    Eyes: Negative for pain and visual disturbance.   Respiratory: Negative for cough and shortness of breath.    Cardiovascular: Negative for chest pain, palpitations and peripheral edema.   Gastrointestinal: Negative for abdominal pain, constipation, diarrhea, heartburn, hematochezia and nausea.   Breasts:  Negative for tenderness, breast mass and discharge.   Genitourinary: Negative for dysuria, frequency, genital sores, hematuria, pelvic pain, urgency, vaginal bleeding and vaginal discharge.   Musculoskeletal: Negative for arthralgias, joint swelling and myalgias.   Skin: Negative for rash.   Neurological: Negative for dizziness, weakness, headaches and paresthesias.   Psychiatric/Behavioral: Negative for mood changes. The patient is not nervous/anxious.           OBJECTIVE:   /70   Pulse 88   Temp 98.2  F (36.8  C) (Temporal)   Resp 18   Ht 1.585 m (5' 2.4\")   Wt 87.1 kg (192 lb 1.6 oz)   SpO2 98%   BMI 34.68 kg/m    Physical Exam  GENERAL: healthy, alert and no distress  EYES: Eyes grossly normal to inspection, PERRL and conjunctivae and sclerae normal  HENT: ear canals and TM's normal, nose and mouth " without ulcers or lesions  NECK: no adenopathy, no asymmetry, masses, or scars and thyroid normal to palpation  RESP: lungs clear to auscultation - no rales, rhonchi or wheezes  BREAST: normal without masses, tenderness or nipple discharge and no palpable axillary masses or adenopathy  CV: regular rate and rhythm, normal S1 S2, no S3 or S4, no murmur, click or rub, no peripheral edema and peripheral pulses strong  ABDOMEN: soft, nontender, no hepatosplenomegaly, no masses and bowel sounds normal  MS: no gross musculoskeletal defects noted, no edema  SKIN: no suspicious lesions or rashes  NEURO: Normal strength and tone, mentation intact and speech normal  PSYCH: mentation appears normal, affect normal/bright        ASSESSMENT/PLAN:       ICD-10-CM    1. Prediabetes  R73.03 Hemoglobin A1c   2. Screen for colon cancer  Z12.11    3. Routine general medical examination at a health care facility  Z00.00 Lipid panel reflex to direct LDL Fasting   4. Fibromyalgia  M79.7 cyclobenzaprine (FLEXERIL) 10 MG tablet   5. Essential hypertension  I10 diltiazem ER COATED BEADS (CARDIZEM CD/CARTIA XT) 240 MG 24 hr capsule     hydrochlorothiazide (HYDRODIURIL) 25 MG tablet   6. Peanut allergy  Z91.010 EPINEPHrine (EPIPEN 2-KIMBERLEE) 0.3 MG/0.3ML injection 2-pack   7. Tree nut allergy  Z91.018 EPINEPHrine (EPIPEN 2-KIMBERLEE) 0.3 MG/0.3ML injection 2-pack   8. Rheumatoid arthritis involving multiple sites, unspecified whether rheumatoid factor present (H)  M06.9    9. Hypothyroidism due to acquired atrophy of thyroid  E03.4 levothyroxine (SYNTHROID/LEVOTHROID) 112 MCG tablet     TSH with free T4 reflex   10. Essential hypertension with goal blood pressure less than 140/90  I10 losartan (COZAAR) 50 MG tablet   11. HTN, goal below 140/90  I10 metoprolol succinate ER (TOPROL XL) 25 MG 24 hr tablet   12. Cough with hemoptysis  R04.2 omeprazole (PRILOSEC) 40 MG DR capsule   13. Hiatal hernia  K44.9 omeprazole (PRILOSEC) 40 MG DR capsule   14.  "Gastroesophageal reflux disease without esophagitis  K21.9 omeprazole (PRILOSEC) 40 MG DR capsule   15. Hypokalemia  E87.6 potassium chloride ER (KLOR-CON M) 20 MEQ CR tablet     Basic metabolic panel       Patient has been advised of split billing requirements and indicates understanding: Yes    COUNSELING:  Reviewed preventive health counseling, as reflected in patient instructions       Regular exercise       Healthy diet/nutrition       Vision screening       Immunizations    Updating Flu today            Osteoporosis prevention/bone health       Colorectal Cancer Screening       Mammogram    HTN_ controlled, weight loss advised as able. Labs- future. Medication refills.   Hypothyroidism- weight loss, as above, labs- future. Meds refilled.   Gerd- stable, monitoring, continue PPI.   Prediabetes, weight loss, and exercise counseling.     Estimated body mass index is 34.68 kg/m  as calculated from the following:    Height as of this encounter: 1.585 m (5' 2.4\").    Weight as of this encounter: 87.1 kg (192 lb 1.6 oz).    Weight management plan: Discussed healthy diet and exercise guidelines    She reports that she quit smoking about 9 years ago. Her smoking use included cigarettes. She smoked 0.00 packs per day for 13.00 years. She has never used smokeless tobacco.      Counseling Resources:  ATP IV Guidelines  Pooled Cohorts Equation Calculator  Breast Cancer Risk Calculator  BRCA-Related Cancer Risk Assessment: FHS-7 Tool  FRAX Risk Assessment  ICSI Preventive Guidelines  Dietary Guidelines for Americans, 2010  USDA's MyPlate  ASA Prophylaxis  Lung CA Screening    RAÚL Flor CNP  M [facility]  "

## 2022-10-05 ENCOUNTER — E-VISIT (OUTPATIENT)
Dept: FAMILY MEDICINE | Facility: CLINIC | Age: 47
End: 2022-10-05
Payer: COMMERCIAL

## 2022-10-05 DIAGNOSIS — J01.90 ACUTE SINUSITIS WITH SYMPTOMS > 10 DAYS: ICD-10-CM

## 2022-10-05 DIAGNOSIS — J01.90 SUBACUTE SINUSITIS, UNSPECIFIED LOCATION: Primary | ICD-10-CM

## 2022-10-05 PROCEDURE — 99421 OL DIG E/M SVC 5-10 MIN: CPT | Performed by: NURSE PRACTITIONER

## 2022-10-05 RX ORDER — DOXYCYCLINE HYCLATE 100 MG
100 TABLET ORAL 2 TIMES DAILY
Qty: 14 TABLET | Refills: 0 | Status: SHIPPED | OUTPATIENT
Start: 2022-10-05 | End: 2023-09-20

## 2022-10-05 NOTE — PATIENT INSTRUCTIONS
Thank you for choosing us for your care. I have placed an order for a prescription so that you can start treatment. View your full visit summary for details by clicking on the link below. Your pharmacist will able to address any questions you may have about the medication.     Please consider a test for Covid if it has been >3 months since your last infection.     If you're not feeling better within 5-7 days, please schedule an appointment.  You can schedule an appointment right here in [employer] [place], or call [phone]  If the visit is for the same symptoms as your eVisit, we'll refund the cost of your eVisit if seen within seven days.      Sincerely,   Keri Yu, DNP

## 2022-11-14 DIAGNOSIS — M79.7 FIBROMYALGIA: ICD-10-CM

## 2022-11-15 RX ORDER — CYCLOBENZAPRINE HCL 10 MG
TABLET ORAL
Qty: 90 TABLET | Refills: 0 | Status: SHIPPED | OUTPATIENT
Start: 2022-11-15 | End: 2023-01-16

## 2023-01-11 ENCOUNTER — MYC MEDICAL ADVICE (OUTPATIENT)
Dept: FAMILY MEDICINE | Facility: CLINIC | Age: 48
End: 2023-01-11

## 2023-01-12 ENCOUNTER — VIRTUAL VISIT (OUTPATIENT)
Dept: FAMILY MEDICINE | Facility: CLINIC | Age: 48
End: 2023-01-12
Payer: COMMERCIAL

## 2023-01-12 DIAGNOSIS — Z12.11 SCREEN FOR COLON CANCER: ICD-10-CM

## 2023-01-12 DIAGNOSIS — M06.9 RHEUMATOID ARTHRITIS INVOLVING MULTIPLE SITES, UNSPECIFIED WHETHER RHEUMATOID FACTOR PRESENT (H): ICD-10-CM

## 2023-01-12 DIAGNOSIS — F41.1 GAD (GENERALIZED ANXIETY DISORDER): Primary | ICD-10-CM

## 2023-01-12 DIAGNOSIS — M79.7 FIBROMYALGIA: ICD-10-CM

## 2023-01-12 PROCEDURE — 99214 OFFICE O/P EST MOD 30 MIN: CPT | Mod: 95 | Performed by: NURSE PRACTITIONER

## 2023-01-12 RX ORDER — CYCLOBENZAPRINE HCL 10 MG
10 TABLET ORAL 2 TIMES DAILY PRN
Qty: 90 TABLET | Refills: 0 | Status: CANCELLED | OUTPATIENT
Start: 2023-01-12

## 2023-01-12 RX ORDER — DOXYCYCLINE HYCLATE 100 MG
100 TABLET ORAL 2 TIMES DAILY
Qty: 14 TABLET | Refills: 0 | Status: CANCELLED | OUTPATIENT
Start: 2023-01-12

## 2023-01-12 NOTE — PROGRESS NOTES
Lani is a 47 year old who is being evaluated via a billable video visit.      How would you like to obtain your AVS? MyChart  If the video visit is dropped, the invitation should be resent by: Text to cell phone: [phone]  Will anyone else be joining your video visit? No          Assessment & Plan   Problem List Items Addressed This Visit     Fibromyalgia    Rheumatoid arthritis, involving unspecified site, unspecified rheumatoid factor presence   Other Visit Diagnoses     EUGENE (generalized anxiety disorder)    -  Primary    Relevant Medications    FLUoxetine (PROZAC) 20 MG capsule    Screen for colon cancer        Relevant Orders    COLOGUARD(EX[employer] SCIENCES)         Starting selective serotonin reuptake inhibitor.   Discussed use, risk, SA and symptoms should subside with use. Warning signs discussed. Should aid with pain.   healthy habits reinforced  Counseling:if needed   Re-check: 4 weeks virtual  Return to clinic with any new or worsening symptoms, and as needed.                 Return in about 4 weeks (around 2/9/2023) for Virtual Visit.    RAÚL Flor CNP  M [facility] RUSTAM Garibay is a 47 year old, presenting for the following health issues:  Anxiety (Looking start on anxiety medication.)      History of Present Illness       Reason for visit:  Anixety    She eats 2-3 servings of fruits and vegetables daily.She consumes 0 sweetened beverage(s) daily.She exercises with enough effort to increase her heart rate 20 to 29 minutes per day.  She exercises with enough effort to increase her heart rate 3 or less days per week.   She is taking medications regularly.     High life stress. Long-term partner with chronic heart failure, needing many procedures this past year.   High job stress. Has fibromyalgia. Sleep- fair.   Wants to try mood medication.  Relationships are stable.   Denies depression           Review of Systems   Constitutional, HEENT, cardiovascular, pulmonary,  gi and gu systems are negative, except as otherwise noted.      Objective           Vitals:  No vitals were obtained today due to virtual visit.    Physical Exam   GENERAL: Healthy, alert and no distress  EYES: Eyes grossly normal to inspection.  No discharge or erythema, or obvious scleral/conjunctival abnormalities.  RESP: No audible wheeze, cough, or visible cyanosis.  No visible retractions or increased work of breathing.    SKIN: Visible skin clear. No significant rash, abnormal pigmentation or lesions.  NEURO: Cranial nerves grossly intact.  Mentation and speech appropriate for age.  PSYCH: Mentation appears normal, affect normal/bright, judgement and insight intact, normal speech and appearance well-groomed. Tearful at times                Video-Visit Details    Type of service:  Video Visit   Video Start Time: 1359  Video End Time:1618    Originating Location (pt. Location): Home    Distant Location (provider location):  Off-site  Platform used for Video Visit: [employer].

## 2023-01-15 DIAGNOSIS — M79.7 FIBROMYALGIA: ICD-10-CM

## 2023-01-16 RX ORDER — CYCLOBENZAPRINE HCL 10 MG
TABLET ORAL
Qty: 90 TABLET | Refills: 0 | Status: SHIPPED | OUTPATIENT
Start: 2023-01-16 | End: 2023-03-31

## 2023-01-16 NOTE — TELEPHONE ENCOUNTER
Pending Prescriptions:                       Disp   Refills    cyclobenzaprine (FLEXERIL) 10 MG tablet [P*90 tab*0        Sig: TAKE ONE TABLET BY MOUTH TWICE A DAY AS NEEDED FOR           MUSCLE SPASMS        Routing refill request to provider for review/approval because:  Drug not on the [facility] refill protocol

## 2023-03-30 DIAGNOSIS — M79.7 FIBROMYALGIA: ICD-10-CM

## 2023-03-31 RX ORDER — CYCLOBENZAPRINE HCL 10 MG
TABLET ORAL
Qty: 90 TABLET | Refills: 0 | Status: SHIPPED | OUTPATIENT
Start: 2023-03-31 | End: 2023-06-06

## 2023-04-05 ENCOUNTER — VIRTUAL VISIT (OUTPATIENT)
Dept: FAMILY MEDICINE | Facility: CLINIC | Age: 48
End: 2023-04-05
Payer: COMMERCIAL

## 2023-04-05 ENCOUNTER — E-VISIT (OUTPATIENT)
Dept: FAMILY MEDICINE | Facility: CLINIC | Age: 48
End: 2023-04-05

## 2023-04-05 DIAGNOSIS — R05.9 COUGH, UNSPECIFIED TYPE: Primary | ICD-10-CM

## 2023-04-05 DIAGNOSIS — Z53.9 ERRONEOUS ENCOUNTER--DISREGARD: Primary | ICD-10-CM

## 2023-04-05 DIAGNOSIS — J06.9 VIRAL UPPER RESPIRATORY INFECTION: ICD-10-CM

## 2023-04-05 PROCEDURE — 99213 OFFICE O/P EST LOW 20 MIN: CPT | Mod: 93 | Performed by: NURSE PRACTITIONER

## 2023-04-05 RX ORDER — PREDNISONE 20 MG/1
40 TABLET ORAL DAILY
Qty: 10 TABLET | Refills: 0 | Status: SHIPPED | OUTPATIENT
Start: 2023-04-05 | End: 2023-04-10

## 2023-04-05 RX ORDER — ALBUTEROL SULFATE 90 UG/1
2 AEROSOL, METERED RESPIRATORY (INHALATION) EVERY 6 HOURS PRN
Qty: 18 G | Refills: 0 | Status: SHIPPED | OUTPATIENT
Start: 2023-04-05

## 2023-04-05 NOTE — PROGRESS NOTES
"Lani is a 48 year old who is being evaluated via a billable telephone visit.      What phone number would you like to be contacted at? [phone]  Distant Location (provider location):  On-site    Assessment & Plan     Elizabeth was seen today for uri.    Diagnoses and all orders for this visit:    Cough, unspecified type  Viral upper respiratory infection  Associated wheezing.   Patient education completed regarding viral cause, typical course, symptomatic treatment and when to follow-up.   Schedule Albuterol inhaler every 4-6 hours until cough improves and start Prednisone burst.    With no improvement or worsening, stressed importance of in clinic/urgent care evaluation.    -     predniSONE (DELTASONE) 20 MG tablet; Take 2 tablets (40 mg) by mouth daily for 5 days  -     albuterol (PROAIR HFA/PROVENTIL HFA/VENTOLIN HFA) 108 (90 Base) MCG/ACT inhaler; Inhale 2 puffs into the lungs every 6 hours as needed for shortness of breath, wheezing or cough         BMI:   Estimated body mass index is 34.68 kg/m  as calculated from the following:    Height as of 9/12/22: 1.585 m (5' 2.4\").    Weight as of 9/12/22: 87.1 kg (192 lb 1.6 oz).     Tricia Bales, APRROWENA [facility]   Lani is a 48 year old, presenting for the following health issues:  URI         View : No data to display.              HPI     Acute Illness    Patient reports onset of symptoms last Wednesday with headache, tightness in chest.  Low grade temperature with sore throat.  Sore throat resolved.  Then cough and sinus pressure started.  She reports that cough has worsened.   Has been sleeping with bed in upright position.  Difficulty with sleeping last night due to cough.  Cough is productive.    Has used inhaler and this helps for 30 minutes.    No shortness of breath.  +Wheezing.    No history of asthma.    Has tried Tessalon without improvement.   Tried Mucinex and Nyquil as as well.      History of " significant cough - has used Prednisone previously with good results.     Is on spring break, works as a teacher.      Negative COVID-19 testing at home, x4.       Acute illness concerns: cough sinus  Onset/Duration: 6 days  Symptoms:  Fever: YES  Chills/Sweats: YES  Headache (location?): YES  Sinus Pressure: YES  Conjunctivitis:  No  Ear Pain: no  Rhinorrhea: YES  Congestion: YES  Sore Throat: No  Cough: YES  Wheeze: YES  Decreased Appetite: YES  Nausea: No  Vomiting: No  Diarrhea: No  Dysuria/Freq.: No  Dysuria or Hematuria: No  Fatigue/Achiness: No  Sick/Strep Exposure: No  Therapies tried and outcome: mucinex nyquil, inhaler        Review of Systems   Constitutional, HEENT, cardiovascular, pulmonary, gi and gu systems are negative, except as otherwise noted.      Objective           Vitals:  No vitals were obtained today due to virtual visit.    Physical Exam   General:  healthy, alert and no distress  PSYCH: Alert and oriented times 3; coherent speech, normal   rate and volume, able to articulate logical thoughts, able   to abstract reason, no tangential thoughts, no hallucinations   or delusions  Her affect is normal  RESP: +Harsh cough, no audible wheezing, able to talk in full sentences  Remainder of exam unable to be completed due to telephone visits        Phone call duration: 7 minutes  9:58 a.m. to 10:05 a.m.

## 2023-06-01 ENCOUNTER — HEALTH MAINTENANCE LETTER (OUTPATIENT)
Age: 48
End: 2023-06-01

## 2023-06-05 DIAGNOSIS — M79.7 FIBROMYALGIA: ICD-10-CM

## 2023-06-06 RX ORDER — CYCLOBENZAPRINE HCL 10 MG
TABLET ORAL
Qty: 90 TABLET | Refills: 0 | Status: SHIPPED | OUTPATIENT
Start: 2023-06-06 | End: 2023-08-07

## 2023-08-05 DIAGNOSIS — M79.7 FIBROMYALGIA: ICD-10-CM

## 2023-08-07 RX ORDER — CYCLOBENZAPRINE HCL 10 MG
TABLET ORAL
Qty: 90 TABLET | Refills: 0 | Status: SHIPPED | OUTPATIENT
Start: 2023-08-07 | End: 2023-09-28

## 2023-08-14 ENCOUNTER — PATIENT OUTREACH (OUTPATIENT)
Dept: CARE COORDINATION | Facility: CLINIC | Age: 48
End: 2023-08-14
Payer: COMMERCIAL

## 2023-08-28 ENCOUNTER — PATIENT OUTREACH (OUTPATIENT)
Dept: CARE COORDINATION | Facility: CLINIC | Age: 48
End: 2023-08-28
Payer: COMMERCIAL

## 2023-09-20 ENCOUNTER — ANCILLARY PROCEDURE (OUTPATIENT)
Dept: GENERAL RADIOLOGY | Facility: OTHER | Age: 48
End: 2023-09-20
Attending: PODIATRIST
Payer: COMMERCIAL

## 2023-09-20 ENCOUNTER — OFFICE VISIT (OUTPATIENT)
Dept: PODIATRY | Facility: OTHER | Age: 48
End: 2023-09-20
Payer: COMMERCIAL

## 2023-09-20 VITALS
BODY MASS INDEX: 35.61 KG/M2 | SYSTOLIC BLOOD PRESSURE: 147 MMHG | HEIGHT: 63 IN | TEMPERATURE: 99.2 F | HEART RATE: 92 BPM | WEIGHT: 201 LBS | DIASTOLIC BLOOD PRESSURE: 96 MMHG

## 2023-09-20 DIAGNOSIS — M20.5X1 HALLUX LIMITUS, RIGHT: ICD-10-CM

## 2023-09-20 DIAGNOSIS — M20.5X1 HALLUX LIMITUS, RIGHT: Primary | ICD-10-CM

## 2023-09-20 PROCEDURE — 99213 OFFICE O/P EST LOW 20 MIN: CPT | Performed by: PODIATRIST

## 2023-09-20 PROCEDURE — 73630 X-RAY EXAM OF FOOT: CPT | Mod: TC | Performed by: RADIOLOGY

## 2023-09-20 ASSESSMENT — PAIN SCALES - GENERAL: PAINLEVEL: SEVERE PAIN (6)

## 2023-09-20 NOTE — LETTER
9/20/2023         RE: Elizabeth Goodson  [id number] [facility] [address]        Dear Colleague,    Thank you for referring your patient, Elizabeth Goodson, to the [facility]. Please see a copy of my visit note below.    HPI:  Elizabeth Goodson is a 48 year old female who is seen in consultation at the request of self.    Pt presents for eval of:   (Onset, Location, L/R, Character, Treatments, Injury if yes)    XR Right foot 9/20/2023    DOS 7/19/2016 - Excision soft tissue mass Right great toe (Dr. Jose Adan, [place])  DOS 11/22/2013 - Right foot soft tissue mass excision (Dr. Jose Cruz Garcia, [place])    LOV 7/19/2016 - Right hallux limitus     Onset early July 2023, dorsal and medial Right foot pain. No injury noted. Presents with flat slip on shoes.  Constant, dull ache. Intermittent sharp, shooting pain 6-9/10. Worse at night when lying in bed after a busy day.    Tylelnol, ibuprofen, orthofeet shoes, ice, heat, nothing helps.    Works as Behavior Specialist Teacher at [email or web address]  Patient was informed that she has rheumatoid arthritis not sure if she has a positive or negative rheumatoid factor and used to see a rheumatologist but discontinued all the rheumatology medications a few years ago.  Now she is having pain around her great toe joint of unknown origin its not continuous.  Not limiting activity sleep or quality of life at this time.    ROS:  10 point ROS neg other than the symptoms noted above in the HPI.    Patient Active Problem List   Diagnosis     Syncope     Raynaud phenomenon     CARDIOVASCULAR SCREENING; LDL GOAL LESS THAN 160     Strain of neck muscle     Migraine headache     Cough with hemoptysis     Incidental lung nodule, less than or equal to 3mm     Need for prophylactic vaccination and inoculation against influenza     Fibromyalgia     HTN, goal below 140/90     H/O: hysterectomy     GERD (gastroesophageal reflux disease)     Edema      High risk medication use     Hypothyroidism     Ventral hernia without obstruction or gangrene     Hemoptysis     Rheumatoid arthritis, involving unspecified site, unspecified rheumatoid factor presence     Penicillin allergy     Peanut allergy     Tree nut allergy     Anaphylaxis, subsequent encounter     Itching     History of hemoptysis     Atypical chest pain     Mass of finger, left     Cubital tunnel syndrome on right     History of nonmelanoma skin cancer     Lupus erythematosus tumidus       PAST MEDICAL HISTORY:   Past Medical History:   Diagnosis Date     Adnexal mass 2017    not seen on 7/10/18 abdominal CT     H/O transfusion of whole blood 2001    with child labor/hysterectomy     Hemoptysis 11/12, 11/2015-mild    last episodes, mild, has had massive in HX     HTN (hypertension)      Incidental lung nodule, less than or equal to 3mm 11/12    [facility]     Massive hemoptysis 11/12    due to pneumonia     Migraine headache      Tobacco abuse, in remission     quit 2012        PAST SURGICAL HISTORY:   Past Surgical History:   Procedure Laterality Date     CHOLECYSTECTOMY, LAPOROSCOPIC  2007    Cholecystectomy, Laparoscopic     COMBINED ESOPHAGOSCOPY, GASTROSCOPY, DUODENOSCOPY (EGD) WITH CO2 INSUFFLATION N/A 11/16/2018    Procedure: Egd;  Surgeon: Richard Aviles MD;  Location: [facility] OR     ESOPHAGOSCOPY, GASTROSCOPY, DUODENOSCOPY (EGD), COMBINED N/A 11/16/2018    Procedure: COMBINED ESOPHAGOSCOPY, GASTROSCOPY, DUODENOSCOPY (EGD), BIOPSY SINGLE OR MULTIPLE;  Surgeon: Richard Aviles MD;  Location: [facility] OR     EXCISE MASS FOOT  11/22/2013    Procedure: EXCISE MASS FOOT;  Right Foot Soft Tissue Mass Excision;  Surgeon: Jose Cruz Garcia DPM;  Location: [facility] OR     EXCISE MASS FOOT Right 7/17/2015    Procedure: EXCISE MASS FOOT;  Surgeon: Pierre Adan DPM;  Location: [facility]     HYSTERECTOMY, VAGINAL  2002     LAPAROSCOPIC HERNIORRHAPHY VENTRAL N/A 2/24/2016    Procedure: LAPAROSCOPIC  HERNIORRHAPHY VENTRAL;  Surgeon: Lars Dahl MD;  Location: [facility] OR     PANNICULECTOMY  07/28/2017    with liposuction, and hernia revision Dr. Heath     RELEASE TRIGGER FINGER Left 5/4/2018    Procedure: RELEASE TRIGGER FINGER;  Trigger Finger Release Left ring finger ;  Surgeon: Aaron Orta DO;  Location: [facility] OR        MEDICATIONS:   Current Outpatient Medications:      cyclobenzaprine (FLEXERIL) 10 MG tablet, TAKE ONE TABLET BY MOUTH TWICE A DAY AS NEEDED FOR MUSCLE SPASMS, Disp: 90 tablet, Rfl: 0     diltiazem ER COATED BEADS (CARDIZEM CD/CARTIA XT) 240 MG 24 hr capsule, TAKE ONE CAPSULE BY MOUTH ONCE DAILY, Disp: 90 capsule, Rfl: 3     hydrochlorothiazide (HYDRODIURIL) 25 MG tablet, Take 1 tablet (25 mg) by mouth daily, Disp: 90 tablet, Rfl: 3     levothyroxine (SYNTHROID/LEVOTHROID) 112 MCG tablet, Take 1 tablet (112 mcg) by mouth daily, Disp: 90 tablet, Rfl: 3     losartan (COZAAR) 50 MG tablet, Take 1 tablet (50 mg) by mouth daily, Disp: 90 tablet, Rfl: 3     metoprolol succinate ER (TOPROL XL) 25 MG 24 hr tablet, Take 1 tablet (25 mg) by mouth daily, Disp: 90 tablet, Rfl: 3     omeprazole (PRILOSEC) 40 MG DR capsule, TAKE ONE CAPSULE BY MOUTH ONCE DAILY 30-60 MINUTES BEFORE A MEAL, Disp: 90 capsule, Rfl: 3     potassium chloride ER (KLOR-CON M) 20 MEQ CR tablet, Take 1 tablet (20 mEq) by mouth daily, Disp: 90 tablet, Rfl: 3     albuterol (PROAIR HFA/PROVENTIL HFA/VENTOLIN HFA) 108 (90 Base) MCG/ACT inhaler, Inhale 2 puffs into the lungs every 6 hours as needed for shortness of breath, wheezing or cough (Patient not taking: Reported on 9/20/2023), Disp: 18 g, Rfl: 0     EPINEPHrine (EPIPEN 2-KIMBERLEE) 0.3 MG/0.3ML injection 2-pack, Inject 0.3 mLs (0.3 mg) into the muscle once as needed for anaphylaxis (Patient not taking: Reported on 9/20/2023), Disp: 0.6 mL, Rfl: 1     fluocinonide (LIDEX) 0.05 % external cream, Apply twice daily for weeks, weekends only for 2 weeks, repeat cycle if needed,  Disp: 60 g, Rfl: 1     FLUoxetine (PROZAC) 20 MG capsule, Take 1 capsule (20 mg) by mouth daily (Patient not taking: Reported on 9/20/2023), Disp: 30 capsule, Rfl: 1     hydrocortisone valerate (WEST-PATRICE) 0.2 % external ointment, Apply twice daily for up to two weeks on the face, Disp: 30 g, Rfl: 3     leflunomide (ARAVA) 20 MG tablet, Take 1 tablet (20 mg) by mouth daily (Patient not taking: Reported on 9/20/2023), Disp: 30 tablet, Rfl: 2     ALLERGIES:    Allergies   Allergen Reactions     Penicillins Anaphylaxis     Walnuts [Nuts] Anaphylaxis     All tree nuts     Norvasc [Amlodipine] Rash     Norvasc- rash at 10 mg dosing     Tramadol      paradoxical affect        SOCIAL HISTORY:   Social History     Socioeconomic History     Marital status:      Spouse name: Not on file     Number of children: 2     Years of education: Not on file     Highest education level: Not on file   Occupational History     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   Tobacco Use     Smoking status: Former     Packs/day: 0.00     Years: 13.00     Pack years: 0.00     Types: Cigarettes     Quit date: 10/1/2012     Years since quitting: 10.9     Smokeless tobacco: Never   Vaping Use     Vaping Use: Never used   Substance and Sexual Activity     Alcohol use: Not Currently     Alcohol/week: 0.0 standard drinks of alcohol     Comment: nothing in the past 3 months - occ/ 1-2 per month     Drug use: No     Sexual activity: Yes     Partners: Male     Birth control/protection: Female Surgical     Comment: hysterectomy   Other Topics Concern     Parent/sibling w/ CABG, MI or angioplasty before 65F 55M? Yes   Social History Narrative     Not on file     Social Determinants of Health     Financial Resource Strain: Not on file   Food Insecurity: Not on file   Transportation Needs: Not on file   Physical Activity: Not on file   Stress: Not on file   Social Connections: Not on file   Interpersonal Safety: Not on file   Housing Stability: Not  "on file        FAMILY HISTORY:   Family History   Problem Relation Age of Onset     Asthma Father      C.A.D. Father      Diabetes No family hx of      Hypertension No family hx of      Cerebrovascular Disease No family hx of      Breast Cancer No family hx of      Cancer - colorectal No family hx of      Prostate Cancer No family hx of      Alcohol/Drug No family hx of      Allergies No family hx of      Alzheimer Disease No family hx of      Anesthesia Reaction No family hx of      Arthritis No family hx of      Blood Disease No family hx of      Cancer No family hx of      Cardiovascular No family hx of      Circulatory No family hx of      Congenital Anomalies No family hx of      Connective Tissue Disorder No family hx of      Neurologic Disorder No family hx of      Depression No family hx of      Endocrine Disease No family hx of      Eye Disorder No family hx of      Genetic Disorder No family hx of      Gastrointestinal Disease No family hx of      Genitourinary Problems No family hx of      Gynecology No family hx of      Heart Disease No family hx of      Lipids No family hx of      Musculoskeletal Disorder No family hx of      Obesity No family hx of      Osteoporosis No family hx of      Psychotic Disorder No family hx of      Respiratory No family hx of      Hearing Loss No family hx of      Family History Negative No family hx of      Thyroid Disease No family hx of      Colon Cancer No family hx of      Hyperlipidemia No family hx of      Coronary Artery Disease No family hx of      Other Cancer No family hx of      Anxiety Disorder No family hx of      Mental Illness No family hx of      Substance Abuse No family hx of         EXAM:Vitals: BP (!) 147/96 (BP Location: Left arm, Patient Position: Sitting, Cuff Size: Adult Regular)   Pulse 92   Temp 99.2  F (37.3  C) (Temporal)   Ht 1.595 m (5' 2.8\")   Wt 91.2 kg (201 lb)   LMP  (LMP Unknown)   BMI 35.84 kg/m    BMI= Body mass index is 35.84 " kg/m .    General appearance: Patient is alert and fully cooperative with history & exam.  No sign of distress is noted during the visit.     Psychiatric: Affect is pleasant & appropriate.  Patient appears motivated to improve health.     Respiratory: Breathing is regular & unlabored while sitting.     HEENT: Hearing is intact to spoken word.  Speech is clear.  No gross evidence of visual impairment that would impact ambulation.     Vascular: DP & PT pulses are intact & regular bilaterally.  No significant edema or varicosities noted.  CFT and skin temperature is normal to both lower extremities.     Neurologic: Lower extremity sensation is intact to light touch.  No evidence of weakness or contracture in the lower extremities.  No evidence of neuropathy.    Dermatologic: Skin is intact to both lower extremities with adequate texture, turgor and tone about the integument.  No paronychia or evidence of soft tissue infection is noted.     Musculoskeletal: Patient is ambulatory without assistive device or brace.  Patient describes some discomfort with firm palpation around the right first metatarsal phalangeal joint but most of her symptoms are not reproduced.  With direct and firm palpation about the sesamoids no significant reproduction of pain.  No erythema or heat noted.  Cicatrix noted about the medial first metatarsal head.      Radiographs 3 views right foot 9/20/2023 demonstrate no acute cortical reaction.  No severe degenerative changes.     ASSESSMENT:       ICD-10-CM    1. Hallux limitus, right  M20.5X1 XR Foot Right G/E 3 Views           PLAN:  Reviewed patient's chart in [facility].      9/20/2023   Obtained and interpreted radiographs  Discussed and recommended appropriate shoe gear to provide support and protection as her current shoe gear is completely inadequate.  Discussed and offered injection oral anti-inflammatories.  We may also consider custom molded orthotic if this remains symptomatic.  Also  discussed advanced imaging if it remains symptomatic.  All questions were answered and she would rather not do the injection today.  May follow-up for the injection or move forward with custom molded orthotics if necessary.      Pierre Adan DPM      Again, thank you for allowing me to participate in the care of your patient.        Sincerely,        Pierre Adan DPM

## 2023-09-20 NOTE — PATIENT INSTRUCTIONS
Reliable shoe stores: To maximize your experience and provide the best possible fit.  Be sure to show them your foot concerns and tell them Dr. Adan sent you.      Stores listed in bold have only athletic shoes, and stores that are not bold are mostly casual or variety of shoes    [employer]  [address] [place] [address]  [phone]    [facility] [employer] [facility] [place]  [address] [place] [address]  [phone]     [employer]  [address] [place] [address]  [phone]    [employer]  [address] [facility]  [facility] [address]  [phone]    [employer]  [address] [place], [place] [address] [phone]    [employer]  [address] [place] [address]  [phone]                         [employer] Locations:     [address] [place] [address]   [phone]     [address] [place] [address] [address] [place] [address]   [phone]     [address] [place] [address]   [phone]     [address] [facility] [place] [address]   [phone]     [address] [place] [address]   [phone]     [address] [place] [address]   [phone]     [address] [facility] [facility] [address]   [place] [address]   [phone]     [address] [facility]   [place] [address]   [phone]             How to find reasonable shoes          The correct width    Correct Fitting    Correct Length      Foot Distortion    Posture Distortion                          Torsional Rigidity      Grasp behind the heel and underneath the foot and twist      Bad    Excessive torsion/twist in midfoot     Less torsion/twist in midfoot is better                   Heel Counter Rigidity      Grasp just above   midsole and squeeze      Bad    Soft heel counter      Good    Rigid Heel Counter      Flexion Rigidity      Grasp shoe and bend from forefoot to rearfoot

## 2023-09-20 NOTE — PROGRESS NOTES
HPI:  Elizabeth Goodson is a 48 year old female who is seen in consultation at the request of self.    Pt presents for eval of:   (Onset, Location, L/R, Character, Treatments, Injury if yes)    XR Right foot 9/20/2023    DOS 7/19/2016 - Excision soft tissue mass Right great toe (Dr. Jose Adan, [place])  DOS 11/22/2013 - Right foot soft tissue mass excision (Dr. Jose Cruz Garcia, [place])    LOV 7/19/2016 - Right hallux limitus     Onset early July 2023, dorsal and medial Right foot pain. No injury noted. Presents with flat slip on shoes.  Constant, dull ache. Intermittent sharp, shooting pain 6-9/10. Worse at night when lying in bed after a busy day.    Tylelnol, ibuprofen, orthofeet shoes, ice, heat, nothing helps.    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  Patient was informed that she has rheumatoid arthritis not sure if she has a positive or negative rheumatoid factor and used to see a rheumatologist but discontinued all the rheumatology medications a few years ago.  Now she is having pain around her great toe joint of unknown origin its not continuous.  Not limiting activity sleep or quality of life at this time.    ROS:  10 point ROS neg other than the symptoms noted above in the HPI.    Patient Active Problem List   Diagnosis    Syncope    Raynaud phenomenon    CARDIOVASCULAR SCREENING; LDL GOAL LESS THAN 160    Strain of neck muscle    Migraine headache    Cough with hemoptysis    Incidental lung nodule, less than or equal to 3mm    Need for prophylactic vaccination and inoculation against influenza    Fibromyalgia    HTN, goal below 140/90    H/O: hysterectomy    GERD (gastroesophageal reflux disease)    Edema    High risk medication use    Hypothyroidism    Ventral hernia without obstruction or gangrene    Hemoptysis    Rheumatoid arthritis, involving unspecified site, unspecified rheumatoid factor presence    Penicillin allergy    Peanut allergy    Tree nut allergy     Anaphylaxis, subsequent encounter    Itching    History of hemoptysis    Atypical chest pain    Mass of finger, left    Cubital tunnel syndrome on right    History of nonmelanoma skin cancer    Lupus erythematosus tumidus       PAST MEDICAL HISTORY:   Past Medical History:   Diagnosis Date    Adnexal mass 2017    not seen on 7/10/18 abdominal CT    H/O transfusion of whole blood 2001    with child labor/hysterectomy    Hemoptysis 11/12, 11/2015-mild    last episodes, mild, has had massive in HX    HTN (hypertension)     Incidental lung nodule, less than or equal to 3mm 11/12    [facility]    Massive hemoptysis 11/12    due to pneumonia    Migraine headache     Tobacco abuse, in remission     quit 2012        PAST SURGICAL HISTORY:   Past Surgical History:   Procedure Laterality Date    CHOLECYSTECTOMY, LAPOROSCOPIC  2007    Cholecystectomy, Laparoscopic    COMBINED ESOPHAGOSCOPY, GASTROSCOPY, DUODENOSCOPY (EGD) WITH CO2 INSUFFLATION N/A 11/16/2018    Procedure: Egd;  Surgeon: Richard Aviles MD;  Location: [facility] OR    ESOPHAGOSCOPY, GASTROSCOPY, DUODENOSCOPY (EGD), COMBINED N/A 11/16/2018    Procedure: COMBINED ESOPHAGOSCOPY, GASTROSCOPY, DUODENOSCOPY (EGD), BIOPSY SINGLE OR MULTIPLE;  Surgeon: Richard Aviles MD;  Location: [facility] OR    EXCISE MASS FOOT  11/22/2013    Procedure: EXCISE MASS FOOT;  Right Foot Soft Tissue Mass Excision;  Surgeon: Jose Cruz Garcia DPM;  Location: [facility] OR    EXCISE MASS FOOT Right 7/17/2015    Procedure: EXCISE MASS FOOT;  Surgeon: Pierre Adan DPM;  Location: [facility]    HYSTERECTOMY, VAGINAL  2002    LAPAROSCOPIC HERNIORRHAPHY VENTRAL N/A 2/24/2016    Procedure: LAPAROSCOPIC HERNIORRHAPHY VENTRAL;  Surgeon: Lars Dahl MD;  Location: [facility] OR    PANNICULECTOMY  07/28/2017    with liposuction, and hernia revision Dr. Heath    RELEASE TRIGGER FINGER Left 5/4/2018    Procedure: RELEASE TRIGGER FINGER;  Trigger Finger Release Left ring finger ;  Surgeon: Aaron Orta  DO Misael;  Location:  OR        MEDICATIONS:   Current Outpatient Medications:     cyclobenzaprine (FLEXERIL) 10 MG tablet, TAKE ONE TABLET BY MOUTH TWICE A DAY AS NEEDED FOR MUSCLE SPASMS, Disp: 90 tablet, Rfl: 0    diltiazem ER COATED BEADS (CARDIZEM CD/CARTIA XT) 240 MG 24 hr capsule, TAKE ONE CAPSULE BY MOUTH ONCE DAILY, Disp: 90 capsule, Rfl: 3    hydrochlorothiazide (HYDRODIURIL) 25 MG tablet, Take 1 tablet (25 mg) by mouth daily, Disp: 90 tablet, Rfl: 3    levothyroxine (SYNTHROID/LEVOTHROID) 112 MCG tablet, Take 1 tablet (112 mcg) by mouth daily, Disp: 90 tablet, Rfl: 3    losartan (COZAAR) 50 MG tablet, Take 1 tablet (50 mg) by mouth daily, Disp: 90 tablet, Rfl: 3    metoprolol succinate ER (TOPROL XL) 25 MG 24 hr tablet, Take 1 tablet (25 mg) by mouth daily, Disp: 90 tablet, Rfl: 3    omeprazole (PRILOSEC) 40 MG DR capsule, TAKE ONE CAPSULE BY MOUTH ONCE DAILY 30-60 MINUTES BEFORE A MEAL, Disp: 90 capsule, Rfl: 3    potassium chloride ER (KLOR-CON M) 20 MEQ CR tablet, Take 1 tablet (20 mEq) by mouth daily, Disp: 90 tablet, Rfl: 3    albuterol (PROAIR HFA/PROVENTIL HFA/VENTOLIN HFA) 108 (90 Base) MCG/ACT inhaler, Inhale 2 puffs into the lungs every 6 hours as needed for shortness of breath, wheezing or cough (Patient not taking: Reported on 9/20/2023), Disp: 18 g, Rfl: 0    EPINEPHrine (EPIPEN 2-KIMBERLEE) 0.3 MG/0.3ML injection 2-pack, Inject 0.3 mLs (0.3 mg) into the muscle once as needed for anaphylaxis (Patient not taking: Reported on 9/20/2023), Disp: 0.6 mL, Rfl: 1    fluocinonide (LIDEX) 0.05 % external cream, Apply twice daily for weeks, weekends only for 2 weeks, repeat cycle if needed, Disp: 60 g, Rfl: 1    FLUoxetine (PROZAC) 20 MG capsule, Take 1 capsule (20 mg) by mouth daily (Patient not taking: Reported on 9/20/2023), Disp: 30 capsule, Rfl: 1    hydrocortisone valerate (WEST-PATRICE) 0.2 % external ointment, Apply twice daily for up to two weeks on the face, Disp: 30 g, Rfl: 3    leflunomide  (ARAVA) 20 MG tablet, Take 1 tablet (20 mg) by mouth daily (Patient not taking: Reported on 9/20/2023), Disp: 30 tablet, Rfl: 2     ALLERGIES:    Allergies   Allergen Reactions    Penicillins Anaphylaxis    Walnuts [Nuts] Anaphylaxis     All tree nuts    Norvasc [Amlodipine] Rash     Norvasc- rash at 10 mg dosing    Tramadol      paradoxical affect        SOCIAL HISTORY:   Social History     Socioeconomic History    Marital status:      Spouse name: Not on file    Number of children: 2    Years of education: Not on file    Highest education level: Not on file   Occupational History     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   Tobacco Use    Smoking status: Former     Packs/day: 0.00     Years: 13.00     Pack years: 0.00     Types: Cigarettes     Quit date: 10/1/2012     Years since quitting: 10.9    Smokeless tobacco: Never   Vaping Use    Vaping Use: Never used   Substance and Sexual Activity    Alcohol use: Not Currently     Alcohol/week: 0.0 standard drinks of alcohol     Comment: nothing in the past 3 months - occ/ 1-2 per month    Drug use: No    Sexual activity: Yes     Partners: Male     Birth control/protection: Female Surgical     Comment: hysterectomy   Other Topics Concern    Parent/sibling w/ CABG, MI or angioplasty before 65F 55M? Yes   Social History Narrative    Not on file     Social Determinants of Health     Financial Resource Strain: Not on file   Food Insecurity: Not on file   Transportation Needs: Not on file   Physical Activity: Not on file   Stress: Not on file   Social Connections: Not on file   Interpersonal Safety: Not on file   Housing Stability: Not on file        FAMILY HISTORY:   Family History   Problem Relation Age of Onset    Asthma Father     C.A.D. Father     Diabetes No family hx of     Hypertension No family hx of     Cerebrovascular Disease No family hx of     Breast Cancer No family hx of     Cancer - colorectal No family hx of     Prostate Cancer No family hx of   "   Alcohol/Drug No family hx of     Allergies No family hx of     Alzheimer Disease No family hx of     Anesthesia Reaction No family hx of     Arthritis No family hx of     Blood Disease No family hx of     Cancer No family hx of     Cardiovascular No family hx of     Circulatory No family hx of     Congenital Anomalies No family hx of     Connective Tissue Disorder No family hx of     Neurologic Disorder No family hx of     Depression No family hx of     Endocrine Disease No family hx of     Eye Disorder No family hx of     Genetic Disorder No family hx of     Gastrointestinal Disease No family hx of     Genitourinary Problems No family hx of     Gynecology No family hx of     Heart Disease No family hx of     Lipids No family hx of     Musculoskeletal Disorder No family hx of     Obesity No family hx of     Osteoporosis No family hx of     Psychotic Disorder No family hx of     Respiratory No family hx of     Hearing Loss No family hx of     Family History Negative No family hx of     Thyroid Disease No family hx of     Colon Cancer No family hx of     Hyperlipidemia No family hx of     Coronary Artery Disease No family hx of     Other Cancer No family hx of     Anxiety Disorder No family hx of     Mental Illness No family hx of     Substance Abuse No family hx of         EXAM:Vitals: BP (!) 147/96 (BP Location: Left arm, Patient Position: Sitting, Cuff Size: Adult Regular)   Pulse 92   Temp 99.2  F (37.3  C) (Temporal)   Ht 1.595 m (5' 2.8\")   Wt 91.2 kg (201 lb)   LMP  (LMP Unknown)   BMI 35.84 kg/m    BMI= Body mass index is 35.84 kg/m .    General appearance: Patient is alert and fully cooperative with history & exam.  No sign of distress is noted during the visit.     Psychiatric: Affect is pleasant & appropriate.  Patient appears motivated to improve health.     Respiratory: Breathing is regular & unlabored while sitting.     HEENT: Hearing is intact to spoken word.  Speech is clear.  No gross " evidence of visual impairment that would impact ambulation.     Vascular: DP & PT pulses are intact & regular bilaterally.  No significant edema or varicosities noted.  CFT and skin temperature is normal to both lower extremities.     Neurologic: Lower extremity sensation is intact to light touch.  No evidence of weakness or contracture in the lower extremities.  No evidence of neuropathy.    Dermatologic: Skin is intact to both lower extremities with adequate texture, turgor and tone about the integument.  No paronychia or evidence of soft tissue infection is noted.     Musculoskeletal: Patient is ambulatory without assistive device or brace.  Patient describes some discomfort with firm palpation around the right first metatarsal phalangeal joint but most of her symptoms are not reproduced.  With direct and firm palpation about the sesamoids no significant reproduction of pain.  No erythema or heat noted.  Cicatrix noted about the medial first metatarsal head.      Radiographs 3 views right foot 9/20/2023 demonstrate no acute cortical reaction.  No severe degenerative changes.     ASSESSMENT:       ICD-10-CM    1. Hallux limitus, right  M20.5X1 XR Foot Right G/E 3 Views           PLAN:  Reviewed patient's chart in [facility].      9/20/2023   Obtained and interpreted radiographs  Discussed and recommended appropriate shoe gear to provide support and protection as her current shoe gear is completely inadequate.  Discussed and offered injection oral anti-inflammatories.  We may also consider custom molded orthotic if this remains symptomatic.  Also discussed advanced imaging if it remains symptomatic.  All questions were answered and she would rather not do the injection today.  May follow-up for the injection or move forward with custom molded orthotics if necessary.      Pierre Adan DPM

## 2023-09-27 DIAGNOSIS — I10 HTN, GOAL BELOW 140/90: ICD-10-CM

## 2023-09-27 DIAGNOSIS — E03.4 HYPOTHYROIDISM DUE TO ACQUIRED ATROPHY OF THYROID: ICD-10-CM

## 2023-09-27 DIAGNOSIS — I10 ESSENTIAL HYPERTENSION: ICD-10-CM

## 2023-09-28 ENCOUNTER — MYC REFILL (OUTPATIENT)
Dept: FAMILY MEDICINE | Facility: CLINIC | Age: 48
End: 2023-09-28
Payer: COMMERCIAL

## 2023-09-28 DIAGNOSIS — M79.7 FIBROMYALGIA: ICD-10-CM

## 2023-09-28 RX ORDER — CYCLOBENZAPRINE HCL 10 MG
10 TABLET ORAL 2 TIMES DAILY PRN
Qty: 90 TABLET | Refills: 0 | Status: SHIPPED | OUTPATIENT
Start: 2023-09-28 | End: 2023-12-05

## 2023-09-28 RX ORDER — METOPROLOL SUCCINATE 25 MG/1
25 TABLET, EXTENDED RELEASE ORAL DAILY
Qty: 90 TABLET | Refills: 0 | Status: SHIPPED | OUTPATIENT
Start: 2023-09-28 | End: 2023-12-19

## 2023-09-28 RX ORDER — HYDROCHLOROTHIAZIDE 25 MG/1
25 TABLET ORAL DAILY
Qty: 90 TABLET | Refills: 0 | Status: SHIPPED | OUTPATIENT
Start: 2023-09-28 | End: 2023-12-19

## 2023-09-28 RX ORDER — LEVOTHYROXINE SODIUM 112 UG/1
112 TABLET ORAL DAILY
Qty: 90 TABLET | Refills: 0 | Status: SHIPPED | OUTPATIENT
Start: 2023-09-28 | End: 2023-12-19

## 2023-10-10 DIAGNOSIS — I10 ESSENTIAL HYPERTENSION: ICD-10-CM

## 2023-10-11 RX ORDER — DILTIAZEM HYDROCHLORIDE 240 MG/1
CAPSULE, COATED, EXTENDED RELEASE ORAL
Qty: 30 CAPSULE | Refills: 0 | Status: SHIPPED | OUTPATIENT
Start: 2023-10-11 | End: 2023-11-07

## 2023-11-06 DIAGNOSIS — I10 ESSENTIAL HYPERTENSION: ICD-10-CM

## 2023-11-07 RX ORDER — DILTIAZEM HYDROCHLORIDE 240 MG/1
CAPSULE, COATED, EXTENDED RELEASE ORAL
Qty: 90 CAPSULE | Refills: 0 | Status: SHIPPED | OUTPATIENT
Start: 2023-11-07 | End: 2024-02-05

## 2023-11-17 DIAGNOSIS — R04.2 COUGH WITH HEMOPTYSIS: ICD-10-CM

## 2023-11-17 DIAGNOSIS — K21.9 GASTROESOPHAGEAL REFLUX DISEASE WITHOUT ESOPHAGITIS: ICD-10-CM

## 2023-11-17 DIAGNOSIS — K44.9 HIATAL HERNIA: ICD-10-CM

## 2023-11-17 RX ORDER — OMEPRAZOLE 40 MG/1
CAPSULE, DELAYED RELEASE ORAL
Qty: 90 CAPSULE | Refills: 3 | Status: SHIPPED | OUTPATIENT
Start: 2023-11-17 | End: 2024-04-08

## 2023-11-19 ENCOUNTER — HEALTH MAINTENANCE LETTER (OUTPATIENT)
Age: 48
End: 2023-11-19

## 2023-11-21 NOTE — PROGRESS NOTES
Subjective     Elizabeth Goodson is a 44 year old female who presents to clinic today for the following health issues:    History of Present Illness        She eats 2-3 servings of fruits and vegetables daily.She consumes 1 sweetened beverage(s) daily.She exercises with enough effort to increase her heart rate 10 to 19 minutes per day.  She exercises with enough effort to increase her heart rate 3 or less days per week.   She is taking medications regularly.     Acute Illness   Acute illness concerns: Swollen lymph nodes and sinus pressure  Onset: 2 weeks. Has a cold about 2 weeks ago. This lasted about 7-8 days. Since the she feels like she may have a sinus inf    Fever: no    Chills/Sweats: no    Headache (location?): YES    Sinus Pressure:YES    Conjunctivitis:  no    Ear Pain: YES: bilateral    Rhinorrhea: no    Congestion: YES    Sore Throat: YES, intermittent      Cough: no    Wheeze: no    Decreased Appetite: no    Nausea: no    Vomiting: no    Diarrhea:  no    Dysuria/Freq.: no    Fatigue/Achiness: no    Sick/Strep Exposure: YES - strep throat, influenza      Therapies Tried and outcome: none  -For the past 2 weeks pt has had sinus headaches, intermittent ear aches, fatigued.   -Has intermittent sore throat and has been exposed to people with strep/influenza at school, but she doesn't feel that she has either. Her current symptoms feel typical of sinus infections she has had in the past.      -2.5 weeks ago a lump in the back of her neck near her hairline appeared. It hasn't changed. Her neck is very sore in the morning. Denies a muscle strain.        -Pt experiences urinary leaking with running, coughing, sneezing. If she holds her bladder she will experience urinary urgency. She wears a pad daily.   -Her friends have been on a pill for urinary urgency and she is wondering if this is a good option.    Musculoskeletal  -Pt is concerned that it seems the first few knuckles on her right hand are always  inflamed. Denies injury     Additional  -She takes a daily potassium supplement. She states that ever since she had an endoscopy it is harder to swallow large pills  -Recently cut out sugar from her diet and has been trying to decrease sodium consumption. Hoping to lose weight.   Wt Readings from Last 4 Encounters:   01/27/20 85.4 kg (188 lb 4.8 oz)   11/11/19 83.6 kg (184 lb 4.8 oz)   03/22/19 79.5 kg (175 lb 3.2 oz)   11/12/18 79.4 kg (175 lb)       Patient Active Problem List   Diagnosis     Syncope     Raynaud phenomenon     CARDIOVASCULAR SCREENING; LDL GOAL LESS THAN 160     Strain of neck muscle     Migraine headache     Cough with hemoptysis     Incidental lung nodule, less than or equal to 3mm     Need for prophylactic vaccination and inoculation against influenza     Fibromyalgia     HTN, goal below 140/90     H/O: hysterectomy     GERD (gastroesophageal reflux disease)     Edema     High risk medication use     Hypothyroidism     Ventral hernia without obstruction or gangrene     Hemoptysis     Rheumatoid arthritis, involving unspecified site, unspecified rheumatoid factor presence (H)     Penicillin allergy     Peanut allergy     Tree nut allergy     Anaphylaxis, subsequent encounter     Itching     History of hemoptysis     Atypical chest pain     Mass of finger, left     Cubital tunnel syndrome on right     Past Surgical History:   Procedure Laterality Date     CHOLECYSTECTOMY, LAPOROSCOPIC  2007    Cholecystectomy, Laparoscopic     COMBINED ESOPHAGOSCOPY, GASTROSCOPY, DUODENOSCOPY (EGD) WITH CO2 INSUFFLATION N/A 11/16/2018    Procedure: Egd;  Surgeon: Richard Aviles MD;  Location:  OR     ESOPHAGOSCOPY, GASTROSCOPY, DUODENOSCOPY (EGD), COMBINED N/A 11/16/2018    Procedure: COMBINED ESOPHAGOSCOPY, GASTROSCOPY, DUODENOSCOPY (EGD), BIOPSY SINGLE OR MULTIPLE;  Surgeon: Richard Aviles MD;  Location:  OR     EXCISE MASS FOOT  11/22/2013    Procedure: EXCISE MASS FOOT;  Right Foot  Soft Tissue Mass Excision;  Surgeon: Jose Cruz Garcia DPM;  Location: [facility]     EXCISE MASS FOOT Right 2015    Procedure: EXCISE MASS FOOT;  Surgeon: Pierre Adan DPM;  Location: [facility]     HYSTERECTOMY, VAGINAL  2002     LAPAROSCOPIC HERNIORRHAPHY VENTRAL N/A 2016    Procedure: LAPAROSCOPIC HERNIORRHAPHY VENTRAL;  Surgeon: Lars Dahl MD;  Location: [facility] OR     PANNICULECTOMY  2017    with liposuction, and hernia revision Dr. Heath     RELEASE TRIGGER FINGER Left 2018    Procedure: RELEASE TRIGGER FINGER;  Trigger Finger Release Left ring finger ;  Surgeon: Aaron Orta DO;  Location: [facility] OR       Social History     Tobacco Use     Smoking status: Former Smoker     Years: 13.00     Types: Cigarettes     Last attempt to quit: 10/1/2012     Years since quittin.3     Smokeless tobacco: Never Used   Substance Use Topics     Alcohol use: Yes     Alcohol/week: 0.0 standard drinks     Comment: occ/ 1-2 per month     Family History   Problem Relation Age of Onset     Asthma Father      C.A.D. Father      Diabetes No family hx of      Hypertension No family hx of      Cerebrovascular Disease No family hx of      Breast Cancer No family hx of      Cancer - colorectal No family hx of      Prostate Cancer No family hx of      Alcohol/Drug No family hx of      Allergies No family hx of      Alzheimer Disease No family hx of      Anesthesia Reaction No family hx of      Arthritis No family hx of      Blood Disease No family hx of      Cancer No family hx of      Cardiovascular No family hx of      Circulatory No family hx of      Congenital Anomalies No family hx of      Connective Tissue Disorder No family hx of      Neurologic Disorder No family hx of      Depression No family hx of      Endocrine Disease No family hx of      Eye Disorder No family hx of      Genetic Disorder No family hx of      Gastrointestinal Disease No family hx of      Genitourinary Problems No family  hx of      Gynecology No family hx of      Heart Disease No family hx of      Lipids No family hx of      Musculoskeletal Disorder No family hx of      Obesity No family hx of      Osteoporosis No family hx of      Psychotic Disorder No family hx of      Respiratory No family hx of      Hearing Loss No family hx of      Family History Negative No family hx of      Thyroid Disease No family hx of      Colon Cancer No family hx of      Hyperlipidemia No family hx of      Coronary Artery Disease No family hx of      Other Cancer No family hx of      Anxiety Disorder No family hx of      Mental Illness No family hx of      Substance Abuse No family hx of          Current Outpatient Medications   Medication Sig Dispense Refill     clarithromycin (BIAXIN) 500 MG tablet Take 1 tablet (500 mg) by mouth 2 times daily 20 tablet 0     cyclobenzaprine (FLEXERIL) 10 MG tablet TAKE ONE TABLET BY MOUTH AT BEDTIME 90 tablet 1     EPINEPHrine (EPIPEN 2-KIMBERLEE) 0.3 MG/0.3ML injection 2-pack Inject 0.3 mLs (0.3 mg) into the muscle once as needed for anaphylaxis 0.6 mL 1     hydrochlorothiazide (HYDRODIURIL) 25 MG tablet TAKE ONE TABLET BY MOUTH ONCE DAILY 90 tablet 0     leflunomide (ARAVA) 20 MG tablet Take 1 tablet (20 mg) by mouth daily 30 tablet 2     levothyroxine (SYNTHROID/LEVOTHROID) 112 MCG tablet Take 1 tablet (112 mcg) by mouth daily 90 tablet 1     losartan (COZAAR) 50 MG tablet TAKE ONE TABLET BY MOUTH ONCE DAILY 90 tablet 2     metoprolol succinate ER (TOPROL-XL) 25 MG 24 hr tablet TAKE ONE TABLET BY MOUTH ONCE DAILY 90 tablet 1     omeprazole (PRILOSEC) 40 MG DR capsule Take 1 capsule (40 mg) by mouth daily Take 30-60 minutes before a meal. 90 capsule 3     potassium chloride ER (KLOR-CON) 20 MEQ CR tablet Take 1 tablet (20 mEq) by mouth daily 90 tablet 1     Allergies   Allergen Reactions     Penicillins Anaphylaxis     Walnuts [Nuts] Anaphylaxis     All tree nuts     Norvasc [Amlodipine] Rash     Norvasc- rash at 10 mg  "dosing     BP Readings from Last 3 Encounters:   01/27/20 128/76   11/11/19 102/64   11/07/19 134/86    Wt Readings from Last 3 Encounters:   01/27/20 85.4 kg (188 lb 4.8 oz)   11/11/19 83.6 kg (184 lb 4.8 oz)   03/22/19 79.5 kg (175 lb 3.2 oz)                      Reviewed and updated as needed this visit by Provider         Review of Systems   ROS COMP: Constitutional, HEENT, cardiovascular, pulmonary, GI, , musculoskeletal, neuro, skin, endocrine and psych systems are negative, except as otherwise noted.    This document serves as a record of the services and decisions personally performed by CAROLYN MAGANA. It was created on his/her behalf by Tana Camilo, a trained medical scribe. The creation of this document is based on the provider's statements to the medical scribe. Tana Camilo, January 27, 2020 11:55 AM         Objective    /76   Pulse 95   Temp 99.6  F (37.6  C) (Temporal)   Resp 14   Ht 1.588 m (5' 2.5\")   Wt 85.4 kg (188 lb 4.8 oz)   LMP  (LMP Unknown)   SpO2 99%   BMI 33.89 kg/m    Body mass index is 33.89 kg/m .  Physical Exam   GENERAL: alert and no distress, obese   EYES: Eyes grossly normal to inspection, PERRL and conjunctivae and sclerae normal  HENT: ear canals and TM's normal, nose and mouth without ulcers or lesions, mild nasal mucosal edema  NECK: mild to moderate right posterior and anterior cervical adenopathy noted that are slightly tender, no asymmetry, masses, or scars and thyroid normal to palpation  RESP: lungs clear to auscultation - no rales, rhonchi or wheezes  CV: regular rate and rhythm, normal S1 S2, no S3 or S4, no murmur, click or rub, no peripheral edema and peripheral pulses strong  ABDOMEN: soft, nontender, no hepatosplenomegaly, no masses and bowel sounds normal  MS: no gross musculoskeletal defects noted, no edema. Soft tissue swelling noted on dorsum of right hand just proximal to the MCP joints, no ganglion appreciated with flexion and extension, no " discrete masses, nontender.   SKIN:  no suspicious lesions or rashes  NEURO: Normal strength and tone, mentation intact and speech normal  PSYCH: mentation appears normal, affect normal/bright    Diagnostic Test Results:  Labs reviewed in Epic        Assessment & Plan       ICD-10-CM    1. Acute non-recurrent maxillary sinusitis J01.00 clarithromycin (BIAXIN) 500 MG tablet   2. Lymphadenopathy R59.1    3. Cough R05 CBC with platelets and differential   4. Peanut allergy Z91.010 EPINEPHrine (EPIPEN 2-KIMBERLEE) 0.3 MG/0.3ML injection 2-pack   5. Tree nut allergy Z91.018 EPINEPHrine (EPIPEN 2-KIMBERLEE) 0.3 MG/0.3ML injection 2-pack   6. Rheumatoid arthritis, involving unspecified site, unspecified rheumatoid factor presence (H) M06.9    7. HTN, goal below 140/90 I10 BASIC METABOLIC PANEL   8. Hypothyroidism, unspecified type E03.9 TSH with free T4 reflex   9. Overactive bladder N32.81    10. Female stress incontinence N39.3 UROLOGY ADULT REFERRAL      Start clarithromycin for sinus infection. Appreciate to monitor the lymph node, normal reactive phase currently, no abnormal palpation.  reviewed red flag signs to watch for.  Follow-up in 3 weeks if symptoms worsen or fail to improve, otherwise follow-up in 6 months for physical exam     Blood pressure at goal, continue metoprolol daily.checking potassium.     Pt will consider scheduling with urology for her stress incontinence. Discussed that medication would likely be ineffective for her symptoms, as not exhibiting urge syptoms. Pt voices understanding.       Patient Instructions   Monitor bump for another 3 weeks. If there is still no change return for a follow-up for further evaluation. Try not to touch it    I will give you a referral for urology to discuss options for a urinary symptoms.      Return in about 6 months (around 7/27/2020) for Physical Exam, but sooner if things aren't going well.  Length of visit was 15 minutes with more than 50 percent of that time used for  discussing medical concerns and education      The information in this document, created by the medical scribe for me, accurately reflects the services I personally performed and the decisions made by me. I have reviewed and approved this document for accuracy.   RAÚL Flor [facility]       Quality 226: Preventive Care And Screening: Tobacco Use: Screening And Cessation Intervention: Patient screened for tobacco use and is an ex/non-smoker Detail Level: Detailed Quality 110: Preventive Care And Screening: Influenza Immunization: Influenza Immunization not Administered for Documented Reasons. Quality 431: Preventive Care And Screening: Unhealthy Alcohol Use - Screening: Patient not identified as an unhealthy alcohol user when screened for unhealthy alcohol use using a systematic screening method Additional Notes: We do not offer flu shots at our location.

## 2023-12-04 DIAGNOSIS — I10 ESSENTIAL HYPERTENSION WITH GOAL BLOOD PRESSURE LESS THAN 140/90: ICD-10-CM

## 2023-12-04 DIAGNOSIS — M79.7 FIBROMYALGIA: ICD-10-CM

## 2023-12-05 RX ORDER — LOSARTAN POTASSIUM 50 MG/1
50 TABLET ORAL DAILY
Qty: 90 TABLET | Refills: 3 | Status: SHIPPED | OUTPATIENT
Start: 2023-12-05 | End: 2024-04-08

## 2023-12-05 RX ORDER — CYCLOBENZAPRINE HCL 10 MG
10 TABLET ORAL 2 TIMES DAILY PRN
Qty: 45 TABLET | Refills: 0 | Status: SHIPPED | OUTPATIENT
Start: 2023-12-05 | End: 2023-12-19

## 2023-12-18 DIAGNOSIS — I10 ESSENTIAL HYPERTENSION: ICD-10-CM

## 2023-12-19 ENCOUNTER — MYC REFILL (OUTPATIENT)
Dept: FAMILY MEDICINE | Facility: CLINIC | Age: 48
End: 2023-12-19
Payer: COMMERCIAL

## 2023-12-19 DIAGNOSIS — I10 ESSENTIAL HYPERTENSION: ICD-10-CM

## 2023-12-19 DIAGNOSIS — M79.7 FIBROMYALGIA: ICD-10-CM

## 2023-12-19 DIAGNOSIS — I10 HTN, GOAL BELOW 140/90: ICD-10-CM

## 2023-12-19 DIAGNOSIS — E03.4 HYPOTHYROIDISM DUE TO ACQUIRED ATROPHY OF THYROID: ICD-10-CM

## 2023-12-19 RX ORDER — HYDROCHLOROTHIAZIDE 25 MG/1
25 TABLET ORAL DAILY
Qty: 90 TABLET | Refills: 0 | OUTPATIENT
Start: 2023-12-19

## 2023-12-19 RX ORDER — CYCLOBENZAPRINE HCL 10 MG
10 TABLET ORAL 2 TIMES DAILY PRN
Qty: 45 TABLET | Refills: 0 | Status: SHIPPED | OUTPATIENT
Start: 2023-12-19 | End: 2024-03-11

## 2023-12-19 NOTE — LETTER
December 22, 2023      Elizabeth Goodson  [address] [place] [id number]                Hello,      Your provider has sent a [facility] refill for your cyclobenzaprine (FLEXERIL) 10 MG. You are due for a nurse/medical assistant only blood pressure check before any more refills will be sent.      Please schedule via [employer] or call [phone].      Have a good day,      [facility]BELLA [facility]

## 2023-12-20 NOTE — TELEPHONE ENCOUNTER
Call pt- no labs in > 18 months. Not able to refill. Needs visit.   Can bridge until seen- needs open provider.  Can do HTN visit earlier and keep PE appt with me if she'd like in Feb. As she  Did not have PE this year and BP is not controlled.  RAÚL Flor CNP

## 2023-12-21 NOTE — TELEPHONE ENCOUNTER
"RN Triage    Patient Contact    Attempt # 1    Was call answered?  No.  Left message on voicemail with information to call me back.    Upon callback please advise of the below from provider.     \"Call pt- no labs in > 18 months. Not able to refill. Needs visit.   Can bridge until seen- needs open provider.  Can do HTN visit earlier and keep PE appt with me if she'd like in Feb. As she  Did not have PE this year and BP is not controlled.  Keri Yu, APRN CNP\"    MARY JO Love, RN  [facility] ~ Registered Nurse  Clinic Triage ~ [place] [facility] [place]  December 21, 2023      "

## 2023-12-22 RX ORDER — HYDROCHLOROTHIAZIDE 25 MG/1
25 TABLET ORAL DAILY
Qty: 30 TABLET | Refills: 0 | Status: SHIPPED | OUTPATIENT
Start: 2023-12-22 | End: 2024-01-26

## 2023-12-22 RX ORDER — LEVOTHYROXINE SODIUM 112 UG/1
112 TABLET ORAL DAILY
Qty: 30 TABLET | Refills: 0 | Status: SHIPPED | OUTPATIENT
Start: 2023-12-22 | End: 2024-02-05

## 2023-12-22 RX ORDER — METOPROLOL SUCCINATE 25 MG/1
25 TABLET, EXTENDED RELEASE ORAL DAILY
Qty: 30 TABLET | Refills: 0 | Status: SHIPPED | OUTPATIENT
Start: 2023-12-22 | End: 2024-01-28

## 2023-12-22 NOTE — TELEPHONE ENCOUNTER
"RN Triage     Patient Contact     Attempt # 2     Was call answered?  No.  Left message on voicemail with information to call me back.     Upon callback please advise of the below from provider.      \"Call pt- no labs in > 18 months. Not able to refill. Needs visit.   Can bridge until seen- needs open provider.  Can do HTN visit earlier and keep PE appt with me if she'd like in Feb. As she  Did not have PE this year and BP is not controlled.  Keri Yu, APRN CNP\"    MARY JO Love, RN  [facility] ~ Registered Nurse  Clinic Triage ~ [place] [facility] [place]  December 22, 2023    "

## 2024-01-26 ENCOUNTER — MYC REFILL (OUTPATIENT)
Dept: FAMILY MEDICINE | Facility: CLINIC | Age: 49
End: 2024-01-26

## 2024-01-26 DIAGNOSIS — I10 ESSENTIAL HYPERTENSION: ICD-10-CM

## 2024-01-26 RX ORDER — HYDROCHLOROTHIAZIDE 25 MG/1
25 TABLET ORAL DAILY
Qty: 30 TABLET | Refills: 0 | Status: SHIPPED | OUTPATIENT
Start: 2024-01-26 | End: 2024-02-26

## 2024-01-28 ENCOUNTER — MYC REFILL (OUTPATIENT)
Dept: FAMILY MEDICINE | Facility: CLINIC | Age: 49
End: 2024-01-28

## 2024-01-28 DIAGNOSIS — I10 HTN, GOAL BELOW 140/90: ICD-10-CM

## 2024-01-29 RX ORDER — METOPROLOL SUCCINATE 25 MG/1
25 TABLET, EXTENDED RELEASE ORAL DAILY
Qty: 30 TABLET | Refills: 0 | OUTPATIENT
Start: 2024-01-29

## 2024-01-29 RX ORDER — METOPROLOL SUCCINATE 25 MG/1
25 TABLET, EXTENDED RELEASE ORAL DAILY
Qty: 30 TABLET | Refills: 0 | Status: SHIPPED | OUTPATIENT
Start: 2024-01-29 | End: 2024-02-26

## 2024-02-05 ENCOUNTER — MYC REFILL (OUTPATIENT)
Dept: FAMILY MEDICINE | Facility: CLINIC | Age: 49
End: 2024-02-05
Payer: COMMERCIAL

## 2024-02-05 DIAGNOSIS — I10 HTN, GOAL BELOW 140/90: ICD-10-CM

## 2024-02-05 DIAGNOSIS — E03.4 HYPOTHYROIDISM DUE TO ACQUIRED ATROPHY OF THYROID: ICD-10-CM

## 2024-02-05 DIAGNOSIS — I10 ESSENTIAL HYPERTENSION: ICD-10-CM

## 2024-02-05 RX ORDER — METOPROLOL SUCCINATE 25 MG/1
25 TABLET, EXTENDED RELEASE ORAL DAILY
Qty: 30 TABLET | Refills: 0 | OUTPATIENT
Start: 2024-02-05

## 2024-02-05 RX ORDER — LEVOTHYROXINE SODIUM 112 UG/1
112 TABLET ORAL DAILY
Qty: 30 TABLET | Refills: 0 | Status: SHIPPED | OUTPATIENT
Start: 2024-02-05 | End: 2024-03-13

## 2024-02-05 RX ORDER — DILTIAZEM HYDROCHLORIDE 240 MG/1
CAPSULE, COATED, EXTENDED RELEASE ORAL
Qty: 90 CAPSULE | Refills: 0 | Status: SHIPPED | OUTPATIENT
Start: 2024-02-05 | End: 2024-04-08

## 2024-02-05 RX ORDER — HYDROCHLOROTHIAZIDE 25 MG/1
25 TABLET ORAL DAILY
Qty: 30 TABLET | Refills: 0 | OUTPATIENT
Start: 2024-02-05

## 2024-02-23 ENCOUNTER — TELEPHONE (OUTPATIENT)
Dept: FAMILY MEDICINE | Facility: CLINIC | Age: 49
End: 2024-02-23
Payer: COMMERCIAL

## 2024-02-23 NOTE — TELEPHONE ENCOUNTER
Patient Quality Outreach    Patient is due for the following:   Hypertension -  BP check  Colon Cancer Screening  Breast Cancer Screening - Mammogram  Physical Preventive Adult Physical      Topic Date Due    Hepatitis B Vaccine (1 of 3 - 19+ 3-dose series) Never done    Pneumococcal Vaccine (2 of 2 - PPSV23 or PCV20) 01/06/2020    Diptheria Tetanus Pertussis (DTAP/TDAP/TD) Vaccine (4 - Td or Tdap) 11/02/2022    Flu Vaccine (1) 09/01/2023    COVID-19 Vaccine (4 - 2023-24 season) 09/01/2023       Next Steps:   Patient has upcoming appointment, these items will be addressed at that time.    Type of outreach:    Chart review performed, no outreach needed.      Questions for provider review:    None           Vicki Alberts, CMA

## 2024-03-09 DIAGNOSIS — M79.7 FIBROMYALGIA: ICD-10-CM

## 2024-03-11 RX ORDER — CYCLOBENZAPRINE HCL 10 MG
10 TABLET ORAL 2 TIMES DAILY PRN
Qty: 45 TABLET | Refills: 0 | Status: SHIPPED | OUTPATIENT
Start: 2024-03-11 | End: 2024-03-18

## 2024-03-13 DIAGNOSIS — E03.4 HYPOTHYROIDISM DUE TO ACQUIRED ATROPHY OF THYROID: ICD-10-CM

## 2024-03-13 RX ORDER — LEVOTHYROXINE SODIUM 112 UG/1
112 TABLET ORAL DAILY
Qty: 30 TABLET | Refills: 0 | Status: SHIPPED | OUTPATIENT
Start: 2024-03-13 | End: 2024-04-08

## 2024-03-16 DIAGNOSIS — M79.7 FIBROMYALGIA: ICD-10-CM

## 2024-03-18 RX ORDER — CYCLOBENZAPRINE HCL 10 MG
10 TABLET ORAL 2 TIMES DAILY PRN
Qty: 45 TABLET | Refills: 0 | Status: SHIPPED | OUTPATIENT
Start: 2024-03-18 | End: 2024-06-17

## 2024-04-08 ENCOUNTER — ORDERS ONLY (AUTO-RELEASED) (OUTPATIENT)
Dept: FAMILY MEDICINE | Facility: CLINIC | Age: 49
End: 2024-04-08

## 2024-04-08 ENCOUNTER — OFFICE VISIT (OUTPATIENT)
Dept: FAMILY MEDICINE | Facility: CLINIC | Age: 49
End: 2024-04-08
Payer: COMMERCIAL

## 2024-04-08 VITALS
HEIGHT: 63 IN | WEIGHT: 206.19 LBS | HEART RATE: 85 BPM | OXYGEN SATURATION: 96 % | DIASTOLIC BLOOD PRESSURE: 84 MMHG | RESPIRATION RATE: 16 BRPM | TEMPERATURE: 97.9 F | BODY MASS INDEX: 36.54 KG/M2 | SYSTOLIC BLOOD PRESSURE: 130 MMHG

## 2024-04-08 DIAGNOSIS — K44.9 HIATAL HERNIA: ICD-10-CM

## 2024-04-08 DIAGNOSIS — M06.9 RHEUMATOID ARTHRITIS INVOLVING MULTIPLE SITES, UNSPECIFIED WHETHER RHEUMATOID FACTOR PRESENT (H): ICD-10-CM

## 2024-04-08 DIAGNOSIS — I10 HTN, GOAL BELOW 140/90: ICD-10-CM

## 2024-04-08 DIAGNOSIS — E03.4 HYPOTHYROIDISM DUE TO ACQUIRED ATROPHY OF THYROID: ICD-10-CM

## 2024-04-08 DIAGNOSIS — Z00.00 ROUTINE MEDICAL EXAM: Primary | ICD-10-CM

## 2024-04-08 DIAGNOSIS — E66.812 CLASS 2 SEVERE OBESITY DUE TO EXCESS CALORIES WITH SERIOUS COMORBIDITY AND BODY MASS INDEX (BMI) OF 36.0 TO 36.9 IN ADULT (H): ICD-10-CM

## 2024-04-08 DIAGNOSIS — Z12.11 SCREEN FOR COLON CANCER: ICD-10-CM

## 2024-04-08 DIAGNOSIS — I10 ESSENTIAL HYPERTENSION: ICD-10-CM

## 2024-04-08 DIAGNOSIS — Z12.31 VISIT FOR SCREENING MAMMOGRAM: ICD-10-CM

## 2024-04-08 DIAGNOSIS — K21.9 GASTROESOPHAGEAL REFLUX DISEASE WITHOUT ESOPHAGITIS: ICD-10-CM

## 2024-04-08 DIAGNOSIS — E66.01 CLASS 2 SEVERE OBESITY DUE TO EXCESS CALORIES WITH SERIOUS COMORBIDITY AND BODY MASS INDEX (BMI) OF 36.0 TO 36.9 IN ADULT (H): ICD-10-CM

## 2024-04-08 DIAGNOSIS — E03.9 ACQUIRED HYPOTHYROIDISM: ICD-10-CM

## 2024-04-08 DIAGNOSIS — Z87.898 HISTORY OF HEMOPTYSIS: ICD-10-CM

## 2024-04-08 DIAGNOSIS — F41.1 GAD (GENERALIZED ANXIETY DISORDER): ICD-10-CM

## 2024-04-08 DIAGNOSIS — E87.6 HYPOKALEMIA: ICD-10-CM

## 2024-04-08 DIAGNOSIS — I10 ESSENTIAL HYPERTENSION WITH GOAL BLOOD PRESSURE LESS THAN 140/90: ICD-10-CM

## 2024-04-08 LAB
BASOPHILS # BLD AUTO: 0 10E3/UL (ref 0–0.2)
BASOPHILS NFR BLD AUTO: 0 %
EOSINOPHIL # BLD AUTO: 0.1 10E3/UL (ref 0–0.7)
EOSINOPHIL NFR BLD AUTO: 1 %
ERYTHROCYTE [DISTWIDTH] IN BLOOD BY AUTOMATED COUNT: 14.6 % (ref 10–15)
HBA1C MFR BLD: 6.3 % (ref 0–5.6)
HCT VFR BLD AUTO: 40.8 % (ref 35–47)
HGB BLD-MCNC: 13.4 G/DL (ref 11.7–15.7)
IMM GRANULOCYTES # BLD: 0 10E3/UL
IMM GRANULOCYTES NFR BLD: 0 %
LYMPHOCYTES # BLD AUTO: 2.6 10E3/UL (ref 0.8–5.3)
LYMPHOCYTES NFR BLD AUTO: 27 %
MCH RBC QN AUTO: 27.7 PG (ref 26.5–33)
MCHC RBC AUTO-ENTMCNC: 32.8 G/DL (ref 31.5–36.5)
MCV RBC AUTO: 85 FL (ref 78–100)
MONOCYTES # BLD AUTO: 0.5 10E3/UL (ref 0–1.3)
MONOCYTES NFR BLD AUTO: 5 %
NEUTROPHILS # BLD AUTO: 6.4 10E3/UL (ref 1.6–8.3)
NEUTROPHILS NFR BLD AUTO: 66 %
PLATELET # BLD AUTO: 408 10E3/UL (ref 150–450)
RBC # BLD AUTO: 4.83 10E6/UL (ref 3.8–5.2)
WBC # BLD AUTO: 9.6 10E3/UL (ref 4–11)

## 2024-04-08 PROCEDURE — 80053 COMPREHEN METABOLIC PANEL: CPT | Performed by: NURSE PRACTITIONER

## 2024-04-08 PROCEDURE — 85025 COMPLETE CBC W/AUTO DIFF WBC: CPT | Performed by: NURSE PRACTITIONER

## 2024-04-08 PROCEDURE — 99396 PREV VISIT EST AGE 40-64: CPT | Performed by: NURSE PRACTITIONER

## 2024-04-08 PROCEDURE — 83036 HEMOGLOBIN GLYCOSYLATED A1C: CPT | Performed by: NURSE PRACTITIONER

## 2024-04-08 PROCEDURE — 99214 OFFICE O/P EST MOD 30 MIN: CPT | Mod: 25 | Performed by: NURSE PRACTITIONER

## 2024-04-08 PROCEDURE — 36415 COLL VENOUS BLD VENIPUNCTURE: CPT | Performed by: NURSE PRACTITIONER

## 2024-04-08 PROCEDURE — 99000 SPECIMEN HANDLING OFFICE-LAB: CPT | Performed by: NURSE PRACTITIONER

## 2024-04-08 PROCEDURE — 84443 ASSAY THYROID STIM HORMONE: CPT | Performed by: NURSE PRACTITIONER

## 2024-04-08 PROCEDURE — 82088 ASSAY OF ALDOSTERONE: CPT | Performed by: NURSE PRACTITIONER

## 2024-04-08 PROCEDURE — 84244 ASSAY OF RENIN: CPT | Mod: 90 | Performed by: NURSE PRACTITIONER

## 2024-04-08 RX ORDER — METOPROLOL SUCCINATE 25 MG/1
25 TABLET, EXTENDED RELEASE ORAL DAILY
Qty: 90 TABLET | Refills: 3 | Status: SHIPPED | OUTPATIENT
Start: 2024-04-08

## 2024-04-08 RX ORDER — HYDROCHLOROTHIAZIDE 25 MG/1
25 TABLET ORAL DAILY
Qty: 90 TABLET | Refills: 3 | Status: SHIPPED | OUTPATIENT
Start: 2024-04-08

## 2024-04-08 RX ORDER — POTASSIUM CHLORIDE 1500 MG/1
20 TABLET, EXTENDED RELEASE ORAL DAILY
Qty: 90 TABLET | Refills: 3 | Status: SHIPPED | OUTPATIENT
Start: 2024-04-08

## 2024-04-08 RX ORDER — LOSARTAN POTASSIUM 50 MG/1
50 TABLET ORAL DAILY
Qty: 90 TABLET | Refills: 3 | Status: SHIPPED | OUTPATIENT
Start: 2024-04-08

## 2024-04-08 RX ORDER — DILTIAZEM HYDROCHLORIDE 240 MG/1
CAPSULE, COATED, EXTENDED RELEASE ORAL
Qty: 90 CAPSULE | Refills: 3 | Status: SHIPPED | OUTPATIENT
Start: 2024-04-08

## 2024-04-08 RX ORDER — OMEPRAZOLE 40 MG/1
CAPSULE, DELAYED RELEASE ORAL
Qty: 90 CAPSULE | Refills: 3 | Status: SHIPPED | OUTPATIENT
Start: 2024-04-08

## 2024-04-08 RX ORDER — LEVOTHYROXINE SODIUM 112 UG/1
112 TABLET ORAL DAILY
Qty: 90 TABLET | Refills: 3 | Status: SHIPPED | OUTPATIENT
Start: 2024-04-08

## 2024-04-08 SDOH — HEALTH STABILITY: PHYSICAL HEALTH: ON AVERAGE, HOW MANY DAYS PER WEEK DO YOU ENGAGE IN MODERATE TO STRENUOUS EXERCISE (LIKE A BRISK WALK)?: 1 DAY

## 2024-04-08 ASSESSMENT — SOCIAL DETERMINANTS OF HEALTH (SDOH): HOW OFTEN DO YOU GET TOGETHER WITH FRIENDS OR RELATIVES?: ONCE A WEEK

## 2024-04-08 ASSESSMENT — PAIN SCALES - GENERAL: PAINLEVEL: NO PAIN (0)

## 2024-04-08 NOTE — PROGRESS NOTES
Preventive Care Visit  [facility] RUSTAM Yu, APRN CNP, Family Medicine  Apr 8, 2024      Assessment & Plan     Screen for colon cancer  Order placed for cologuard.    - COLOGUARD(EXACT SCIENCES); Future    Visit for screening mammogram  Last mammogram 2021. Referral placed for mammogram. Patient to schedule.    - MA SCREENING DIGITAL BILAT - Future  (s+30); Future    HTN, goal below 140/90  Controlled. Continue current medication regimen, in addition to diet and exercise.    - Comprehensive metabolic panel; Future  - Aldosterone; Future  - Renin activity; Future  - Aldosterone Renin Ratio; Future    Acquired hypothyroidism  Controlled. Continue synthroid.     - TSH WITH FREE T4 REFLEX; Future    Routine medical exam  Discussed routine health and wellness including nutrition and activity recommendations. She tries to eat a low sodium diet along with her , but feels that she's been stress eating and has gained some weight recently. Currently does not have an exercise routine. Encouraged her to start by getting out and walking daily, and increase activity with time.     Last mammogram in 2021. Referral placed. Manual breast exam done at this visit with no concerns on physical exam.    Colon cancer screening never done. Order placed for cologuard.    Hysterectomy in 2002. PAP no longer indicated.    Vaccinations offered this visit (pneumococcal, Hep B, COVID) but she declined. Due for Tdap booster. Would like to have this done at her next visit.     Anular area with raised erythematic outer border and central clearing on upper left posterior shoulder measuring approximately 6cm X 4cm. This area is not pruritic. Dermatophytosis vs rheumatoid skin flare. Discussed trying an over the counter antifungal on the area to see if it resolves.     - Hemoglobin A1c; Future  - Lipid panel reflex to direct LDL Fasting; Future  - CBC with Platelets & Differential; Future    Class 2 severe obesity due  "to excess calories with serious comorbidity and body mass index (BMI) of 36.0 to 36.9 in adult (H)  BMI is 36. Discussed the use of wegovy for weight loss, but enforced that this has to be used in conjunction with nutrition and exercise as the goal is to sustain weight loss through lifestyle changes.    - Semaglutide-Weight Management (WEGOVY) 0.25 MG/0.5ML pen; Inject 0.25 mg Subcutaneous once a week    Rheumatoid arthritis involving multiple sites, unspecified whether rheumatoid factor present (H)  Stable.    Essential hypertension  Controlled. See above.    Cough with hemoptysis  Resolved.     Hiatal hernia  Stable. Monitoring per patient.     Gastroesophageal reflux disease without esophagitis  HX of heoptysis- from cough:  Controlled with PPI. Discussed weight loss for additional control of symptoms.     Hypokalemia  Stable. Taking oral replacement. Checking labs at this visit.     EUGENE- had HA's on Prozac, pt. Will self monitor.     Patient has been advised of split billing requirements and indicates understanding: Yes          BMI  Estimated body mass index is 36.81 kg/m  as calculated from the following:    Height as of this encounter: 1.594 m (5' 2.76\").    Weight as of this encounter: 93.5 kg (206 lb 3 oz).   Weight management plan: Discussed healthy diet and exercise guidelines and will initiate wegovy therapy for assisted weight loss.     Counseling  Appropriate preventive services were discussed with this patient, including applicable screening as appropriate for fall prevention, nutrition, physical activity, Tobacco-use cessation, weight loss and cognition.  Checklist reviewing preventive services available has been given to the patient.  Reviewed patient's diet, addressing concerns and/or questions.   She is at risk for lack of exercise and has been provided with information to increase physical activity for the benefit of her well-being.         Opal Garibay is a 49 year old, presenting for the " following:  Physical (Refills needed)        4/8/2024     3:42 PM   Additional Questions   Roomed by Diane Lynne Schoenherr RN        Health Care Directive  Patient does not have a Health Care Directive or Living Will: Discussed advance care planning with patient; however, patient declined at this time.    JAMES Johnson is a 49 year old female who presents to clinic this afternoon for her routine annual physical exam. She reports feeling very healthy physically. She is experiencing a fair amount of stress due to her daughter's cancer diagnosis. She feels that she had a good support system, and is able find ways to relieve stress, if even temporarily. She would like a spot on her back looked at. Unsure if this is a stress response associated with her RA, or possibly a recurrence of ring worm.    + RA- on prn muscle relaxers for pain.   HTN- controlled, has LE edema. Due for labs.   No CP, SOB  + weight gain.   Is starting to exercise.             4/8/2024   General Health   How would you rate your overall physical health? Excellent   Feel stress (tense, anxious, or unable to sleep) To some extent   (!) STRESS CONCERN      4/8/2024   Nutrition   Three or more servings of calcium each day? Yes   Diet: Low salt   How many servings of fruit and vegetables per day? (!) 0-1   How many sweetened beverages each day? (!) 2         4/8/2024   Exercise   Days per week of moderate/strenous exercise 1 day   (!) EXERCISE CONCERN      4/8/2024   Social Factors   Frequency of gathering with friends or relatives Once a week   Worry food won't last until get money to buy more No   Food not last or not have enough money for food? No   Do you have housing?  Yes   Are you worried about losing your housing? No   Lack of transportation? No   Unable to get utilities (heat,electricity)? No         4/8/2024   Dental   Dentist two times every year? Yes            Today's PHQ-2 Score:       4/8/2024     3:42 PM   PHQ-2 ( 1999 [employer])   Q1:  Little interest or pleasure in doing things 0   Q2: Feeling down, depressed or hopeless 1   PHQ-2 Score 1   Q1: Little interest or pleasure in doing things Not at all   Q2: Feeling down, depressed or hopeless Several days   PHQ-2 Score 1           2024   Substance Use   Alcohol more than 3/day or more than 7/wk No   Do you use any other substances recreationally? No     Social History     Tobacco Use    Smoking status: Former     Current packs/day: 0.00     Types: Cigarettes     Quit date: 10/1/1999     Years since quittin.5    Smokeless tobacco: Never   Vaping Use    Vaping status: Never Used   Substance Use Topics    Alcohol use: Not Currently     Alcohol/week: 0.0 standard drinks of alcohol     Comment: nothing in the past 3 months - occ/ 1-2 per month    Drug use: No             2024   Breast Cancer Screening   Family history of breast, colon, or ovarian cancer? No / Unknown      Mammogram Screening - Mammogram every 1-2 years updated in Health Maintenance based on mutual decision making        2024   STI Screening   New sexual partner(s) since last STI/HIV test? No     History of abnormal Pap smear: S/P hysterectomy in . PAP no longer indicated.         2011    12:23 PM   PAP / HPV   PAP (Historical) NIL      ASCVD Risk   The 10-year ASCVD risk score (Yonatan DK, et al., 2019) is: 2.1%    Values used to calculate the score:      Age: 49 years      Sex: Female      Is Non- : No      Diabetic: No      Tobacco smoker: No      Systolic Blood Pressure: 130 mmHg      Is BP treated: Yes      HDL Cholesterol: 33 mg/dL      Total Cholesterol: 161 mg/dL       Reviewed and updated as needed this visit by Provider                    Past Medical History:   Diagnosis Date    Adnexal mass 2017    not seen on 7/10/18 abdominal CT    H/O transfusion of whole blood     with child labor/hysterectomy    Hemoptysis , 2015-mild    last episodes, mild, has had  massive in HX    HTN (hypertension)     Incidental lung nodule, less than or equal to 3mm 11/12    NICHOLAS    Massive hemoptysis 11/12    due to pneumonia    Migraine headache     Tobacco abuse, in remission     quit 2012     Past Surgical History:   Procedure Laterality Date    CHOLECYSTECTOMY, LAPOROSCOPIC  2007    Cholecystectomy, Laparoscopic    COMBINED ESOPHAGOSCOPY, GASTROSCOPY, DUODENOSCOPY (EGD) WITH CO2 INSUFFLATION N/A 11/16/2018    Procedure: Egd;  Surgeon: Richard Aviles MD;  Location: [facility] OR    ESOPHAGOSCOPY, GASTROSCOPY, DUODENOSCOPY (EGD), COMBINED N/A 11/16/2018    Procedure: COMBINED ESOPHAGOSCOPY, GASTROSCOPY, DUODENOSCOPY (EGD), BIOPSY SINGLE OR MULTIPLE;  Surgeon: Richard Aviles MD;  Location: [facility] OR    EXCISE MASS FOOT  11/22/2013    Procedure: EXCISE MASS FOOT;  Right Foot Soft Tissue Mass Excision;  Surgeon: Jose Cruz Garcia DPM;  Location: [facility] OR    EXCISE MASS FOOT Right 7/17/2015    Procedure: EXCISE MASS FOOT;  Surgeon: Pierre Adan DPM;  Location: [facility] OR    HYSTERECTOMY, VAGINAL  2002    LAPAROSCOPIC HERNIORRHAPHY VENTRAL N/A 2/24/2016    Procedure: LAPAROSCOPIC HERNIORRHAPHY VENTRAL;  Surgeon: Lars Dahl MD;  Location: [facility] OR    PANNICULECTOMY  07/28/2017    with liposuction, and hernia revision Dr. Heath    RELEASE TRIGGER FINGER Left 5/4/2018    Procedure: RELEASE TRIGGER FINGER;  Trigger Finger Release Left ring finger ;  Surgeon: Aaron Orta DO;  Location: [facility] OR         Review of Systems  CONSTITUTIONAL: NEGATIVE for fever or chills. Reports small weight gain of approximately 10 pound due to excessive calorie intake  INTEGUMENTARY/SKIN: POSITIVE for spot on her back  EYES: NEGATIVE for vision changes or irritation  ENT/MOUTH: NEGATIVE for ear, mouth and throat problems  RESP: NEGATIVE for significant cough or SOB  BREAST: NEGATIVE for masses, tenderness or discharge  CV: POSITIVE for bilateral lower extremity swelling. NEGATIVE for  "chest pain, palpitations.   GI: NEGATIVE for nausea, abdominal pain, heartburn, or change in bowel habits  : NEGATIVE for frequency, dysuria, or hematuria  MUSCULOSKELETAL: NEGATIVE for significant arthralgias or myalgia  NEURO: NEGATIVE for weakness, dizziness or paresthesias  ENDOCRINE: NEGATIVE for temperature intolerance, skin/hair changes  PSYCHIATRIC: NEGATIVE for changes in mood or affect     Objective    Exam  /84 (BP Location: Left arm, Patient Position: Sitting, Cuff Size: Adult Large)   Pulse 85   Temp 97.9  F (36.6  C) (Oral)   Resp 16   Ht 1.594 m (5' 2.76\")   Wt 93.5 kg (206 lb 3 oz)   LMP  (LMP Unknown)   SpO2 96%   BMI 36.81 kg/m     Estimated body mass index is 36.81 kg/m  as calculated from the following:    Height as of this encounter: 1.594 m (5' 2.76\").    Weight as of this encounter: 93.5 kg (206 lb 3 oz).    Physical Exam  GENERAL: Adult female, slightly emotional when asked about her current stressors. Alert, and oriented.  EYES: Eyes grossly normal to inspection, PERRL and conjunctivae and sclerae normal  HENT: Bilateral ear canals with cerumen noted, TMs normal, nose and mouth without ulcers or lesions  NECK: No adenopathy, no asymmetry, masses, or scars  RESP: Lungs clear to auscultation bilaterally - no rales, rhonchi or wheezes  CV: Regular rate and rhythm, normal S1 S2, no S3 or S4, no murmur, click or rub, no peripheral edema  ABDOMEN: Soft, nontender, no hepatosplenomegaly, no masses and bowel sounds normal  MS: No gross musculoskeletal defects noted. Bilateral lower extremity non-pitting edema.  SKIN: Warm and dry. Anular area with raised erythematic outer border and central clearing on upper left posterior shoulder measuring approximately 6cm X 4cm. Several small rheumatoid nodules on left lateral neck near hair line.   NEURO: Normal strength and tone, mentation intact and speech normal  PSYCH: Appropriate affect. Emotional and teary when discussing her daughter's " cancer diagnosis.       Patient was seen, examined and note reviewed by Keri Yu DNP.   Braulio Lopez RN, DNP student  [employer]      Signed Electronically by: RAÚL Flor CNP

## 2024-04-09 ENCOUNTER — TELEPHONE (OUTPATIENT)
Dept: FAMILY MEDICINE | Facility: CLINIC | Age: 49
End: 2024-04-09
Payer: COMMERCIAL

## 2024-04-09 DIAGNOSIS — F41.1 GAD (GENERALIZED ANXIETY DISORDER): ICD-10-CM

## 2024-04-09 DIAGNOSIS — E66.01 CLASS 2 SEVERE OBESITY DUE TO EXCESS CALORIES WITH SERIOUS COMORBIDITY AND BODY MASS INDEX (BMI) OF 36.0 TO 36.9 IN ADULT (H): Primary | ICD-10-CM

## 2024-04-09 DIAGNOSIS — E66.812 CLASS 2 SEVERE OBESITY DUE TO EXCESS CALORIES WITH SERIOUS COMORBIDITY AND BODY MASS INDEX (BMI) OF 36.0 TO 36.9 IN ADULT (H): Primary | ICD-10-CM

## 2024-04-09 LAB
ALBUMIN SERPL BCG-MCNC: 4.4 G/DL (ref 3.5–5.2)
ALP SERPL-CCNC: 84 U/L (ref 40–150)
ALT SERPL W P-5'-P-CCNC: 20 U/L (ref 0–50)
ANION GAP SERPL CALCULATED.3IONS-SCNC: 12 MMOL/L (ref 7–15)
AST SERPL W P-5'-P-CCNC: 18 U/L (ref 0–45)
BILIRUB SERPL-MCNC: 0.3 MG/DL
BUN SERPL-MCNC: 8.7 MG/DL (ref 6–20)
CALCIUM SERPL-MCNC: 9.6 MG/DL (ref 8.6–10)
CHLORIDE SERPL-SCNC: 98 MMOL/L (ref 98–107)
CREAT SERPL-MCNC: 0.72 MG/DL (ref 0.51–0.95)
DEPRECATED HCO3 PLAS-SCNC: 27 MMOL/L (ref 22–29)
EGFRCR SERPLBLD CKD-EPI 2021: >90 ML/MIN/1.73M2
GLUCOSE SERPL-MCNC: 136 MG/DL (ref 70–99)
POTASSIUM SERPL-SCNC: 3.3 MMOL/L (ref 3.4–5.3)
PROT SERPL-MCNC: 7.3 G/DL (ref 6.4–8.3)
SODIUM SERPL-SCNC: 137 MMOL/L (ref 135–145)
TSH SERPL DL<=0.005 MIU/L-ACNC: 2.28 UIU/ML (ref 0.3–4.2)

## 2024-04-09 NOTE — LETTER
May 21, 2024      Elizabeth Goodson  [address] [address] [place] [id number]        To Whom It May Concern,       I am writing this letter to ask for appeal for GLP-1 inhibitor treatment for the above-named patient,  1975.  Lani has been diagnosed with obesity Class I-II, and has comorbid rheumatoid arthritis and systemic lupus tumidus, and hypertension.    She is working to modify her exercise and her nutrition.  Weight management would benefit her comorbid illnesses and we are asking for appeal to the denial of the GLP-1 inhibitor.          Sincerely,    Electronically signed.    RAÚL Flor CNP

## 2024-04-09 NOTE — RESULT ENCOUNTER NOTE
Prediabetes- noted, stable. Continue weight loss plan and walking/exercise.   Normal blood cell count.  More labs processing.  Sincerely,   Keri Yu, DNP

## 2024-04-09 NOTE — TELEPHONE ENCOUNTER
PA required.   Obtain PA or change medications for   Semaglutide-Weight Management (WEGOVY) 0.25 MG/0.5ML pen         To complete the PA :  1.  Go to [email or web address] and click 'Enter a Key'    2.  Enter the patient's last name and  and the key provided below.       Key: [id number]    3.  Complete the PA form and click 'Send to Plan' for approval.    Please notify the pharmacy when you receive a determination from the plan.

## 2024-04-12 LAB
ALDOST SERPL-MCNC: 7.5 NG/DL (ref 0–31)
RENIN PLAS-CCNC: 13 NG/ML/HR

## 2024-04-15 ENCOUNTER — MYC REFILL (OUTPATIENT)
Dept: FAMILY MEDICINE | Facility: CLINIC | Age: 49
End: 2024-04-15
Payer: COMMERCIAL

## 2024-04-15 DIAGNOSIS — I10 ESSENTIAL HYPERTENSION WITH GOAL BLOOD PRESSURE LESS THAN 140/90: ICD-10-CM

## 2024-04-15 LAB — ALDOST/RENIN PLAS-RTO: 0.6 {RATIO} (ref 0–25)

## 2024-04-16 RX ORDER — LOSARTAN POTASSIUM 50 MG/1
50 TABLET ORAL DAILY
Qty: 90 TABLET | Refills: 3 | OUTPATIENT
Start: 2024-04-16

## 2024-04-21 NOTE — TELEPHONE ENCOUNTER
PA Initiation    Medication: WEGOVY 0.25 MG/0.5ML SC SOAJ  Insurance Company: [employer] ([facility]) - Phone [phone] Fax [phone]  Pharmacy Filling the Rx: [id number] - [place], [place] - [address]  Filling Pharmacy Phone:    Filling Pharmacy Fax:    Start Date: 4/21/2024        Soo Martinez CPOncology Pharmacy [facility] [address] [address]  Office: [phone]  Fax: [phone]  [email or web address]

## 2024-04-21 NOTE — TELEPHONE ENCOUNTER
PRIOR AUTHORIZATION DENIED    Medication: WEGOVY 0.25 MG/0.5ML SC SOAJ  Insurance Company: [employer] ([facility]) - Phone [phone] Fax [phone]  Denial Date: 4/21/2024  Denial Reason(s): The requested medication and/or diagnosis are not a covered benefit and are excluded from coverage  in accordance with the terms and conditions of your plan benefit. Therefore, this request has been  administratively denied.  Appeal Information: Sent appeal information to care team  Patient Notified:                   Soo Martinez CPOncology Pharmacy Liaison     [facility] [place] [address]  Office: [phone]  Fax: [phone]  [email or web address]

## 2024-05-06 DIAGNOSIS — F41.1 GAD (GENERALIZED ANXIETY DISORDER): ICD-10-CM

## 2024-05-10 DIAGNOSIS — F41.1 GAD (GENERALIZED ANXIETY DISORDER): ICD-10-CM

## 2024-05-18 ENCOUNTER — MYC MEDICAL ADVICE (OUTPATIENT)
Dept: FAMILY MEDICINE | Facility: CLINIC | Age: 49
End: 2024-05-18
Payer: COMMERCIAL

## 2024-05-20 NOTE — TELEPHONE ENCOUNTER
Ozempic never prescribed.     Wegovy prescribed on 4/8/24 with diagnosis code Class 2 severe obesity due to excess calories with serious comorbidity and body mass index (BMI) of 36.0 to 36.9 in adult (H) [E66.01, Z68.36]    Wegovy resent on Class 2 severe obesity due to excess calories with serious comorbidity and body mass index (BMI) of 36.0 to 36.9 in adult (H) [E66.01, Z68.36] & Prediabetes [R73.03]    No new PA was submitted or an appeal. Please call pharmacy and see if a PA is needed on this medication or if medication is covered with the added Prediabetes [R73.03] diagnosis.     MARY JO Love, RN  [facility] ~ Registered Nurse  Clinic Triage  May 20, 2024

## 2024-05-20 NOTE — TELEPHONE ENCOUNTER
RN called pharmacy and they stated that it is still denied per insurance at this time.     Call made to PA team at [facility] and they do not see resubmission or appeal. Message sent to Soo that F/U on appeals PA meds was sent per PA team.     Awaiting response.    Dagmar Martinez RN  [facility] - Registered Nurse  Clinic Triage [facility]   May 20, 2024

## 2024-05-20 NOTE — TELEPHONE ENCOUNTER
Retail Pharmacy Prior Authorization Team   Phone: [phone]    Received phone call from Dagmar AMEZQUITA RN at [facility] - states that an appeal was possibly submitted for this medication but they haven't heard any updates. Upon checking patient's chart I don't see that an appeal had been sent to insurance. I informed Dagmar that I would send a message to Soo for an update. Please notify the care team with any updates by routing to the clinic pool.  Thank you

## 2024-05-21 NOTE — TELEPHONE ENCOUNTER
See Prior Authorization encounter from 4/9/24, No appeal was submitted. Prior Auth encounter routed to provider to review and provide next steps.     Closing this encounter.    MARY JO Love, RN  [facility] Registered Nurse  Clinic Triage  May 21, 2024

## 2024-05-21 NOTE — TELEPHONE ENCOUNTER
Medication Appeal Initiation    Medication: WEGOVY 0.25 MG/0.5ML SC SOAJ  Appeal Start Date:  5/21/2024  Insurance Company: [employer] Phone:   Insurance Fax: [phone]  Comments:    Sent to expedite fax for appeal.      Soo Martinez CPhTOncology Pharmacy [facility] [address] [address]  Office: [phone]  Fax: [phone]  [email or web address]

## 2024-05-21 NOTE — TELEPHONE ENCOUNTER
Keri please see note below:    I received a message stating that an appeal was submitted but the status was unknown. Did the clinic staff submit the appeal? I was not provided with a letter of medical necessity so no appeal was submitted by me. If you would like me to submit an appeal, please provide a LOMN. Thank you!     Soo Martinez, CPhTOncology Pharmacy Liaison     Do you want to do appeal letter?    Vicki Alberts CMA ([facility])

## 2024-05-23 NOTE — TELEPHONE ENCOUNTER
Further appeal not recommended. Plan is not covering medication.   Offer patient weight management referral or MTM.   RAÚL Flor CNP

## 2024-05-23 NOTE — TELEPHONE ENCOUNTER
MEDICATION APPEAL DENIED    Medication: WEGOVY 0.25 MG/0.5ML SC SOAJ  Insurance Company: [facility]  Denial Date: 5/23/2024  Denial Reason(s):   Second Level Appeal Information:   Central Prior Authorization Team ONLY: Second level appeals will be managed by the clinic staff and provider. Please contact the [facility] Prior Authorization Team if additional information about the denial is needed.                   Soo Martinez CPhTOncology Pharmacy Liaison     [facility] [address] [address]  Office: [phone]  Fax: [phone]  [email or web address]

## 2024-05-24 RX ORDER — METFORMIN HCL 500 MG
500 TABLET, EXTENDED RELEASE 24 HR ORAL
Qty: 90 TABLET | Refills: 0 | Status: SHIPPED | OUTPATIENT
Start: 2024-05-24 | End: 2024-06-20 | Stop reason: SINTOL

## 2024-05-24 NOTE — TELEPHONE ENCOUNTER
Spoke with pt.   Sent Metformin per pt. Request, and weight mgmt.   Not recommending additional referral- due to not trying many other options.   RAÚL Flor CNP

## 2024-06-17 DIAGNOSIS — M79.7 FIBROMYALGIA: ICD-10-CM

## 2024-06-17 RX ORDER — CYCLOBENZAPRINE HCL 10 MG
10 TABLET ORAL 2 TIMES DAILY PRN
Qty: 45 TABLET | Refills: 0 | Status: SHIPPED | OUTPATIENT
Start: 2024-06-17 | End: 2024-08-15

## 2024-06-18 ENCOUNTER — TELEPHONE (OUTPATIENT)
Dept: FAMILY MEDICINE | Facility: CLINIC | Age: 49
End: 2024-06-18
Payer: COMMERCIAL

## 2024-06-18 NOTE — TELEPHONE ENCOUNTER
Last seen 4/8/24      Insurance denied Wegovy, Ozempic    Placed on metformin and after 3 weeks of stomach pains and diarrhea she stopped medication 6/14. Symptoms resolved.    Insurance will cover Zepbound, would like to discuss this and request RX.      Appointments in Next Year      Jun 20, 2024  8:00 AM  (Arrive by 7:55 AM)  Provider Visit with Yun Vilchis PA-C  [facility] ([facility] ) [phone]            Dagmar Martinez RN  [facility] - Registered Nurse  Clinic Triage [facility]   June 18, 2024

## 2024-06-20 ENCOUNTER — TELEPHONE (OUTPATIENT)
Dept: FAMILY MEDICINE | Facility: CLINIC | Age: 49
End: 2024-06-20

## 2024-06-20 ENCOUNTER — VIRTUAL VISIT (OUTPATIENT)
Dept: FAMILY MEDICINE | Facility: CLINIC | Age: 49
End: 2024-06-20
Payer: COMMERCIAL

## 2024-06-20 DIAGNOSIS — I10 ESSENTIAL HYPERTENSION WITH GOAL BLOOD PRESSURE LESS THAN 140/90: ICD-10-CM

## 2024-06-20 DIAGNOSIS — E66.01 CLASS 2 SEVERE OBESITY DUE TO EXCESS CALORIES WITH SERIOUS COMORBIDITY AND BODY MASS INDEX (BMI) OF 36.0 TO 36.9 IN ADULT (H): ICD-10-CM

## 2024-06-20 DIAGNOSIS — E66.01 CLASS 2 SEVERE OBESITY DUE TO EXCESS CALORIES WITH SERIOUS COMORBIDITY AND BODY MASS INDEX (BMI) OF 36.0 TO 36.9 IN ADULT (H): Primary | ICD-10-CM

## 2024-06-20 DIAGNOSIS — E66.812 CLASS 2 SEVERE OBESITY DUE TO EXCESS CALORIES WITH SERIOUS COMORBIDITY AND BODY MASS INDEX (BMI) OF 36.0 TO 36.9 IN ADULT (H): Primary | ICD-10-CM

## 2024-06-20 DIAGNOSIS — E66.812 CLASS 2 SEVERE OBESITY DUE TO EXCESS CALORIES WITH SERIOUS COMORBIDITY AND BODY MASS INDEX (BMI) OF 36.0 TO 36.9 IN ADULT (H): ICD-10-CM

## 2024-06-20 DIAGNOSIS — R73.03 PREDIABETES: ICD-10-CM

## 2024-06-20 PROCEDURE — 99213 OFFICE O/P EST LOW 20 MIN: CPT | Mod: 95 | Performed by: PHYSICIAN ASSISTANT

## 2024-06-20 NOTE — PROGRESS NOTES
Lani is a 49 year old who is being evaluated via a billable video visit.    How would you like to obtain your AVS? MyChart  If the video visit is dropped, the invitation should be resent by: Text to cell phone: [phone]  Will anyone else be joining your video visit? No      Assessment & Plan     Class 2 severe obesity due to excess calories with serious comorbidity and body mass index (BMI) of 36.0 to 36.9 in adult (H)  See below, also she may message myself or her PCP in 3 weeks and let us know how she is doing we would increase the dosage at this time.  She is aware this low dosage will likely not help her to lose weight  - tirzepatide-Weight Management (ZEPBOUND) 2.5 MG/0.5ML prefilled pen; Inject 0.5 mLs (2.5 mg) Subcutaneous every 7 days    Prediabetes  As above  - tirzepatide-Weight Management (ZEPBOUND) 2.5 MG/0.5ML prefilled pen; Inject 0.5 mLs (2.5 mg) Subcutaneous every 7 days    Essential hypertension with goal blood pressure less than 140/90  As above  - tirzepatide-Weight Management (ZEPBOUND) 2.5 MG/0.5ML prefilled pen; Inject 0.5 mLs (2.5 mg) Subcutaneous every 7 days    Discussed at length with the use of GLP-1 injections for weight loss.  We discussed patient alerts regarding this medication including increasing depression and suicidal ideation, risk of thyroid tumors, and the fact that patients with a family history of medullary thyroid cancer or multiple endocrine neoplasia syndrome should not use these medications.  We also discussed that pancreatitis may occur,  patients with a history of pancreatitis should avoid its use or at least use with extreme caution.  We discussed some potential side effects to be nausea, vomiting, and constipation due its mechanism of action.  If dehydration occurs because of this medication acute kidney injury could occur.  There is a risk for pancreatitis as above, people with elevated triglycerides  should use this medication with caution as well.  There is a  risk for the development of gallbladder disease and of course low blood sugars.  We would start with a lower dosage of the first month and then increase the dosage to efficacy from there.  There is some concerns with muscle wasting with this medication and a reduction in bone density, it is important to eat a diet rich in lean proteins, do weightbearing exercise on a regular basis, and lifts weights for taking his medication.   Opal Garibay is a 49 year old, presenting for the following health issues:  Recheck Medication    HPI     Would like to start zepbound. Was recently on metformin for 3 weeks, but stopped due to diarrhea and headache.     She was seen in April for her physical, Wegovy was prescribed initially though Wegovy and Ozempic were both denied by her insurance for coverage the diagnoses of obesity, hypertension, and prediabetes.  She then tried metformin but was unable to tolerate it due to abdominal pain more so than even the diarrhea.  She did check with insurance and they said they would cover it is that bound for the diagnoses of prediabetes and hypertension.  She is interested in starting this today.  She has no family history of thyroid cancer or multiple endocrine neoplasia, she has had her gallbladder removed does not recall history of pancreatitis or extreme elevated triglycerides.      Review of Systems  As documented above       Objective           Vitals:  No vitals were obtained today due to virtual visit.    Physical Exam   GENERAL: alert and no distress  EYES: Eyes grossly normal to inspection.  No discharge or erythema, or obvious scleral/conjunctival abnormalities.  RESP: No audible wheeze, cough, or visible cyanosis.    SKIN: Visible skin clear. No significant rash, abnormal pigmentation or lesions.  NEURO: Cranial nerves grossly intact.  Mentation and speech appropriate for age.  PSYCH: Appropriate affect, tone, and pace of words    Office Visit on 04/08/2024   Component Date  Value Ref Range Status    TSH 04/08/2024 2.28  0.30 - 4.20 uIU/mL Final    Sodium 04/08/2024 137  135 - 145 mmol/L Final    Reference intervals for this test were updated on 09/26/2023 to more accurately reflect our healthy population. There may be differences in the flagging of prior results with similar values performed with this method. Interpretation of those prior results can be made in the context of the updated reference intervals.     Potassium 04/08/2024 3.3 (L)  3.4 - 5.3 mmol/L Final    Carbon Dioxide (CO2) 04/08/2024 27  22 - 29 mmol/L Final    Anion Gap 04/08/2024 12  7 - 15 mmol/L Final    Urea Nitrogen 04/08/2024 8.7  6.0 - 20.0 mg/dL Final    Creatinine 04/08/2024 0.72  0.51 - 0.95 mg/dL Final    GFR Estimate 04/08/2024 >90  >60 mL/min/1.73m2 Final    Calcium 04/08/2024 9.6  8.6 - 10.0 mg/dL Final    Chloride 04/08/2024 98  98 - 107 mmol/L Final    Glucose 04/08/2024 136 (H)  70 - 99 mg/dL Final    Alkaline Phosphatase 04/08/2024 84  40 - 150 U/L Final    Reference intervals for this test were updated on 11/14/2023 to more accurately reflect our healthy population. There may be differences in the flagging of prior results with similar values performed with this method. Interpretation of those prior results can be made in the context of the updated reference intervals.    AST 04/08/2024 18  0 - 45 U/L Final    Reference intervals for this test were updated on 6/12/2023 to more accurately reflect our healthy population. There may be differences in the flagging of prior results with similar values performed with this method. Interpretation of those prior results can be made in the context of the updated reference intervals.    ALT 04/08/2024 20  0 - 50 U/L Final    Reference intervals for this test were updated on 6/12/2023 to more accurately reflect our healthy population. There may be differences in the flagging of prior results with similar values performed with this method. Interpretation of those  prior results can be made in the context of the updated reference intervals.      Protein Total 2024 7.3  6.4 - 8.3 g/dL Final    Albumin 2024 4.4  3.5 - 5.2 g/dL Final    Bilirubin Total 2024 0.3  <=1.2 mg/dL Final    Hemoglobin A1C 2024 6.3 (H)  0.0 - 5.6 % Final    Normal <5.7%   Prediabetes 5.7-6.4%    Diabetes 6.5% or higher     Note: Adopted from ADA consensus guidelines.    WBC Count 2024 9.6  4.0 - 11.0 10e3/uL Final    RBC Count 2024 4.83  3.80 - 5.20 10e6/uL Final    Hemoglobin 2024 13.4  11.7 - 15.7 g/dL Final    Hematocrit 2024 40.8  35.0 - 47.0 % Final    MCV 2024 85  78 - 100 fL Final    MCH 2024 27.7  26.5 - 33.0 pg Final    MCHC 2024 32.8  31.5 - 36.5 g/dL Final    RDW 2024 14.6  10.0 - 15.0 % Final    Platelet Count 2024 408  150 - 450 10e3/uL Final    % Neutrophils 2024 66  % Final    % Lymphocytes 2024 27  % Final    % Monocytes 2024 5  % Final    % Eosinophils 2024 1  % Final    % Basophils 2024 0  % Final    % Immature Granulocytes 2024 0  % Final    Absolute Neutrophils 2024 6.4  1.6 - 8.3 10e3/uL Final    Absolute Lymphocytes 2024 2.6  0.8 - 5.3 10e3/uL Final    Absolute Monocytes 2024 0.5  0.0 - 1.3 10e3/uL Final    Absolute Eosinophils 2024 0.1  0.0 - 0.7 10e3/uL Final    Absolute Basophils 2024 0.0  0.0 - 0.2 10e3/uL Final    Absolute Immature Granulocytes 2024 0.0  <=0.4 10e3/uL Final    Aldosterone 2024 7.5  0.0 - 31.0 ng/dL Final    Renin Activity 2024 13.0  ng/mL/hr Final    Comment: INTERPRETIVE INFORMATION: Renin Activity    Adult, Normal sodium diet:    Supine ................. 0.2-1.6 ng/mL/hr    Upright ................ 0.5-4.0 ng/mL/hr    Children, Normal sodium diet, Supine:     (1-7 days) ..... 2.0-35.0 ng/mL/hr    Cord blood ............. 4.0-32.0 ng/mL/hr    1-12 mos ............... 2.4-37.0 ng/mL/hr    13  mos-3 yrs ........... 1.7-11.2 ng/mL/hr    4-5 yrs ................ 1.0- 6.5 ng/mL/hr    6-10 yrs ............... 0.5- 5.9 ng/mL/hr    11-15 yrs .............. 0.5- 3.3 ng/mL/hr    Children, normal sodium diet, Upright:    0-3 yrs ................ Not Available    4-5 yrs ................ Less than or equal to 15 ng/mL/hr    6-10 yrs ............... Less than or equal to 17 ng/mL/hr    11-15 yrs .............. Less than or equal to 16 ng/mL/hr    Plasma renin activity measures enzyme ability to convert   angiotensinogen to angiotensin I and is limited by the   availability of angiotensinogen. Plasma renin activity is   not an accurate indicator                            of enzyme activity when   angiotensinogen is decreased.    This test was developed and its performance characteristics   determined by [email or web address]. It has not been cleared or   approved by the US Food and Drug Administration. This test   was performed in a CLIA certified laboratory and is   intended for clinical purposes.  Performed By: [email or web address]  [address] [place] [address]  : Charlie An MD, PhD  CLIA Number: [id number]    Aldosterone Renin Ratio 04/08/2024 0.6  0.0 - 25.0 Final         Video-Visit Details    Type of service:  Video Visit   Originating Location (pt. Location): Home    Distant Location (provider location):  Off-site  Platform used for Video Visit: Yoel  Signed Electronically by: Yun Vilchis PA-C

## 2024-06-20 NOTE — TELEPHONE ENCOUNTER
PA required for  tirzepatide-Weight Management (ZEPBOUND) 2.5 MG/0.5ML prefilled pen         To complete the PA :  1.  Go to [email or web address] and click 'Enter a Key'    2.  Enter the patient's last name and  and the key provided below.       Key: [id number]    3.  Complete the PA form and click 'Send to Plan' for approval.    Please notify the pharmacy when you receive a determination from the plan.

## 2024-06-26 NOTE — TELEPHONE ENCOUNTER
PRIOR AUTHORIZATION DENIED    Medication: tirzepatide-Weight Management (ZEPBOUND) 2.5 MG/0.5ML prefilled pen     Denial Date: 6/26/2024    Denial Rational:  Per insurance, medication is excluded from patient's benefit plan and will not be covered. Review and appeal are not available because of this exclusion.              Appeal Information:  N/A

## 2024-06-26 NOTE — TELEPHONE ENCOUNTER
Central Prior Authorization Team   Phone: [phone]    PA Initiation    Medication: tirzepatide-Weight Management (ZEPBOUND) 2.5 MG/0.5ML prefilled pen   Insurance Company: [employer] ([facility]) - Phone [phone] Fax [phone]  Pharmacy Filling the Rx: [id number] - [place], [place] - [address]  Filling Pharmacy Phone: [phone]  Filling Pharmacy Fax:    Start Date: 6/26/2024

## 2024-06-27 NOTE — TELEPHONE ENCOUNTER
Please contact pt her insurance will not cover zepbound  it is excluded from coverage  Yun Vilchis PA-C

## 2024-06-28 RX ORDER — METFORMIN HCL 500 MG
500 TABLET, EXTENDED RELEASE 24 HR ORAL
Qty: 30 TABLET | Refills: 0 | Status: SHIPPED | OUTPATIENT
Start: 2024-06-28 | End: 2024-08-01

## 2024-06-28 NOTE — TELEPHONE ENCOUNTER
Patient returning call.  Informed patient of documentation per OSMAN Logan as stated below.  Patient stated understanding and will  the medication to try again.    Nadya Stringer RN

## 2024-06-28 NOTE — TELEPHONE ENCOUNTER
Please call patient, yes, that is what she was taking before( same extended release version) and she did not tolerate it well.  Would she like to try it again?  I am not going to be in my inbasket again until July 8th, I will send in a new prescription just in case she would like to re-try it    Yun Vilchis PA-C

## 2024-08-01 DIAGNOSIS — E66.01 CLASS 2 SEVERE OBESITY DUE TO EXCESS CALORIES WITH SERIOUS COMORBIDITY AND BODY MASS INDEX (BMI) OF 36.0 TO 36.9 IN ADULT (H): ICD-10-CM

## 2024-08-01 DIAGNOSIS — E66.812 CLASS 2 SEVERE OBESITY DUE TO EXCESS CALORIES WITH SERIOUS COMORBIDITY AND BODY MASS INDEX (BMI) OF 36.0 TO 36.9 IN ADULT (H): ICD-10-CM

## 2024-08-01 RX ORDER — METFORMIN HCL 500 MG
500 TABLET, EXTENDED RELEASE 24 HR ORAL
Qty: 30 TABLET | Refills: 0 | Status: SHIPPED | OUTPATIENT
Start: 2024-08-01 | End: 2024-09-06

## 2024-08-14 DIAGNOSIS — M79.7 FIBROMYALGIA: ICD-10-CM

## 2024-08-15 RX ORDER — CYCLOBENZAPRINE HCL 10 MG
10 TABLET ORAL 2 TIMES DAILY PRN
Qty: 45 TABLET | Refills: 0 | Status: SHIPPED | OUTPATIENT
Start: 2024-08-15 | End: 2024-10-01

## 2024-09-06 DIAGNOSIS — E66.812 CLASS 2 SEVERE OBESITY DUE TO EXCESS CALORIES WITH SERIOUS COMORBIDITY AND BODY MASS INDEX (BMI) OF 36.0 TO 36.9 IN ADULT (H): ICD-10-CM

## 2024-09-06 DIAGNOSIS — E66.01 CLASS 2 SEVERE OBESITY DUE TO EXCESS CALORIES WITH SERIOUS COMORBIDITY AND BODY MASS INDEX (BMI) OF 36.0 TO 36.9 IN ADULT (H): ICD-10-CM

## 2024-09-06 RX ORDER — METFORMIN HCL 500 MG
500 TABLET, EXTENDED RELEASE 24 HR ORAL
Qty: 90 TABLET | Refills: 1 | Status: SHIPPED | OUTPATIENT
Start: 2024-09-06

## 2024-09-30 DIAGNOSIS — M79.7 FIBROMYALGIA: ICD-10-CM

## 2024-10-01 RX ORDER — CYCLOBENZAPRINE HCL 10 MG
10 TABLET ORAL 2 TIMES DAILY PRN
Qty: 45 TABLET | Refills: 0 | Status: SHIPPED | OUTPATIENT
Start: 2024-10-01

## 2024-11-19 DIAGNOSIS — F41.1 GAD (GENERALIZED ANXIETY DISORDER): ICD-10-CM

## 2024-11-25 DIAGNOSIS — M79.7 FIBROMYALGIA: ICD-10-CM

## 2024-11-26 RX ORDER — CYCLOBENZAPRINE HCL 10 MG
10 TABLET ORAL 2 TIMES DAILY PRN
Qty: 45 TABLET | Refills: 0 | Status: SHIPPED | OUTPATIENT
Start: 2024-11-26

## 2024-12-22 DIAGNOSIS — F41.1 GAD (GENERALIZED ANXIETY DISORDER): ICD-10-CM

## 2025-01-22 DIAGNOSIS — M79.7 FIBROMYALGIA: ICD-10-CM

## 2025-01-22 RX ORDER — CYCLOBENZAPRINE HCL 10 MG
10 TABLET ORAL 2 TIMES DAILY PRN
Qty: 45 TABLET | Refills: 0 | Status: SHIPPED | OUTPATIENT
Start: 2025-01-22

## 2025-02-18 ENCOUNTER — TELEPHONE (OUTPATIENT)
Dept: FAMILY MEDICINE | Facility: CLINIC | Age: 50
End: 2025-02-18
Payer: COMMERCIAL

## 2025-02-18 DIAGNOSIS — E66.01 CLASS 2 SEVERE OBESITY DUE TO EXCESS CALORIES WITH SERIOUS COMORBIDITY AND BODY MASS INDEX (BMI) OF 36.0 TO 36.9 IN ADULT (H): Primary | ICD-10-CM

## 2025-02-18 DIAGNOSIS — R73.03 PREDIABETES: ICD-10-CM

## 2025-02-18 DIAGNOSIS — E66.812 CLASS 2 SEVERE OBESITY DUE TO EXCESS CALORIES WITH SERIOUS COMORBIDITY AND BODY MASS INDEX (BMI) OF 36.0 TO 36.9 IN ADULT (H): Primary | ICD-10-CM

## 2025-02-18 NOTE — TELEPHONE ENCOUNTER
Patient calling to request prescription for Monjouro. She reports her insurance has been denying all other medications that had been prescribed for her previously including zepbound. She reports there is a promotion for Monjouro right now that she can get without insurance. Please advise if prescription can be sent or if you would like a visit to discuss further at this time.     Routing to provider.    Kimber Dyer RN on 2/18/2025 at 12:28 PM

## 2025-02-19 ENCOUNTER — TELEPHONE (OUTPATIENT)
Dept: FAMILY MEDICINE | Facility: CLINIC | Age: 50
End: 2025-02-19
Payer: COMMERCIAL

## 2025-02-19 RX ORDER — TIRZEPATIDE 2.5 MG/.5ML
2.5 INJECTION, SOLUTION SUBCUTANEOUS
Qty: 2 ML | Refills: 0 | Status: SHIPPED | OUTPATIENT
Start: 2025-02-19

## 2025-02-19 NOTE — TELEPHONE ENCOUNTER
PRIOR AUTHORIZATION DENIED    Medication: MOUNJARO 2.5 MG/0.5ML SC SOAJ  Insurance Company: [employer] ([facility]) - Phone [phone] Fax [phone]  Denial Date: 2/19/2025  Denial Reason(s): Only covered for diagnosis of type 2 diabetes with A1C of 6.5% and higher      Appeal Information:       Patient Notified: No

## 2025-02-20 NOTE — TELEPHONE ENCOUNTER
Notify pt.med not covered.   Options for continuing GLP1/GIP agonist in 2025:  Pay out of pocket for Wegovy or Zepbound pens (>$1000/month cash price without savings card)   Zepbound cost with Zepbound savings card (link below to sign up for this): $650/month with card  https://www.enrollment.zepbound.[employer].com/enroll/checkEnrollment  Wegovy cost with Wegovy savings card (link below to sign up for this): $650/month with card   https://www.[employer]/coverage-and-savings/save-on-wegovy.html  Compounded semaglutide (same active ingredient as Wegovy) from [facility] ($230/month for doses of 1mg or less; $370/month for doses higher than 1mg)  This is an available option for as long as Wegovy is on FDA shortage list, Wegovy has been on the list for the last several months with no foreseeable end in sight as of now   Zepbound Vials through [facility] pharmacy - vials only available in 2.5 mg and 5 mg doses  $399/month for 2.5mg vials  $549/month for 5mg vials   $5 per month for administrations supplies (syringe/needles, etc)    RAÚL Flor CNP

## 2025-02-20 NOTE — TELEPHONE ENCOUNTER
Balloon inserted to right coronary artery and middle right coronary artery. Dr. Becerril vs and updated   Reason for Call:  Other call back    Detailed comments: pt calling and stating that the lump on the finger is not as big as it was but she is having more pain in the hand could it have ruptured a little. Please call pt top advise she is set up for surgery 5/4. Thank You Lyubov      Phone Number Patient can be reached at: Cell number on file:    Telephone Information:   Mobile [phone]       Best Time: any     Can we leave a detailed message on this number? YES    Call taken on 4/27/2018 at 2:47 PM by Lyubov Sanford

## 2025-02-20 NOTE — TELEPHONE ENCOUNTER
Patient read [employer] message.   Will close this encounter.    Kimber Dyer RN on 2/20/2025 at 8:59 AM

## 2025-03-09 ENCOUNTER — MYC REFILL (OUTPATIENT)
Dept: FAMILY MEDICINE | Facility: CLINIC | Age: 50
End: 2025-03-09
Payer: COMMERCIAL

## 2025-03-09 DIAGNOSIS — M79.7 FIBROMYALGIA: ICD-10-CM

## 2025-03-10 RX ORDER — CYCLOBENZAPRINE HCL 10 MG
10 TABLET ORAL 2 TIMES DAILY PRN
Qty: 45 TABLET | Refills: 0 | Status: SHIPPED | OUTPATIENT
Start: 2025-03-10

## 2025-03-16 DIAGNOSIS — K44.9 HIATAL HERNIA: ICD-10-CM

## 2025-03-16 DIAGNOSIS — K21.9 GASTROESOPHAGEAL REFLUX DISEASE WITHOUT ESOPHAGITIS: ICD-10-CM

## 2025-03-16 DIAGNOSIS — I10 ESSENTIAL HYPERTENSION: ICD-10-CM

## 2025-03-16 DIAGNOSIS — E03.4 HYPOTHYROIDISM DUE TO ACQUIRED ATROPHY OF THYROID: ICD-10-CM

## 2025-03-16 DIAGNOSIS — Z87.898 HISTORY OF HEMOPTYSIS: ICD-10-CM

## 2025-03-17 DIAGNOSIS — E66.01 CLASS 2 SEVERE OBESITY DUE TO EXCESS CALORIES WITH SERIOUS COMORBIDITY AND BODY MASS INDEX (BMI) OF 36.0 TO 36.9 IN ADULT (H): ICD-10-CM

## 2025-03-17 DIAGNOSIS — E66.812 CLASS 2 SEVERE OBESITY DUE TO EXCESS CALORIES WITH SERIOUS COMORBIDITY AND BODY MASS INDEX (BMI) OF 36.0 TO 36.9 IN ADULT (H): ICD-10-CM

## 2025-03-17 RX ORDER — DILTIAZEM HYDROCHLORIDE 240 MG/1
CAPSULE, COATED, EXTENDED RELEASE ORAL
Qty: 90 CAPSULE | Refills: 0 | Status: SHIPPED | OUTPATIENT
Start: 2025-03-17

## 2025-03-17 RX ORDER — METFORMIN HYDROCHLORIDE 500 MG/1
500 TABLET, EXTENDED RELEASE ORAL
Qty: 90 TABLET | Refills: 1 | Status: SHIPPED | OUTPATIENT
Start: 2025-03-17

## 2025-03-17 RX ORDER — LEVOTHYROXINE SODIUM 112 UG/1
112 TABLET ORAL DAILY
Qty: 90 TABLET | Refills: 0 | Status: SHIPPED | OUTPATIENT
Start: 2025-03-17

## 2025-03-17 RX ORDER — OMEPRAZOLE 40 MG/1
CAPSULE, DELAYED RELEASE ORAL
Qty: 90 CAPSULE | Refills: 0 | Status: SHIPPED | OUTPATIENT
Start: 2025-03-17

## 2025-03-17 NOTE — TELEPHONE ENCOUNTER
Pharmacy request for med not on active med list. Please advise.    metFORMIN (GLUCOPHAGE XR) 500 MG 24 hr tablet

## 2025-04-10 ENCOUNTER — TELEPHONE (OUTPATIENT)
Dept: FAMILY MEDICINE | Facility: CLINIC | Age: 50
End: 2025-04-10
Payer: COMMERCIAL

## 2025-04-10 NOTE — TELEPHONE ENCOUNTER
Patient Quality Outreach    Patient is due for the following:   Hypertension -  Hypertension follow-up visit  Colon Cancer Screening  Breast Cancer Screening - Mammogram  Physical Preventive Adult Physical    Action(s) Taken:   Patient has upcoming appointment, these items will be addressed at that time.    Type of outreach:    Chart review performed, no outreach needed.    Questions for provider review:    None         Beatrice Gonzalez  Chart routed to None.

## 2025-05-06 ENCOUNTER — ANCILLARY PROCEDURE (OUTPATIENT)
Dept: MAMMOGRAPHY | Facility: CLINIC | Age: 50
End: 2025-05-06
Attending: NURSE PRACTITIONER
Payer: COMMERCIAL

## 2025-05-06 DIAGNOSIS — Z12.31 VISIT FOR SCREENING MAMMOGRAM: ICD-10-CM

## 2025-05-27 ENCOUNTER — OFFICE VISIT (OUTPATIENT)
Dept: FAMILY MEDICINE | Facility: CLINIC | Age: 50
End: 2025-05-27
Payer: COMMERCIAL

## 2025-05-27 ENCOUNTER — ORDERS ONLY (AUTO-RELEASED) (OUTPATIENT)
Dept: FAMILY MEDICINE | Facility: CLINIC | Age: 50
End: 2025-05-27

## 2025-05-27 VITALS
DIASTOLIC BLOOD PRESSURE: 80 MMHG | RESPIRATION RATE: 17 BRPM | HEIGHT: 62 IN | BODY MASS INDEX: 35.02 KG/M2 | TEMPERATURE: 98.7 F | SYSTOLIC BLOOD PRESSURE: 136 MMHG | WEIGHT: 190.3 LBS | HEART RATE: 90 BPM | OXYGEN SATURATION: 99 %

## 2025-05-27 DIAGNOSIS — I10 ESSENTIAL HYPERTENSION: ICD-10-CM

## 2025-05-27 DIAGNOSIS — E66.01 CLASS 2 SEVERE OBESITY DUE TO EXCESS CALORIES WITH SERIOUS COMORBIDITY AND BODY MASS INDEX (BMI) OF 36.0 TO 36.9 IN ADULT (H): ICD-10-CM

## 2025-05-27 DIAGNOSIS — I10 ESSENTIAL HYPERTENSION WITH GOAL BLOOD PRESSURE LESS THAN 140/90: ICD-10-CM

## 2025-05-27 DIAGNOSIS — K21.9 GASTROESOPHAGEAL REFLUX DISEASE WITHOUT ESOPHAGITIS: ICD-10-CM

## 2025-05-27 DIAGNOSIS — M05.9 RHEUMATOID ARTHRITIS WITH POSITIVE RHEUMATOID FACTOR, INVOLVING UNSPECIFIED SITE (H): ICD-10-CM

## 2025-05-27 DIAGNOSIS — E03.9 HYPOTHYROIDISM: ICD-10-CM

## 2025-05-27 DIAGNOSIS — Z12.11 SCREEN FOR COLON CANCER: Primary | ICD-10-CM

## 2025-05-27 DIAGNOSIS — Z87.898 HISTORY OF HEMOPTYSIS: ICD-10-CM

## 2025-05-27 DIAGNOSIS — Z00.00 ROUTINE MEDICAL EXAM: ICD-10-CM

## 2025-05-27 DIAGNOSIS — K44.9 HIATAL HERNIA: ICD-10-CM

## 2025-05-27 DIAGNOSIS — E87.6 HYPOKALEMIA: ICD-10-CM

## 2025-05-27 DIAGNOSIS — I10 HTN, GOAL BELOW 140/90: ICD-10-CM

## 2025-05-27 DIAGNOSIS — E66.812 CLASS 2 SEVERE OBESITY DUE TO EXCESS CALORIES WITH SERIOUS COMORBIDITY AND BODY MASS INDEX (BMI) OF 36.0 TO 36.9 IN ADULT (H): ICD-10-CM

## 2025-05-27 DIAGNOSIS — Z12.11 SCREEN FOR COLON CANCER: ICD-10-CM

## 2025-05-27 DIAGNOSIS — M79.7 FIBROMYALGIA: ICD-10-CM

## 2025-05-27 DIAGNOSIS — E03.4 HYPOTHYROIDISM DUE TO ACQUIRED ATROPHY OF THYROID: ICD-10-CM

## 2025-05-27 LAB
BASOPHILS # BLD AUTO: 0 10E3/UL (ref 0–0.2)
BASOPHILS NFR BLD AUTO: 0 %
EOSINOPHIL # BLD AUTO: 0.2 10E3/UL (ref 0–0.7)
EOSINOPHIL NFR BLD AUTO: 2 %
ERYTHROCYTE [DISTWIDTH] IN BLOOD BY AUTOMATED COUNT: 14.7 % (ref 10–15)
EST. AVERAGE GLUCOSE BLD GHB EST-MCNC: 120 MG/DL
HBA1C MFR BLD: 5.8 % (ref 0–5.6)
HCT VFR BLD AUTO: 38.3 % (ref 35–47)
HGB BLD-MCNC: 12.5 G/DL (ref 11.7–15.7)
IMM GRANULOCYTES # BLD: 0 10E3/UL
IMM GRANULOCYTES NFR BLD: 0 %
LYMPHOCYTES # BLD AUTO: 3.7 10E3/UL (ref 0.8–5.3)
LYMPHOCYTES NFR BLD AUTO: 38 %
MCH RBC QN AUTO: 27.8 PG (ref 26.5–33)
MCHC RBC AUTO-ENTMCNC: 32.6 G/DL (ref 31.5–36.5)
MCV RBC AUTO: 85 FL (ref 78–100)
MONOCYTES # BLD AUTO: 0.6 10E3/UL (ref 0–1.3)
MONOCYTES NFR BLD AUTO: 6 %
NEUTROPHILS # BLD AUTO: 5.2 10E3/UL (ref 1.6–8.3)
NEUTROPHILS NFR BLD AUTO: 53 %
PLATELET # BLD AUTO: 435 10E3/UL (ref 150–450)
RBC # BLD AUTO: 4.5 10E6/UL (ref 3.8–5.2)
WBC # BLD AUTO: 9.8 10E3/UL (ref 4–11)

## 2025-05-27 PROCEDURE — 80053 COMPREHEN METABOLIC PANEL: CPT | Performed by: NURSE PRACTITIONER

## 2025-05-27 PROCEDURE — 84443 ASSAY THYROID STIM HORMONE: CPT | Performed by: NURSE PRACTITIONER

## 2025-05-27 PROCEDURE — 83036 HEMOGLOBIN GLYCOSYLATED A1C: CPT | Performed by: NURSE PRACTITIONER

## 2025-05-27 PROCEDURE — 85025 COMPLETE CBC W/AUTO DIFF WBC: CPT | Performed by: NURSE PRACTITIONER

## 2025-05-27 PROCEDURE — 80061 LIPID PANEL: CPT | Performed by: NURSE PRACTITIONER

## 2025-05-27 PROCEDURE — 36415 COLL VENOUS BLD VENIPUNCTURE: CPT | Performed by: NURSE PRACTITIONER

## 2025-05-27 RX ORDER — POTASSIUM CHLORIDE 1500 MG/1
20 TABLET, EXTENDED RELEASE ORAL DAILY
Qty: 90 TABLET | Refills: 3 | Status: SHIPPED | OUTPATIENT
Start: 2025-05-27

## 2025-05-27 RX ORDER — HYDROCHLOROTHIAZIDE 25 MG/1
25 TABLET ORAL DAILY
Qty: 90 TABLET | Refills: 3 | Status: SHIPPED | OUTPATIENT
Start: 2025-05-27

## 2025-05-27 RX ORDER — METOPROLOL SUCCINATE 25 MG/1
25 TABLET, EXTENDED RELEASE ORAL DAILY
Qty: 90 TABLET | Refills: 3 | Status: SHIPPED | OUTPATIENT
Start: 2025-05-27

## 2025-05-27 RX ORDER — LOSARTAN POTASSIUM 50 MG/1
50 TABLET ORAL DAILY
Qty: 90 TABLET | Refills: 3 | Status: SHIPPED | OUTPATIENT
Start: 2025-05-27

## 2025-05-27 RX ORDER — OMEPRAZOLE 40 MG/1
CAPSULE, DELAYED RELEASE ORAL
Qty: 90 CAPSULE | Refills: 3 | Status: SHIPPED | OUTPATIENT
Start: 2025-05-27

## 2025-05-27 RX ORDER — CYCLOBENZAPRINE HCL 10 MG
10 TABLET ORAL
Qty: 90 TABLET | Refills: 1 | Status: SHIPPED | OUTPATIENT
Start: 2025-05-27

## 2025-05-27 RX ORDER — LEVOTHYROXINE SODIUM 112 UG/1
112 TABLET ORAL DAILY
Qty: 90 TABLET | Refills: 3 | Status: SHIPPED | OUTPATIENT
Start: 2025-05-27

## 2025-05-27 ASSESSMENT — PAIN SCALES - GENERAL: PAINLEVEL_OUTOF10: NO PAIN (0)

## 2025-05-27 NOTE — NURSING NOTE
Prior to immunization administration, verified patients identity using patient s name and date of birth. Please see Immunization Activity for additional information.     Screening Questionnaire for Adult Immunization    Are you sick today?   No   Do you have allergies to medications, food, a vaccine component or latex?   Yes   Have you ever had a serious reaction after receiving a vaccination?   No   Do you have a long-term health problem with heart, lung, kidney, or metabolic disease (e.g., diabetes), asthma, a blood disorder, no spleen, complement component deficiency, a cochlear implant, or a spinal fluid leak?  Are you on long-term aspirin therapy?   No   Do you have cancer, leukemia, HIV/AIDS, or any other immune system problem?   No   Do you have a parent, brother, or sister with an immune system problem?   No   In the past 3 months, have you taken medications that affect  your immune system, such as prednisone, other steroids, or anticancer drugs; drugs for the treatment of rheumatoid arthritis, Crohn s disease, or psoriasis; or have you had radiation treatments?   No   Have you had a seizure, or a brain or other nervous system problem?   No   During the past year, have you received a transfusion of blood or blood    products, or been given immune (gamma) globulin or antiviral drug?   No   For women: Are you pregnant or is there a chance you could become       pregnant during the next month?   No   Have you received any vaccinations in the past 4 weeks?   No     Immunization questionnaire was positive for at least one answer.  Notified provider.      Patient instructed to remain in clinic for 15 minutes afterwards, and to report any adverse reactions.     Screening performed by Vicki Alberts CMA on 5/27/2025 at 4:53 PM.

## 2025-05-27 NOTE — PROGRESS NOTES
Preventive Care Visit  [facility] RAÚL Spencer CNP, Family Medicine  May 27, 2025  {Provider  Link to SmartSet :399691}    {PROVIDER CHARTING PREFERENCE:468760}    Opal Garibay is a 50 year old, presenting for the following:  Physical        5/27/2025     4:09 PM   Additional Questions   Roomed by Marlene LUJAN   Accompanied by None         5/27/2025     4:09 PM   Patient Reported Additional Medications   Patient reports taking the following new medications NA        HPI  ***   {MA/LPN/RN Pre-Provider Visit Orders- hCG/UA/Strep (Optional):281775}  {SUPERLIST (Optional):603843}  {additonal problems for provider to add (Optional):976127}    Advance Care Planning  {The storyboard will display whether the patient has ACP docs on file. Hover over the Code section in the storyboard to access the ACP documents. :457633}  Discussed advance care planning with patient; however, patient declined at this time.        4/8/2024   General Health   How would you rate your overall physical health? Excellent   Feel stress (tense, anxious, or unable to sleep) To some extent         4/8/2024   Nutrition   Three or more servings of calcium each day? Yes   Diet: Low salt   How many servings of fruit and vegetables per day? (!) 0-1   How many sweetened beverages each day? (!) 2         4/8/2024   Exercise   Days per week of moderate/strenous exercise 1 day         4/8/2024   Social Factors   Frequency of gathering with friends or relatives Once a week   Worry food won't last until get money to buy more No   Food not last or not have enough money for food? No   Do you have housing? (Housing is defined as stable permanent housing and does not include staying outside in a car, in a tent, in an abandoned building, in an overnight shelter, or couch-surfing.) Yes   Are you worried about losing your housing? No   Lack of transportation? No   Unable to get utilities (heat,electricity)? No         4/8/2024   Dental    Dentist two times every year? Yes       {Rooming Staff Patient needs a PHQ as part of the AWV.  Use this link to complete and then refresh the note to pull results Link to PHQ2 Assessment :701267}    Today's PHQ-2 Score:       2025     4:16 PM   PHQ-2 (  [employer])   Q1: Little interest or pleasure in doing things 0   Q2: Feeling down, depressed or hopeless 0   PHQ-2 Score 0         2024   Substance Use   Alcohol more than 3/day or more than 7/wk No   Do you use any other substances recreationally? No     Social History     Tobacco Use    Smoking status: Former     Current packs/day: 0.00     Types: Cigarettes     Quit date: 10/1/1999     Years since quittin.6    Smokeless tobacco: Never   Vaping Use    Vaping status: Never Used   Substance Use Topics    Alcohol use: Not Currently     Comment: nothing in the past 3 months - occ/ 1-2 per month    Drug use: No     {Provider  If there are gaps in the social history shown above, please follow the link to update and then refresh the note Link to Social and Substance History :050703}     {Mammogram Decision Support (Optional):013929}      History of abnormal Pap smear: { :958664}        2011    12:23 PM   PAP / HPV   PAP (Historical) NIL      ASCVD Risk   The 10-year ASCVD risk score (Yonatan HEADLEY, et al., 2019) is: 2.5%    Values used to calculate the score:      Age: 50 years      Sex: Female      Is Non- : No      Diabetic: No      Tobacco smoker: No      Systolic Blood Pressure: 136 mmHg      Is BP treated: Yes      HDL Cholesterol: 33 mg/dL      Total Cholesterol: 161 mg/dL    {Provider  REQUIRED FOR AWV Use the storyboard to review patient history, after sections have been marked as reviewed, refresh note to capture documentation:962312}   Reviewed and updated as needed this visit by Provider                    {HISTORY OPTIONS (Optional):502325}    {ROS Picklists (Optional):508597}     Objective    Exam  BP (!)  "136/90   Pulse 93   Temp 98.7  F (37.1  C) (Temporal)   Resp 17   Ht 1.585 m (5' 2.4\")   Wt 86.3 kg (190 lb 4.8 oz)   LMP  (LMP Unknown)   SpO2 99%   BMI 34.36 kg/m     Estimated body mass index is 34.36 kg/m  as calculated from the following:    Height as of this encounter: 1.585 m (5' 2.4\").    Weight as of this encounter: 86.3 kg (190 lb 4.8 oz).    Physical Exam  {Exam Choices (Optional):242229}        Signed Electronically by: RAÚL Flor CNP  {Email feedback regarding this note to [email or web address]   :683647}  "

## 2025-05-28 ENCOUNTER — MYC MEDICAL ADVICE (OUTPATIENT)
Dept: FAMILY MEDICINE | Facility: CLINIC | Age: 50
End: 2025-05-28
Payer: COMMERCIAL

## 2025-05-28 LAB
ALBUMIN SERPL BCG-MCNC: 4.5 G/DL (ref 3.5–5.2)
ALP SERPL-CCNC: 80 U/L (ref 40–150)
ALT SERPL W P-5'-P-CCNC: 15 U/L (ref 0–50)
ANION GAP SERPL CALCULATED.3IONS-SCNC: 17 MMOL/L (ref 7–15)
AST SERPL W P-5'-P-CCNC: 20 U/L (ref 0–45)
BILIRUB SERPL-MCNC: <0.2 MG/DL
BUN SERPL-MCNC: 10.4 MG/DL (ref 6–20)
CALCIUM SERPL-MCNC: 9.8 MG/DL (ref 8.8–10.4)
CHLORIDE SERPL-SCNC: 97 MMOL/L (ref 98–107)
CHOLEST SERPL-MCNC: 174 MG/DL
CREAT SERPL-MCNC: 0.72 MG/DL (ref 0.51–0.95)
EGFRCR SERPLBLD CKD-EPI 2021: >90 ML/MIN/1.73M2
FASTING STATUS PATIENT QL REPORTED: NO
FASTING STATUS PATIENT QL REPORTED: NO
GLUCOSE SERPL-MCNC: 98 MG/DL (ref 70–99)
HCO3 SERPL-SCNC: 24 MMOL/L (ref 22–29)
HDLC SERPL-MCNC: 33 MG/DL
LDLC SERPL CALC-MCNC: 105 MG/DL
NONHDLC SERPL-MCNC: 141 MG/DL
POTASSIUM SERPL-SCNC: 3.4 MMOL/L (ref 3.4–5.3)
PROT SERPL-MCNC: 7.1 G/DL (ref 6.4–8.3)
SODIUM SERPL-SCNC: 138 MMOL/L (ref 135–145)
TRIGL SERPL-MCNC: 182 MG/DL
TSH SERPL DL<=0.005 MIU/L-ACNC: 1.58 UIU/ML (ref 0.3–4.2)

## 2025-05-29 ENCOUNTER — RESULTS FOLLOW-UP (OUTPATIENT)
Dept: FAMILY MEDICINE | Facility: CLINIC | Age: 50
End: 2025-05-29

## 2025-05-29 NOTE — TELEPHONE ENCOUNTER
Last seen 5/27/25    Is this from glp1 medicine? I was going to start taking it again. Regarding Anion gap      Stated you will review labs and answer questions in result note.    Dagmar Martinez RN  [facility] - Registered Nurse  Clinic Triage [place]   May 29, 2025

## 2025-06-25 DIAGNOSIS — I10 ESSENTIAL HYPERTENSION: ICD-10-CM

## 2025-06-25 RX ORDER — DILTIAZEM HYDROCHLORIDE 240 MG/1
240 CAPSULE, COATED, EXTENDED RELEASE ORAL DAILY
Qty: 90 CAPSULE | Refills: 2 | Status: SHIPPED | OUTPATIENT
Start: 2025-06-25

## 2025-07-12 ENCOUNTER — HEALTH MAINTENANCE LETTER (OUTPATIENT)
Age: 50
End: 2025-07-12

## 2025-09-03 ENCOUNTER — OFFICE VISIT (OUTPATIENT)
Dept: FAMILY MEDICINE | Facility: CLINIC | Age: 50
End: 2025-09-03
Payer: COMMERCIAL

## 2025-09-03 ENCOUNTER — ANCILLARY PROCEDURE (OUTPATIENT)
Dept: GENERAL RADIOLOGY | Facility: CLINIC | Age: 50
End: 2025-09-03
Attending: FAMILY MEDICINE
Payer: COMMERCIAL

## 2025-09-03 VITALS
RESPIRATION RATE: 18 BRPM | BODY MASS INDEX: 35.51 KG/M2 | HEART RATE: 87 BPM | TEMPERATURE: 97.8 F | HEIGHT: 62 IN | DIASTOLIC BLOOD PRESSURE: 80 MMHG | WEIGHT: 193 LBS | OXYGEN SATURATION: 97 % | SYSTOLIC BLOOD PRESSURE: 114 MMHG

## 2025-09-03 DIAGNOSIS — M53.3 COCCYDYNIA: Primary | ICD-10-CM

## 2025-09-03 DIAGNOSIS — M53.3 COCCYDYNIA: ICD-10-CM

## 2025-09-03 DIAGNOSIS — Z12.11 SPECIAL SCREENING FOR MALIGNANT NEOPLASMS, COLON: ICD-10-CM

## 2025-09-03 DIAGNOSIS — Z87.898 HX OF DIARRHEA: ICD-10-CM

## 2025-09-03 LAB — ERYTHROCYTE [SEDIMENTATION RATE] IN BLOOD BY WESTERGREN METHOD: 8 MM/HR (ref 0–20)

## 2025-09-03 PROCEDURE — 72220 X-RAY EXAM SACRUM TAILBONE: CPT | Mod: TC | Performed by: RADIOLOGY

## 2025-09-03 PROCEDURE — 3074F SYST BP LT 130 MM HG: CPT | Performed by: FAMILY MEDICINE

## 2025-09-03 PROCEDURE — 3079F DIAST BP 80-89 MM HG: CPT | Performed by: FAMILY MEDICINE

## 2025-09-03 PROCEDURE — 36415 COLL VENOUS BLD VENIPUNCTURE: CPT | Performed by: FAMILY MEDICINE

## 2025-09-03 PROCEDURE — 85652 RBC SED RATE AUTOMATED: CPT | Performed by: FAMILY MEDICINE

## 2025-09-03 PROCEDURE — 99214 OFFICE O/P EST MOD 30 MIN: CPT | Performed by: FAMILY MEDICINE

## 2025-09-03 PROCEDURE — G2211 COMPLEX E/M VISIT ADD ON: HCPCS | Performed by: FAMILY MEDICINE

## 2025-09-03 PROCEDURE — 86140 C-REACTIVE PROTEIN: CPT | Performed by: FAMILY MEDICINE

## 2025-09-03 PROCEDURE — 1126F AMNT PAIN NOTED NONE PRSNT: CPT | Performed by: FAMILY MEDICINE

## 2025-09-03 ASSESSMENT — PAIN SCALES - GENERAL: PAINLEVEL_OUTOF10: NO PAIN (0)

## 2025-09-04 LAB — CRP SERPL-MCNC: <3 MG/L

## (undated) DEVICE — PACK HAND WRIST FOREARM CUSTOM

## (undated) DEVICE — SOL WATER IRRIG 1000ML BOTTLE 07139-09

## (undated) DEVICE — BASIN SET MINOR DISP

## (undated) DEVICE — GLOVE PROTEXIS W/NEU-THERA 7.5  2D73TE75

## (undated) DEVICE — GLOVE PROTEXIS BLUE W/NEU-THERA 8.0  2D73EB80

## (undated) DEVICE — GOWN IMPERVIOUS SPECIALTY XL/XLONG 39049

## (undated) DEVICE — BNDG ESMARK 4" STERILE

## (undated) DEVICE — SYR EAR BULB 2OZ

## (undated) DEVICE — SOL ISOPROPYL ALCOHOL USP 70% 16OZ  NDC10565-002-16 D0022

## (undated) RX ORDER — SIMETHICONE 40MG/0.6ML
SUSPENSION, DROPS(FINAL DOSAGE FORM)(ML) ORAL
Status: DISPENSED
Start: 2018-11-16

## (undated) RX ORDER — ONDANSETRON 2 MG/ML
INJECTION INTRAMUSCULAR; INTRAVENOUS
Status: DISPENSED
Start: 2018-11-16

## (undated) RX ORDER — FENTANYL CITRATE 50 UG/ML
INJECTION, SOLUTION INTRAMUSCULAR; INTRAVENOUS
Status: DISPENSED
Start: 2018-11-16

## (undated) RX ORDER — DIPHENHYDRAMINE HYDROCHLORIDE 50 MG/ML
INJECTION INTRAMUSCULAR; INTRAVENOUS
Status: DISPENSED
Start: 2018-11-16

## (undated) RX ORDER — BUPIVACAINE HYDROCHLORIDE 2.5 MG/ML
INJECTION, SOLUTION EPIDURAL; INFILTRATION; INTRACAUDAL
Status: DISPENSED
Start: 2018-05-04